# Patient Record
Sex: MALE | Race: WHITE | NOT HISPANIC OR LATINO | Employment: OTHER | URBAN - METROPOLITAN AREA
[De-identification: names, ages, dates, MRNs, and addresses within clinical notes are randomized per-mention and may not be internally consistent; named-entity substitution may affect disease eponyms.]

---

## 2023-01-22 PROBLEM — R35.1 NOCTURIA: Status: ACTIVE | Noted: 2023-01-22
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
## 2023-01-22 PROBLEM — Z98.890 H/O AORTIC ARCH REPAIR: Status: ACTIVE | Noted: 2023-01-22

## 2023-01-22 PROBLEM — G47.33 OBSTRUCTIVE SLEEP APNEA: Status: ACTIVE | Noted: 2023-01-22

## 2023-01-22 PROBLEM — C61 PROSTATE CA (MULTI): Status: ACTIVE | Noted: 2023-01-22

## 2023-01-22 PROBLEM — R60.9 EDEMA: Status: ACTIVE | Noted: 2023-01-22

## 2023-01-22 PROBLEM — R06.00 DYSPNEA: Status: ACTIVE | Noted: 2023-01-22

## 2023-01-22 PROBLEM — E78.5 HYPERLIPIDEMIA: Status: ACTIVE | Noted: 2023-01-22

## 2023-01-22 PROBLEM — E78.2 MIXED HYPERLIPIDEMIA: Status: ACTIVE | Noted: 2023-01-22

## 2023-01-22 PROBLEM — R07.81 ANTERIOR PLEURITIC PAIN: Status: ACTIVE | Noted: 2023-01-22

## 2023-01-22 PROBLEM — R94.31 ABNORMAL EKG: Status: ACTIVE | Noted: 2023-01-22

## 2023-01-22 PROBLEM — M65.332 TRIGGER MIDDLE FINGER OF LEFT HAND: Status: ACTIVE | Noted: 2023-01-22

## 2023-01-22 PROBLEM — I34.0 NON-RHEUMATIC MITRAL REGURGITATION: Status: ACTIVE | Noted: 2023-01-22

## 2023-01-22 PROBLEM — I10 HYPERTENSION: Status: ACTIVE | Noted: 2023-01-22

## 2023-01-22 PROBLEM — D17.1 LIPOMA OF BACK: Status: ACTIVE | Noted: 2023-01-22

## 2023-01-22 PROBLEM — I25.10 ATHEROSCLEROSIS OF CORONARY ARTERY: Status: ACTIVE | Noted: 2023-01-22

## 2023-01-22 PROBLEM — R97.20 ELEVATED PSA: Status: ACTIVE | Noted: 2023-01-22

## 2023-01-22 PROBLEM — I71.21 ASCENDING AORTIC ANEURYSM (CMS-HCC): Status: ACTIVE | Noted: 2023-01-22

## 2023-01-22 RX ORDER — LOSARTAN POTASSIUM 50 MG/1
1 TABLET ORAL ONCE
COMMUNITY
Start: 2021-08-03 | End: 2023-09-27 | Stop reason: SDUPTHER

## 2023-01-22 RX ORDER — ROSUVASTATIN CALCIUM 40 MG/1
1 TABLET, COATED ORAL ONCE
COMMUNITY
Start: 2019-02-19 | End: 2023-08-31 | Stop reason: SDUPTHER

## 2023-01-22 RX ORDER — MULTIVITAMIN WITH MINERALS
1 TABLET ORAL ONCE
COMMUNITY
Start: 2016-01-18 | End: 2023-09-27 | Stop reason: WASHOUT

## 2023-01-22 RX ORDER — EZETIMIBE 10 MG/1
1 TABLET ORAL ONCE
COMMUNITY
Start: 2022-03-17 | End: 2023-04-17

## 2023-01-22 RX ORDER — NAPROXEN SODIUM 220 MG/1
81 TABLET, FILM COATED ORAL
COMMUNITY
Start: 2016-01-18 | End: 2023-12-08 | Stop reason: SDUPTHER

## 2023-01-22 RX ORDER — TADALAFIL 20 MG/1
TABLET ORAL
COMMUNITY
Start: 2021-07-27 | End: 2023-09-27 | Stop reason: WASHOUT

## 2023-01-22 RX ORDER — LOSARTAN POTASSIUM 25 MG/1
1 TABLET ORAL ONCE
COMMUNITY
Start: 2022-02-04 | End: 2023-09-27 | Stop reason: WASHOUT

## 2023-01-22 RX ORDER — METOPROLOL TARTRATE 50 MG/1
TABLET ORAL
COMMUNITY
Start: 2021-01-10 | End: 2023-04-10 | Stop reason: SDUPTHER

## 2023-01-22 RX ORDER — AMLODIPINE BESYLATE 10 MG/1
1 TABLET ORAL ONCE
COMMUNITY
Start: 2021-07-27 | End: 2023-06-08

## 2023-03-08 ENCOUNTER — OFFICE VISIT (OUTPATIENT)
Dept: PRIMARY CARE | Facility: CLINIC | Age: 68
End: 2023-03-08
Payer: MEDICARE

## 2023-03-08 VITALS
TEMPERATURE: 98.7 F | BODY MASS INDEX: 32.54 KG/M2 | HEART RATE: 55 BPM | SYSTOLIC BLOOD PRESSURE: 141 MMHG | OXYGEN SATURATION: 94 % | DIASTOLIC BLOOD PRESSURE: 55 MMHG | WEIGHT: 214 LBS

## 2023-03-08 DIAGNOSIS — Z12.11 COLON CANCER SCREENING: ICD-10-CM

## 2023-03-08 DIAGNOSIS — E78.2 MIXED HYPERLIPIDEMIA: ICD-10-CM

## 2023-03-08 DIAGNOSIS — G47.33 OBSTRUCTIVE SLEEP APNEA: ICD-10-CM

## 2023-03-08 DIAGNOSIS — E66.1 CLASS 1 DRUG-INDUCED OBESITY WITH SERIOUS COMORBIDITY AND BODY MASS INDEX (BMI) OF 32.0 TO 32.9 IN ADULT: ICD-10-CM

## 2023-03-08 DIAGNOSIS — I10 HYPERTENSION, UNSPECIFIED TYPE: ICD-10-CM

## 2023-03-08 DIAGNOSIS — C61 PROSTATE CA (MULTI): Primary | ICD-10-CM

## 2023-03-08 DIAGNOSIS — Z98.890 H/O AORTIC ARCH REPAIR: ICD-10-CM

## 2023-03-08 DIAGNOSIS — M17.0 OSTEOARTHRITIS OF BOTH LOWER LEGS: ICD-10-CM

## 2023-03-08 PROBLEM — E66.811 CLASS 1 DRUG-INDUCED OBESITY WITH SERIOUS COMORBIDITY AND BODY MASS INDEX (BMI) OF 32.0 TO 32.9 IN ADULT: Status: ACTIVE | Noted: 2023-03-08

## 2023-03-08 PROBLEM — D17.1 LIPOMA OF BACK: Status: RESOLVED | Noted: 2023-01-22 | Resolved: 2023-03-08

## 2023-03-08 PROBLEM — M65.332 TRIGGER MIDDLE FINGER OF LEFT HAND: Status: RESOLVED | Noted: 2023-01-22 | Resolved: 2023-03-08

## 2023-03-08 PROBLEM — R60.9 EDEMA: Status: RESOLVED | Noted: 2023-01-22 | Resolved: 2023-03-08

## 2023-03-08 PROCEDURE — 3008F BODY MASS INDEX DOCD: CPT | Performed by: STUDENT IN AN ORGANIZED HEALTH CARE EDUCATION/TRAINING PROGRAM

## 2023-03-08 PROCEDURE — 1159F MED LIST DOCD IN RCRD: CPT | Performed by: STUDENT IN AN ORGANIZED HEALTH CARE EDUCATION/TRAINING PROGRAM

## 2023-03-08 PROCEDURE — 99214 OFFICE O/P EST MOD 30 MIN: CPT | Performed by: STUDENT IN AN ORGANIZED HEALTH CARE EDUCATION/TRAINING PROGRAM

## 2023-03-08 PROCEDURE — 3077F SYST BP >= 140 MM HG: CPT | Performed by: STUDENT IN AN ORGANIZED HEALTH CARE EDUCATION/TRAINING PROGRAM

## 2023-03-08 PROCEDURE — 3078F DIAST BP <80 MM HG: CPT | Performed by: STUDENT IN AN ORGANIZED HEALTH CARE EDUCATION/TRAINING PROGRAM

## 2023-03-08 PROCEDURE — 1160F RVW MEDS BY RX/DR IN RCRD: CPT | Performed by: STUDENT IN AN ORGANIZED HEALTH CARE EDUCATION/TRAINING PROGRAM

## 2023-03-08 RX ORDER — ACETAMINOPHEN 325 MG/1
TABLET ORAL
COMMUNITY
Start: 2022-10-28 | End: 2023-03-08 | Stop reason: WASHOUT

## 2023-03-08 RX ORDER — PREDNISONE 5 MG/1
1 TABLET ORAL DAILY
COMMUNITY
Start: 2022-06-13 | End: 2023-03-08 | Stop reason: WASHOUT

## 2023-03-08 RX ORDER — IBUPROFEN 600 MG/1
600 TABLET ORAL EVERY 6 HOURS
COMMUNITY
Start: 2022-10-28 | End: 2023-03-08 | Stop reason: WASHOUT

## 2023-03-08 RX ORDER — AZITHROMYCIN 250 MG/1
TABLET, FILM COATED ORAL
COMMUNITY
Start: 2022-09-23 | End: 2023-03-08 | Stop reason: WASHOUT

## 2023-03-08 SDOH — HEALTH STABILITY: PHYSICAL HEALTH: ON AVERAGE, HOW MANY MINUTES DO YOU ENGAGE IN EXERCISE AT THIS LEVEL?: 30 MIN

## 2023-03-08 SDOH — HEALTH STABILITY: PHYSICAL HEALTH: ON AVERAGE, HOW MANY DAYS PER WEEK DO YOU ENGAGE IN MODERATE TO STRENUOUS EXERCISE (LIKE A BRISK WALK)?: 1 DAY

## 2023-03-08 ASSESSMENT — PATIENT HEALTH QUESTIONNAIRE - PHQ9
2. FEELING DOWN, DEPRESSED OR HOPELESS: NOT AT ALL
1. LITTLE INTEREST OR PLEASURE IN DOING THINGS: NOT AT ALL
SUM OF ALL RESPONSES TO PHQ9 QUESTIONS 1 & 2: 0

## 2023-03-08 NOTE — ASSESSMENT & PLAN NOTE
# Obesity: BMI > 30  - counselled on wt loss via diet, exercise, and other lifestyle modifications

## 2023-03-08 NOTE — PATIENT INSTRUCTIONS
Please stop at the lab ([address]) to complete your blood and/or urine work that I've ordered for you.    I will contact you with the results at my soonest convenience. I strongly urge you to use [employer] as this is the quickest and easiest way to access your results and recieve my correspondences.
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
## 2023-04-10 DIAGNOSIS — Z00.00 HEALTHCARE MAINTENANCE: ICD-10-CM

## 2023-04-10 RX ORDER — METOPROLOL TARTRATE 50 MG/1
50 TABLET ORAL 2 TIMES DAILY
Qty: 180 TABLET | Refills: 1 | Status: SHIPPED | OUTPATIENT
Start: 2023-04-10 | End: 2023-04-13 | Stop reason: SDUPTHER

## 2023-04-13 DIAGNOSIS — Z00.00 HEALTHCARE MAINTENANCE: ICD-10-CM

## 2023-04-13 RX ORDER — METOPROLOL TARTRATE 50 MG/1
50 TABLET ORAL 2 TIMES DAILY
Qty: 180 TABLET | Refills: 3 | Status: SHIPPED | OUTPATIENT
Start: 2023-04-13 | End: 2023-08-23 | Stop reason: SDUPTHER

## 2023-04-14 DIAGNOSIS — Z00.00 HEALTHCARE MAINTENANCE: ICD-10-CM

## 2023-04-17 RX ORDER — EZETIMIBE 10 MG/1
10 TABLET ORAL DAILY
Qty: 90 TABLET | Refills: 0 | Status: SHIPPED | OUTPATIENT
Start: 2023-04-17 | End: 2023-07-24

## 2023-06-07 DIAGNOSIS — I10 HYPERTENSION, UNSPECIFIED TYPE: ICD-10-CM

## 2023-06-08 RX ORDER — AMLODIPINE BESYLATE 10 MG/1
10 TABLET ORAL DAILY
Qty: 90 TABLET | Refills: 3 | Status: SHIPPED | OUTPATIENT
Start: 2023-06-08 | End: 2023-09-27 | Stop reason: SDUPTHER

## 2023-07-22 DIAGNOSIS — Z00.00 HEALTHCARE MAINTENANCE: ICD-10-CM

## 2023-07-24 RX ORDER — EZETIMIBE 10 MG/1
10 TABLET ORAL DAILY
Qty: 90 TABLET | Refills: 0 | Status: SHIPPED | OUTPATIENT
Start: 2023-07-24 | End: 2023-09-27 | Stop reason: SDUPTHER

## 2023-08-23 DIAGNOSIS — Z00.00 HEALTHCARE MAINTENANCE: ICD-10-CM

## 2023-08-23 RX ORDER — METOPROLOL TARTRATE 50 MG/1
50 TABLET ORAL 2 TIMES DAILY
Qty: 180 TABLET | Refills: 3 | Status: SHIPPED | OUTPATIENT
Start: 2023-08-23 | End: 2023-09-27 | Stop reason: WASHOUT

## 2023-08-25 PROBLEM — R94.31 ABNORMAL ELECTROCARDIOGRAM: Status: ACTIVE | Noted: 2020-08-13

## 2023-08-25 PROBLEM — E83.52 HYPERCALCEMIA: Status: ACTIVE | Noted: 2023-08-25

## 2023-08-25 PROBLEM — I34.0 MITRAL REGURGITATION: Status: ACTIVE | Noted: 2023-08-25

## 2023-08-25 PROBLEM — R31.0 GROSS HEMATURIA: Status: ACTIVE | Noted: 2023-08-25

## 2023-08-25 PROBLEM — I48.91 ATRIAL FIBRILLATION (MULTI): Status: ACTIVE | Noted: 2020-08-13

## 2023-08-25 PROBLEM — R97.20 RAISED PROSTATE SPECIFIC ANTIGEN: Status: ACTIVE | Noted: 2023-08-25

## 2023-08-25 PROBLEM — R33.9 URINARY RETENTION: Status: ACTIVE | Noted: 2023-08-25

## 2023-08-25 PROBLEM — R14.0 ABDOMINAL BLOATING: Status: ACTIVE | Noted: 2023-08-25

## 2023-08-25 PROBLEM — R23.2 HOT FLASHES: Status: ACTIVE | Noted: 2023-08-25

## 2023-08-25 RX ORDER — TADALAFIL 10 MG/1
1 TABLET ORAL AS NEEDED
COMMUNITY
Start: 2023-06-07 | End: 2023-09-27 | Stop reason: WASHOUT

## 2023-08-25 RX ORDER — ACETAMINOPHEN 325 MG/1
2 TABLET ORAL EVERY 6 HOURS
COMMUNITY
End: 2023-09-27 | Stop reason: WASHOUT

## 2023-08-25 RX ORDER — BENZONATATE 100 MG/1
1 CAPSULE ORAL AS NEEDED
COMMUNITY
Start: 2023-06-29 | End: 2023-09-27 | Stop reason: WASHOUT

## 2023-08-31 DIAGNOSIS — E78.5 HYPERLIPIDEMIA, UNSPECIFIED HYPERLIPIDEMIA TYPE: ICD-10-CM

## 2023-08-31 RX ORDER — ROSUVASTATIN CALCIUM 40 MG/1
40 TABLET, COATED ORAL DAILY
Qty: 90 TABLET | Refills: 2 | Status: SHIPPED | OUTPATIENT
Start: 2023-08-31 | End: 2023-09-27 | Stop reason: SDUPTHER

## 2023-09-27 ENCOUNTER — OFFICE VISIT (OUTPATIENT)
Dept: PRIMARY CARE | Facility: CLINIC | Age: 68
End: 2023-09-27
Payer: MEDICARE

## 2023-09-27 VITALS
OXYGEN SATURATION: 97 % | BODY MASS INDEX: 32.1 KG/M2 | HEART RATE: 85 BPM | DIASTOLIC BLOOD PRESSURE: 87 MMHG | SYSTOLIC BLOOD PRESSURE: 151 MMHG | WEIGHT: 205 LBS | TEMPERATURE: 97.8 F

## 2023-09-27 DIAGNOSIS — Z00.00 MEDICARE ANNUAL WELLNESS VISIT, SUBSEQUENT: ICD-10-CM

## 2023-09-27 DIAGNOSIS — I10 HYPERTENSION, UNSPECIFIED TYPE: ICD-10-CM

## 2023-09-27 DIAGNOSIS — E66.1 CLASS 1 DRUG-INDUCED OBESITY WITH SERIOUS COMORBIDITY AND BODY MASS INDEX (BMI) OF 32.0 TO 32.9 IN ADULT: ICD-10-CM

## 2023-09-27 DIAGNOSIS — G47.33 OBSTRUCTIVE SLEEP APNEA: ICD-10-CM

## 2023-09-27 DIAGNOSIS — E78.5 HYPERLIPIDEMIA, UNSPECIFIED HYPERLIPIDEMIA TYPE: ICD-10-CM

## 2023-09-27 DIAGNOSIS — Z00.00 HEALTHCARE MAINTENANCE: Primary | ICD-10-CM

## 2023-09-27 DIAGNOSIS — Z98.890 H/O AORTIC ARCH REPAIR: ICD-10-CM

## 2023-09-27 DIAGNOSIS — I34.0 NONRHEUMATIC MITRAL VALVE REGURGITATION: ICD-10-CM

## 2023-09-27 DIAGNOSIS — Z12.11 ENCOUNTER FOR SCREENING FOR MALIGNANT NEOPLASM OF COLON: ICD-10-CM

## 2023-09-27 DIAGNOSIS — C61 PROSTATE CA (MULTI): ICD-10-CM

## 2023-09-27 PROBLEM — I71.21 ASCENDING AORTIC ANEURYSM (CMS-HCC): Status: RESOLVED | Noted: 2023-01-22 | Resolved: 2023-09-27

## 2023-09-27 PROBLEM — R35.1 NOCTURIA: Status: RESOLVED | Noted: 2023-01-22 | Resolved: 2023-09-27

## 2023-09-27 PROBLEM — R94.31 ABNORMAL ELECTROCARDIOGRAM: Status: RESOLVED | Noted: 2020-08-13 | Resolved: 2023-09-27

## 2023-09-27 PROBLEM — R97.20 RAISED PROSTATE SPECIFIC ANTIGEN: Status: RESOLVED | Noted: 2023-08-25 | Resolved: 2023-09-27

## 2023-09-27 PROBLEM — R94.31 ABNORMAL EKG: Status: RESOLVED | Noted: 2023-01-22 | Resolved: 2023-09-27

## 2023-09-27 PROBLEM — R06.00 DYSPNEA: Status: RESOLVED | Noted: 2023-01-22 | Resolved: 2023-09-27

## 2023-09-27 PROBLEM — R97.20 ELEVATED PSA: Status: RESOLVED | Noted: 2023-01-22 | Resolved: 2023-09-27

## 2023-09-27 PROBLEM — E78.2 MIXED HYPERLIPIDEMIA: Status: RESOLVED | Noted: 2023-01-22 | Resolved: 2023-09-27

## 2023-09-27 PROBLEM — R31.0 GROSS HEMATURIA: Status: RESOLVED | Noted: 2023-08-25 | Resolved: 2023-09-27

## 2023-09-27 PROBLEM — I48.91 ATRIAL FIBRILLATION (MULTI): Status: RESOLVED | Noted: 2020-08-13 | Resolved: 2023-09-27
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
## 2023-09-27 PROBLEM — R23.2 HOT FLASHES: Status: RESOLVED | Noted: 2023-08-25 | Resolved: 2023-09-27

## 2023-09-27 PROBLEM — E83.52 HYPERCALCEMIA: Status: RESOLVED | Noted: 2023-08-25 | Resolved: 2023-09-27

## 2023-09-27 PROBLEM — M17.0: Status: RESOLVED | Noted: 2023-03-08 | Resolved: 2023-09-27

## 2023-09-27 PROBLEM — R07.81 ANTERIOR PLEURITIC PAIN: Status: RESOLVED | Noted: 2023-01-22 | Resolved: 2023-09-27

## 2023-09-27 PROBLEM — I25.10 ATHEROSCLEROSIS OF CORONARY ARTERY: Status: RESOLVED | Noted: 2023-01-22 | Resolved: 2023-09-27

## 2023-09-27 PROBLEM — R14.0 ABDOMINAL BLOATING: Status: RESOLVED | Noted: 2023-08-25 | Resolved: 2023-09-27

## 2023-09-27 PROBLEM — R33.9 URINARY RETENTION: Status: RESOLVED | Noted: 2023-08-25 | Resolved: 2023-09-27

## 2023-09-27 PROCEDURE — 90662 IIV NO PRSV INCREASED AG IM: CPT | Performed by: STUDENT IN AN ORGANIZED HEALTH CARE EDUCATION/TRAINING PROGRAM

## 2023-09-27 PROCEDURE — 3077F SYST BP >= 140 MM HG: CPT | Performed by: STUDENT IN AN ORGANIZED HEALTH CARE EDUCATION/TRAINING PROGRAM

## 2023-09-27 PROCEDURE — 1160F RVW MEDS BY RX/DR IN RCRD: CPT | Performed by: STUDENT IN AN ORGANIZED HEALTH CARE EDUCATION/TRAINING PROGRAM

## 2023-09-27 PROCEDURE — 3079F DIAST BP 80-89 MM HG: CPT | Performed by: STUDENT IN AN ORGANIZED HEALTH CARE EDUCATION/TRAINING PROGRAM

## 2023-09-27 PROCEDURE — 1159F MED LIST DOCD IN RCRD: CPT | Performed by: STUDENT IN AN ORGANIZED HEALTH CARE EDUCATION/TRAINING PROGRAM

## 2023-09-27 PROCEDURE — G0439 PPPS, SUBSEQ VISIT: HCPCS | Performed by: STUDENT IN AN ORGANIZED HEALTH CARE EDUCATION/TRAINING PROGRAM

## 2023-09-27 PROCEDURE — 1126F AMNT PAIN NOTED NONE PRSNT: CPT | Performed by: STUDENT IN AN ORGANIZED HEALTH CARE EDUCATION/TRAINING PROGRAM

## 2023-09-27 PROCEDURE — G0008 ADMIN INFLUENZA VIRUS VAC: HCPCS | Performed by: STUDENT IN AN ORGANIZED HEALTH CARE EDUCATION/TRAINING PROGRAM

## 2023-09-27 PROCEDURE — 99397 PER PM REEVAL EST PAT 65+ YR: CPT | Performed by: STUDENT IN AN ORGANIZED HEALTH CARE EDUCATION/TRAINING PROGRAM

## 2023-09-27 PROCEDURE — 1170F FXNL STATUS ASSESSED: CPT | Performed by: STUDENT IN AN ORGANIZED HEALTH CARE EDUCATION/TRAINING PROGRAM

## 2023-09-27 PROCEDURE — 3008F BODY MASS INDEX DOCD: CPT | Performed by: STUDENT IN AN ORGANIZED HEALTH CARE EDUCATION/TRAINING PROGRAM

## 2023-09-27 RX ORDER — AMLODIPINE BESYLATE 10 MG/1
10 TABLET ORAL DAILY
Qty: 90 TABLET | Refills: 3 | Status: SHIPPED | OUTPATIENT
Start: 2023-09-27 | End: 2024-01-28 | Stop reason: HOSPADM

## 2023-09-27 RX ORDER — LOSARTAN POTASSIUM 50 MG/1
50 TABLET ORAL DAILY
Qty: 90 TABLET | Refills: 3 | Status: SHIPPED | OUTPATIENT
Start: 2023-09-27 | End: 2024-01-28 | Stop reason: HOSPADM

## 2023-09-27 RX ORDER — EZETIMIBE 10 MG/1
10 TABLET ORAL DAILY
Qty: 90 TABLET | Refills: 3 | Status: SHIPPED | OUTPATIENT
Start: 2023-09-27

## 2023-09-27 RX ORDER — ROSUVASTATIN CALCIUM 40 MG/1
40 TABLET, COATED ORAL DAILY
Qty: 90 TABLET | Refills: 2 | Status: SHIPPED | OUTPATIENT
Start: 2023-09-27

## 2023-09-27 RX ORDER — METOPROLOL SUCCINATE 50 MG/1
50 TABLET, EXTENDED RELEASE ORAL DAILY
Qty: 90 TABLET | Refills: 3 | Status: SHIPPED | OUTPATIENT
Start: 2023-09-27 | End: 2023-10-05 | Stop reason: SDUPTHER

## 2023-09-27 ASSESSMENT — PATIENT HEALTH QUESTIONNAIRE - PHQ9
2. FEELING DOWN, DEPRESSED OR HOPELESS: NOT AT ALL
1. LITTLE INTEREST OR PLEASURE IN DOING THINGS: NOT AT ALL
SUM OF ALL RESPONSES TO PHQ9 QUESTIONS 1 AND 2: 0

## 2023-09-27 ASSESSMENT — ACTIVITIES OF DAILY LIVING (ADL)
GROCERY_SHOPPING: INDEPENDENT
BATHING: INDEPENDENT
MANAGING_FINANCES: INDEPENDENT
DRESSING: INDEPENDENT
DOING_HOUSEWORK: INDEPENDENT
TAKING_MEDICATION: INDEPENDENT
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
## 2023-10-05 ENCOUNTER — HOSPITAL ENCOUNTER (OUTPATIENT)
Dept: CARDIOLOGY | Facility: HOSPITAL | Age: 68
Discharge: HOME | End: 2023-10-05
Payer: MEDICARE

## 2023-10-05 ENCOUNTER — OFFICE VISIT (OUTPATIENT)
Dept: CARDIOLOGY | Facility: HOSPITAL | Age: 68
End: 2023-10-05
Payer: MEDICARE

## 2023-10-05 ENCOUNTER — LAB (OUTPATIENT)
Dept: LAB | Facility: LAB | Age: 68
End: 2023-10-05
Payer: MEDICARE

## 2023-10-05 VITALS
WEIGHT: 206 LBS | BODY MASS INDEX: 31.22 KG/M2 | SYSTOLIC BLOOD PRESSURE: 120 MMHG | DIASTOLIC BLOOD PRESSURE: 60 MMHG | HEIGHT: 68 IN

## 2023-10-05 DIAGNOSIS — I25.10 CORONARY ARTERY DISEASE INVOLVING NATIVE CORONARY ARTERY OF NATIVE HEART WITHOUT ANGINA PECTORIS: ICD-10-CM

## 2023-10-05 DIAGNOSIS — I10 HYPERTENSION, UNSPECIFIED TYPE: Primary | ICD-10-CM

## 2023-10-05 DIAGNOSIS — I10 HYPERTENSION, UNSPECIFIED TYPE: ICD-10-CM

## 2023-10-05 DIAGNOSIS — Z98.890 H/O AORTIC ARCH REPAIR: ICD-10-CM

## 2023-10-05 DIAGNOSIS — E78.2 MIXED HYPERLIPIDEMIA: ICD-10-CM

## 2023-10-05 DIAGNOSIS — I34.0 NONRHEUMATIC MITRAL VALVE REGURGITATION: Primary | ICD-10-CM

## 2023-10-05 LAB
ANION GAP SERPL CALC-SCNC: 12 MMOL/L (ref 10–20)
BUN SERPL-MCNC: 17 MG/DL (ref 6–23)
CALCIUM SERPL-MCNC: 10.2 MG/DL (ref 8.6–10.3)
CHLORIDE SERPL-SCNC: 106 MMOL/L (ref 98–107)
CHOLEST SERPL-MCNC: 123 MG/DL (ref 0–199)
CHOLESTEROL/HDL RATIO: 2.8
CO2 SERPL-SCNC: 27 MMOL/L (ref 21–32)
CREAT SERPL-MCNC: 0.85 MG/DL (ref 0.5–1.3)
GFR SERPL CREATININE-BSD FRML MDRD: >90 ML/MIN/1.73M*2
GLUCOSE SERPL-MCNC: 100 MG/DL (ref 74–99)
HDLC SERPL-MCNC: 43.5 MG/DL
LDLC SERPL CALC-MCNC: 57 MG/DL (ref 140–190)
NON HDL CHOLESTEROL: 80 MG/DL (ref 0–149)
POTASSIUM SERPL-SCNC: 4.8 MMOL/L (ref 3.5–5.3)
SODIUM SERPL-SCNC: 140 MMOL/L (ref 136–145)
TRIGL SERPL-MCNC: 114 MG/DL (ref 0–149)
VLDL: 23 MG/DL (ref 0–40)

## 2023-10-05 PROCEDURE — 3008F BODY MASS INDEX DOCD: CPT | Performed by: INTERNAL MEDICINE

## 2023-10-05 PROCEDURE — 36415 COLL VENOUS BLD VENIPUNCTURE: CPT

## 2023-10-05 PROCEDURE — 3078F DIAST BP <80 MM HG: CPT | Performed by: INTERNAL MEDICINE

## 2023-10-05 PROCEDURE — 1036F TOBACCO NON-USER: CPT | Performed by: INTERNAL MEDICINE

## 2023-10-05 PROCEDURE — 1159F MED LIST DOCD IN RCRD: CPT | Performed by: INTERNAL MEDICINE

## 2023-10-05 PROCEDURE — 93005 ELECTROCARDIOGRAM TRACING: CPT

## 2023-10-05 PROCEDURE — 1160F RVW MEDS BY RX/DR IN RCRD: CPT | Performed by: INTERNAL MEDICINE

## 2023-10-05 PROCEDURE — 80048 BASIC METABOLIC PNL TOTAL CA: CPT

## 2023-10-05 PROCEDURE — 1126F AMNT PAIN NOTED NONE PRSNT: CPT | Performed by: INTERNAL MEDICINE

## 2023-10-05 PROCEDURE — 99214 OFFICE O/P EST MOD 30 MIN: CPT | Mod: 25 | Performed by: INTERNAL MEDICINE

## 2023-10-05 PROCEDURE — 80061 LIPID PANEL: CPT

## 2023-10-05 PROCEDURE — 3074F SYST BP LT 130 MM HG: CPT | Performed by: INTERNAL MEDICINE

## 2023-10-05 PROCEDURE — 93010 ELECTROCARDIOGRAM REPORT: CPT | Performed by: INTERNAL MEDICINE

## 2023-10-05 PROCEDURE — 99214 OFFICE O/P EST MOD 30 MIN: CPT | Performed by: INTERNAL MEDICINE

## 2023-10-05 RX ORDER — METOPROLOL TARTRATE 50 MG/1
75 TABLET ORAL 2 TIMES DAILY
Qty: 270 TABLET | Refills: 3 | Status: SHIPPED | OUTPATIENT
Start: 2023-10-05 | End: 2024-01-28 | Stop reason: HOSPADM

## 2023-10-05 RX ORDER — MULTIVITAMIN
1 TABLET ORAL DAILY
COMMUNITY

## 2023-10-05 RX ORDER — METOPROLOL TARTRATE 50 MG/1
1.5 TABLET ORAL 2 TIMES DAILY
COMMUNITY
End: 2023-10-05 | Stop reason: SDUPTHER

## 2023-10-05 ASSESSMENT — PAIN SCALES - GENERAL: PAINLEVEL: 0-NO PAIN

## 2023-10-05 ASSESSMENT — PATIENT HEALTH QUESTIONNAIRE - PHQ9
2. FEELING DOWN, DEPRESSED OR HOPELESS: NOT AT ALL
SUM OF ALL RESPONSES TO PHQ9 QUESTIONS 1 AND 2: 0
1. LITTLE INTEREST OR PLEASURE IN DOING THINGS: NOT AT ALL

## 2023-10-05 ASSESSMENT — ENCOUNTER SYMPTOMS
LOSS OF SENSATION IN FEET: 0
OCCASIONAL FEELINGS OF UNSTEADINESS: 0

## 2023-10-05 NOTE — PATIENT INSTRUCTIONS
Check BMP and fasting lipids.  Check CT angiogram of the chest.  CT surgery referral to Dr. Arboleda.  Follow up in 3 months with Deisi.

## 2023-10-05 NOTE — PROGRESS NOTES
"Chief Complaint:   Hypertension, Pre-op Clearance, Coronary Artery Disease, and Ascending aorta aneurysm     History Of Present Illness:    Jake Brunner is a 68 y.o. male presents for follow-up.  He walks his dog on a daily basis for up to 1 mile including up hills without chest discomfort.  He has longstanding mild dyspnea when walking briskly up hills that has not changed in the last 5 years.  The patient denies chest pain, palpitations, lightheadedness, syncope, orthopnea, paroxysmal nocturnal dyspnea, lower extremity edema, or bleeding problems.     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    Past Surgical History:  He has a past surgical history that includes Ankle surgery (01/27/2016); Femur fracture surgery (01/27/2016); Other surgical history (01/27/2016); and Other surgical history (01/27/2016).      Social History:  He reports that he has quit smoking. His smoking use included cigarettes. He has never used smokeless tobacco. He reports that he does not drink alcohol and does not use drugs.    Family History:  Family History   Problem Relation Name Age of Onset    Hyperlipidemia Mother      Coronary artery disease Father      Other (MYOCARDIAL INFARCTION) Father          Allergies:  Patient has no known allergies.    Outpatient Medications:  Current Outpatient Medications   Medication Instructions    amLODIPine (NORVASC) 10 mg, oral, Daily    aspirin 81 mg, oral, Daily RT, CHEW AND SWALLOW 1 TABLET DAILY.    ezetimibe (ZETIA) 10 mg, oral, Daily    losartan (COZAAR) 50 mg, oral, Daily, DAILY    metoprolol tartrate (Lopressor) 50 mg tablet " "1.5 tablets, oral, 2 times daily    multivitamin tablet 1 tablet, oral, Daily    rosuvastatin (CRESTOR) 40 mg, oral, Daily, DAILY       Physical Exam:  General: Well-developed well-nourished in no acute distress  HEENT: Normocephalic atraumatic  Neck: Supple, JVP is normal negative hepatojugular reflux 2+ carotid pulses without bruit  Pulmonary: Normal respiratory effort, clear to auscultation  Cardiovascular: No right ventricular heave, normal S1 and S2, 2 out of 6 early peaking systolic ejection murmur at the base, 2 out of 6 holosystolic murmur left lower sternal border and apex no rubs or gallops  Abdomen: Soft nontender nondistended  Extremities: Warm without edema 2+ radial pulses bilaterally   Neurologic: Alert and oriented x3  Psychiatric: Normal mood and affect     Last Labs:  CBC -  Lab Results   Component Value Date    WBC 6.3 07/11/2023    HGB 12.9 (L) 07/11/2023    HCT 38.6 (L) 07/11/2023    MCV 89 07/11/2023     07/11/2023       CMP -  Lab Results   Component Value Date    CALCIUM 11.0 (H) 07/11/2023    PROT 6.9 07/11/2023    ALBUMIN 4.5 07/11/2023    ALBUMIN 4.6 10/05/2021    AST 14 07/11/2023    ALT 12 07/11/2023    ALKPHOS 51 07/11/2023    BILITOT 0.6 07/11/2023       LIPID PANEL -   Lab Results   Component Value Date    CHOL 123 06/01/2022    TRIG 142 06/01/2022    HDL 43.9 06/01/2022    CHHDL 2.8 06/01/2022    LDLF 51 06/01/2022    VLDL 28 06/01/2022       RENAL FUNCTION PANEL -   Lab Results   Component Value Date    GLUCOSE 90 07/11/2023     07/11/2023    K 4.9 07/11/2023     07/11/2023    CO2 29 07/11/2023    ANIONGAP 10 07/11/2023    BUN 15 07/11/2023    CREATININE 0.82 07/11/2023    GFRMALE >90 07/11/2023    CALCIUM 11.0 (H) 07/11/2023    ALBUMIN 4.5 07/11/2023    ALBUMIN 4.6 10/05/2021        No results found for: \"BNP\", \"HGBA1C\"    Last Cardiology Tests:  ECG:  Electrocardiogram performed today that I reviewed shows sinus bradycardia and is otherwise normal.    Echo:  An " echocardiogram from February 21, 2023  CONCLUSIONS:  1. Left ventricular systolic function is normal with a 65-70% estimated ejection fraction.  2. Abnormal septal motion consistent with post-operative status.  3. Spectral Doppler shows an abnormal pattern of left ventricular diastolic filling.  4. The left atrium is moderately dilated.  5. Moderate mitral valve regurgitation.  6. Mild aortic valve regurgitation.      Cardiac Imaging:  MR cardiac morphology and function w and wo IV contrast 8/24/2023    CARDIAC MRI at 3T  Severe eccentrically directed anterior directed MR. RF 44% using the   indirect method.   Normal BIV function and size. Quantitative LVEF 81% and RVEF 49%   Concentric LVH with septal thickening (max 1.5 cm). No DAINA or LVOTO.   No evidence of myocardial inflammation/edema on T2-weighted imaging  Evidence of myocardial fibrosis with elevated ECV 31%   Basal septal, basal-mid anterolateral wall mid-myocardial LGE in a   non-ischemic pattern     LEFT VENTRICLE: 1. Normal LV size (EDVi 35 ml/m2) and hyperdynamic   systolic function (LVEF 81 %).   2. No regional wall motion abnormalities.   3. concentric LVH with septal thickening (max 1.5 cm) with normal   indexed LV mass.   4. Elevated ECV 31%.   5. Normal native T2 values (<50 ms) with normal myocardial signal on   STIR imaging.   6. Following administration of gadolinium, in the early phase, there   is no evidence of LV thrombus or MVO.   7. In the late phase, there is basal septal, basal-mid anterolateral   wall mid-myocardial enhancement in a  non-ischemic pattern    CT Coronary Angiogram 3/10/22  IMPRESSION:  1. There is 3 vessel coronary artery atherosclerotic disease  (LAD  70%, RCA <25%, Circ <25%).  2. Focal calcific plaque in the mid LAD renders segment of the  underlying lumen difficult. This study has been sent to [facility]  for further evaluation.  3. S/p ascending and transverse aorta replacement. The root is native  and the graft  starts approximate 2.7cm from the annulus. Unclear if  there is a pseudoleak within the graft, as no delayed imaging was  performed.  4. Left atrial enlargement.  5. Thoracic aortic atherosclerosis.        Assessment/Plan     In summary Mr. Brunner is a pleasant 68 year-old white male with a past medical history significant for type A aortic dissection status post replacement of his ascending aorta, hypertension, hyperlipidemia, postoperative atrial fibrillation, mitral regurgitation, coronary atherosclerosis on CT, hepatic steatosis, alcohol abuse, and remote traumatic subdural hematoma.  He is relatively asymptomatic from a cardiac perspective.  He has longstanding very mild chronic dyspnea with hills that has not changed in many years.  His MRI was consistent with severe eccentric mitral regurgitation.  I did refer him to CT surgery for consideration of repair.  I ordered a CT angiogram of his chest to evaluate his ascending aorta.  He will require preoperative right and left heart catheterization.  He should continue his current cardiovascular medications.  We will see him back in follow-up in 3 months.    Owen Choi MD

## 2023-10-18 ENCOUNTER — HOSPITAL ENCOUNTER (OUTPATIENT)
Dept: RADIOLOGY | Facility: HOSPITAL | Age: 68
Discharge: HOME | End: 2023-10-18
Payer: MEDICARE

## 2023-10-18 DIAGNOSIS — Z98.890 H/O AORTIC ARCH REPAIR: ICD-10-CM

## 2023-10-18 PROCEDURE — 2550000001 HC RX 255 CONTRASTS: Performed by: INTERNAL MEDICINE

## 2023-10-18 PROCEDURE — 71275 CT ANGIOGRAPHY CHEST: CPT | Performed by: INTERNAL MEDICINE

## 2023-10-18 PROCEDURE — 71275 CT ANGIOGRAPHY CHEST: CPT | Mod: MG

## 2023-10-18 RX ADMIN — IOHEXOL 75 ML: 350 INJECTION, SOLUTION INTRAVENOUS at 09:31

## 2023-10-26 ENCOUNTER — APPOINTMENT (OUTPATIENT)
Dept: CARDIAC SURGERY | Facility: HOSPITAL | Age: 68
End: 2023-10-26
Payer: MEDICARE

## 2023-10-30 NOTE — RESULT ENCOUNTER NOTE
Subsequent addendum of CT report by radiology notes stable chronic upper lobe changes in lungs on CT. Recommend referral to pulmonary (Jaja) for further evaluation.

## 2023-11-06 PROBLEM — S82.251A CLOSED DISPLACED COMMINUTED FRACTURE OF SHAFT OF RIGHT TIBIA: Status: RESOLVED | Noted: 2019-07-31 | Resolved: 2023-11-06

## 2023-11-06 PROBLEM — M79.604 RIGHT LEG PAIN: Status: RESOLVED | Noted: 2019-08-15 | Resolved: 2023-11-06

## 2023-11-06 PROBLEM — M25.511 RIGHT SHOULDER PAIN: Status: RESOLVED | Noted: 2019-08-15 | Resolved: 2023-11-06

## 2023-11-06 PROBLEM — S42.111A CLOSED DISPLACED FRACTURE OF BODY OF RIGHT SCAPULA: Status: RESOLVED | Noted: 2019-07-31 | Resolved: 2023-11-06

## 2023-11-06 PROBLEM — S22.49XA RIBS, MULTIPLE FRACTURES: Status: RESOLVED | Noted: 2019-06-26 | Resolved: 2023-11-06

## 2023-11-06 PROBLEM — S42.021A CLOSED DISPLACED FRACTURE OF SHAFT OF RIGHT CLAVICLE: Status: RESOLVED | Noted: 2019-07-31 | Resolved: 2023-11-06

## 2023-11-06 RX ORDER — ATORVASTATIN CALCIUM 40 MG/1
40 TABLET, FILM COATED ORAL NIGHTLY
Status: ON HOLD | COMMUNITY
Start: 2019-06-29 | End: 2023-12-11 | Stop reason: ALTCHOICE

## 2023-11-06 NOTE — PROGRESS NOTES
Referral from Dr. Choi. Jake comes to discuss treatment recommendations for mitral regurgitation. History of emergent ascending aorta replacement 12/25/2015. History of cad by coronary ct. Past etoh. Jen Bobo RN    Patient referred to clinic for evaluation of mild regurgitation.  Patient has a past medical history of a type I aortic dissection and emergent replacement of his ascending aorta with right axillary artery cannulation.  Recent CT scan demonstrates intact ascending aorta.    Patient has a known history of mitral regurgitation recent echocardiography demonstrates an enlarged left atrium with moderate mitral regurgitation.  There appears to be an eccentric, anteriorly directed jet suggesting posterior leaflet prolapse.  His left ventricle is normal in size and has normal function.  Recent MRI demonstrates moderately severe mitral regurgitation with preserved left ventricular function.    Recommend further evaluation with right and left heart cath and transesophageal echocardiography to better delineate mechanism of mitral regurgitation, feasibility of repair and degree of regurgitation.  Patient will return to clinic after obtaining the studies.

## 2023-11-09 ENCOUNTER — PREP FOR PROCEDURE (OUTPATIENT)
Dept: CARDIOLOGY | Facility: HOSPITAL | Age: 68
End: 2023-11-09

## 2023-11-09 ENCOUNTER — OFFICE VISIT (OUTPATIENT)
Dept: CARDIAC SURGERY | Facility: HOSPITAL | Age: 68
End: 2023-11-09
Payer: MEDICARE

## 2023-11-09 VITALS
WEIGHT: 209 LBS | DIASTOLIC BLOOD PRESSURE: 68 MMHG | BODY MASS INDEX: 31.78 KG/M2 | OXYGEN SATURATION: 95 % | HEART RATE: 55 BPM | SYSTOLIC BLOOD PRESSURE: 163 MMHG

## 2023-11-09 DIAGNOSIS — I34.0 NONRHEUMATIC MITRAL VALVE REGURGITATION: Primary | ICD-10-CM

## 2023-11-09 DIAGNOSIS — I34.0 MITRAL VALVE INSUFFICIENCY, UNSPECIFIED ETIOLOGY: ICD-10-CM

## 2023-11-09 PROCEDURE — 3078F DIAST BP <80 MM HG: CPT | Performed by: THORACIC SURGERY (CARDIOTHORACIC VASCULAR SURGERY)

## 2023-11-09 PROCEDURE — 1036F TOBACCO NON-USER: CPT | Performed by: THORACIC SURGERY (CARDIOTHORACIC VASCULAR SURGERY)

## 2023-11-09 PROCEDURE — 1160F RVW MEDS BY RX/DR IN RCRD: CPT | Performed by: THORACIC SURGERY (CARDIOTHORACIC VASCULAR SURGERY)

## 2023-11-09 PROCEDURE — 3077F SYST BP >= 140 MM HG: CPT | Performed by: THORACIC SURGERY (CARDIOTHORACIC VASCULAR SURGERY)

## 2023-11-09 PROCEDURE — 99214 OFFICE O/P EST MOD 30 MIN: CPT | Performed by: THORACIC SURGERY (CARDIOTHORACIC VASCULAR SURGERY)

## 2023-11-09 PROCEDURE — 1126F AMNT PAIN NOTED NONE PRSNT: CPT | Performed by: THORACIC SURGERY (CARDIOTHORACIC VASCULAR SURGERY)

## 2023-11-09 PROCEDURE — 99204 OFFICE O/P NEW MOD 45 MIN: CPT | Performed by: THORACIC SURGERY (CARDIOTHORACIC VASCULAR SURGERY)

## 2023-11-09 PROCEDURE — 3008F BODY MASS INDEX DOCD: CPT | Performed by: THORACIC SURGERY (CARDIOTHORACIC VASCULAR SURGERY)

## 2023-11-09 PROCEDURE — 1159F MED LIST DOCD IN RCRD: CPT | Performed by: THORACIC SURGERY (CARDIOTHORACIC VASCULAR SURGERY)

## 2023-11-09 RX ORDER — SODIUM CHLORIDE 9 MG/ML
100 INJECTION, SOLUTION INTRAVENOUS CONTINUOUS
Status: CANCELLED | OUTPATIENT
Start: 2023-11-09

## 2023-11-09 ASSESSMENT — PAIN SCALES - GENERAL: PAINLEVEL: 0-NO PAIN

## 2023-11-17 ENCOUNTER — TELEPHONE (OUTPATIENT)
Dept: CARDIOLOGY | Facility: HOSPITAL | Age: 68
End: 2023-11-17
Payer: MEDICARE

## 2023-11-17 DIAGNOSIS — R91.8 GROUND GLASS OPACITY PRESENT ON IMAGING OF LUNG: Primary | ICD-10-CM

## 2023-11-17 NOTE — TELEPHONE ENCOUNTER
----- Message from Owen Choi MD sent at 10/30/2023  4:46 PM EDT -----  Subsequent addendum of CT report by radiology notes stable chronic upper lobe changes in lungs on CT. Recommend referral to pulmonary (Jaja) for further evaluation.     Patient notified and agreeable to plan of care.

## 2023-11-20 LAB
ATRIAL RATE: 54 BPM
P AXIS: 43 DEGREES
P OFFSET: 205 MS
P ONSET: 142 MS
PR INTERVAL: 146 MS
Q ONSET: 215 MS
QRS COUNT: 9 BEATS
QRS DURATION: 88 MS
QT INTERVAL: 430 MS
QTC CALCULATION(BAZETT): 407 MS
QTC FREDERICIA: 414 MS
R AXIS: 10 DEGREES
T AXIS: 51 DEGREES
T OFFSET: 430 MS
VENTRICULAR RATE: 54 BPM

## 2023-12-08 ENCOUNTER — TELEPHONE (OUTPATIENT)
Dept: CARDIOLOGY | Facility: HOSPITAL | Age: 68
End: 2023-12-08
Payer: MEDICARE

## 2023-12-08 DIAGNOSIS — I25.10 CORONARY ARTERY DISEASE INVOLVING NATIVE CORONARY ARTERY OF NATIVE HEART WITHOUT ANGINA PECTORIS: Primary | ICD-10-CM

## 2023-12-08 NOTE — TELEPHONE ENCOUNTER
LM providing pre heart cath instructions.     Per Dr. Choi,   Check BMP/CBC Fri or Sat,   Meds ok.

## 2023-12-11 ENCOUNTER — ANESTHESIA (OUTPATIENT)
Dept: CARDIOLOGY | Facility: HOSPITAL | Age: 68
End: 2023-12-11
Payer: MEDICARE

## 2023-12-11 ENCOUNTER — ANESTHESIA EVENT (OUTPATIENT)
Dept: CARDIOLOGY | Facility: HOSPITAL | Age: 68
End: 2023-12-11
Payer: MEDICARE

## 2023-12-11 ENCOUNTER — HOSPITAL ENCOUNTER (OUTPATIENT)
Dept: CARDIOLOGY | Facility: HOSPITAL | Age: 68
Discharge: HOME | End: 2023-12-11
Payer: MEDICARE

## 2023-12-11 ENCOUNTER — HOSPITAL ENCOUNTER (OUTPATIENT)
Facility: HOSPITAL | Age: 68
Setting detail: OUTPATIENT SURGERY
Discharge: HOME | End: 2023-12-11
Attending: INTERNAL MEDICINE | Admitting: INTERNAL MEDICINE
Payer: MEDICARE

## 2023-12-11 VITALS
RESPIRATION RATE: 16 BRPM | HEART RATE: 49 BPM | DIASTOLIC BLOOD PRESSURE: 58 MMHG | SYSTOLIC BLOOD PRESSURE: 111 MMHG | OXYGEN SATURATION: 98 % | TEMPERATURE: 96.8 F

## 2023-12-11 VITALS
OXYGEN SATURATION: 96 % | RESPIRATION RATE: 16 BRPM | TEMPERATURE: 97.5 F | DIASTOLIC BLOOD PRESSURE: 57 MMHG | HEART RATE: 52 BPM | SYSTOLIC BLOOD PRESSURE: 119 MMHG

## 2023-12-11 DIAGNOSIS — I34.0 NONRHEUMATIC MITRAL VALVE REGURGITATION: ICD-10-CM

## 2023-12-11 DIAGNOSIS — I25.10 CORONARY ARTERY DISEASE INVOLVING NATIVE CORONARY ARTERY OF NATIVE HEART WITHOUT ANGINA PECTORIS: Primary | ICD-10-CM

## 2023-12-11 DIAGNOSIS — Z98.890 H/O AORTIC ARCH REPAIR: ICD-10-CM

## 2023-12-11 DIAGNOSIS — I34.0 MITRAL VALVE INSUFFICIENCY, UNSPECIFIED ETIOLOGY: ICD-10-CM

## 2023-12-11 LAB
ANION GAP SERPL CALC-SCNC: 9 MMOL/L (ref 10–20)
BUN SERPL-MCNC: 19 MG/DL (ref 6–23)
CALCIUM SERPL-MCNC: 9.8 MG/DL (ref 8.6–10.3)
CHLORIDE SERPL-SCNC: 108 MMOL/L (ref 98–107)
CO2 SERPL-SCNC: 25 MMOL/L (ref 21–32)
CREAT SERPL-MCNC: 0.82 MG/DL (ref 0.5–1.3)
ERYTHROCYTE [DISTWIDTH] IN BLOOD BY AUTOMATED COUNT: 14.1 % (ref 11.5–14.5)
GFR SERPL CREATININE-BSD FRML MDRD: >90 ML/MIN/1.73M*2
GLUCOSE SERPL-MCNC: 112 MG/DL (ref 74–99)
HCT VFR BLD AUTO: 39.1 % (ref 41–52)
HGB BLD-MCNC: 13.4 G/DL (ref 13.5–17.5)
HOLD SPECIMEN: NORMAL
MCH RBC QN AUTO: 30.6 PG (ref 26–34)
MCHC RBC AUTO-ENTMCNC: 34.3 G/DL (ref 32–36)
MCV RBC AUTO: 89 FL (ref 80–100)
NRBC BLD-RTO: 0 /100 WBCS (ref 0–0)
PLATELET # BLD AUTO: 204 X10*3/UL (ref 150–450)
POTASSIUM SERPL-SCNC: 4.2 MMOL/L (ref 3.5–5.3)
RBC # BLD AUTO: 4.38 X10*6/UL (ref 4.5–5.9)
RIGHT VENTRICLE PEAK SYSTOLIC PRESSURE: 42.9
SODIUM SERPL-SCNC: 138 MMOL/L (ref 136–145)
WBC # BLD AUTO: 6.6 X10*3/UL (ref 4.4–11.3)

## 2023-12-11 PROCEDURE — 93456 R HRT CORONARY ARTERY ANGIO: CPT | Performed by: INTERNAL MEDICINE

## 2023-12-11 PROCEDURE — 99153 MOD SED SAME PHYS/QHP EA: CPT | Performed by: INTERNAL MEDICINE

## 2023-12-11 PROCEDURE — C1894 INTRO/SHEATH, NON-LASER: HCPCS | Performed by: INTERNAL MEDICINE

## 2023-12-11 PROCEDURE — 36415 COLL VENOUS BLD VENIPUNCTURE: CPT | Performed by: INTERNAL MEDICINE

## 2023-12-11 PROCEDURE — 85027 COMPLETE CBC AUTOMATED: CPT | Performed by: INTERNAL MEDICINE

## 2023-12-11 PROCEDURE — 93325 DOPPLER ECHO COLOR FLOW MAPG: CPT | Performed by: INTERNAL MEDICINE

## 2023-12-11 PROCEDURE — 93312 ECHO TRANSESOPHAGEAL: CPT | Performed by: INTERNAL MEDICINE

## 2023-12-11 PROCEDURE — A93312 PR ECHO HEART,TRANSESOPHAGEAL,COMPLETE: Performed by: ANESTHESIOLOGIST ASSISTANT

## 2023-12-11 PROCEDURE — 93320 DOPPLER ECHO COMPLETE: CPT | Performed by: INTERNAL MEDICINE

## 2023-12-11 PROCEDURE — 2500000001 HC RX 250 WO HCPCS SELF ADMINISTERED DRUGS (ALT 637 FOR MEDICARE OP): Performed by: INTERNAL MEDICINE

## 2023-12-11 PROCEDURE — 99152 MOD SED SAME PHYS/QHP 5/>YRS: CPT | Performed by: INTERNAL MEDICINE

## 2023-12-11 PROCEDURE — 2500000004 HC RX 250 GENERAL PHARMACY W/ HCPCS (ALT 636 FOR OP/ED): Performed by: ANESTHESIOLOGIST ASSISTANT

## 2023-12-11 PROCEDURE — 99203 OFFICE O/P NEW LOW 30 MIN: CPT | Performed by: PHYSICIAN ASSISTANT

## 2023-12-11 PROCEDURE — 93312 ECHO TRANSESOPHAGEAL: CPT

## 2023-12-11 PROCEDURE — 2500000005 HC RX 250 GENERAL PHARMACY W/O HCPCS: Performed by: INTERNAL MEDICINE

## 2023-12-11 PROCEDURE — 2500000004 HC RX 250 GENERAL PHARMACY W/ HCPCS (ALT 636 FOR OP/ED): Performed by: INTERNAL MEDICINE

## 2023-12-11 PROCEDURE — A93312 PR ECHO HEART,TRANSESOPHAGEAL,COMPLETE: Performed by: STUDENT IN AN ORGANIZED HEALTH CARE EDUCATION/TRAINING PROGRAM

## 2023-12-11 PROCEDURE — 7100000010 HC PHASE TWO TIME - EACH INCREMENTAL 1 MINUTE: Performed by: INTERNAL MEDICINE

## 2023-12-11 PROCEDURE — 3700000001 HC GENERAL ANESTHESIA TIME - INITIAL BASE CHARGE

## 2023-12-11 PROCEDURE — 80048 BASIC METABOLIC PNL TOTAL CA: CPT | Performed by: INTERNAL MEDICINE

## 2023-12-11 PROCEDURE — 3700000002 HC GENERAL ANESTHESIA TIME - EACH INCREMENTAL 1 MINUTE

## 2023-12-11 PROCEDURE — 2550000001 HC RX 255 CONTRASTS: Performed by: INTERNAL MEDICINE

## 2023-12-11 PROCEDURE — C1769 GUIDE WIRE: HCPCS | Performed by: INTERNAL MEDICINE

## 2023-12-11 PROCEDURE — 99152 MOD SED SAME PHYS/QHP 5/>YRS: CPT

## 2023-12-11 PROCEDURE — 2720000007 HC OR 272 NO HCPCS: Performed by: INTERNAL MEDICINE

## 2023-12-11 PROCEDURE — 7100000009 HC PHASE TWO TIME - INITIAL BASE CHARGE: Performed by: INTERNAL MEDICINE

## 2023-12-11 PROCEDURE — 93461 R&L HRT ART/VENTRICLE ANGIO: CPT | Performed by: INTERNAL MEDICINE

## 2023-12-11 RX ORDER — SODIUM CHLORIDE 9 MG/ML
100 INJECTION, SOLUTION INTRAVENOUS CONTINUOUS
Status: DISCONTINUED | OUTPATIENT
Start: 2023-12-11 | End: 2023-12-12 | Stop reason: HOSPADM

## 2023-12-11 RX ORDER — MIDAZOLAM HYDROCHLORIDE 1 MG/ML
INJECTION, SOLUTION INTRAMUSCULAR; INTRAVENOUS AS NEEDED
Status: DISCONTINUED | OUTPATIENT
Start: 2023-12-11 | End: 2023-12-11 | Stop reason: HOSPADM

## 2023-12-11 RX ORDER — FENTANYL CITRATE 50 UG/ML
INJECTION, SOLUTION INTRAMUSCULAR; INTRAVENOUS AS NEEDED
Status: DISCONTINUED | OUTPATIENT
Start: 2023-12-11 | End: 2023-12-11 | Stop reason: HOSPADM

## 2023-12-11 RX ORDER — LIDOCAINE HYDROCHLORIDE 20 MG/ML
SOLUTION OROPHARYNGEAL AS NEEDED
Status: DISCONTINUED | OUTPATIENT
Start: 2023-12-11 | End: 2023-12-12 | Stop reason: HOSPADM

## 2023-12-11 RX ORDER — LIDOCAINE HYDROCHLORIDE 10 MG/ML
INJECTION, SOLUTION EPIDURAL; INFILTRATION; INTRACAUDAL; PERINEURAL AS NEEDED
Status: DISCONTINUED | OUTPATIENT
Start: 2023-12-11 | End: 2023-12-11 | Stop reason: HOSPADM

## 2023-12-11 RX ORDER — PROPOFOL 10 MG/ML
INJECTION, EMULSION INTRAVENOUS AS NEEDED
Status: DISCONTINUED | OUTPATIENT
Start: 2023-12-11 | End: 2023-12-11

## 2023-12-11 RX ORDER — HEPARIN SODIUM 1000 [USP'U]/ML
INJECTION, SOLUTION INTRAVENOUS; SUBCUTANEOUS AS NEEDED
Status: DISCONTINUED | OUTPATIENT
Start: 2023-12-11 | End: 2023-12-11 | Stop reason: HOSPADM

## 2023-12-11 RX ORDER — ASPIRIN 81 MG/1
1 TABLET ORAL DAILY
COMMUNITY

## 2023-12-11 RX ORDER — SODIUM CHLORIDE 9 MG/ML
1 INJECTION, SOLUTION INTRAVENOUS CONTINUOUS
Status: CANCELLED | OUTPATIENT
Start: 2023-12-11 | End: 2023-12-11

## 2023-12-11 RX ORDER — VERAPAMIL HYDROCHLORIDE 2.5 MG/ML
INJECTION, SOLUTION INTRAVENOUS AS NEEDED
Status: DISCONTINUED | OUTPATIENT
Start: 2023-12-11 | End: 2023-12-11 | Stop reason: HOSPADM

## 2023-12-11 RX ADMIN — BENZOCAINE, BUTAMBEN, AND TETRACAINE HYDROCHLORIDE 2 SPRAY: .028; .004; .004 AEROSOL, SPRAY TOPICAL at 07:46

## 2023-12-11 RX ADMIN — PROPOFOL 50 MG: 10 INJECTION, EMULSION INTRAVENOUS at 07:54

## 2023-12-11 RX ADMIN — SODIUM CHLORIDE, SODIUM LACTATE, POTASSIUM CHLORIDE, AND CALCIUM CHLORIDE: .6; .31; .03; .02 INJECTION, SOLUTION INTRAVENOUS at 07:43

## 2023-12-11 RX ADMIN — PROPOFOL 50 MG: 10 INJECTION, EMULSION INTRAVENOUS at 08:08

## 2023-12-11 RX ADMIN — PROPOFOL 50 MG: 10 INJECTION, EMULSION INTRAVENOUS at 07:47

## 2023-12-11 RX ADMIN — LIDOCAINE HYDROCHLORIDE 15 ML: 20 SOLUTION ORAL; TOPICAL at 07:45

## 2023-12-11 RX ADMIN — SODIUM CHLORIDE 100 ML/HR: 9 INJECTION, SOLUTION INTRAVENOUS at 07:34

## 2023-12-11 RX ADMIN — PROPOFOL 50 MG: 10 INJECTION, EMULSION INTRAVENOUS at 08:00

## 2023-12-11 SDOH — HEALTH STABILITY: MENTAL HEALTH: CURRENT SMOKER: 0

## 2023-12-11 ASSESSMENT — PAIN - FUNCTIONAL ASSESSMENT
PAIN_FUNCTIONAL_ASSESSMENT: 0-10

## 2023-12-11 ASSESSMENT — PAIN SCALES - GENERAL
PAINLEVEL_OUTOF10: 0 - NO PAIN

## 2023-12-11 ASSESSMENT — COLUMBIA-SUICIDE SEVERITY RATING SCALE - C-SSRS
2. HAVE YOU ACTUALLY HAD ANY THOUGHTS OF KILLING YOURSELF?: NO
6. HAVE YOU EVER DONE ANYTHING, STARTED TO DO ANYTHING, OR PREPARED TO DO ANYTHING TO END YOUR LIFE?: NO
1. IN THE PAST MONTH, HAVE YOU WISHED YOU WERE DEAD OR WISHED YOU COULD GO TO SLEEP AND NOT WAKE UP?: NO

## 2023-12-11 NOTE — POST-PROCEDURE NOTE
Physician Transition of Care Summary  Invasive Cardiovascular Lab    Procedure Date: 12/11/2023  Attending:    * Owen Choi - Primary  Resident/Fellow/Other Assistant: Surgeon(s) and Role:    Indications:   Pre-op Diagnosis     * Nonrheumatic mitral valve regurgitation [I34.0]    Post-procedure diagnosis:   Post-op Diagnosis     * Nonrheumatic mitral valve regurgitation [I34.0]    Procedure(s):   Left And Right Heart Cath, With LV  00497 - VT R & L HRT CATH WINJX HRT ART& L VENTR IMG        Procedure Findings:   Nonobstructive CAD    Description of the Procedure:   R antecubital vein  R radial artery    Complications:   none    Stents/Implants:   none    Anticoagulation/Antiplatelet Plan:   asa    Estimated Blood Loss:   5 mL    Anesthesia: Moderate Sedation Anesthesia Staff: No anesthesia staff entered.    Any Specimen(s) Removed:   No specimens collected during this procedure.    Disposition:   home      Electronically signed by: Owen Choi MD, 12/11/2023 11:46 AM

## 2023-12-11 NOTE — ANESTHESIA POSTPROCEDURE EVALUATION
Patient: Jake Brunner    Procedure Summary       Date: 12/11/23 Room / Location: [facility]; [facility]    Anesthesia Start: 0744 Anesthesia Stop: 0820    Procedure: TRANSESOPHAGEAL ECHO (EULA) Diagnosis:       Mitral valve insufficiency, unspecified etiology      (evaluate degree of mitral regurgitation)    Scheduled Providers: Owen Choi MD; Anastasiya Jaquez MD; ANTHONY Pastor Responsible Provider: Anastasiya Jaquez MD    Anesthesia Type: MAC ASA Status: 3            Anesthesia Type: No value filed.    Vitals Value Taken Time   /58 12/11/23 0859   Temp  12/11/23 1047   Pulse 49 12/11/23 0859   Resp 16 12/11/23 0859   SpO2 96 % 12/11/23 0859       Anesthesia Post Evaluation    Patient location during evaluation: PACU  Patient participation: complete - patient participated  Level of consciousness: awake and alert  Pain management: adequate  Airway patency: patent  Cardiovascular status: acceptable  Respiratory status: acceptable  Hydration status: acceptable  Postoperative Nausea and Vomiting: none  Comments: Denies nausea         No notable events documented.
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
## 2023-12-11 NOTE — ANESTHESIA PREPROCEDURE EVALUATION
Patient: Jake Brunner    Procedure Information       Date/Time: 12/11/23 0900    Procedure: Left And Right Heart Cath, With LV - Scheduled 12/11 @ 9:00am, EULA w/ anesthesia @ 7:30am    Location: [facility] LAB 2 / Virtual [facility] Cardiac Cath Lab    Providers: Owen Choi MD            Relevant Problems   Anesthesia (within normal limits)      Cardiovascular   (+) Coronary artery disease involving native coronary artery of native heart without angina pectoris   (+) Essential hypertension   (+) Hyperlipidemia   (+) Mitral regurgitation      Endocrine   (+) Class 1 drug-induced obesity with serious comorbidity and body mass index (BMI) of 32.0 to 32.9 in adult      /Renal (within normal limits)      Pulmonary   (+) Obstructive sleep apnea       Clinical information reviewed:                   NPO Detail:  NPO/Void Status  Date of Last Liquid: 12/11/23  Time of Last Liquid: 0000  Date of Last Solid: 12/10/23  Time of Last Solid: 2000       There were no vitals filed for this visit.    Past Surgical History:   Procedure Laterality Date    ANKLE SURGERY  01/27/2016    Ankle Surgery    FEMUR FRACTURE SURGERY  01/27/2016    Femur Repair    OTHER SURGICAL HISTORY  01/27/2016    Aortic Aneurysm Repair Ascending Aorta    OTHER SURGICAL HISTORY  01/27/2016    Wrist Surgery     Past Medical History:   Diagnosis Date    Alcohol abuse, in remission 01/27/2016    History of alcohol abuse    Closed displaced comminuted fracture of shaft of right tibia 07/31/2019    Closed displaced fracture of body of right scapula 07/31/2019    Closed displaced fracture of shaft of right clavicle 07/31/2019    Edema 01/22/2023    Essential (primary) hypertension 09/06/2022    Hypertension    Ribs, multiple fractures 06/26/2019    Right leg pain 08/15/2019    Right shoulder pain 08/15/2019    Trigger middle finger of left hand 01/22/2023     No current facility-administered medications for this encounter.    Current Outpatient Medications:      amLODIPine (Norvasc) 10 mg tablet, Take 1 tablet (10 mg) by mouth once daily. (Patient taking differently: Take 0.5 mg by mouth once daily.), Disp: 90 tablet, Rfl: 3    atorvastatin (Lipitor) 40 mg tablet, Take 1 tablet (40 mg) by mouth once daily at bedtime., Disp: , Rfl:     ezetimibe (Zetia) 10 mg tablet, Take 1 tablet (10 mg) by mouth once daily., Disp: 90 tablet, Rfl: 3    losartan (Cozaar) 50 mg tablet, Take 1 tablet (50 mg) by mouth once daily. DAILY, Disp: 90 tablet, Rfl: 3    metoprolol tartrate (Lopressor) 50 mg tablet, Take 1.5 tablets by mouth 2 times a day. Discontinue Metoprolol Succinate, Disp: 270 tablet, Rfl: 3    multivitamin tablet, Take 1 tablet by mouth once daily., Disp: , Rfl:     rosuvastatin (Crestor) 40 mg tablet, Take 1 tablet (40 mg) by mouth once daily. DAILY, Disp: 90 tablet, Rfl: 2  Prior to Admission medications    Medication Sig Start Date End Date Taking? Authorizing Provider   amLODIPine (Norvasc) 10 mg tablet Take 1 tablet (10 mg) by mouth once daily.  Patient taking differently: Take 0.5 mg by mouth once daily. 9/27/23   Neo Hanna MD   atorvastatin (Lipitor) 40 mg tablet Take 1 tablet (40 mg) by mouth once daily at bedtime. 6/29/19   Historical Provider, MD   ezetimibe (Zetia) 10 mg tablet Take 1 tablet (10 mg) by mouth once daily. 9/27/23   Neo Hanna MD   losartan (Cozaar) 50 mg tablet Take 1 tablet (50 mg) by mouth once daily. DAILY 9/27/23 9/26/24  Neo Hanna MD   metoprolol tartrate (Lopressor) 50 mg tablet Take 1.5 tablets by mouth 2 times a day. Discontinue Metoprolol Succinate 10/5/23   Owen Choi MD   multivitamin tablet Take 1 tablet by mouth once daily.    Historical Provider, MD   rosuvastatin (Crestor) 40 mg tablet Take 1 tablet (40 mg) by mouth once daily. DAILY 9/27/23   Neo Hanna MD   aspirin 81 mg chewable tablet Chew 1 tablet (81 mg) once daily. CHEW AND SWALLOW 1 TABLET DAILY. 1/18/16 12/8/23  Historical Provider, MD     No Known  Allergies  Social History     Tobacco Use    Smoking status: Former     Types: Cigarettes    Smokeless tobacco: Never   Substance Use Topics    Alcohol use: Never         Chemistry    Lab Results   Component Value Date/Time     10/05/2023 1107    K 4.8 10/05/2023 1107     10/05/2023 1107    CO2 27 10/05/2023 1107    BUN 17 10/05/2023 1107    CREATININE 0.85 10/05/2023 1107    Lab Results   Component Value Date/Time    CALCIUM 10.2 10/05/2023 1107    ALKPHOS 51 07/11/2023 1156    AST 14 07/11/2023 1156    ALT 12 07/11/2023 1156    BILITOT 0.6 07/11/2023 1156          Lab Results   Component Value Date/Time    WBC 6.3 07/11/2023 1156    HGB 12.9 (L) 07/11/2023 1156    HCT 38.6 (L) 07/11/2023 1156     07/11/2023 1156     Lab Results   Component Value Date/Time    PROTIME 10.0 10/05/2021 1329    INR 1.0 10/05/2021 1329     Encounter Date: 10/05/23   ECG 12 lead (Clinic Performed)   Result Value    Ventricular Rate 54    Atrial Rate 54    VA Interval 146    QRS Duration 88    QT Interval 430    QTC Calculation(Bazett) 407    P Axis 43    R Axis 10    T Axis 51    QRS Count 9    Q Onset 215    P Onset 142    P Offset 205    T Offset 430    QTC Fredericia 414    Narrative    Sinus bradycardia  Otherwise normal ECG  When compared with ECG of 23-AUG-2022 10:40,  No significant change was found  Confirmed by Owen Choi ([id number]) on 11/20/2023 4:04:05 PM     No results found for this or any previous visit from the past 1095 days.    Physical Exam    Airway  Mallampati: II  TM distance: >3 FB  Neck ROM: full     Cardiovascular   Rhythm: regular  Rate: normal     Dental - normal exam     Pulmonary - normal exam     Abdominal - normal exam             Anesthesia Plan    ASA 3     MAC     The patient is not a current smoker.    intravenous induction   Anesthetic plan and risks discussed with patient.  Use of blood products discussed with patient who.    Plan discussed with CAA and attending.

## 2023-12-11 NOTE — ANESTHESIA PREPROCEDURE EVALUATION
Patient: Jake Brunner    Procedure Information       Anesthesia Start Date/Time: 12/11/23 0744    Scheduled providers: Owen Choi MD; Anastasiya Jaquez MD; ANTHONY Pastor    Procedure: TRANSESOPHAGEAL ECHO (EULA)    Location: [facility]; [facility]            Relevant Problems   Cardiovascular   (+) Coronary artery disease involving native coronary artery of native heart without angina pectoris   (+) Essential hypertension   (+) Hyperlipidemia   (+) Mitral regurgitation      Endocrine   (+) Class 1 drug-induced obesity with serious comorbidity and body mass index (BMI) of 32.0 to 32.9 in adult      Pulmonary   (+) Obstructive sleep apnea       Clinical information reviewed:   Tobacco  Allergies  Meds   Med Hx  Surg Hx   Fam Hx  Soc Hx        NPO Detail:  NPO/Void Status  Date of Last Liquid: 12/10/23  Time of Last Liquid: 0000  Date of Last Solid: 12/10/23  Time of Last Solid: 2000         Vitals:    12/11/23 0859   BP: 111/58   Pulse: (!) 49   Resp: 16   Temp:    SpO2: 96%       Past Surgical History:   Procedure Laterality Date    ANKLE SURGERY  01/27/2016    Ankle Surgery    FEMUR FRACTURE SURGERY  01/27/2016    Femur Repair    OTHER SURGICAL HISTORY  01/27/2016    Aortic Aneurysm Repair Ascending Aorta    OTHER SURGICAL HISTORY  01/27/2016    Wrist Surgery     Past Medical History:   Diagnosis Date    Alcohol abuse, in remission 01/27/2016    History of alcohol abuse    Closed displaced comminuted fracture of shaft of right tibia 07/31/2019    Closed displaced fracture of body of right scapula 07/31/2019    Closed displaced fracture of shaft of right clavicle 07/31/2019    Edema 01/22/2023    Essential (primary) hypertension 09/06/2022    Hypertension    Ribs, multiple fractures 06/26/2019    Right leg pain 08/15/2019    Right shoulder pain 08/15/2019    Trigger middle finger of left hand 01/22/2023       Current Facility-Administered Medications:      butamben-tetracaine-benzocaine (Cetacaine) spray, , , PRN, Owen Choi MD, 2 spray at 12/11/23 0746    lidocaine (Xylocaine) 2 % mouth solution, , , PRN, Owen Choi MD, 15 mL at 12/11/23 0745    sodium chloride 0.9% infusion, 100 mL/hr, intravenous, Continuous, Natalya Ayala PA-C    sodium chloride 0.9% infusion, 100 mL/hr, intravenous, Continuous, Owen Choi MD, Last Rate: 100 mL/hr at 12/11/23 0734, 100 mL/hr at 12/11/23 0734  No current outpatient medications on file.  Prior to Admission medications    Medication Sig Start Date End Date Taking? Authorizing Provider   amLODIPine (Norvasc) 10 mg tablet Take 1 tablet (10 mg) by mouth once daily.  Patient taking differently: Take 0.5 mg by mouth once daily. 9/27/23  Yes Neo Hanna MD   aspirin 81 mg EC tablet Take 1 tablet (81 mg) by mouth once daily.   Yes Historical Provider, MD   ezetimibe (Zetia) 10 mg tablet Take 1 tablet (10 mg) by mouth once daily. 9/27/23  Yes Neo Hanna MD   losartan (Cozaar) 50 mg tablet Take 1 tablet (50 mg) by mouth once daily. DAILY 9/27/23 9/26/24 Yes Neo Hanna MD   metoprolol tartrate (Lopressor) 50 mg tablet Take 1.5 tablets by mouth 2 times a day. Discontinue Metoprolol Succinate 10/5/23  Yes Owen Choi MD   multivitamin tablet Take 1 tablet by mouth once daily.   Yes Historical Provider, MD   rosuvastatin (Crestor) 40 mg tablet Take 1 tablet (40 mg) by mouth once daily. DAILY 9/27/23  Yes Neo Hanna MD   aspirin 81 mg chewable tablet Chew 1 tablet (81 mg) once daily. CHEW AND SWALLOW 1 TABLET DAILY. 1/18/16 12/8/23  Historical Provider, MD   atorvastatin (Lipitor) 40 mg tablet Take 1 tablet (40 mg) by mouth once daily at bedtime. 6/29/19 12/11/23  Historical Provider, MD     No Known Allergies  Social History     Tobacco Use    Smoking status: Former     Types: Cigarettes    Smokeless tobacco: Never   Substance Use Topics    Alcohol use: Never         Chemistry    Lab Results    Component Value Date/Time     12/11/2023 0744    K 4.2 12/11/2023 0744     (H) 12/11/2023 0744    CO2 25 12/11/2023 0744    BUN 19 12/11/2023 0744    CREATININE 0.82 12/11/2023 0744    Lab Results   Component Value Date/Time    CALCIUM 9.8 12/11/2023 0744    ALKPHOS 51 07/11/2023 1156    AST 14 07/11/2023 1156    ALT 12 07/11/2023 1156    BILITOT 0.6 07/11/2023 1156          Lab Results   Component Value Date/Time    WBC 6.6 12/11/2023 0744    HGB 13.4 (L) 12/11/2023 0744    HCT 39.1 (L) 12/11/2023 0744     12/11/2023 0744     Lab Results   Component Value Date/Time    PROTIME 10.0 10/05/2021 1329    INR 1.0 10/05/2021 1329     Encounter Date: 10/05/23   ECG 12 lead (Clinic Performed)   Result Value    Ventricular Rate 54    Atrial Rate 54    MO Interval 146    QRS Duration 88    QT Interval 430    QTC Calculation(Bazett) 407    P Axis 43    R Axis 10    T Axis 51    QRS Count 9    Q Onset 215    P Onset 142    P Offset 205    T Offset 430    QTC Fredericia 414    Narrative    Sinus bradycardia  Otherwise normal ECG  When compared with ECG of 23-AUG-2022 10:40,  No significant change was found  Confirmed by Owen Choi ([id number]) on 11/20/2023 4:04:05 PM     No results found for this or any previous visit from the past 1095 days.    Physical Exam    Airway  Mallampati: II  TM distance: >3 FB  Neck ROM: full     Cardiovascular   Rhythm: regular  Rate: normal     Dental - normal exam     Pulmonary - normal exam     Abdominal - normal exam             Anesthesia Plan    ASA 3     MAC     intravenous induction   Anesthetic plan and risks discussed with patient.  Use of blood products discussed with patient who.    Plan discussed with CAA and attending.

## 2023-12-11 NOTE — Clinical Note
Patient Clipped and Prepped: right groin, right radial and right brachial. Prepped with ChloraPrep, a minimum of 3 minute dry time, longer if needed, no pooling noted, patient draped in sterile fashion and Betadine and draped in sterile fashion.

## 2023-12-15 NOTE — PROGRESS NOTES
Jake returns after recent EULA, left and right heart catheterization to evaluation mitral regurgitation. Jen Bobo RN

## 2023-12-21 ENCOUNTER — PREP FOR PROCEDURE (OUTPATIENT)
Dept: CARDIOTHORACIC SURGERY | Facility: HOSPITAL | Age: 68
End: 2023-12-21
Payer: MEDICARE

## 2023-12-21 ENCOUNTER — OFFICE VISIT (OUTPATIENT)
Dept: CARDIAC SURGERY | Facility: HOSPITAL | Age: 68
End: 2023-12-21
Payer: MEDICARE

## 2023-12-21 VITALS
BODY MASS INDEX: 31.93 KG/M2 | DIASTOLIC BLOOD PRESSURE: 76 MMHG | HEART RATE: 60 BPM | WEIGHT: 210 LBS | OXYGEN SATURATION: 95 % | SYSTOLIC BLOOD PRESSURE: 135 MMHG

## 2023-12-21 DIAGNOSIS — I34.0 MITRAL VALVE INSUFFICIENCY, UNSPECIFIED ETIOLOGY: ICD-10-CM

## 2023-12-21 DIAGNOSIS — I34.0 NONRHEUMATIC MITRAL VALVE REGURGITATION: ICD-10-CM

## 2023-12-21 DIAGNOSIS — I34.0 MITRAL REGURGITATION, MYXOMATOUS: Primary | ICD-10-CM

## 2023-12-21 PROCEDURE — 3008F BODY MASS INDEX DOCD: CPT | Performed by: THORACIC SURGERY (CARDIOTHORACIC VASCULAR SURGERY)

## 2023-12-21 PROCEDURE — 3075F SYST BP GE 130 - 139MM HG: CPT | Performed by: THORACIC SURGERY (CARDIOTHORACIC VASCULAR SURGERY)

## 2023-12-21 PROCEDURE — 99214 OFFICE O/P EST MOD 30 MIN: CPT | Performed by: THORACIC SURGERY (CARDIOTHORACIC VASCULAR SURGERY)

## 2023-12-21 PROCEDURE — 1159F MED LIST DOCD IN RCRD: CPT | Performed by: THORACIC SURGERY (CARDIOTHORACIC VASCULAR SURGERY)

## 2023-12-21 PROCEDURE — 1160F RVW MEDS BY RX/DR IN RCRD: CPT | Performed by: THORACIC SURGERY (CARDIOTHORACIC VASCULAR SURGERY)

## 2023-12-21 PROCEDURE — 1036F TOBACCO NON-USER: CPT | Performed by: THORACIC SURGERY (CARDIOTHORACIC VASCULAR SURGERY)

## 2023-12-21 PROCEDURE — 3078F DIAST BP <80 MM HG: CPT | Performed by: THORACIC SURGERY (CARDIOTHORACIC VASCULAR SURGERY)

## 2023-12-21 PROCEDURE — 1126F AMNT PAIN NOTED NONE PRSNT: CPT | Performed by: THORACIC SURGERY (CARDIOTHORACIC VASCULAR SURGERY)

## 2023-12-21 RX ORDER — CHLORHEXIDINE GLUCONATE ORAL RINSE 1.2 MG/ML
15 SOLUTION DENTAL 2 TIMES DAILY
Status: CANCELLED | OUTPATIENT
Start: 2023-12-21 | End: 2023-12-22

## 2023-12-21 RX ORDER — MUPIROCIN 20 MG/G
0.5 OINTMENT TOPICAL 2 TIMES DAILY
Status: CANCELLED | OUTPATIENT
Start: 2023-12-21 | End: 2023-12-26

## 2023-12-21 ASSESSMENT — PAIN SCALES - GENERAL: PAINLEVEL: 0-NO PAIN

## 2023-12-21 NOTE — H&P
History Of Present Illness  Jake Brunner is a 68 y.o. male presenting with mitral regurgitation.  8 years ago patient underwent emergent median sternotomy and replacement of his ascending aorta for an aortic dissection.  Patient now presents with severe mitral regurgitation secondary to a flail segment of his posterior mitral valve leaflet.  Echocardiography also demonstrates ventricular function to be preserved and coronary angiography just demonstrates moderate coronary artery disease without any significant stenoses.  During his previous operation it was performed via a median sternotomy with right axillary artery cannulation.    Plan redo median sternotomy, ascending and bicaval cannulation, with repair of mitral valve.  If unable to repair mitral valve we will plan on using a tissue valve.  Risks, benefits, and alternatives discussed with patient, who wishes to proceed..       Amarjit Arboleda MD

## 2024-01-05 ENCOUNTER — TELEPHONE (OUTPATIENT)
Dept: RADIATION ONCOLOGY | Facility: HOSPITAL | Age: 69
End: 2024-01-05
Payer: MEDICARE

## 2024-01-05 NOTE — TELEPHONE ENCOUNTER
Called pt. to remind of appointment on 1/9/2024 at 11:00 am with GAGANDEEP Buckner.  Pt answered and confirmed appointment.

## 2024-01-09 ENCOUNTER — HOSPITAL ENCOUNTER (OUTPATIENT)
Dept: RADIATION ONCOLOGY | Facility: HOSPITAL | Age: 69
Setting detail: RADIATION/ONCOLOGY SERIES
Discharge: HOME | End: 2024-01-09
Payer: MEDICARE

## 2024-01-09 ENCOUNTER — DOCUMENTATION (OUTPATIENT)
Dept: RADIATION ONCOLOGY | Facility: HOSPITAL | Age: 69
End: 2024-01-09

## 2024-01-09 ENCOUNTER — LAB (OUTPATIENT)
Dept: LAB | Facility: HOSPITAL | Age: 69
End: 2024-01-09
Payer: MEDICARE

## 2024-01-09 VITALS
BODY MASS INDEX: 32.78 KG/M2 | RESPIRATION RATE: 18 BRPM | HEIGHT: 68 IN | OXYGEN SATURATION: 96 % | TEMPERATURE: 96.8 F | WEIGHT: 216.27 LBS | SYSTOLIC BLOOD PRESSURE: 145 MMHG | HEART RATE: 58 BPM | DIASTOLIC BLOOD PRESSURE: 70 MMHG

## 2024-01-09 DIAGNOSIS — Z98.890 H/O AORTIC ARCH REPAIR: ICD-10-CM

## 2024-01-09 DIAGNOSIS — Z01.818 PRE-OP EVALUATION: ICD-10-CM

## 2024-01-09 DIAGNOSIS — C61 PROSTATE CA (MULTI): Primary | ICD-10-CM

## 2024-01-09 DIAGNOSIS — E78.2 MIXED HYPERLIPIDEMIA: ICD-10-CM

## 2024-01-09 DIAGNOSIS — I10 HYPERTENSION, UNSPECIFIED TYPE: ICD-10-CM

## 2024-01-09 DIAGNOSIS — C61 PROSTATE CA (MULTI): ICD-10-CM

## 2024-01-09 LAB
ALBUMIN SERPL BCP-MCNC: 4.7 G/DL (ref 3.4–5)
ALP SERPL-CCNC: 53 U/L (ref 33–136)
ALT SERPL W P-5'-P-CCNC: 18 U/L (ref 10–52)
ANION GAP SERPL CALC-SCNC: 9 MMOL/L (ref 10–20)
AST SERPL W P-5'-P-CCNC: 18 U/L (ref 9–39)
BASOPHILS # BLD AUTO: 0.06 X10*3/UL (ref 0–0.1)
BASOPHILS NFR BLD AUTO: 0.9 %
BILIRUB SERPL-MCNC: 0.5 MG/DL (ref 0–1.2)
BUN SERPL-MCNC: 16 MG/DL (ref 6–23)
CALCIUM SERPL-MCNC: 10.9 MG/DL (ref 8.6–10.3)
CHLORIDE SERPL-SCNC: 100 MMOL/L (ref 98–107)
CO2 SERPL-SCNC: 30 MMOL/L (ref 21–32)
CREAT SERPL-MCNC: 0.93 MG/DL (ref 0.5–1.3)
EGFRCR SERPLBLD CKD-EPI 2021: 89 ML/MIN/1.73M*2
EOSINOPHIL # BLD AUTO: 0.16 X10*3/UL (ref 0–0.7)
EOSINOPHIL NFR BLD AUTO: 2.5 %
ERYTHROCYTE [DISTWIDTH] IN BLOOD BY AUTOMATED COUNT: 14.2 % (ref 11.5–14.5)
GLUCOSE SERPL-MCNC: 106 MG/DL (ref 74–99)
HCT VFR BLD AUTO: 42 % (ref 41–52)
HGB BLD-MCNC: 14.3 G/DL (ref 13.5–17.5)
IMM GRANULOCYTES # BLD AUTO: 0.01 X10*3/UL (ref 0–0.7)
IMM GRANULOCYTES NFR BLD AUTO: 0.2 % (ref 0–0.9)
LYMPHOCYTES # BLD AUTO: 1.74 X10*3/UL (ref 1.2–4.8)
LYMPHOCYTES NFR BLD AUTO: 27.4 %
MCH RBC QN AUTO: 30.2 PG (ref 26–34)
MCHC RBC AUTO-ENTMCNC: 34 G/DL (ref 32–36)
MCV RBC AUTO: 89 FL (ref 80–100)
MONOCYTES # BLD AUTO: 0.58 X10*3/UL (ref 0.1–1)
MONOCYTES NFR BLD AUTO: 9.1 %
NEUTROPHILS # BLD AUTO: 3.81 X10*3/UL (ref 1.2–7.7)
NEUTROPHILS NFR BLD AUTO: 59.9 %
NRBC BLD-RTO: 0 /100 WBCS (ref 0–0)
PLATELET # BLD AUTO: 209 X10*3/UL (ref 150–450)
POTASSIUM SERPL-SCNC: 4.4 MMOL/L (ref 3.5–5.3)
PROT SERPL-MCNC: 7.5 G/DL (ref 6.4–8.2)
PSA SERPL-MCNC: 0.11 NG/ML
RBC # BLD AUTO: 4.74 X10*6/UL (ref 4.5–5.9)
SODIUM SERPL-SCNC: 135 MMOL/L (ref 136–145)
WBC # BLD AUTO: 6.4 X10*3/UL (ref 4.4–11.3)

## 2024-01-09 PROCEDURE — 84153 ASSAY OF PSA TOTAL: CPT

## 2024-01-09 PROCEDURE — 99214 OFFICE O/P EST MOD 30 MIN: CPT

## 2024-01-09 PROCEDURE — 85025 COMPLETE CBC W/AUTO DIFF WBC: CPT

## 2024-01-09 PROCEDURE — 36415 COLL VENOUS BLD VENIPUNCTURE: CPT

## 2024-01-09 PROCEDURE — 80053 COMPREHEN METABOLIC PANEL: CPT

## 2024-01-09 ASSESSMENT — ENCOUNTER SYMPTOMS
COUGH: 0
FATIGUE: 0
WEAKNESS: 0
PALPITATIONS: 0
DEPRESSION: 0
ABDOMINAL PAIN: 0
ARTHRALGIAS: 0
BLOOD IN STOOL: 0
SHORTNESS OF BREATH: 0
FREQUENCY: 0
PSYCHIATRIC NEGATIVE: 1
HEMATURIA: 0
DYSURIA: 0
UNEXPECTED WEIGHT CHANGE: 0
LOSS OF SENSATION IN FEET: 0
FEVER: 0
DIZZINESS: 0
ANAL BLEEDING: 0
BACK PAIN: 0
RECTAL PAIN: 0
ALLERGIC/IMMUNOLOGIC NEGATIVE: 1
CHEST TIGHTNESS: 0
DIARRHEA: 0
CONSTIPATION: 0
OCCASIONAL FEELINGS OF UNSTEADINESS: 0
DIFFICULTY URINATING: 0
JOINT SWELLING: 0

## 2024-01-09 ASSESSMENT — COLUMBIA-SUICIDE SEVERITY RATING SCALE - C-SSRS
1. IN THE PAST MONTH, HAVE YOU WISHED YOU WERE DEAD OR WISHED YOU COULD GO TO SLEEP AND NOT WAKE UP?: NO
6. HAVE YOU EVER DONE ANYTHING, STARTED TO DO ANYTHING, OR PREPARED TO DO ANYTHING TO END YOUR LIFE?: NO
2. HAVE YOU ACTUALLY HAD ANY THOUGHTS OF KILLING YOURSELF?: NO

## 2024-01-09 ASSESSMENT — PAIN SCALES - GENERAL: PAINLEVEL: 0-NO PAIN

## 2024-01-09 NOTE — RESEARCH NOTES
STUDY: [id number]  VISIT NAME: [id number]    Clinical Research Specialist saw patient in clinic today. Adverse Events and Con Meds were assessed. Next appointment given to patient. Business card/contact information provided. All questions were answered to patient satisfaction.    Karoline Gipson

## 2024-01-09 NOTE — PROGRESS NOTES
Cancer synopsis:  Rad/onc: Dr. Tavares/ S[facility] DONIS    68 year old male with node-positive prostate cancer, [id number] (PSMA PET+ left int iliac node), [place] 4+4=8, iPSA 63.47. He was treated with  definitive treatment on the ASCLEPIUS trial: SBRT with 6 months ADT/abiraterone/niraparib.   He completed treatment to a total dose of 4000 cGy in 5 fractions to his gross disease in his prostate and lymph nodes, 3750 cGy in 5 fractions to his prostate, and 2500 cGy to his pelvic lymph nodes     Treatment Dates: -2022  Elapsed Days: 9  Region(s) Treated: prostate, nodes, and pelvis  Radiation Dose Prescribed: 4000 cGy in five fractions to his gross disease (800 cGy per fraction), 3750 cGy in five fractions to his prostate (750 cGy per fraction), and 2500 cGy to his pelvic lymph nodes (500 cGy per fraction)  Radiation Technique: SBRT  Energy: 10 MV photons    History of presenting illness:    Patient ID: [id number]     Jake Brunner is a 68 y.o. male who presents for Locally advance very high risk prostate cancer GG4, IPSA 63.47, dP2dG6L0 now s/p RT st-term systemic therapy.    RT Site: prostate, pevlis and local LN  RT Date: 2022  Hormone therapy: Yes, st-term ADT, aa/p and niraparib  Hot Flushes: Denies  Fatigue: Denies  Bone pain: Denies  ED: Reports sexual drive has vastly improved. Does have Cialis but has not had used. Reports some mild discomfort initially with erectile.  ED medications: Yes, cialis 10-20mg  Urinary symptoms: Denies dysuria, hematuria, frequency, urgency, urine leakage.   Urinary Medications: No  Rectal bleeding: Denies  Other systems: Denies SOB, CP or fever. Is to have upcoming mitral valve open hear repair d/t mitral valve leakage.    PERFORMANCE STATUS:  KPS/ECO, Fully active, able to carry on all pre-disease performed without restriction (ECOG equivalent 0)    Review of systems:  Review of Systems   Constitutional:  Negative for fatigue, fever and unexpected weight  change.   Respiratory:  Negative for cough, chest tightness and shortness of breath.    Cardiovascular:  Negative for chest pain, palpitations and leg swelling.   Gastrointestinal:  Negative for abdominal pain, anal bleeding, blood in stool, constipation, diarrhea and rectal pain.   Endocrine: Negative for cold intolerance, heat intolerance and polyuria.   Genitourinary:  Positive for penile pain. Negative for decreased urine volume, difficulty urinating, dysuria, frequency, hematuria and urgency.        Refer to HPI   Musculoskeletal:  Negative for arthralgias, back pain, gait problem and joint swelling.   Skin: Negative.    Allergic/Immunologic: Negative.    Neurological:  Negative for dizziness, syncope and weakness.   Psychiatric/Behavioral: Negative.         Past Medical history  Past Medical History:   Diagnosis Date    Alcohol abuse, in remission 01/27/2016    History of alcohol abuse    Closed displaced comminuted fracture of shaft of right tibia 07/31/2019    Closed displaced fracture of body of right scapula 07/31/2019    Closed displaced fracture of shaft of right clavicle 07/31/2019    Edema 01/22/2023    Essential (primary) hypertension 09/06/2022    Hypertension    Ribs, multiple fractures 06/26/2019    Right leg pain 08/15/2019    Right shoulder pain 08/15/2019    Trigger middle finger of left hand 01/22/2023        Surgical/family history  Family History   Problem Relation Name Age of Onset    Hyperlipidemia Mother      Coronary artery disease Father      Other (MYOCARDIAL INFARCTION) Father        Past Surgical History:   Procedure Laterality Date    ANKLE SURGERY  01/27/2016    Ankle Surgery    CARDIAC CATHETERIZATION N/A 12/11/2023    Procedure: Left And Right Heart Cath, With LV;  Surgeon: Owen Choi MD;  Location: [facility] Cardiac Cath Lab;  Service: Cardiovascular;  Laterality: N/A;  Scheduled 12/11 @ 9:00am, EULA w/ anesthesia @ 7:30am    FEMUR FRACTURE SURGERY  01/27/2016    Femur Repair     "OTHER SURGICAL HISTORY  01/27/2016    Aortic Aneurysm Repair Ascending Aorta    OTHER SURGICAL HISTORY  01/27/2016    Wrist Surgery        Social History  Tobacco Use: Medium Risk (12/21/2023)    Patient History     Smoking Tobacco Use: Former     Smokeless Tobacco Use: Never     Passive Exposure: Not on file         Current med list:  Current Outpatient Medications   Medication Instructions    amLODIPine (NORVASC) 10 mg, oral, Daily    aspirin 81 mg, oral, Daily    ezetimibe (ZETIA) 10 mg, oral, Daily    losartan (COZAAR) 50 mg, oral, Daily, DAILY    metoprolol tartrate (LOPRESSOR) 75 mg, oral, 2 times daily, Discontinue Metoprolol Succinate    multivitamin tablet 1 tablet, oral, Daily    rosuvastatin (CRESTOR) 40 mg, oral, Daily, DAILY        Last recorded vital:  /70   Pulse 58   Temp 36 °C (96.8 °F) (Temporal)   Resp 18   Ht 1.727 m (5' 8\")   Wt 98.1 kg (216 lb 4.3 oz)   SpO2 96%   BMI 32.88 kg/m²     Physical exam  Physical Exam  Constitutional:       Appearance: Normal appearance.   Cardiovascular:      Rate and Rhythm: Normal rate.   Pulmonary:      Effort: Pulmonary effort is normal.      Breath sounds: Normal breath sounds.   Musculoskeletal:         General: Normal range of motion.      Cervical back: Normal range of motion.   Neurological:      Mental Status: He is alert and oriented to person, place, and time.   Psychiatric:         Mood and Affect: Mood normal.         Behavior: Behavior normal.         Thought Content: Thought content normal.         Judgment: Judgment normal.         Pertinent labs:  PSA   Date/Time Value Ref Range Status   06/29/2023 10:50 AM <0.10 0.00 - 4.00 ng/mL Final     Comment:     The FDA requires that the method used for PSA assay be   reported to the physician. Values obtained with different   assay methods must not be used interchangeably. This test   was performed at [facility] using the Siemens  You.i PSA method, which is a sandwich " immunoassay using   chemiluminescence for quantitation. The assay is approved  for measurement of prostate-specific antigen (PSA) in   serum and may be used in conjunction with a digital rectal  examination in men 50 years and older as an aid in   detection of prostate cancer.   5-Alpha-reductase inhibitors (e.g. Proscar, Finasteride,   Avodart, Dutasteride and Radha) for the treatment of BPH   have been shown to lower PSA levels by an average of 50%   after 6 months of treatment.           Dx:  Problem List Items Addressed This Visit       Prostate CA (CMS/HCC) - Primary   Due for labs today, will call with results. Review of latent SE including rectal bleeding, hematuria, urinary strictures, ED where reviewed as well as how to contact office if s/s present. Denies latent SE. NCCN guidelines where reviewed and routine FUV of every 3m for first year and every 6m for four years for a total of five years was discussed. Patient verbalized understanding.     Discussed re-bx at two year fuentes after RT per trial. Patient is hesitant about having new bx and asked to forgo this given urinary related issues from inital bx. Karoline evans clinical research coordinator to evaluate if this will effect trial enrollment or not.         PLAN:  FUV 6m  Labs Psa today and in 6m  Imaging none  Cialis 10-20mg PRN  FUV other providers: PCP for routine evals, cards for mitral valve regurg        Please contact office with any concerns:  Pacheco Gomes CNP  [phone]

## 2024-01-10 ENCOUNTER — APPOINTMENT (OUTPATIENT)
Dept: CARDIOLOGY | Facility: HOSPITAL | Age: 69
End: 2024-01-10
Payer: MEDICARE

## 2024-01-11 ENCOUNTER — APPOINTMENT (OUTPATIENT)
Dept: PREADMISSION TESTING | Facility: HOSPITAL | Age: 69
End: 2024-01-11
Payer: MEDICARE

## 2024-01-12 ENCOUNTER — HOSPITAL ENCOUNTER (OUTPATIENT)
Dept: RADIOLOGY | Facility: HOSPITAL | Age: 69
Discharge: HOME | End: 2024-01-12
Payer: MEDICARE

## 2024-01-12 ENCOUNTER — PRE-ADMISSION TESTING (OUTPATIENT)
Dept: PREADMISSION TESTING | Facility: HOSPITAL | Age: 69
End: 2024-01-12
Payer: MEDICARE

## 2024-01-12 VITALS
TEMPERATURE: 97.7 F | OXYGEN SATURATION: 97 % | HEART RATE: 64 BPM | WEIGHT: 214.73 LBS | DIASTOLIC BLOOD PRESSURE: 83 MMHG | SYSTOLIC BLOOD PRESSURE: 148 MMHG | HEIGHT: 68 IN | BODY MASS INDEX: 32.54 KG/M2

## 2024-01-12 DIAGNOSIS — Z01.818 PRE-OP EVALUATION: ICD-10-CM

## 2024-01-12 DIAGNOSIS — R79.1 ABNORMAL COAGULATION PROFILE: ICD-10-CM

## 2024-01-12 DIAGNOSIS — R94.5 ABNORMAL RESULTS OF LIVER FUNCTION STUDIES: ICD-10-CM

## 2024-01-12 LAB
ABO GROUP (TYPE) IN BLOOD: NORMAL
ANTIBODY SCREEN: NORMAL
APPEARANCE UR: CLEAR
APTT PPP: 32 SECONDS (ref 27–38)
BILIRUB UR STRIP.AUTO-MCNC: NEGATIVE MG/DL
COLOR UR: YELLOW
GLUCOSE UR STRIP.AUTO-MCNC: NEGATIVE MG/DL
INR PPP: 1 (ref 0.9–1.1)
KETONES UR STRIP.AUTO-MCNC: ABNORMAL MG/DL
LEUKOCYTE ESTERASE UR QL STRIP.AUTO: NEGATIVE
NITRITE UR QL STRIP.AUTO: NEGATIVE
PH UR STRIP.AUTO: 5 [PH]
PROT UR STRIP.AUTO-MCNC: NEGATIVE MG/DL
PROTHROMBIN TIME: 11.5 SECONDS (ref 9.8–12.8)
RBC # UR STRIP.AUTO: NEGATIVE /UL
RH FACTOR (ANTIGEN D): NORMAL
SP GR UR STRIP.AUTO: 1.02
UROBILINOGEN UR STRIP.AUTO-MCNC: <2 MG/DL

## 2024-01-12 PROCEDURE — 81003 URINALYSIS AUTO W/O SCOPE: CPT

## 2024-01-12 PROCEDURE — 99205 OFFICE O/P NEW HI 60 MIN: CPT | Performed by: NURSE PRACTITIONER

## 2024-01-12 PROCEDURE — 71046 X-RAY EXAM CHEST 2 VIEWS: CPT

## 2024-01-12 PROCEDURE — 85610 PROTHROMBIN TIME: CPT

## 2024-01-12 PROCEDURE — 86920 COMPATIBILITY TEST SPIN: CPT | Mod: 91

## 2024-01-12 PROCEDURE — 36415 COLL VENOUS BLD VENIPUNCTURE: CPT

## 2024-01-12 PROCEDURE — 86901 BLOOD TYPING SEROLOGIC RH(D): CPT

## 2024-01-12 PROCEDURE — 87081 CULTURE SCREEN ONLY: CPT

## 2024-01-12 RX ORDER — CHLORHEXIDINE GLUCONATE 40 MG/ML
SOLUTION TOPICAL
Qty: 473 ML | Refills: 0 | Status: SHIPPED | OUTPATIENT
Start: 2024-01-12 | End: 2024-01-28 | Stop reason: HOSPADM

## 2024-01-12 RX ORDER — CHLORHEXIDINE GLUCONATE ORAL RINSE 1.2 MG/ML
SOLUTION DENTAL
Qty: 15 ML | Refills: 0 | Status: SHIPPED | OUTPATIENT
Start: 2024-01-12 | End: 2024-01-28 | Stop reason: HOSPADM

## 2024-01-12 ASSESSMENT — ENCOUNTER SYMPTOMS
RESPIRATORY NEGATIVE: 1
CONSTITUTIONAL NEGATIVE: 1
ENDOCRINE NEGATIVE: 1
DYSPNEA WITH EXERTION: 1
GASTROINTESTINAL NEGATIVE: 1
MUSCULOSKELETAL NEGATIVE: 1
CARDIOVASCULAR NEGATIVE: 1
NEUROLOGICAL NEGATIVE: 1
NECK NEGATIVE: 1
EYES NEGATIVE: 1

## 2024-01-12 ASSESSMENT — CHADS2 SCORE
PRIOR STROKE OR TIA OR THROMBOEMBOLISM: NO
CHF: NO
CHADS2 SCORE: 1
DIABETES: NO
AGE GREATER THAN OR EQUAL TO 75: NO
HYPERTENSION: YES

## 2024-01-12 ASSESSMENT — DUKE ACTIVITY SCORE INDEX (DASI)
CAN YOU PARTICIPATE IN MODERATE RECREATIONAL ACTIVITIES LIKE GOLF, BOWLING, DANCING, DOUBLES TENNIS OR THROWING A BASEBALL OR FOOTBALL: YES
CAN YOU HAVE SEXUAL RELATIONS: YES
CAN YOU WALK A BLOCK OR TWO ON LEVEL GROUND: YES
CAN YOU DO LIGHT WORK AROUND THE HOUSE LIKE DUSTING OR WASHING DISHES: YES
CAN YOU RUN A SHORT DISTANCE: YES
CAN YOU PARTICIPATE IN STRENOUS SPORTS LIKE SWIMMING, SINGLES TENNIS, FOOTBALL, BASKETBALL, OR SKIING: YES
CAN YOU DO YARD WORK LIKE RAKING LEAVES, WEEDING OR PUSHING A MOWER: YES
TOTAL_SCORE: 58.2
CAN YOU DO HEAVY WORK AROUND THE HOUSE LIKE SCRUBBING FLOORS OR LIFTING AND MOVING HEAVY FURNITURE: YES
CAN YOU WALK INDOORS, SUCH AS AROUND YOUR HOUSE: YES
CAN YOU TAKE CARE OF YOURSELF (EAT, DRESS, BATHE, OR USE TOILET): YES
DASI METS SCORE: 9.9
CAN YOU CLIMB A FLIGHT OF STAIRS OR WALK UP A HILL: YES
CAN YOU DO MODERATE WORK AROUND THE HOUSE LIKE VACUUMING, SWEEPING FLOORS OR CARRYING GROCERIES: YES

## 2024-01-12 ASSESSMENT — LIFESTYLE VARIABLES: SMOKING_STATUS: NONSMOKER

## 2024-01-12 NOTE — PREPROCEDURE INSTRUCTIONS
NPO Instructions:    Do not eat any food after midnight the night before your surgery/procedure.  You may have up to TEN ounces of clear liquids until TWO hours before your instructed arrival time to the hospital. This includes water, black tea/coffee, (no milk or cream), apple juice, and/or electrolyte drinks (Gatorade).  You may chew gum up to TWO hours before your surgery/procedure.    Additional Instructions:     We have sent a prescription for Hibiclens soap and Peridex mouth wash to your preferred pharmacy.  If you have not already, Please  your prescription and start using as directed before surgery.  Follow the instruction sheet provided to you at your CPM/PAT appointment.    Avoid herbal supplements, multivitamins and NSAIDS (non-steroidal anti-inflammatory drugs) such as Advil, Aleve, Ibuprofen, Naproxen, Excedrin, Meloxicam or Celebrex for at least 7 days prior to surgery. May take Tylenol as needed.    Avoid tobacco and alcohol products for 24 hours prior to surgery.    Please bring CPAP/BIPAP with you the day of surgery.     Seven/Six Days before Surgery:  Review your medication instructions, stop indicated medications    Day of Surgery:  Review your medication instructions, take indicated medications  Wear comfortable loose fitting clothing  Do not use moisturizers, creams, lotions or perfume  All jewelry and valuables should be left at home    Ruben Eastman [facility]  [facility]  Hhxts-[phone]  [phone]  [email or web address]

## 2024-01-12 NOTE — H&P (VIEW-ONLY)
CPM/PAT Evaluation       Name: Jake Brunner (Jake Brunner)  /Age: 1955/68 y.o.     Visit Type:   In-Person       Chief Complaint: MVR    HPI  The patient is a 68 year old male with severe mitral valve regurgitation and history of type A aortic dissection status post replacement of his ascending aorta. He presents today for perioperative evaluation in anticipation of Redo Median Sternotomy, Repair Mitral Valve on 24 with Dr. Arboleda.      Past Medical History:   Diagnosis Date    Alcohol abuse, in remission     History of alcohol abuse    Cataract     Closed displaced comminuted fracture of shaft of right tibia 2019    Closed displaced fracture of body of right scapula 2019    Closed displaced fracture of shaft of right clavicle 2019    Coronary artery disease     Essential (primary) hypertension     Hypertension    Heart valve disease     Severe mitral valve regurgitation.    History of transesophageal echocardiography (EULA) 2023    Echo on 23    Hyperlipidemia     Prostate cancer (CMS/HCC) 2024    Onc: Pacheco Gomes CNP    Shortness of breath     Sleep apnea     Uses CPAP    Vision loss     Wears contacts       Past Surgical History:   Procedure Laterality Date    ANKLE SURGERY Left     Ankle Surgery    ARTERIAL ANEURYSM REPAIR  2015    Aortic Aneurysm Repair Ascending Aorta    CARDIAC CATHETERIZATION N/A 2023    Procedure: Left And Right Heart Cath, With LV;  Surgeon: Owen Choi MD;  Location: [facility];  Service: Cardiovascular;  Laterality: N/A;  Scheduled  @ 9:00am, EULA w/ anesthesia @ 7:30am    COLONOSCOPY      CT CHEST W AND WO IV CONTRAST  10/18/2023    Thoracic aortic atherosclerosis.    FEMUR FRACTURE SURGERY  2016    Femur Repair    LIPOMA RESECTION      back    OTHER SURGICAL HISTORY  2016    Wrist Surgery       Patient  reports that he is not currently sexually active.    Family History   Problem  Relation Name Age of Onset    Hyperlipidemia Mother      Coronary artery disease Father      Other (MYOCARDIAL INFARCTION) Father      Heart attack Father         No Known Allergies    Prior to Admission medications    Medication Sig Start Date End Date Taking? Authorizing Provider   amLODIPine (Norvasc) 10 mg tablet Take 1 tablet (10 mg) by mouth once daily.  Patient taking differently: Take 0.5 tablets (5 mg) by mouth once daily. 9/27/23   Neo Hanna MD   aspirin 81 mg EC tablet Take 1 tablet (81 mg) by mouth once daily.    Historical Provider, MD   ezetimibe (Zetia) 10 mg tablet Take 1 tablet (10 mg) by mouth once daily. 9/27/23   Neo Hanna MD   losartan (Cozaar) 50 mg tablet Take 1 tablet (50 mg) by mouth once daily. DAILY 9/27/23 9/26/24  Neo Hanna MD   metoprolol tartrate (Lopressor) 50 mg tablet Take 1.5 tablets by mouth 2 times a day. Discontinue Metoprolol Succinate 10/5/23   Owen Choi MD   multivitamin tablet Take 1 tablet by mouth once daily.    Historical Provider, MD   rosuvastatin (Crestor) 40 mg tablet Take 1 tablet (40 mg) by mouth once daily. DAILY 9/27/23   Neo Hanna MD        PAT ROS:   Constitutional:   neg    Neuro/Psych:   neg    Eyes:    contacts  neg     use of corrective lenses  Ears:   neg    Nose:   neg    Mouth:   neg    Throat:   neg    Neck:   neg    Cardio:   neg     ROD  Respiratory:   neg    Endocrine:   neg    GI:   neg    :   neg    Musculoskeletal:   neg    Hematologic:   neg    Skin:  neg        Physical Exam  Vitals reviewed.   Constitutional:       Appearance: Normal appearance.   HENT:      Head: Normocephalic and atraumatic.      Nose: Nose normal.      Mouth/Throat:      Mouth: Mucous membranes are moist.      Pharynx: Oropharynx is clear.   Eyes:      Extraocular Movements: Extraocular movements intact.      Pupils: Pupils are equal, round, and reactive to light.   Cardiovascular:      Rate and Rhythm: Normal rate and regular rhythm.       Pulses: Normal pulses.      Heart sounds: Murmur heard.      Comments: RSB, LSB III/VI systolic non-radiating  Pulmonary:      Effort: Pulmonary effort is normal.      Breath sounds: Normal breath sounds.   Musculoskeletal:         General: Normal range of motion.      Cervical back: Normal range of motion.   Skin:     General: Skin is warm and dry.   Neurological:      General: No focal deficit present.      Mental Status: He is alert and oriented to person, place, and time.   Psychiatric:         Mood and Affect: Mood normal.         Behavior: Behavior normal.          PAT AIRWAY:   Airway:     Mallampati::  IV    TM distance::  >3 FB    Neck ROM::  Full  normal        Visit Vitals  /83   Pulse 64   Temp 36.5 °C (97.7 °F) (Oral)       DASI Risk Score      Flowsheet Row Most Recent Value   DASI SCORE 58.2   METS Score (Will be calculated only when all the questions are answered) 9.9          Caprini DVT Assessment      Flowsheet Row Most Recent Value   DVT Score 13   Current Status Major surgery planned, lasting over 3 hours   History Prior major surgery, Previous malignancy   Age 60-75 years   BMI 31-40 (Obesity)          Modified Frailty Index    No data to display       CHADS2 Stroke Risk  Current as of 6 hours ago        N/A 3 - 100%: High Risk   2 - 3%: Medium Risk   0 - 2%: Low Risk     Last Change: N/A          This score determines the patient's risk of having a stroke if the patient has atrial fibrillation.        This score is not applicable to this patient. Components are not calculated.          Revised Cardiac Risk Index      Flowsheet Row Most Recent Value   Revised Cardiac Risk Calculator 2          Apfel Simplified Score      Flowsheet Row Most Recent Value   Apfel Simplified Score Calculator 2          Risk Analysis Index Results This Encounter    No data found in the last 1 encounters.       Stop Bang Score      Flowsheet Row Most Recent Value   Do you snore loudly? 1   Do you often feel  tired or fatigued after your sleep? 0   Has anyone ever observed you stop breathing in your sleep? 0   Do you have or are you being treated for high blood pressure? 1   Recent BMI (Calculated) 32.9   Is BMI greater than 35 kg/m2? 0=No   Age older than 50 years old? 1=Yes   Is your neck circumference greater than 17 inches (Male) or 16 inches (Female)? 0   Gender - Male 1=Yes   STOP-BANG Total Score 4          Recent Results (from the past 168 hour(s))   Coagulation Screen    Collection Time: 01/12/24  3:34 PM   Result Value Ref Range    Protime 11.5 9.8 - 12.8 seconds    INR 1.0 0.9 - 1.1    aPTT 32 27 - 38 seconds   Type And Screen    Collection Time: 01/12/24  3:34 PM   Result Value Ref Range    ABO TYPE A     Rh TYPE POS     ANTIBODY SCREEN NEG    Staphylococcus aureus/MRSA colonization, Culture    Collection Time: 01/12/24  3:34 PM    Specimen: Anterior Nares; Swab   Result Value Ref Range    Staph/MRSA Screen Culture No Staphylococcus aureus isolated    Urinalysis with Reflex Culture and Microscopic    Collection Time: 01/12/24  3:34 PM   Result Value Ref Range    Color, Urine Yellow Straw, Yellow    Appearance, Urine Clear Clear    Specific Gravity, Urine 1.017 1.005 - 1.035    pH, Urine 5.0 5.0, 5.5, 6.0, 6.5, 7.0, 7.5, 8.0    Protein, Urine NEGATIVE NEGATIVE mg/dL    Glucose, Urine NEGATIVE NEGATIVE mg/dL    Blood, Urine NEGATIVE NEGATIVE    Ketones, Urine 5 (TRACE) (A) NEGATIVE mg/dL    Bilirubin, Urine NEGATIVE NEGATIVE    Urobilinogen, Urine <2.0 <2.0 mg/dL    Nitrite, Urine NEGATIVE NEGATIVE    Leukocyte Esterase, Urine NEGATIVE NEGATIVE   Extra Urine Gray Tube    Collection Time: 01/12/24  3:34 PM   Result Value Ref Range    Extra Tube Hold for add-ons.      Diagnostic Results      Cardiovascular Catheterization Report   Study Date:       12/11/2023         CONCLUSIONS:   1. Moderate 2 vessel CAD in a right dominant system.   2. Elevated left and right heart filling pressures.   3. Mild pulmonary  hypertension with a PVR of 1.2 Wood units.   4. No evidence of intracardiac shunt.   5. Preserved cardiac index with a normal .    TRANSESOPHAGEAL ECHOCARDIOGRAM REPORT   Study Date:        12/11/2023     CONCLUSIONS:   1. Left ventricular systolic function is normal with a 65% estimated ejection fraction.   2. Abnormal septal motion consistent with post-operative status.   3. The left atrium is moderately dilated.   4. The right atrium is moderately dilated.   5. Flail segment of the posterior mitral valve leaflet.   6. Severe mitral valve regurgitation.   7. Mildly elevated RVSP.   8. Mild to moderate aortic valve regurgitation.    EKG 10/5/23  Sinus bradycardia  Otherwise normal ECG  When compared with ECG of 23-AUG-2022 10:40,  No significant change was found  Show images for ECG 12 lead (Clinic Performed)     Nuclear Treadmill Stress Test   Study Date:       5/12/2016       Summary:   1. No clinical or electrocardiographic evidence for ischemia at a maximal workload.   2. Nuclear image results are reported separately.   3. The adequate level of stress was achieved.    Assessment and Plan:     Anesthesia:  The patient denies problems with anesthesia in the past such as PONV, prolonged sedation, awareness, dental damage, aspiration, cardiac arrest, difficult intubation, or unexpected hospital admissions.     Neuro:   The patient has history of ETOH, remote traumatic subdural hematoma. He is at increased risk for postoperative delirium secondary to age 65 or older. The patient is at increased risk for perioperative stroke secondary to hypertension , hyperlipidemia, diabetes mellitus, general anesthesia, operative time >2.5 hours, cardiac or emergency surgery. Handouts for preoperative brain exercises given to patient.    HEENT/Airway  The patient has diagnoses, significant findings on chart review, clinical presentation or evaluation of short thick neck, No documented or reported history of airway  difficulty.     Cardiovascular  The patient is scheduled for cardiac surgery.  Preoperative evaluation is complete pending results of labs, urine, nares, cxr.  RCRI  The patient meets 2 RCRI criteria and therefore has a 6.6% risk (elevated) of major adverse cardiac complications.  METS  The patient's functional capacity capacity is greater than 4 METS.  EKG  The patient has no EKG or echocardiographic changes concerning for myocardial ischemia.  No further cardiac evaluation is indicated  Heart Failure  The patient has no known history of heart failure.  Additionally, the patient reports no symptoms of heart failure and demonstrates no signs of heart failure.  Hypertension Evaluation  The patient has a known history of hypertension that is controlled.  Patient's hypertension is most consistent with stage 2.  Heart Rhythm Evaluation  The patient has no history of arrhythmias.  Heart Valve Evaluation  The patient has a known history of valvular heart disease.  Per patient's prior studies, the patient has severe MR.  CARDS EVAL  The patient follows with cardiology, Diana Alcantar CNP last seen 2/22/23. Per note, moderate MVR, eccentric jet referred to get cardiac MRI. Patient now scheduled for surgery with Dr. Arboleda on 1/18/24. History of emergent ascending aorta replacement 12/25/2015.  Recent CT scan demonstrates intact ascending aorta. CAD by coronary CT, moderate 2 vessel CAD per recent cath.    The patient has a 30-day risk for MACE of 2 predictors, 10.1% risk for cardiac death, nonfatal myocardial infarction, and nonfatal cardiac arrest.  COOPER score which indicates a 0.8% risk of intraoperative or 30-day postoperative.    Pulmonary   The patient has findings on chart review, clinical presentation and evaluation significant for HILTON. The patient is at increased risk of perioperative pulmonary complications secondary to thoracic surgery, HILTON, advanced age greater than 60. Patient educated to bring CPAP/BIPAP day of  surgery.    ARISCAT 50, High, 42.1% risk of in-hospital postoperative pulmonary complications  PRODIGY 24, high risk of respiratory depression episode.    Hematology  No diagnoses or significant findings on chart review or clinical presentation and evaluation.  Antiplatelet management   The patient is currently receiving antiplatelet therapy for CAD., The patient was instructed to continue in there perioperative period.  Anticoagulation management  The patient is not currently receiving anticoagulation therapy. Patient provided with DVT educational handout.    Caprini score 13, high risk of perioperative VTE  Transfusion Evaluation  A type and screen was obtained given the likelihood for perioperative transfusion of blood or blood products.    Gastrointestinal  No diagnoses or significant findings on chart review or clinical presentation and evaluation.  Eat 10- 0,  self-perceived oropharyngeal dysphagia scale (0-40)     Genitourinary  The patient has diagnoses or significant findings on chart review or clinical presentation and evaluation significant for history of prostate cancer.  He was treated with  definitive treatment on the ASCLEPIUS trial: SBRT with 6 months ADT/abiraterone/niraparib 4/11-4/20/2022. Follows with radiation oncology Pacheco Buckner CNP, last seen 1/9/24.    Renal  The patient has no known history of chronic kidney disease. No renal diagnoses or significant findings on chart review or clinical presentation and evaluation. The patient is scheduled for coronary revascularization surgery which places patient at higher risk of perioperative renal complications. The patient has specific risk factors associated with increased risk of perioperative renal complications due to age greater than 55, male gender, hypertension. DPS >3 high risk for perioperative acute kidney injury Preventative measures include preoperative hydration.    Musculoskeletal  No diagnoses or significant findings on chart review  or clinical presentation and evaluation.    Endocrine  Diabetes Evaluation  The patient has no history of diabetes mellitus  Thyroid Disease Evaluation  The patient has no history of thyroid disease.    ID  No diagnoses or significant findings on chart review or clinical presentation and evaluation., MRSA screening obtained. Prescriptions given for Hibiclens and Peridex.    -Preoperative medication instructions were provided and reviewed with the patient.  Any additional testing or evaluation was explained to the patient.  NPO Instructions were discussed, and the patient's questions were answered prior to conclusion of this encounter

## 2024-01-12 NOTE — CPM/PAT H&P
CPM/PAT Evaluation       Name: Jake Brunner (Jake Brunner)  /Age: 1955/68 y.o.     Visit Type:   In-Person       Chief Complaint: MVR    HPI  The patient is a 68 year old male with severe mitral valve regurgitation and history of type A aortic dissection status post replacement of his ascending aorta. He presents today for perioperative evaluation in anticipation of Redo Median Sternotomy, Repair Mitral Valve on 24 with Dr. Arboleda.      Past Medical History:   Diagnosis Date    Alcohol abuse, in remission     History of alcohol abuse    Cataract     Closed displaced comminuted fracture of shaft of right tibia 2019    Closed displaced fracture of body of right scapula 2019    Closed displaced fracture of shaft of right clavicle 2019    Coronary artery disease     Essential (primary) hypertension     Hypertension    Heart valve disease     Severe mitral valve regurgitation.    History of transesophageal echocardiography (EULA) 2023    Echo on 23    Hyperlipidemia     Prostate cancer (CMS/HCC) 2024    Onc: Pacheco Gomes CNP    Shortness of breath     Sleep apnea     Uses CPAP    Vision loss     Wears contacts       Past Surgical History:   Procedure Laterality Date    ANKLE SURGERY Left     Ankle Surgery    ARTERIAL ANEURYSM REPAIR  2015    Aortic Aneurysm Repair Ascending Aorta    CARDIAC CATHETERIZATION N/A 2023    Procedure: Left And Right Heart Cath, With LV;  Surgeon: Owen Choi MD;  Location: [facility];  Service: Cardiovascular;  Laterality: N/A;  Scheduled  @ 9:00am, EULA w/ anesthesia @ 7:30am    COLONOSCOPY      CT CHEST W AND WO IV CONTRAST  10/18/2023    Thoracic aortic atherosclerosis.    FEMUR FRACTURE SURGERY  2016    Femur Repair    LIPOMA RESECTION      back    OTHER SURGICAL HISTORY  2016    Wrist Surgery       Patient  reports that he is not currently sexually active.    Family History   Problem  Relation Name Age of Onset    Hyperlipidemia Mother      Coronary artery disease Father      Other (MYOCARDIAL INFARCTION) Father      Heart attack Father         No Known Allergies    Prior to Admission medications    Medication Sig Start Date End Date Taking? Authorizing Provider   amLODIPine (Norvasc) 10 mg tablet Take 1 tablet (10 mg) by mouth once daily.  Patient taking differently: Take 0.5 tablets (5 mg) by mouth once daily. 9/27/23   Neo Hanna MD   aspirin 81 mg EC tablet Take 1 tablet (81 mg) by mouth once daily.    Historical Provider, MD   ezetimibe (Zetia) 10 mg tablet Take 1 tablet (10 mg) by mouth once daily. 9/27/23   Neo Hanna MD   losartan (Cozaar) 50 mg tablet Take 1 tablet (50 mg) by mouth once daily. DAILY 9/27/23 9/26/24  Neo Hanna MD   metoprolol tartrate (Lopressor) 50 mg tablet Take 1.5 tablets by mouth 2 times a day. Discontinue Metoprolol Succinate 10/5/23   Owen Choi MD   multivitamin tablet Take 1 tablet by mouth once daily.    Historical Provider, MD   rosuvastatin (Crestor) 40 mg tablet Take 1 tablet (40 mg) by mouth once daily. DAILY 9/27/23   Neo Hanna MD        PAT ROS:   Constitutional:   neg    Neuro/Psych:   neg    Eyes:    contacts  neg     use of corrective lenses  Ears:   neg    Nose:   neg    Mouth:   neg    Throat:   neg    Neck:   neg    Cardio:   neg     ROD  Respiratory:   neg    Endocrine:   neg    GI:   neg    :   neg    Musculoskeletal:   neg    Hematologic:   neg    Skin:  neg        Physical Exam  Vitals reviewed.   Constitutional:       Appearance: Normal appearance.   HENT:      Head: Normocephalic and atraumatic.      Nose: Nose normal.      Mouth/Throat:      Mouth: Mucous membranes are moist.      Pharynx: Oropharynx is clear.   Eyes:      Extraocular Movements: Extraocular movements intact.      Pupils: Pupils are equal, round, and reactive to light.   Cardiovascular:      Rate and Rhythm: Normal rate and regular rhythm.       Pulses: Normal pulses.      Heart sounds: Murmur heard.      Comments: RSB, LSB III/VI systolic non-radiating  Pulmonary:      Effort: Pulmonary effort is normal.      Breath sounds: Normal breath sounds.   Musculoskeletal:         General: Normal range of motion.      Cervical back: Normal range of motion.   Skin:     General: Skin is warm and dry.   Neurological:      General: No focal deficit present.      Mental Status: He is alert and oriented to person, place, and time.   Psychiatric:         Mood and Affect: Mood normal.         Behavior: Behavior normal.          PAT AIRWAY:   Airway:     Mallampati::  IV    TM distance::  >3 FB    Neck ROM::  Full  normal        Visit Vitals  /83   Pulse 64   Temp 36.5 °C (97.7 °F) (Oral)       DASI Risk Score      Flowsheet Row Most Recent Value   DASI SCORE 58.2   METS Score (Will be calculated only when all the questions are answered) 9.9          Caprini DVT Assessment      Flowsheet Row Most Recent Value   DVT Score 13   Current Status Major surgery planned, lasting over 3 hours   History Prior major surgery, Previous malignancy   Age 60-75 years   BMI 31-40 (Obesity)          Modified Frailty Index    No data to display       CHADS2 Stroke Risk  Current as of 6 hours ago        N/A 3 - 100%: High Risk   2 - 3%: Medium Risk   0 - 2%: Low Risk     Last Change: N/A          This score determines the patient's risk of having a stroke if the patient has atrial fibrillation.        This score is not applicable to this patient. Components are not calculated.          Revised Cardiac Risk Index      Flowsheet Row Most Recent Value   Revised Cardiac Risk Calculator 2          Apfel Simplified Score      Flowsheet Row Most Recent Value   Apfel Simplified Score Calculator 2          Risk Analysis Index Results This Encounter    No data found in the last 1 encounters.       Stop Bang Score      Flowsheet Row Most Recent Value   Do you snore loudly? 1   Do you often feel  tired or fatigued after your sleep? 0   Has anyone ever observed you stop breathing in your sleep? 0   Do you have or are you being treated for high blood pressure? 1   Recent BMI (Calculated) 32.9   Is BMI greater than 35 kg/m2? 0=No   Age older than 50 years old? 1=Yes   Is your neck circumference greater than 17 inches (Male) or 16 inches (Female)? 0   Gender - Male 1=Yes   STOP-BANG Total Score 4          Recent Results (from the past 168 hour(s))   Coagulation Screen    Collection Time: 01/12/24  3:34 PM   Result Value Ref Range    Protime 11.5 9.8 - 12.8 seconds    INR 1.0 0.9 - 1.1    aPTT 32 27 - 38 seconds   Type And Screen    Collection Time: 01/12/24  3:34 PM   Result Value Ref Range    ABO TYPE A     Rh TYPE POS     ANTIBODY SCREEN NEG    Staphylococcus aureus/MRSA colonization, Culture    Collection Time: 01/12/24  3:34 PM    Specimen: Anterior Nares; Swab   Result Value Ref Range    Staph/MRSA Screen Culture No Staphylococcus aureus isolated    Urinalysis with Reflex Culture and Microscopic    Collection Time: 01/12/24  3:34 PM   Result Value Ref Range    Color, Urine Yellow Straw, Yellow    Appearance, Urine Clear Clear    Specific Gravity, Urine 1.017 1.005 - 1.035    pH, Urine 5.0 5.0, 5.5, 6.0, 6.5, 7.0, 7.5, 8.0    Protein, Urine NEGATIVE NEGATIVE mg/dL    Glucose, Urine NEGATIVE NEGATIVE mg/dL    Blood, Urine NEGATIVE NEGATIVE    Ketones, Urine 5 (TRACE) (A) NEGATIVE mg/dL    Bilirubin, Urine NEGATIVE NEGATIVE    Urobilinogen, Urine <2.0 <2.0 mg/dL    Nitrite, Urine NEGATIVE NEGATIVE    Leukocyte Esterase, Urine NEGATIVE NEGATIVE   Extra Urine Gray Tube    Collection Time: 01/12/24  3:34 PM   Result Value Ref Range    Extra Tube Hold for add-ons.      Diagnostic Results      Cardiovascular Catheterization Report   Study Date:       12/11/2023         CONCLUSIONS:   1. Moderate 2 vessel CAD in a right dominant system.   2. Elevated left and right heart filling pressures.   3. Mild pulmonary  hypertension with a PVR of 1.2 Wood units.   4. No evidence of intracardiac shunt.   5. Preserved cardiac index with a normal .    TRANSESOPHAGEAL ECHOCARDIOGRAM REPORT   Study Date:        12/11/2023     CONCLUSIONS:   1. Left ventricular systolic function is normal with a 65% estimated ejection fraction.   2. Abnormal septal motion consistent with post-operative status.   3. The left atrium is moderately dilated.   4. The right atrium is moderately dilated.   5. Flail segment of the posterior mitral valve leaflet.   6. Severe mitral valve regurgitation.   7. Mildly elevated RVSP.   8. Mild to moderate aortic valve regurgitation.    EKG 10/5/23  Sinus bradycardia  Otherwise normal ECG  When compared with ECG of 23-AUG-2022 10:40,  No significant change was found  Show images for ECG 12 lead (Clinic Performed)     Nuclear Treadmill Stress Test   Study Date:       5/12/2016       Summary:   1. No clinical or electrocardiographic evidence for ischemia at a maximal workload.   2. Nuclear image results are reported separately.   3. The adequate level of stress was achieved.    Assessment and Plan:     Anesthesia:  The patient denies problems with anesthesia in the past such as PONV, prolonged sedation, awareness, dental damage, aspiration, cardiac arrest, difficult intubation, or unexpected hospital admissions.     Neuro:   The patient has history of ETOH, remote traumatic subdural hematoma. He is at increased risk for postoperative delirium secondary to age 65 or older. The patient is at increased risk for perioperative stroke secondary to hypertension , hyperlipidemia, diabetes mellitus, general anesthesia, operative time >2.5 hours, cardiac or emergency surgery. Handouts for preoperative brain exercises given to patient.    HEENT/Airway  The patient has diagnoses, significant findings on chart review, clinical presentation or evaluation of short thick neck, No documented or reported history of airway  difficulty.     Cardiovascular  The patient is scheduled for cardiac surgery.  Preoperative evaluation is complete pending results of labs, urine, nares, cxr.  RCRI  The patient meets 2 RCRI criteria and therefore has a 6.6% risk (elevated) of major adverse cardiac complications.  METS  The patient's functional capacity capacity is greater than 4 METS.  EKG  The patient has no EKG or echocardiographic changes concerning for myocardial ischemia.  No further cardiac evaluation is indicated  Heart Failure  The patient has no known history of heart failure.  Additionally, the patient reports no symptoms of heart failure and demonstrates no signs of heart failure.  Hypertension Evaluation  The patient has a known history of hypertension that is controlled.  Patient's hypertension is most consistent with stage 2.  Heart Rhythm Evaluation  The patient has no history of arrhythmias.  Heart Valve Evaluation  The patient has a known history of valvular heart disease.  Per patient's prior studies, the patient has severe MR.  CARDS EVAL  The patient follows with cardiology, Diana Alcantar CNP last seen 2/22/23. Per note, moderate MVR, eccentric jet referred to get cardiac MRI. Patient now scheduled for surgery with Dr. Arboleda on 1/18/24. History of emergent ascending aorta replacement 12/25/2015.  Recent CT scan demonstrates intact ascending aorta. CAD by coronary CT, moderate 2 vessel CAD per recent cath.    The patient has a 30-day risk for MACE of 2 predictors, 10.1% risk for cardiac death, nonfatal myocardial infarction, and nonfatal cardiac arrest.  COOPER score which indicates a 0.8% risk of intraoperative or 30-day postoperative.    Pulmonary   The patient has findings on chart review, clinical presentation and evaluation significant for HILTON. The patient is at increased risk of perioperative pulmonary complications secondary to thoracic surgery, HILTON, advanced age greater than 60. Patient educated to bring CPAP/BIPAP day of  surgery.    ARISCAT 50, High, 42.1% risk of in-hospital postoperative pulmonary complications  PRODIGY 24, high risk of respiratory depression episode.    Hematology  No diagnoses or significant findings on chart review or clinical presentation and evaluation.  Antiplatelet management   The patient is currently receiving antiplatelet therapy for CAD., The patient was instructed to continue in there perioperative period.  Anticoagulation management  The patient is not currently receiving anticoagulation therapy. Patient provided with DVT educational handout.    Caprini score 13, high risk of perioperative VTE  Transfusion Evaluation  A type and screen was obtained given the likelihood for perioperative transfusion of blood or blood products.    Gastrointestinal  No diagnoses or significant findings on chart review or clinical presentation and evaluation.  Eat 10- 0,  self-perceived oropharyngeal dysphagia scale (0-40)     Genitourinary  The patient has diagnoses or significant findings on chart review or clinical presentation and evaluation significant for history of prostate cancer.  He was treated with  definitive treatment on the ASCLEPIUS trial: SBRT with 6 months ADT/abiraterone/niraparib 4/11-4/20/2022. Follows with radiation oncology Pacheco Buckner CNP, last seen 1/9/24.    Renal  The patient has no known history of chronic kidney disease. No renal diagnoses or significant findings on chart review or clinical presentation and evaluation. The patient is scheduled for coronary revascularization surgery which places patient at higher risk of perioperative renal complications. The patient has specific risk factors associated with increased risk of perioperative renal complications due to age greater than 55, male gender, hypertension. DPS >3 high risk for perioperative acute kidney injury Preventative measures include preoperative hydration.    Musculoskeletal  No diagnoses or significant findings on chart review  or clinical presentation and evaluation.    Endocrine  Diabetes Evaluation  The patient has no history of diabetes mellitus  Thyroid Disease Evaluation  The patient has no history of thyroid disease.    ID  No diagnoses or significant findings on chart review or clinical presentation and evaluation., MRSA screening obtained. Prescriptions given for Hibiclens and Peridex.    -Preoperative medication instructions were provided and reviewed with the patient.  Any additional testing or evaluation was explained to the patient.  NPO Instructions were discussed, and the patient's questions were answered prior to conclusion of this encounter

## 2024-01-13 LAB — HOLD SPECIMEN: NORMAL

## 2024-01-14 LAB — STAPHYLOCOCCUS SPEC CULT: NORMAL

## 2024-01-22 ENCOUNTER — ANESTHESIA EVENT (OUTPATIENT)
Dept: OPERATING ROOM | Facility: HOSPITAL | Age: 69
DRG: 220 | End: 2024-01-22
Payer: MEDICARE

## 2024-01-23 ENCOUNTER — ANESTHESIA (OUTPATIENT)
Dept: OPERATING ROOM | Facility: HOSPITAL | Age: 69
DRG: 220 | End: 2024-01-23
Payer: MEDICARE

## 2024-01-23 ENCOUNTER — HOSPITAL ENCOUNTER (INPATIENT)
Facility: HOSPITAL | Age: 69
LOS: 5 days | Discharge: HOME | DRG: 220 | End: 2024-01-28
Attending: THORACIC SURGERY (CARDIOTHORACIC VASCULAR SURGERY) | Admitting: NURSE PRACTITIONER
Payer: MEDICARE

## 2024-01-23 ENCOUNTER — HOSPITAL ENCOUNTER (OUTPATIENT)
Dept: OPERATING ROOM | Facility: HOSPITAL | Age: 69
Discharge: HOME | End: 2024-01-23
Payer: MEDICARE

## 2024-01-23 ENCOUNTER — APPOINTMENT (OUTPATIENT)
Dept: RADIOLOGY | Facility: HOSPITAL | Age: 69
DRG: 220 | End: 2024-01-23
Payer: MEDICARE

## 2024-01-23 DIAGNOSIS — I05.9 MITRAL VALVE DISORDER: Primary | ICD-10-CM

## 2024-01-23 DIAGNOSIS — Z98.890 S/P MVR (MITRAL VALVE REPAIR): ICD-10-CM

## 2024-01-23 DIAGNOSIS — I34.0 NONRHEUMATIC MITRAL VALVE REGURGITATION: ICD-10-CM

## 2024-01-23 LAB
ABO GROUP (TYPE) IN BLOOD: NORMAL
ALBUMIN SERPL BCP-MCNC: 3.5 G/DL (ref 3.4–5)
ANION GAP BLDA CALCULATED.4IONS-SCNC: 10 MMO/L (ref 10–25)
ANION GAP BLDA CALCULATED.4IONS-SCNC: 10 MMO/L (ref 10–25)
ANION GAP BLDA CALCULATED.4IONS-SCNC: 11 MMO/L (ref 10–25)
ANION GAP BLDA CALCULATED.4IONS-SCNC: 13 MMO/L (ref 10–25)
ANION GAP BLDA CALCULATED.4IONS-SCNC: 13 MMO/L (ref 10–25)
ANION GAP BLDA CALCULATED.4IONS-SCNC: 14 MMO/L (ref 10–25)
ANION GAP BLDA CALCULATED.4IONS-SCNC: 16 MMO/L (ref 10–25)
ANION GAP BLDA CALCULATED.4IONS-SCNC: ABNORMAL MMOL/L
ANION GAP BLDA CALCULATED.4IONS-SCNC: ABNORMAL MMOL/L
ANION GAP BLDV CALCULATED.4IONS-SCNC: 9 MMOL/L (ref 10–25)
ANION GAP SERPL CALC-SCNC: 15 MMOL/L (ref 10–20)
ANTIBODY SCREEN: NORMAL
APTT PPP: 27 SECONDS (ref 27–38)
BASE EXCESS BLDA CALC-SCNC: -0.2 MMOL/L (ref -2–3)
BASE EXCESS BLDA CALC-SCNC: -0.4 MMOL/L (ref -2–3)
BASE EXCESS BLDA CALC-SCNC: -0.7 MMOL/L (ref -2–3)
BASE EXCESS BLDA CALC-SCNC: -1.5 MMOL/L (ref -2–3)
BASE EXCESS BLDA CALC-SCNC: -1.9 MMOL/L (ref -2–3)
BASE EXCESS BLDA CALC-SCNC: -2.2 MMOL/L (ref -2–3)
BASE EXCESS BLDA CALC-SCNC: -2.7 MMOL/L (ref -2–3)
BASE EXCESS BLDA CALC-SCNC: -3.1 MMOL/L (ref -2–3)
BASE EXCESS BLDA CALC-SCNC: -3.8 MMOL/L (ref -2–3)
BASE EXCESS BLDA CALC-SCNC: -3.9 MMOL/L (ref -2–3)
BASE EXCESS BLDA CALC-SCNC: -5.1 MMOL/L (ref -2–3)
BASE EXCESS BLDA CALC-SCNC: -5.9 MMOL/L (ref -2–3)
BASE EXCESS BLDA CALC-SCNC: 0.5 MMOL/L (ref -2–3)
BASE EXCESS BLDV CALC-SCNC: 0 MMOL/L (ref -2–3)
BODY TEMPERATURE: 37 DEGREES CELSIUS
BUN SERPL-MCNC: 15 MG/DL (ref 6–23)
CA-I BLD-SCNC: 1.15 MMOL/L (ref 1.1–1.33)
CA-I BLDA-SCNC: 1.15 MMOL/L (ref 1.1–1.33)
CA-I BLDA-SCNC: 1.17 MMOL/L (ref 1.1–1.33)
CA-I BLDA-SCNC: 1.17 MMOL/L (ref 1.1–1.33)
CA-I BLDA-SCNC: 1.18 MMOL/L (ref 1.1–1.33)
CA-I BLDA-SCNC: 1.19 MMOL/L (ref 1.1–1.33)
CA-I BLDA-SCNC: 1.19 MMOL/L (ref 1.1–1.33)
CA-I BLDA-SCNC: 1.22 MMOL/L (ref 1.1–1.33)
CA-I BLDA-SCNC: 1.29 MMOL/L (ref 1.1–1.33)
CA-I BLDA-SCNC: 1.33 MMOL/L (ref 1.1–1.33)
CA-I BLDA-SCNC: 1.33 MMOL/L (ref 1.1–1.33)
CA-I BLDA-SCNC: 1.35 MMOL/L (ref 1.1–1.33)
CA-I BLDV-SCNC: 1.18 MMOL/L (ref 1.1–1.33)
CALCIUM SERPL-MCNC: 8.7 MG/DL (ref 8.6–10.6)
CFT BLD TEG: 1.2 MIN (ref 0.8–2.1)
CFT BLD TEG: 1.4 MIN (ref 0.8–2.1)
CHLORIDE BLDA-SCNC: 107 MMOL/L (ref 98–107)
CHLORIDE BLDA-SCNC: 107 MMOL/L (ref 98–107)
CHLORIDE BLDA-SCNC: 108 MMOL/L (ref 98–107)
CHLORIDE BLDA-SCNC: 110 MMOL/L (ref 98–107)
CHLORIDE BLDA-SCNC: 111 MMOL/L (ref 98–107)
CHLORIDE BLDA-SCNC: 112 MMOL/L (ref 98–107)
CHLORIDE BLDA-SCNC: 114 MMOL/L (ref 98–107)
CHLORIDE BLDA-SCNC: 115 MMOL/L (ref 98–107)
CHLORIDE BLDA-SCNC: ABNORMAL MMOL/L
CHLORIDE BLDV-SCNC: 107 MMOL/L (ref 98–107)
CHLORIDE SERPL-SCNC: 112 MMOL/L (ref 98–107)
CLOT ANGLE BLD TEG: 71 DEG (ref 63–78)
CLOT ANGLE BLD TEG: 73 DEG (ref 63–78)
CLOT INIT BLD TEG: 6.6 MIN (ref 4.6–9.1)
CLOT INIT BLD TEG: 7.2 MIN (ref 4.6–9.1)
CLOT INIT P HPASE BLD TEG: 6.2 MIN (ref 4.3–8.3)
CLOT INIT P HPASE BLD TEG: 6.9 MIN (ref 4.3–8.3)
CO2 SERPL-SCNC: 21 MMOL/L (ref 21–32)
COHGB MFR BLDA: 0.2 %
COHGB MFR BLDA: 0.3 %
COHGB MFR BLDA: 0.4 %
COHGB MFR BLDA: 0.5 %
COHGB MFR BLDA: 0.6 %
COHGB MFR BLDA: 0.7 %
COHGB MFR BLDA: 0.7 %
COHGB MFR BLDA: 1 %
COHGB MFR BLDV: 1 %
CREAT SERPL-MCNC: 0.91 MG/DL (ref 0.5–1.3)
DO-HGB MFR BLDA: 0.1 % (ref 0–5)
DO-HGB MFR BLDA: 0.2 % (ref 0–5)
DO-HGB MFR BLDA: 0.3 % (ref 0–5)
DO-HGB MFR BLDA: 0.3 % (ref 0–5)
DO-HGB MFR BLDA: 0.4 % (ref 0–5)
DO-HGB MFR BLDA: 0.7 % (ref 0–5)
DO-HGB MFR BLDA: 1.7 % (ref 0–5)
EGFRCR SERPLBLD CKD-EPI 2021: >90 ML/MIN/1.73M*2
ERYTHROCYTE [DISTWIDTH] IN BLOOD BY AUTOMATED COUNT: 14.3 % (ref 11.5–14.5)
FIBRINOGEN BLD CALC-MCNC: 345 MG/DL (ref 278–581)
FIBRINOGEN BLD CALC-MCNC: 412 MG/DL (ref 278–581)
FIBRINOGEN PPP-MCNC: 157 MG/DL (ref 200–400)
GLUCOSE BLD MANUAL STRIP-MCNC: 171 MG/DL (ref 74–99)
GLUCOSE BLDA-MCNC: 112 MG/DL (ref 74–99)
GLUCOSE BLDA-MCNC: 119 MG/DL (ref 74–99)
GLUCOSE BLDA-MCNC: 120 MG/DL (ref 74–99)
GLUCOSE BLDA-MCNC: 122 MG/DL (ref 74–99)
GLUCOSE BLDA-MCNC: 123 MG/DL (ref 74–99)
GLUCOSE BLDA-MCNC: 124 MG/DL (ref 74–99)
GLUCOSE BLDA-MCNC: 125 MG/DL (ref 74–99)
GLUCOSE BLDA-MCNC: 128 MG/DL (ref 74–99)
GLUCOSE BLDA-MCNC: 137 MG/DL (ref 74–99)
GLUCOSE BLDA-MCNC: 151 MG/DL (ref 74–99)
GLUCOSE BLDA-MCNC: 155 MG/DL (ref 74–99)
GLUCOSE BLDA-MCNC: 160 MG/DL (ref 74–99)
GLUCOSE BLDA-MCNC: 171 MG/DL (ref 74–99)
GLUCOSE BLDV-MCNC: 126 MG/DL (ref 74–99)
GLUCOSE SERPL-MCNC: 146 MG/DL (ref 74–99)
HCO3 BLDA-SCNC: 20.2 MMOL/L (ref 22–26)
HCO3 BLDA-SCNC: 20.6 MMOL/L (ref 22–26)
HCO3 BLDA-SCNC: 21.5 MMOL/L (ref 22–26)
HCO3 BLDA-SCNC: 21.8 MMOL/L (ref 22–26)
HCO3 BLDA-SCNC: 22.2 MMOL/L (ref 22–26)
HCO3 BLDA-SCNC: 22.7 MMOL/L (ref 22–26)
HCO3 BLDA-SCNC: 23.2 MMOL/L (ref 22–26)
HCO3 BLDA-SCNC: 23.7 MMOL/L (ref 22–26)
HCO3 BLDA-SCNC: 24.6 MMOL/L (ref 22–26)
HCO3 BLDA-SCNC: 24.8 MMOL/L (ref 22–26)
HCO3 BLDA-SCNC: 24.8 MMOL/L (ref 22–26)
HCO3 BLDV-SCNC: 25.9 MMOL/L (ref 22–26)
HCT VFR BLD AUTO: 32.6 % (ref 41–52)
HCT VFR BLD EST: 28 % (ref 41–52)
HCT VFR BLD EST: 28 % (ref 41–52)
HCT VFR BLD EST: 29 % (ref 41–52)
HCT VFR BLD EST: 30 % (ref 41–52)
HCT VFR BLD EST: 31 % (ref 41–52)
HCT VFR BLD EST: 31 % (ref 41–52)
HCT VFR BLD EST: 32 % (ref 41–52)
HCT VFR BLD EST: 32 % (ref 41–52)
HCT VFR BLD EST: 35 % (ref 41–52)
HCT VFR BLD EST: 38 % (ref 41–52)
HGB BLD-MCNC: 10.9 G/DL (ref 13.5–17.5)
HGB BLDA-MCNC: 10 G/DL (ref 13.5–17.5)
HGB BLDA-MCNC: 10 G/DL (ref 13.5–17.5)
HGB BLDA-MCNC: 10.1 G/DL (ref 13.5–17.5)
HGB BLDA-MCNC: 10.1 G/DL (ref 13.5–17.5)
HGB BLDA-MCNC: 10.2 G/DL (ref 13.5–17.5)
HGB BLDA-MCNC: 10.3 G/DL (ref 13.5–17.5)
HGB BLDA-MCNC: 10.3 G/DL (ref 13.5–17.5)
HGB BLDA-MCNC: 10.8 G/DL (ref 13.5–17.5)
HGB BLDA-MCNC: 11.5 G/DL (ref 13.5–17.5)
HGB BLDA-MCNC: 12.8 G/DL (ref 13.5–17.5)
HGB BLDA-MCNC: 12.8 G/DL (ref 13.5–17.5)
HGB BLDA-MCNC: 9.3 G/DL (ref 13.5–17.5)
HGB BLDA-MCNC: 9.6 G/DL (ref 13.5–17.5)
HGB BLDA-MCNC: 9.8 G/DL (ref 13.5–17.5)
HGB BLDA-MCNC: 9.8 G/DL (ref 13.5–17.5)
HGB BLDV-MCNC: 10 G/DL (ref 13.5–17.5)
INHALED O2 CONCENTRATION: 100 %
INHALED O2 CONCENTRATION: 100 %
INHALED O2 CONCENTRATION: 21 %
INHALED O2 CONCENTRATION: 36 %
INHALED O2 CONCENTRATION: 50 %
INHALED O2 CONCENTRATION: 60 %
INHALED O2 CONCENTRATION: 70 %
INHALED O2 CONCENTRATION: 70 %
INHALED O2 CONCENTRATION: 77 %
INHALED O2 CONCENTRATION: 80 %
INR PPP: 1.2 (ref 0.9–1.1)
LACTATE BLDA-SCNC: 0.8 MMOL/L (ref 0.4–2)
LACTATE BLDA-SCNC: 0.9 MMOL/L (ref 0.4–2)
LACTATE BLDA-SCNC: 1.1 MMOL/L (ref 0.4–2)
LACTATE BLDA-SCNC: 1.1 MMOL/L (ref 0.4–2)
LACTATE BLDA-SCNC: 1.2 MMOL/L (ref 0.4–2)
LACTATE BLDA-SCNC: 1.7 MMOL/L (ref 0.4–2)
LACTATE BLDA-SCNC: 1.8 MMOL/L (ref 0.4–2)
LACTATE BLDA-SCNC: 1.9 MMOL/L (ref 0.4–2)
LACTATE BLDA-SCNC: 2 MMOL/L (ref 0.4–2)
LACTATE BLDA-SCNC: 2.1 MMOL/L (ref 0.4–2)
LACTATE BLDA-SCNC: 2.6 MMOL/L (ref 0.4–2)
LACTATE BLDA-SCNC: 3 MMOL/L (ref 0.4–2)
LACTATE BLDA-SCNC: 3.2 MMOL/L (ref 0.4–2)
LACTATE BLDV-SCNC: 1 MMOL/L (ref 0.4–2)
MAGNESIUM SERPL-MCNC: 3.46 MG/DL (ref 1.6–2.4)
MCF BLD TEG: 19 MM (ref 15–32)
MCF BLD TEG: 23 MM (ref 15–32)
MCF BLD TEG: 55 MM (ref 52–70)
MCF BLD TEG: 58 MM (ref 52–69)
MCF BLD TEG: 61 MM (ref 52–69)
MCF BLD TEG: 63 MM (ref 52–70)
MCH RBC QN AUTO: 29.9 PG (ref 26–34)
MCHC RBC AUTO-ENTMCNC: 33.4 G/DL (ref 32–36)
MCV RBC AUTO: 90 FL (ref 80–100)
METHGB MFR BLDA: 0.3 % (ref 0–1.5)
METHGB MFR BLDA: 0.5 % (ref 0–1.5)
METHGB MFR BLDA: 0.6 % (ref 0–1.5)
METHGB MFR BLDA: 0.7 % (ref 0–1.5)
METHGB MFR BLDA: 0.7 % (ref 0–1.5)
METHGB MFR BLDA: 0.8 % (ref 0–1.5)
METHGB MFR BLDA: 1.2 % (ref 0–1.5)
METHGB MFR BLDV: 0.4 % (ref 0–1.5)
NRBC BLD-RTO: 0 /100 WBCS (ref 0–0)
OXYHGB MFR BLDA: 96.7 % (ref 94–98)
OXYHGB MFR BLDA: 96.7 % (ref 94–98)
OXYHGB MFR BLDA: 97 % (ref 94–98)
OXYHGB MFR BLDA: 97.3 % (ref 94–98)
OXYHGB MFR BLDA: 97.4 % (ref 94–98)
OXYHGB MFR BLDA: 97.9 % (ref 94–98)
OXYHGB MFR BLDA: 97.9 % (ref 94–98)
OXYHGB MFR BLDA: 98.3 % (ref 94–98)
OXYHGB MFR BLDA: 98.3 % (ref 94–98)
OXYHGB MFR BLDA: 98.4 % (ref 94–98)
OXYHGB MFR BLDA: 98.4 % (ref 94–98)
OXYHGB MFR BLDA: 98.5 % (ref 94–98)
OXYHGB MFR BLDA: 98.6 % (ref 94–98)
OXYHGB MFR BLDA: 98.6 % (ref 94–98)
OXYHGB MFR BLDA: 98.8 % (ref 94–98)
OXYHGB MFR BLDA: 98.8 % (ref 94–98)
OXYHGB MFR BLDV: 82.6 % (ref 45–75)
PCO2 BLDA: 35 MM HG (ref 38–42)
PCO2 BLDA: 36 MM HG (ref 38–42)
PCO2 BLDA: 37 MM HG (ref 38–42)
PCO2 BLDA: 39 MM HG (ref 38–42)
PCO2 BLDA: 40 MM HG (ref 38–42)
PCO2 BLDA: 41 MM HG (ref 38–42)
PCO2 BLDA: 42 MM HG (ref 38–42)
PCO2 BLDA: 43 MM HG (ref 38–42)
PCO2 BLDA: 43 MM HG (ref 38–42)
PCO2 BLDA: 44 MM HG (ref 38–42)
PCO2 BLDA: 45 MM HG (ref 38–42)
PCO2 BLDV: 47 MM HG (ref 41–51)
PH BLDA: 7.3 PH (ref 7.38–7.42)
PH BLDA: 7.31 PH (ref 7.38–7.42)
PH BLDA: 7.32 PH (ref 7.38–7.42)
PH BLDA: 7.33 PH (ref 7.38–7.42)
PH BLDA: 7.33 PH (ref 7.38–7.42)
PH BLDA: 7.35 PH (ref 7.38–7.42)
PH BLDA: 7.35 PH (ref 7.38–7.42)
PH BLDA: 7.37 PH (ref 7.38–7.42)
PH BLDA: 7.38 PH (ref 7.38–7.42)
PH BLDA: 7.39 PH (ref 7.38–7.42)
PH BLDA: 7.39 PH (ref 7.38–7.42)
PH BLDA: 7.43 PH (ref 7.38–7.42)
PH BLDA: 7.43 PH (ref 7.38–7.42)
PH BLDV: 7.35 PH (ref 7.33–7.43)
PHOSPHATE SERPL-MCNC: 1.9 MG/DL (ref 2.5–4.9)
PLATELET # BLD AUTO: 106 X10*3/UL (ref 150–450)
PO2 BLDA: 104 MM HG (ref 85–95)
PO2 BLDA: 117 MM HG (ref 85–95)
PO2 BLDA: 125 MM HG (ref 85–95)
PO2 BLDA: 159 MM HG (ref 85–95)
PO2 BLDA: 165 MM HG (ref 85–95)
PO2 BLDA: 177 MM HG (ref 85–95)
PO2 BLDA: 220 MM HG (ref 85–95)
PO2 BLDA: 223 MM HG (ref 85–95)
PO2 BLDA: 238 MM HG (ref 85–95)
PO2 BLDA: 291 MM HG (ref 85–95)
PO2 BLDA: 292 MM HG (ref 85–95)
PO2 BLDA: 403 MM HG (ref 85–95)
PO2 BLDA: 438 MM HG (ref 85–95)
PO2 BLDV: 49 MM HG (ref 35–45)
POTASSIUM BLDA-SCNC: 3.8 MMOL/L (ref 3.5–5.3)
POTASSIUM BLDA-SCNC: 3.9 MMOL/L (ref 3.5–5.3)
POTASSIUM BLDA-SCNC: 4.2 MMOL/L (ref 3.5–5.3)
POTASSIUM BLDA-SCNC: 4.3 MMOL/L (ref 3.5–5.3)
POTASSIUM BLDA-SCNC: 4.3 MMOL/L (ref 3.5–5.3)
POTASSIUM BLDA-SCNC: 4.5 MMOL/L (ref 3.5–5.3)
POTASSIUM BLDA-SCNC: 4.6 MMOL/L (ref 3.5–5.3)
POTASSIUM BLDA-SCNC: 5 MMOL/L (ref 3.5–5.3)
POTASSIUM BLDA-SCNC: 5 MMOL/L (ref 3.5–5.3)
POTASSIUM BLDA-SCNC: 5.3 MMOL/L (ref 3.5–5.3)
POTASSIUM BLDA-SCNC: 5.5 MMOL/L (ref 3.5–5.3)
POTASSIUM BLDV-SCNC: 4 MMOL/L (ref 3.5–5.3)
POTASSIUM SERPL-SCNC: 5 MMOL/L (ref 3.5–5.3)
PROTHROMBIN TIME: 13.9 SECONDS (ref 9.8–12.8)
RBC # BLD AUTO: 3.64 X10*6/UL (ref 4.5–5.9)
RH FACTOR (ANTIGEN D): NORMAL
SAO2 % BLDA: 100 % (ref 94–100)
SAO2 % BLDA: 98 % (ref 94–100)
SAO2 % BLDA: 99 % (ref 94–100)
SAO2 % BLDA: 99 % (ref 94–100)
SAO2 % BLDV: 84 % (ref 45–75)
SODIUM BLDA-SCNC: 138 MMOL/L (ref 136–145)
SODIUM BLDA-SCNC: 138 MMOL/L (ref 136–145)
SODIUM BLDA-SCNC: 139 MMOL/L (ref 136–145)
SODIUM BLDA-SCNC: 139 MMOL/L (ref 136–145)
SODIUM BLDA-SCNC: 140 MMOL/L (ref 136–145)
SODIUM BLDA-SCNC: 140 MMOL/L (ref 136–145)
SODIUM BLDA-SCNC: 141 MMOL/L (ref 136–145)
SODIUM BLDA-SCNC: 142 MMOL/L (ref 136–145)
SODIUM BLDA-SCNC: 143 MMOL/L (ref 136–145)
SODIUM BLDA-SCNC: ABNORMAL MMOL/L
SODIUM BLDV-SCNC: 138 MMOL/L (ref 136–145)
SODIUM SERPL-SCNC: 143 MMOL/L (ref 136–145)
TEST COMMENT: NORMAL
TEST COMMENT: NORMAL
WBC # BLD AUTO: 23.2 X10*3/UL (ref 4.4–11.3)

## 2024-01-23 PROCEDURE — 2500000001 HC RX 250 WO HCPCS SELF ADMINISTERED DRUGS (ALT 637 FOR MEDICARE OP): Performed by: NURSE PRACTITIONER

## 2024-01-23 PROCEDURE — 71045 X-RAY EXAM CHEST 1 VIEW: CPT | Performed by: RADIOLOGY

## 2024-01-23 PROCEDURE — 2500000004 HC RX 250 GENERAL PHARMACY W/ HCPCS (ALT 636 FOR OP/ED): Performed by: NURSE PRACTITIONER

## 2024-01-23 PROCEDURE — 2500000005 HC RX 250 GENERAL PHARMACY W/O HCPCS: Performed by: THORACIC SURGERY (CARDIOTHORACIC VASCULAR SURGERY)

## 2024-01-23 PROCEDURE — 3600000012 HC PERFUSION TIME - EACH INCREMENTAL 1 MINUTE: Performed by: THORACIC SURGERY (CARDIOTHORACIC VASCULAR SURGERY)

## 2024-01-23 PROCEDURE — 0753T DGTZ GLS MCRSCP SLD LEVEL IV: CPT | Mod: TC,SUR | Performed by: THORACIC SURGERY (CARDIOTHORACIC VASCULAR SURGERY)

## 2024-01-23 PROCEDURE — 85384 FIBRINOGEN ACTIVITY: CPT | Performed by: NURSE PRACTITIONER

## 2024-01-23 PROCEDURE — 94002 VENT MGMT INPAT INIT DAY: CPT

## 2024-01-23 PROCEDURE — 36556 INSERT NON-TUNNEL CV CATH: CPT | Performed by: ANESTHESIOLOGY

## 2024-01-23 PROCEDURE — 2500000001 HC RX 250 WO HCPCS SELF ADMINISTERED DRUGS (ALT 637 FOR MEDICARE OP)

## 2024-01-23 PROCEDURE — 99291 CRITICAL CARE FIRST HOUR: CPT | Performed by: ANESTHESIOLOGY

## 2024-01-23 PROCEDURE — P9045 ALBUMIN (HUMAN), 5%, 250 ML: HCPCS | Mod: JZ | Performed by: NURSE PRACTITIONER

## 2024-01-23 PROCEDURE — 2500000005 HC RX 250 GENERAL PHARMACY W/O HCPCS: Performed by: NURSE PRACTITIONER

## 2024-01-23 PROCEDURE — 5A1221Z PERFORMANCE OF CARDIAC OUTPUT, CONTINUOUS: ICD-10-PCS | Performed by: THORACIC SURGERY (CARDIOTHORACIC VASCULAR SURGERY)

## 2024-01-23 PROCEDURE — 2500000004 HC RX 250 GENERAL PHARMACY W/ HCPCS (ALT 636 FOR OP/ED): Performed by: THORACIC SURGERY (CARDIOTHORACIC VASCULAR SURGERY)

## 2024-01-23 PROCEDURE — 82375 ASSAY CARBOXYHB QUANT: CPT

## 2024-01-23 PROCEDURE — 3700000001 HC GENERAL ANESTHESIA TIME - INITIAL BASE CHARGE: Performed by: THORACIC SURGERY (CARDIOTHORACIC VASCULAR SURGERY)

## 2024-01-23 PROCEDURE — 02QG0ZZ REPAIR MITRAL VALVE, OPEN APPROACH: ICD-10-PCS | Performed by: THORACIC SURGERY (CARDIOTHORACIC VASCULAR SURGERY)

## 2024-01-23 PROCEDURE — 85384 FIBRINOGEN ACTIVITY: CPT

## 2024-01-23 PROCEDURE — 2500000004 HC RX 250 GENERAL PHARMACY W/ HCPCS (ALT 636 FOR OP/ED)

## 2024-01-23 PROCEDURE — 33425 REPAIR OF MITRAL VALVE: CPT | Performed by: THORACIC SURGERY (CARDIOTHORACIC VASCULAR SURGERY)

## 2024-01-23 PROCEDURE — A4217 STERILE WATER/SALINE, 500 ML: HCPCS | Performed by: THORACIC SURGERY (CARDIOTHORACIC VASCULAR SURGERY)

## 2024-01-23 PROCEDURE — 84132 ASSAY OF SERUM POTASSIUM: CPT | Performed by: NURSE PRACTITIONER

## 2024-01-23 PROCEDURE — 82330 ASSAY OF CALCIUM: CPT | Performed by: NURSE PRACTITIONER

## 2024-01-23 PROCEDURE — 85610 PROTHROMBIN TIME: CPT | Performed by: NURSE PRACTITIONER

## 2024-01-23 PROCEDURE — 3600000011 HC PERFUSION TIME - INITIAL BASE CHARGE: Performed by: THORACIC SURGERY (CARDIOTHORACIC VASCULAR SURGERY)

## 2024-01-23 PROCEDURE — 82947 ASSAY GLUCOSE BLOOD QUANT: CPT

## 2024-01-23 PROCEDURE — 84132 ASSAY OF SERUM POTASSIUM: CPT

## 2024-01-23 PROCEDURE — 99223 1ST HOSP IP/OBS HIGH 75: CPT | Performed by: STUDENT IN AN ORGANIZED HEALTH CARE EDUCATION/TRAINING PROGRAM

## 2024-01-23 PROCEDURE — 2500000004 HC RX 250 GENERAL PHARMACY W/ HCPCS (ALT 636 FOR OP/ED): Performed by: ANESTHESIOLOGY

## 2024-01-23 PROCEDURE — 3700000002 HC GENERAL ANESTHESIA TIME - EACH INCREMENTAL 1 MINUTE: Performed by: THORACIC SURGERY (CARDIOTHORACIC VASCULAR SURGERY)

## 2024-01-23 PROCEDURE — 2780000003 HC OR 278 NO HCPCS: Performed by: THORACIC SURGERY (CARDIOTHORACIC VASCULAR SURGERY)

## 2024-01-23 PROCEDURE — 37799 UNLISTED PX VASCULAR SURGERY: CPT | Performed by: NURSE PRACTITIONER

## 2024-01-23 PROCEDURE — 2020000001 HC ICU ROOM DAILY

## 2024-01-23 PROCEDURE — 85347 COAGULATION TIME ACTIVATED: CPT

## 2024-01-23 PROCEDURE — P9016 RBC LEUKOCYTES REDUCED: HCPCS

## 2024-01-23 PROCEDURE — 3600000018 HC OR TIME - INITIAL BASE CHARGE - PROCEDURE LEVEL SIX: Performed by: THORACIC SURGERY (CARDIOTHORACIC VASCULAR SURGERY)

## 2024-01-23 PROCEDURE — 99291 CRITICAL CARE FIRST HOUR: CPT | Performed by: NURSE PRACTITIONER

## 2024-01-23 PROCEDURE — C9113 INJ PANTOPRAZOLE SODIUM, VIA: HCPCS | Performed by: NURSE PRACTITIONER

## 2024-01-23 PROCEDURE — 33530 CORONARY ARTERY BYPASS/REOP: CPT | Performed by: THORACIC SURGERY (CARDIOTHORACIC VASCULAR SURGERY)

## 2024-01-23 PROCEDURE — 88305 TISSUE EXAM BY PATHOLOGIST: CPT | Performed by: PATHOLOGY

## 2024-01-23 PROCEDURE — P9045 ALBUMIN (HUMAN), 5%, 250 ML: HCPCS | Mod: JZ

## 2024-01-23 PROCEDURE — 3600000017 HC OR TIME - EACH INCREMENTAL 1 MINUTE - PROCEDURE LEVEL SIX: Performed by: THORACIC SURGERY (CARDIOTHORACIC VASCULAR SURGERY)

## 2024-01-23 PROCEDURE — 85027 COMPLETE CBC AUTOMATED: CPT | Performed by: NURSE PRACTITIONER

## 2024-01-23 PROCEDURE — A33425 PR MITRALPLASTY W CP BYPASS: Performed by: ANESTHESIOLOGY

## 2024-01-23 PROCEDURE — 2500000005 HC RX 250 GENERAL PHARMACY W/O HCPCS

## 2024-01-23 PROCEDURE — 2500000002 HC RX 250 W HCPCS SELF ADMINISTERED DRUGS (ALT 637 FOR MEDICARE OP, ALT 636 FOR OP/ED)

## 2024-01-23 PROCEDURE — 36620 INSERTION CATHETER ARTERY: CPT | Performed by: ANESTHESIOLOGY

## 2024-01-23 PROCEDURE — 83735 ASSAY OF MAGNESIUM: CPT | Performed by: NURSE PRACTITIONER

## 2024-01-23 PROCEDURE — 71045 X-RAY EXAM CHEST 1 VIEW: CPT

## 2024-01-23 PROCEDURE — A33425 PR MITRALPLASTY W CP BYPASS

## 2024-01-23 PROCEDURE — 86901 BLOOD TYPING SEROLOGIC RH(D): CPT | Performed by: NURSE PRACTITIONER

## 2024-01-23 PROCEDURE — 2500000002 HC RX 250 W HCPCS SELF ADMINISTERED DRUGS (ALT 637 FOR MEDICARE OP, ALT 636 FOR OP/ED): Performed by: NURSE PRACTITIONER

## 2024-01-23 PROCEDURE — 2720000007 HC OR 272 NO HCPCS: Performed by: THORACIC SURGERY (CARDIOTHORACIC VASCULAR SURGERY)

## 2024-01-23 PROCEDURE — 76937 US GUIDE VASCULAR ACCESS: CPT | Performed by: ANESTHESIOLOGY

## 2024-01-23 PROCEDURE — A4649 SURGICAL SUPPLIES: HCPCS | Performed by: THORACIC SURGERY (CARDIOTHORACIC VASCULAR SURGERY)

## 2024-01-23 DEVICE — PATCH, TACHOSIL, 4.8CM X 4.8CM, ABSORABLE FIBRIN SEALANT: Type: IMPLANTABLE DEVICE | Site: HEART | Status: NON-FUNCTIONAL

## 2024-01-23 RX ORDER — POTASSIUM CHLORIDE 14.9 MG/ML
20 INJECTION INTRAVENOUS EVERY 6 HOURS PRN
Status: DISCONTINUED | OUTPATIENT
Start: 2024-01-23 | End: 2024-01-24

## 2024-01-23 RX ORDER — HYDROMORPHONE HYDROCHLORIDE 1 MG/ML
0.2 INJECTION, SOLUTION INTRAMUSCULAR; INTRAVENOUS; SUBCUTANEOUS
Status: DISCONTINUED | OUTPATIENT
Start: 2024-01-23 | End: 2024-01-24

## 2024-01-23 RX ORDER — GLYCOPYRROLATE 0.2 MG/ML
0.4 INJECTION INTRAMUSCULAR; INTRAVENOUS ONCE
Status: COMPLETED | OUTPATIENT
Start: 2024-01-23 | End: 2024-01-23

## 2024-01-23 RX ORDER — ACETAMINOPHEN 325 MG/1
TABLET ORAL AS NEEDED
Status: DISCONTINUED | OUTPATIENT
Start: 2024-01-23 | End: 2024-01-23

## 2024-01-23 RX ORDER — ASPIRIN 300 MG/1
150 SUPPOSITORY RECTAL DAILY
Status: DISCONTINUED | OUTPATIENT
Start: 2024-01-24 | End: 2024-01-24

## 2024-01-23 RX ORDER — CALCIUM CHLORIDE INJECTION 100 MG/ML
INJECTION, SOLUTION INTRAVENOUS AS NEEDED
Status: DISCONTINUED | OUTPATIENT
Start: 2024-01-23 | End: 2024-01-23

## 2024-01-23 RX ORDER — ROPIVACAINE HYDROCHLORIDE 5 MG/ML
INJECTION, SOLUTION EPIDURAL; INFILTRATION; PERINEURAL AS NEEDED
Status: DISCONTINUED | OUTPATIENT
Start: 2024-01-23 | End: 2024-01-23

## 2024-01-23 RX ORDER — NITROGLYCERIN 40 MG/100ML
INJECTION INTRAVENOUS AS NEEDED
Status: DISCONTINUED | OUTPATIENT
Start: 2024-01-23 | End: 2024-01-23

## 2024-01-23 RX ORDER — ACETAMINOPHEN 325 MG/1
650 TABLET ORAL EVERY 4 HOURS
Status: DISCONTINUED | OUTPATIENT
Start: 2024-01-23 | End: 2024-01-27

## 2024-01-23 RX ORDER — ESMOLOL HYDROCHLORIDE 10 MG/ML
INJECTION INTRAVENOUS AS NEEDED
Status: DISCONTINUED | OUTPATIENT
Start: 2024-01-23 | End: 2024-01-23

## 2024-01-23 RX ORDER — OXYCODONE HYDROCHLORIDE 5 MG/1
5 TABLET ORAL EVERY 4 HOURS PRN
Status: DISCONTINUED | OUTPATIENT
Start: 2024-01-23 | End: 2024-01-28 | Stop reason: HOSPADM

## 2024-01-23 RX ORDER — SODIUM CHLORIDE, SODIUM LACTATE, POTASSIUM CHLORIDE, CALCIUM CHLORIDE 600; 310; 30; 20 MG/100ML; MG/100ML; MG/100ML; MG/100ML
30 INJECTION, SOLUTION INTRAVENOUS CONTINUOUS
Status: DISCONTINUED | OUTPATIENT
Start: 2024-01-23 | End: 2024-01-24

## 2024-01-23 RX ORDER — CEFAZOLIN 1 G/1
INJECTION, POWDER, FOR SOLUTION INTRAVENOUS AS NEEDED
Status: DISCONTINUED | OUTPATIENT
Start: 2024-01-23 | End: 2024-01-23

## 2024-01-23 RX ORDER — SODIUM CHLORIDE 0.9 G/100ML
IRRIGANT IRRIGATION AS NEEDED
Status: DISCONTINUED | OUTPATIENT
Start: 2024-01-23 | End: 2024-01-23 | Stop reason: HOSPADM

## 2024-01-23 RX ORDER — EPINEPHRINE HCL IN 0.9 % NACL 4MG/250ML
PLASTIC BAG, INJECTION (ML) INTRAVENOUS CONTINUOUS PRN
Status: DISCONTINUED | OUTPATIENT
Start: 2024-01-23 | End: 2024-01-23

## 2024-01-23 RX ORDER — PROPOFOL 10 MG/ML
INJECTION, EMULSION INTRAVENOUS AS NEEDED
Status: DISCONTINUED | OUTPATIENT
Start: 2024-01-23 | End: 2024-01-23

## 2024-01-23 RX ORDER — ONDANSETRON 4 MG/1
4 TABLET, FILM COATED ORAL EVERY 8 HOURS PRN
Status: DISCONTINUED | OUTPATIENT
Start: 2024-01-23 | End: 2024-01-28 | Stop reason: HOSPADM

## 2024-01-23 RX ORDER — SODIUM CHLORIDE 9 MG/ML
INJECTION, SOLUTION INTRAVENOUS CONTINUOUS PRN
Status: DISCONTINUED | OUTPATIENT
Start: 2024-01-23 | End: 2024-01-23

## 2024-01-23 RX ORDER — CEFAZOLIN SODIUM 2 G/100ML
2 INJECTION, SOLUTION INTRAVENOUS EVERY 8 HOURS
Status: DISCONTINUED | OUTPATIENT
Start: 2024-01-23 | End: 2024-01-23

## 2024-01-23 RX ORDER — ALBUMIN HUMAN 50 G/1000ML
SOLUTION INTRAVENOUS AS NEEDED
Status: DISCONTINUED | OUTPATIENT
Start: 2024-01-23 | End: 2024-01-23

## 2024-01-23 RX ORDER — NAPROXEN SODIUM 220 MG/1
81 TABLET, FILM COATED ORAL DAILY
Status: DISCONTINUED | OUTPATIENT
Start: 2024-01-24 | End: 2024-01-24

## 2024-01-23 RX ORDER — SODIUM CHLORIDE, SODIUM LACTATE, POTASSIUM CHLORIDE, CALCIUM CHLORIDE 600; 310; 30; 20 MG/100ML; MG/100ML; MG/100ML; MG/100ML
5 INJECTION, SOLUTION INTRAVENOUS CONTINUOUS
Status: DISCONTINUED | OUTPATIENT
Start: 2024-01-23 | End: 2024-01-24

## 2024-01-23 RX ORDER — MAGNESIUM SULFATE HEPTAHYDRATE 40 MG/ML
2 INJECTION, SOLUTION INTRAVENOUS EVERY 6 HOURS PRN
Status: DISCONTINUED | OUTPATIENT
Start: 2024-01-23 | End: 2024-01-24

## 2024-01-23 RX ORDER — NAPROXEN SODIUM 220 MG/1
81 TABLET, FILM COATED ORAL DAILY
Status: DISCONTINUED | OUTPATIENT
Start: 2024-01-24 | End: 2024-01-28 | Stop reason: HOSPADM

## 2024-01-23 RX ORDER — PANTOPRAZOLE SODIUM 40 MG/10ML
40 INJECTION, POWDER, LYOPHILIZED, FOR SOLUTION INTRAVENOUS ONCE
Status: COMPLETED | OUTPATIENT
Start: 2024-01-23 | End: 2024-01-23

## 2024-01-23 RX ORDER — VANCOMYCIN HYDROCHLORIDE 1 G/20ML
INJECTION, POWDER, LYOPHILIZED, FOR SOLUTION INTRAVENOUS AS NEEDED
Status: DISCONTINUED | OUTPATIENT
Start: 2024-01-23 | End: 2024-01-23 | Stop reason: HOSPADM

## 2024-01-23 RX ORDER — CALCIUM GLUCONATE 20 MG/ML
1 INJECTION, SOLUTION INTRAVENOUS EVERY 6 HOURS PRN
Status: DISCONTINUED | OUTPATIENT
Start: 2024-01-23 | End: 2024-01-24

## 2024-01-23 RX ORDER — ONDANSETRON HYDROCHLORIDE 2 MG/ML
4 INJECTION, SOLUTION INTRAVENOUS EVERY 8 HOURS PRN
Status: DISCONTINUED | OUTPATIENT
Start: 2024-01-23 | End: 2024-01-28 | Stop reason: HOSPADM

## 2024-01-23 RX ORDER — POTASSIUM CHLORIDE 29.8 MG/ML
40 INJECTION INTRAVENOUS EVERY 6 HOURS PRN
Status: DISCONTINUED | OUTPATIENT
Start: 2024-01-23 | End: 2024-01-24

## 2024-01-23 RX ORDER — NEOSTIGMINE METHYLSULFATE 1 MG/ML
2 INJECTION, SOLUTION INTRAVENOUS ONCE
Status: COMPLETED | OUTPATIENT
Start: 2024-01-23 | End: 2024-01-23

## 2024-01-23 RX ORDER — FENTANYL CITRATE 50 UG/ML
INJECTION, SOLUTION INTRAMUSCULAR; INTRAVENOUS AS NEEDED
Status: DISCONTINUED | OUTPATIENT
Start: 2024-01-23 | End: 2024-01-23

## 2024-01-23 RX ORDER — SODIUM CHLORIDE, SODIUM GLUCONATE, SODIUM ACETATE, POTASSIUM CHLORIDE AND MAGNESIUM CHLORIDE 30; 37; 368; 526; 502 MG/100ML; MG/100ML; MG/100ML; MG/100ML; MG/100ML
INJECTION, SOLUTION INTRAVENOUS CONTINUOUS PRN
Status: DISCONTINUED | OUTPATIENT
Start: 2024-01-23 | End: 2024-01-23

## 2024-01-23 RX ORDER — PROPOFOL 10 MG/ML
INJECTION, EMULSION INTRAVENOUS CONTINUOUS PRN
Status: DISCONTINUED | OUTPATIENT
Start: 2024-01-23 | End: 2024-01-23

## 2024-01-23 RX ORDER — ALBUMIN HUMAN 50 G/1000ML
25 SOLUTION INTRAVENOUS ONCE
Status: COMPLETED | OUTPATIENT
Start: 2024-01-23 | End: 2024-01-23

## 2024-01-23 RX ORDER — LIDOCAINE HCL/PF 100 MG/5ML
SYRINGE (ML) INTRAVENOUS AS NEEDED
Status: DISCONTINUED | OUTPATIENT
Start: 2024-01-23 | End: 2024-01-23

## 2024-01-23 RX ORDER — NOREPINEPHRINE BITARTRATE 0.03 MG/ML
INJECTION, SOLUTION INTRAVENOUS CONTINUOUS PRN
Status: DISCONTINUED | OUTPATIENT
Start: 2024-01-23 | End: 2024-01-23

## 2024-01-23 RX ORDER — INSULIN LISPRO 100 [IU]/ML
0-15 INJECTION, SOLUTION INTRAVENOUS; SUBCUTANEOUS EVERY 4 HOURS
Status: DISCONTINUED | OUTPATIENT
Start: 2024-01-23 | End: 2024-01-24

## 2024-01-23 RX ORDER — NALOXONE HYDROCHLORIDE 0.4 MG/ML
0.2 INJECTION, SOLUTION INTRAMUSCULAR; INTRAVENOUS; SUBCUTANEOUS EVERY 5 MIN PRN
Status: DISCONTINUED | OUTPATIENT
Start: 2024-01-23 | End: 2024-01-28 | Stop reason: HOSPADM

## 2024-01-23 RX ORDER — NOREPINEPHRINE BITARTRATE/D5W 8 MG/250ML
0-1 PLASTIC BAG, INJECTION (ML) INTRAVENOUS CONTINUOUS
Status: DISCONTINUED | OUTPATIENT
Start: 2024-01-23 | End: 2024-01-24

## 2024-01-23 RX ORDER — AMOXICILLIN 250 MG
2 CAPSULE ORAL 2 TIMES DAILY
Status: DISCONTINUED | OUTPATIENT
Start: 2024-01-23 | End: 2024-01-28 | Stop reason: HOSPADM

## 2024-01-23 RX ORDER — PROPOFOL 10 MG/ML
0-50 INJECTION, EMULSION INTRAVENOUS CONTINUOUS
Status: DISCONTINUED | OUTPATIENT
Start: 2024-01-23 | End: 2024-01-24

## 2024-01-23 RX ORDER — PHENYLEPHRINE HCL IN 0.9% NACL 1 MG/10 ML
SYRINGE (ML) INTRAVENOUS AS NEEDED
Status: DISCONTINUED | OUTPATIENT
Start: 2024-01-23 | End: 2024-01-23

## 2024-01-23 RX ORDER — POLYETHYLENE GLYCOL 3350 17 G/17G
17 POWDER, FOR SOLUTION ORAL 2 TIMES DAILY
Status: DISCONTINUED | OUTPATIENT
Start: 2024-01-23 | End: 2024-01-28 | Stop reason: HOSPADM

## 2024-01-23 RX ORDER — GABAPENTIN 300 MG/1
CAPSULE ORAL AS NEEDED
Status: DISCONTINUED | OUTPATIENT
Start: 2024-01-23 | End: 2024-01-23

## 2024-01-23 RX ORDER — CALCIUM GLUCONATE 20 MG/ML
2 INJECTION, SOLUTION INTRAVENOUS EVERY 6 HOURS PRN
Status: DISCONTINUED | OUTPATIENT
Start: 2024-01-23 | End: 2024-01-24

## 2024-01-23 RX ORDER — ACETAMINOPHEN 650 MG/1
650 SUPPOSITORY RECTAL EVERY 4 HOURS
Status: DISCONTINUED | OUTPATIENT
Start: 2024-01-23 | End: 2024-01-24

## 2024-01-23 RX ORDER — ROCURONIUM BROMIDE 10 MG/ML
INJECTION, SOLUTION INTRAVENOUS AS NEEDED
Status: DISCONTINUED | OUTPATIENT
Start: 2024-01-23 | End: 2024-01-23

## 2024-01-23 RX ORDER — MIDAZOLAM HYDROCHLORIDE 1 MG/ML
INJECTION INTRAMUSCULAR; INTRAVENOUS AS NEEDED
Status: DISCONTINUED | OUTPATIENT
Start: 2024-01-23 | End: 2024-01-23

## 2024-01-23 RX ORDER — GENTAMICIN 40 MG/ML
INJECTION, SOLUTION INTRAMUSCULAR; INTRAVENOUS AS NEEDED
Status: DISCONTINUED | OUTPATIENT
Start: 2024-01-23 | End: 2024-01-23 | Stop reason: HOSPADM

## 2024-01-23 RX ORDER — CEFAZOLIN SODIUM 2 G/100ML
2 INJECTION, SOLUTION INTRAVENOUS EVERY 8 HOURS
Status: COMPLETED | OUTPATIENT
Start: 2024-01-23 | End: 2024-01-25

## 2024-01-23 RX ORDER — PANTOPRAZOLE SODIUM 40 MG/10ML
40 INJECTION, POWDER, LYOPHILIZED, FOR SOLUTION INTRAVENOUS
Status: DISCONTINUED | OUTPATIENT
Start: 2024-01-24 | End: 2024-01-24

## 2024-01-23 RX ORDER — NEOSTIGMINE METHYLSULFATE 1 MG/ML
INJECTION, SOLUTION INTRAVENOUS
Status: COMPLETED
Start: 2024-01-23 | End: 2024-01-23

## 2024-01-23 RX ORDER — LIDOCAINE HYDROCHLORIDE 10 MG/ML
INJECTION INFILTRATION; PERINEURAL AS NEEDED
Status: DISCONTINUED | OUTPATIENT
Start: 2024-01-23 | End: 2024-01-23

## 2024-01-23 RX ORDER — PROTAMINE SULFATE 10 MG/ML
INJECTION, SOLUTION INTRAVENOUS AS NEEDED
Status: DISCONTINUED | OUTPATIENT
Start: 2024-01-23 | End: 2024-01-23

## 2024-01-23 RX ORDER — DEXTROSE 50 % IN WATER (D50W) INTRAVENOUS SYRINGE
25
Status: DISCONTINUED | OUTPATIENT
Start: 2024-01-23 | End: 2024-01-28 | Stop reason: HOSPADM

## 2024-01-23 RX ORDER — ROSUVASTATIN CALCIUM 40 MG/1
40 TABLET, COATED ORAL NIGHTLY
Status: DISCONTINUED | OUTPATIENT
Start: 2024-01-24 | End: 2024-01-28 | Stop reason: HOSPADM

## 2024-01-23 RX ORDER — HEPARIN SODIUM 1000 [USP'U]/ML
INJECTION, SOLUTION INTRAVENOUS; SUBCUTANEOUS AS NEEDED
Status: DISCONTINUED | OUTPATIENT
Start: 2024-01-23 | End: 2024-01-23

## 2024-01-23 RX ORDER — PANTOPRAZOLE SODIUM 40 MG/1
40 TABLET, DELAYED RELEASE ORAL
Status: DISCONTINUED | OUTPATIENT
Start: 2024-01-24 | End: 2024-01-24

## 2024-01-23 RX ORDER — MAGNESIUM SULFATE HEPTAHYDRATE 40 MG/ML
4 INJECTION, SOLUTION INTRAVENOUS EVERY 6 HOURS PRN
Status: DISCONTINUED | OUTPATIENT
Start: 2024-01-23 | End: 2024-01-24

## 2024-01-23 RX ORDER — GLYCOPYRROLATE 0.2 MG/ML
INJECTION INTRAMUSCULAR; INTRAVENOUS
Status: COMPLETED
Start: 2024-01-23 | End: 2024-01-23

## 2024-01-23 RX ORDER — MAGNESIUM SULFATE HEPTAHYDRATE 500 MG/ML
INJECTION, SOLUTION INTRAMUSCULAR; INTRAVENOUS AS NEEDED
Status: DISCONTINUED | OUTPATIENT
Start: 2024-01-23 | End: 2024-01-23

## 2024-01-23 RX ORDER — DEXTROSE 50 % IN WATER (D50W) INTRAVENOUS SYRINGE
AS NEEDED
Status: DISCONTINUED | OUTPATIENT
Start: 2024-01-23 | End: 2024-01-23

## 2024-01-23 RX ORDER — INDOMETHACIN 25 MG/1
CAPSULE ORAL AS NEEDED
Status: DISCONTINUED | OUTPATIENT
Start: 2024-01-23 | End: 2024-01-23

## 2024-01-23 RX ADMIN — FENTANYL CITRATE 100 MCG: 50 INJECTION, SOLUTION INTRAMUSCULAR; INTRAVENOUS at 08:15

## 2024-01-23 RX ADMIN — Medication 8 MCG: at 08:24

## 2024-01-23 RX ADMIN — PROTAMINE SULFATE 350 MG: 10 INJECTION, SOLUTION INTRAVENOUS at 12:08

## 2024-01-23 RX ADMIN — VASOPRESSIN 0.03 UNITS/MIN: 20 INJECTION INTRAVENOUS at 13:01

## 2024-01-23 RX ADMIN — NITROGLYCERIN 200 MCG: 10 INJECTION INTRAVENOUS at 09:03

## 2024-01-23 RX ADMIN — Medication 4 L/MIN: at 22:53

## 2024-01-23 RX ADMIN — NITROGLYCERIN 100 MCG: 10 INJECTION INTRAVENOUS at 09:10

## 2024-01-23 RX ADMIN — Medication 40 MCG: at 09:27

## 2024-01-23 RX ADMIN — ESMOLOL HYDROCHLORIDE 20 MG: 10 INJECTION, SOLUTION INTRAVENOUS at 08:16

## 2024-01-23 RX ADMIN — HYDROMORPHONE HYDROCHLORIDE 0.2 MG: 1 INJECTION, SOLUTION INTRAMUSCULAR; INTRAVENOUS; SUBCUTANEOUS at 17:31

## 2024-01-23 RX ADMIN — ALBUMIN (HUMAN) 250 ML: 12.5 SOLUTION INTRAVENOUS at 12:34

## 2024-01-23 RX ADMIN — SODIUM BICARBONATE 50 MEQ: 84 INJECTION, SOLUTION INTRAVENOUS at 11:05

## 2024-01-23 RX ADMIN — LIDOCAINE HYDROCHLORIDE 100 MG: 10 INJECTION, SOLUTION INFILTRATION; PERINEURAL at 11:07

## 2024-01-23 RX ADMIN — INSULIN LISPRO 5 UNITS: 100 INJECTION, SOLUTION INTRAVENOUS; SUBCUTANEOUS at 15:12

## 2024-01-23 RX ADMIN — Medication 10 MG: at 11:18

## 2024-01-23 RX ADMIN — MAGNESIUM SULFATE HEPTAHYDRATE 2 G: 500 INJECTION, SOLUTION INTRAMUSCULAR; INTRAVENOUS at 10:51

## 2024-01-23 RX ADMIN — PROPOFOL 30 MCG/KG/MIN: 10 INJECTION, EMULSION INTRAVENOUS at 14:38

## 2024-01-23 RX ADMIN — Medication 50 MG: at 07:40

## 2024-01-23 RX ADMIN — SENNOSIDES AND DOCUSATE SODIUM 2 TABLET: 8.6; 5 TABLET ORAL at 21:15

## 2024-01-23 RX ADMIN — Medication 40 MCG: at 09:48

## 2024-01-23 RX ADMIN — PROPOFOL 120 MG: 10 INJECTION, EMULSION INTRAVENOUS at 07:40

## 2024-01-23 RX ADMIN — HYDROMORPHONE HYDROCHLORIDE 0.2 MG: 1 INJECTION, SOLUTION INTRAMUSCULAR; INTRAVENOUS; SUBCUTANEOUS at 18:55

## 2024-01-23 RX ADMIN — NEOSTIGMINE METHYLSULFATE 2 MG: 1 INJECTION INTRAVENOUS at 15:16

## 2024-01-23 RX ADMIN — ACETAMINOPHEN 975 MG: 325 TABLET ORAL at 06:43

## 2024-01-23 RX ADMIN — NITROGLYCERIN 150 MCG: 10 INJECTION INTRAVENOUS at 12:09

## 2024-01-23 RX ADMIN — OXYCODONE HYDROCHLORIDE 5 MG: 5 TABLET ORAL at 23:03

## 2024-01-23 RX ADMIN — Medication 4 MCG: at 12:04

## 2024-01-23 RX ADMIN — ALBUMIN (HUMAN) 250 ML: 12.5 SOLUTION INTRAVENOUS at 12:51

## 2024-01-23 RX ADMIN — MAGNESIUM SULFATE HEPTAHYDRATE 2 G: 500 INJECTION, SOLUTION INTRAMUSCULAR; INTRAVENOUS at 11:07

## 2024-01-23 RX ADMIN — Medication 8 MCG: at 13:09

## 2024-01-23 RX ADMIN — NITROGLYCERIN 200 MCG: 10 INJECTION INTRAVENOUS at 07:48

## 2024-01-23 RX ADMIN — CEFAZOLIN 2 G: 330 INJECTION, POWDER, FOR SOLUTION INTRAMUSCULAR; INTRAVENOUS at 07:31

## 2024-01-23 RX ADMIN — ALBUMIN HUMAN 25 G: 0.05 INJECTION, SOLUTION INTRAVENOUS at 21:14

## 2024-01-23 RX ADMIN — FENTANYL CITRATE 250 MCG: 50 INJECTION, SOLUTION INTRAMUSCULAR; INTRAVENOUS at 07:40

## 2024-01-23 RX ADMIN — MIDAZOLAM HYDROCHLORIDE 1 MG: 1 INJECTION, SOLUTION INTRAMUSCULAR; INTRAVENOUS at 07:26

## 2024-01-23 RX ADMIN — ROCURONIUM BROMIDE 20 MG: 10 INJECTION INTRAVENOUS at 08:34

## 2024-01-23 RX ADMIN — LIDOCAINE HYDROCHLORIDE 100 MG: 10 INJECTION, SOLUTION INFILTRATION; PERINEURAL at 11:51

## 2024-01-23 RX ADMIN — Medication 0.04 MCG/KG/MIN: at 10:24

## 2024-01-23 RX ADMIN — Medication 10 MG: at 09:17

## 2024-01-23 RX ADMIN — HYDROMORPHONE HYDROCHLORIDE 0.2 MG: 1 INJECTION, SOLUTION INTRAMUSCULAR; INTRAVENOUS; SUBCUTANEOUS at 16:56

## 2024-01-23 RX ADMIN — MIDAZOLAM HYDROCHLORIDE 1 MG: 1 INJECTION, SOLUTION INTRAMUSCULAR; INTRAVENOUS at 07:16

## 2024-01-23 RX ADMIN — GLYCOPYRROLATE 0.4 MG: 0.2 INJECTION INTRAMUSCULAR; INTRAVENOUS at 15:15

## 2024-01-23 RX ADMIN — Medication 40 MCG: at 09:25

## 2024-01-23 RX ADMIN — FENTANYL CITRATE 100 MCG: 50 INJECTION, SOLUTION INTRAMUSCULAR; INTRAVENOUS at 08:43

## 2024-01-23 RX ADMIN — CALCIUM CHLORIDE 1 G: 100 INJECTION, SOLUTION INTRAVENOUS at 11:12

## 2024-01-23 RX ADMIN — Medication 4 MCG: at 09:50

## 2024-01-23 RX ADMIN — SODIUM CHLORIDE, POTASSIUM CHLORIDE, SODIUM LACTATE AND CALCIUM CHLORIDE 500 ML: 600; 310; 30; 20 INJECTION, SOLUTION INTRAVENOUS at 14:32

## 2024-01-23 RX ADMIN — POLYETHYLENE GLYCOL 3350 17 G: 17 POWDER, FOR SOLUTION ORAL at 21:15

## 2024-01-23 RX ADMIN — HYDROMORPHONE HYDROCHLORIDE 0.2 MG: 1 INJECTION, SOLUTION INTRAMUSCULAR; INTRAVENOUS; SUBCUTANEOUS at 20:43

## 2024-01-23 RX ADMIN — ESMOLOL HYDROCHLORIDE 10 MG: 10 INJECTION, SOLUTION INTRAVENOUS at 09:10

## 2024-01-23 RX ADMIN — ROCURONIUM BROMIDE 50 MG: 10 INJECTION INTRAVENOUS at 11:30

## 2024-01-23 RX ADMIN — HEPARIN SODIUM 39000 UNITS: 1000 INJECTION INTRAVENOUS; SUBCUTANEOUS at 09:43

## 2024-01-23 RX ADMIN — FENTANYL CITRATE 200 MCG: 50 INJECTION, SOLUTION INTRAMUSCULAR; INTRAVENOUS at 08:40

## 2024-01-23 RX ADMIN — INSULIN HUMAN 10 UNITS: 100 INJECTION, SOLUTION PARENTERAL at 12:00

## 2024-01-23 RX ADMIN — Medication 8 MCG: at 10:08

## 2024-01-23 RX ADMIN — GABAPENTIN 300 MG: 300 CAPSULE ORAL at 06:43

## 2024-01-23 RX ADMIN — SODIUM CHLORIDE, SODIUM GLUCONATE, SODIUM ACETATE, POTASSIUM CHLORIDE AND MAGNESIUM CHLORIDE: 526; 502; 368; 37; 30 INJECTION, SOLUTION INTRAVENOUS at 07:16

## 2024-01-23 RX ADMIN — Medication 8 MCG: at 08:19

## 2024-01-23 RX ADMIN — CEFAZOLIN SODIUM 2 G: 2 INJECTION, SOLUTION INTRAVENOUS at 18:51

## 2024-01-23 RX ADMIN — PROPOFOL 20 MCG/KG/MIN: 10 INJECTION, EMULSION INTRAVENOUS at 12:43

## 2024-01-23 RX ADMIN — LIDOCAINE HYDROCHLORIDE 100 MG: 10 INJECTION, SOLUTION INFILTRATION; PERINEURAL at 11:03

## 2024-01-23 RX ADMIN — Medication 4 MCG: at 09:34

## 2024-01-23 RX ADMIN — INSULIN LISPRO 5 UNITS: 100 INJECTION, SOLUTION INTRAVENOUS; SUBCUTANEOUS at 21:20

## 2024-01-23 RX ADMIN — HYDROMORPHONE HYDROCHLORIDE 0.2 MG: 1 INJECTION, SOLUTION INTRAMUSCULAR; INTRAVENOUS; SUBCUTANEOUS at 15:29

## 2024-01-23 RX ADMIN — Medication 4 MCG: at 12:37

## 2024-01-23 RX ADMIN — SODIUM PHOSPHATE, MONOBASIC, MONOHYDRATE AND SODIUM PHOSPHATE, DIBASIC, ANHYDROUS 30 MMOL: 142; 276 INJECTION, SOLUTION INTRAVENOUS at 17:30

## 2024-01-23 RX ADMIN — FENTANYL CITRATE 350 MCG: 50 INJECTION, SOLUTION INTRAMUSCULAR; INTRAVENOUS at 08:47

## 2024-01-23 RX ADMIN — HEPARIN SODIUM 5000 UNITS: 1000 INJECTION INTRAVENOUS; SUBCUTANEOUS at 11:37

## 2024-01-23 RX ADMIN — ALBUMIN HUMAN 25 G: 0.05 INJECTION, SOLUTION INTRAVENOUS at 15:23

## 2024-01-23 RX ADMIN — Medication 40 MCG: at 11:24

## 2024-01-23 RX ADMIN — ROCURONIUM BROMIDE 50 MG: 10 INJECTION INTRAVENOUS at 09:55

## 2024-01-23 RX ADMIN — Medication 160 MCG: at 12:44

## 2024-01-23 RX ADMIN — Medication 0.02 MCG/KG/MIN: at 11:03

## 2024-01-23 RX ADMIN — CEFAZOLIN 2 G: 330 INJECTION, POWDER, FOR SOLUTION INTRAMUSCULAR; INTRAVENOUS at 11:22

## 2024-01-23 RX ADMIN — SODIUM CHLORIDE: 9 INJECTION, SOLUTION INTRAVENOUS at 08:14

## 2024-01-23 RX ADMIN — ROPIVACAINE HYDROCHLORIDE 60 ML: 5 INJECTION, SOLUTION EPIDURAL; INFILTRATION; PERINEURAL at 16:07

## 2024-01-23 RX ADMIN — LIDOCAINE HYDROCHLORIDE 100 MG: 20 INJECTION INTRAVENOUS at 07:40

## 2024-01-23 RX ADMIN — Medication 40 MCG: at 09:30

## 2024-01-23 RX ADMIN — Medication 40 MCG: at 09:38

## 2024-01-23 RX ADMIN — ROCURONIUM BROMIDE 100 MG: 10 INJECTION INTRAVENOUS at 07:41

## 2024-01-23 RX ADMIN — VASOPRESSIN 0.03 UNITS/MIN: 0.2 INJECTION INTRAVENOUS at 21:50

## 2024-01-23 RX ADMIN — ALBUMIN (HUMAN) 250 ML: 12.5 SOLUTION INTRAVENOUS at 13:09

## 2024-01-23 RX ADMIN — SODIUM CHLORIDE 1455 MG: 9 INJECTION, SOLUTION INTRAVENOUS at 08:24

## 2024-01-23 RX ADMIN — ACETAMINOPHEN 650 MG: 325 TABLET ORAL at 21:15

## 2024-01-23 RX ADMIN — Medication 8 MCG: at 07:40

## 2024-01-23 RX ADMIN — GLYCOPYRROLATE 0.4 MG: 0.2 INJECTION, SOLUTION INTRAMUSCULAR; INTRAVENOUS at 15:15

## 2024-01-23 RX ADMIN — PANTOPRAZOLE SODIUM 40 MG: 40 INJECTION, POWDER, FOR SOLUTION INTRAVENOUS at 21:52

## 2024-01-23 RX ADMIN — NEOSTIGMINE METHYLSULFATE 2 MG: 1 INJECTION, SOLUTION INTRAVENOUS at 15:16

## 2024-01-23 RX ADMIN — ROCURONIUM BROMIDE 30 MG: 10 INJECTION INTRAVENOUS at 09:02

## 2024-01-23 RX ADMIN — Medication 8 MCG: at 13:12

## 2024-01-23 RX ADMIN — DEXTROSE MONOHYDRATE 6.25 G: 25 INJECTION, SOLUTION INTRAVENOUS at 12:00

## 2024-01-23 RX ADMIN — Medication 8 MCG: at 10:07

## 2024-01-23 RX ADMIN — NITROGLYCERIN 100 MCG: 10 INJECTION INTRAVENOUS at 08:45

## 2024-01-23 SDOH — HEALTH STABILITY: MENTAL HEALTH
STRESS IS WHEN SOMEONE FEELS TENSE, NERVOUS, ANXIOUS, OR CAN'T SLEEP AT NIGHT BECAUSE THEIR MIND IS TROUBLED. HOW STRESSED ARE YOU?: PATIENT DECLINED

## 2024-01-23 SDOH — HEALTH STABILITY: PHYSICAL HEALTH
ON AVERAGE, HOW MANY DAYS PER WEEK DO YOU ENGAGE IN MODERATE TO STRENUOUS EXERCISE (LIKE A BRISK WALK)?: PATIENT DECLINED

## 2024-01-23 SDOH — SOCIAL STABILITY: SOCIAL NETWORK: IN A TYPICAL WEEK, HOW MANY TIMES DO YOU TALK ON THE PHONE WITH FAMILY, FRIENDS, OR NEIGHBORS?: PATIENT DECLINED

## 2024-01-23 SDOH — SOCIAL STABILITY: SOCIAL NETWORK: HOW OFTEN DO YOU ATTEND CHURCH OR RELIGIOUS SERVICES?: PATIENT DECLINED

## 2024-01-23 SDOH — SOCIAL STABILITY: SOCIAL INSECURITY: WITHIN THE LAST YEAR, HAVE YOU BEEN AFRAID OF YOUR PARTNER OR EX-PARTNER?: NO

## 2024-01-23 SDOH — SOCIAL STABILITY: SOCIAL INSECURITY
WITHIN THE LAST YEAR, HAVE TO BEEN RAPED OR FORCED TO HAVE ANY KIND OF SEXUAL ACTIVITY BY YOUR PARTNER OR EX-PARTNER?: NO

## 2024-01-23 SDOH — SOCIAL STABILITY: SOCIAL INSECURITY: HAS ANYONE EVER THREATENED TO HURT YOUR FAMILY OR YOUR PETS?: NO

## 2024-01-23 SDOH — SOCIAL STABILITY: SOCIAL INSECURITY: DO YOU FEEL UNSAFE GOING BACK TO THE PLACE WHERE YOU ARE LIVING?: NO

## 2024-01-23 SDOH — SOCIAL STABILITY: SOCIAL INSECURITY: DO YOU FEEL ANYONE HAS EXPLOITED OR TAKEN ADVANTAGE OF YOU FINANCIALLY OR OF YOUR PERSONAL PROPERTY?: NO

## 2024-01-23 SDOH — SOCIAL STABILITY: SOCIAL INSECURITY: DOES ANYONE TRY TO KEEP YOU FROM HAVING/CONTACTING OTHER FRIENDS OR DOING THINGS OUTSIDE YOUR HOME?: NO

## 2024-01-23 SDOH — ECONOMIC STABILITY: FOOD INSECURITY: WITHIN THE PAST 12 MONTHS, THE FOOD YOU BOUGHT JUST DIDN'T LAST AND YOU DIDN'T HAVE MONEY TO GET MORE.: PATIENT DECLINED

## 2024-01-23 SDOH — SOCIAL STABILITY: SOCIAL NETWORK: HOW OFTEN DO YOU ATTENT MEETINGS OF THE CLUB OR ORGANIZATION YOU BELONG TO?: PATIENT DECLINED

## 2024-01-23 SDOH — SOCIAL STABILITY: SOCIAL INSECURITY: ARE YOU OR HAVE YOU BEEN THREATENED OR ABUSED PHYSICALLY, EMOTIONALLY, OR SEXUALLY BY ANYONE?: NO

## 2024-01-23 SDOH — ECONOMIC STABILITY: TRANSPORTATION INSECURITY
IN THE PAST 12 MONTHS, HAS LACK OF TRANSPORTATION KEPT YOU FROM MEETINGS, WORK, OR FROM GETTING THINGS NEEDED FOR DAILY LIVING?: PATIENT DECLINED

## 2024-01-23 SDOH — SOCIAL STABILITY: SOCIAL INSECURITY: HAVE YOU HAD THOUGHTS OF HARMING ANYONE ELSE?: NO

## 2024-01-23 SDOH — SOCIAL STABILITY: SOCIAL INSECURITY: ABUSE: ADULT

## 2024-01-23 SDOH — SOCIAL STABILITY: SOCIAL INSECURITY: ARE THERE ANY APPARENT SIGNS OF INJURIES/BEHAVIORS THAT COULD BE RELATED TO ABUSE/NEGLECT?: NO

## 2024-01-23 SDOH — SOCIAL STABILITY: SOCIAL INSECURITY: WITHIN THE LAST YEAR, HAVE YOU BEEN HUMILIATED OR EMOTIONALLY ABUSED IN OTHER WAYS BY YOUR PARTNER OR EX-PARTNER?: NO

## 2024-01-23 SDOH — SOCIAL STABILITY: SOCIAL INSECURITY
WITHIN THE LAST YEAR, HAVE YOU BEEN KICKED, HIT, SLAPPED, OR OTHERWISE PHYSICALLY HURT BY YOUR PARTNER OR EX-PARTNER?: NO

## 2024-01-23 SDOH — HEALTH STABILITY: MENTAL HEALTH: CURRENT SMOKER: 0

## 2024-01-23 SDOH — ECONOMIC STABILITY: INCOME INSECURITY: HOW HARD IS IT FOR YOU TO PAY FOR THE VERY BASICS LIKE FOOD, HOUSING, MEDICAL CARE, AND HEATING?: PATIENT DECLINED

## 2024-01-23 SDOH — SOCIAL STABILITY: SOCIAL NETWORK: ARE YOU MARRIED, WIDOWED, DIVORCED, SEPARATED, NEVER MARRIED, OR LIVING WITH A PARTNER?: PATIENT DECLINED

## 2024-01-23 SDOH — SOCIAL STABILITY: SOCIAL NETWORK: HOW OFTEN DO YOU GET TOGETHER WITH FRIENDS OR RELATIVES?: PATIENT DECLINED

## 2024-01-23 SDOH — SOCIAL STABILITY: SOCIAL NETWORK
DO YOU BELONG TO ANY CLUBS OR ORGANIZATIONS SUCH AS CHURCH GROUPS UNIONS, FRATERNAL OR ATHLETIC GROUPS, OR SCHOOL GROUPS?: PATIENT DECLINED

## 2024-01-23 SDOH — ECONOMIC STABILITY: INCOME INSECURITY: IN THE LAST 12 MONTHS, WAS THERE A TIME WHEN YOU WERE NOT ABLE TO PAY THE MORTGAGE OR RENT ON TIME?: PATIENT DECLINED

## 2024-01-23 SDOH — ECONOMIC STABILITY: FOOD INSECURITY: WITHIN THE PAST 12 MONTHS, YOU WORRIED THAT YOUR FOOD WOULD RUN OUT BEFORE YOU GOT MONEY TO BUY MORE.: PATIENT DECLINED

## 2024-01-23 SDOH — ECONOMIC STABILITY: TRANSPORTATION INSECURITY
IN THE PAST 12 MONTHS, HAS THE LACK OF TRANSPORTATION KEPT YOU FROM MEDICAL APPOINTMENTS OR FROM GETTING MEDICATIONS?: PATIENT DECLINED

## 2024-01-23 SDOH — ECONOMIC STABILITY: HOUSING INSECURITY
IN THE LAST 12 MONTHS, WAS THERE A TIME WHEN YOU DID NOT HAVE A STEADY PLACE TO SLEEP OR SLEPT IN A SHELTER (INCLUDING NOW)?: PATIENT DECLINED

## 2024-01-23 SDOH — SOCIAL STABILITY: SOCIAL INSECURITY: WERE YOU ABLE TO COMPLETE ALL THE BEHAVIORAL HEALTH SCREENINGS?: YES

## 2024-01-23 ASSESSMENT — PAIN SCALES - GENERAL
PAINLEVEL_OUTOF10: 8
PAIN_LEVEL: 0
PAINLEVEL_OUTOF10: 0 - NO PAIN
PAINLEVEL_OUTOF10: 9
PAINLEVEL_OUTOF10: 5 - MODERATE PAIN
PAINLEVEL_OUTOF10: 9
PAINLEVEL_OUTOF10: 6
PAINLEVEL_OUTOF10: 7
PAINLEVEL_OUTOF10: 0 - NO PAIN
PAINLEVEL_OUTOF10: 0 - NO PAIN

## 2024-01-23 ASSESSMENT — ACTIVITIES OF DAILY LIVING (ADL)
HEARING - RIGHT EAR: FUNCTIONAL
JUDGMENT_ADEQUATE_SAFELY_COMPLETE_DAILY_ACTIVITIES: YES
ADEQUATE_TO_COMPLETE_ADL: YES
DRESSING YOURSELF: INDEPENDENT
FEEDING YOURSELF: INDEPENDENT
HEARING - LEFT EAR: FUNCTIONAL
LACK_OF_TRANSPORTATION: NO
GROOMING: INDEPENDENT
BATHING: INDEPENDENT
WALKS IN HOME: INDEPENDENT
PATIENT'S MEMORY ADEQUATE TO SAFELY COMPLETE DAILY ACTIVITIES?: YES
TOILETING: INDEPENDENT
LACK_OF_TRANSPORTATION: NO

## 2024-01-23 ASSESSMENT — PAIN - FUNCTIONAL ASSESSMENT
PAIN_FUNCTIONAL_ASSESSMENT: 0-10

## 2024-01-23 ASSESSMENT — LIFESTYLE VARIABLES
AUDIT-C TOTAL SCORE: 0
SUBSTANCE_ABUSE_PAST_12_MONTHS: NO
HOW MANY STANDARD DRINKS CONTAINING ALCOHOL DO YOU HAVE ON A TYPICAL DAY: PATIENT DOES NOT DRINK
SKIP TO QUESTIONS 9-10: 1
HOW OFTEN DO YOU HAVE 6 OR MORE DRINKS ON ONE OCCASION: NEVER
AUDIT-C TOTAL SCORE: 0
PRESCIPTION_ABUSE_PAST_12_MONTHS: NO
HOW OFTEN DO YOU HAVE A DRINK CONTAINING ALCOHOL: NEVER

## 2024-01-23 ASSESSMENT — COGNITIVE AND FUNCTIONAL STATUS - GENERAL
DAILY ACTIVITIY SCORE: 24
PATIENT BASELINE BEDBOUND: NO
MOBILITY SCORE: 24

## 2024-01-23 ASSESSMENT — PATIENT HEALTH QUESTIONNAIRE - PHQ9
1. LITTLE INTEREST OR PLEASURE IN DOING THINGS: NOT AT ALL
1. LITTLE INTEREST OR PLEASURE IN DOING THINGS: NOT AT ALL
SUM OF ALL RESPONSES TO PHQ9 QUESTIONS 1 & 2: 0
SUM OF ALL RESPONSES TO PHQ9 QUESTIONS 1 & 2: 0
2. FEELING DOWN, DEPRESSED OR HOPELESS: NOT AT ALL
2. FEELING DOWN, DEPRESSED OR HOPELESS: NOT AT ALL

## 2024-01-23 ASSESSMENT — PAIN DESCRIPTION - LOCATION
LOCATION: CHEST

## 2024-01-23 ASSESSMENT — PAIN DESCRIPTION - ORIENTATION
ORIENTATION: MID
ORIENTATION: MID

## 2024-01-23 NOTE — BRIEF OP NOTE
Date: 2024  OR Location: [facility] OR    Name: Jake Brunner, : 1955, Age: 68 y.o., MRN: [id number], Sex: male    Diagnosis  Pre-op Diagnosis     * Nonrheumatic mitral valve regurgitation [I34.0] Post-op Diagnosis     * Nonrheumatic mitral valve regurgitation [I34.0]     Procedures  Redo Median Sternotomy, Repair Mitral Valve (tissue valve if unable to repair)  33737 - AL VLVP MITRAL VALVE W/CARD BYP W/PROSTC RING  Re-do Sternotomy  Mitral Valve repair with Ethibond stitch     Chest Tubes/Drains: Bilateral Pleural chest tubes, one mediastinal chest tube       Temporary Pacing Wires: Ventricular temporary pacing wires    -Settings: Not pacing   -Underlying Rhythm:     Permanent pacer/ICD: No   -Preoperative settings:    -Intra-op/ Postoperative settings:    Sternotomy performed by: Robles    Conduit Harvested by: n/a    Sternal Wires placed by: Jony    Arm/Leg/Groin Closure/Cutdown performed by: n/a    Cardio Pulmonary Bypass Time: 125 minutes  Cross-clamp Time: 72 minutes  Circulatory Arrest: No Time:     Is patient candidate for Emergency Re-sternotomy? Yes   -If yes, POD #10 is - 24      Surgeons      * Amarjit Arboleda - Primary    Resident/Fellow/Other Assistant:  Surgeon(s) and Role:  Duncan Borges Larkin    Procedure Summary  Anesthesia: General  ASA: ASA status not filed in the log.  Anesthesia Staff: Anesthesiologist: Williams Schwartz MD  C-AA: ANTHONY Mcclelland  Perfusionist: Miguel Sharpe  Estimated Blood Loss: 500mL  Intra-op Medications:   Medication Name Total Dose   gelatin absorbable (Gelfoam) 100 sponge 1 each   sodium chloride 0.9 % irrigation solution 3,000 mL   gentamicin (Garamycin) injection 482.5 mg              Anesthesia Record               Intraprocedure I/O Totals          Intake    Norepinephrine Drip 0.00 mL    The total shown is the total volume documented since Anesthesia Start was filed.    Epinephrine Drip 0.00 mL    The total shown is the total  volume documented since Anesthesia Start was filed.    Propofol Drip 0.00 mL    The total shown is the total volume documented since Anesthesia Start was filed.    Cell Saver 900 mL    Total Intake 900 mL       Output    Urine 630 mL    Est. Blood Loss 500 mL    Total Output 1130 mL       Net    Net Volume -230 mL          Specimen: No specimens collected     Staff:   Circulator: Goldie Hardwick RN  Scrub Person: Sue Heredia RN; Ewa Ignacio RN          Findings: MR    Complications:  None; patient tolerated the procedure well.     Disposition: ICU - intubated and hemodynamically stable.  Condition: stable  Specimens Collected: No specimens collected  Attending Attestation: I was present and scrubbed for the key portions of the procedure.    Amarjit Arboleda  Phone Number: [phone]

## 2024-01-23 NOTE — ANESTHESIA PROCEDURE NOTES
Peripheral IV  Date/Time: 1/23/2024 8:00 AM      Placement  Needle size: 14 G  Laterality: right  Location: hand  Site prep: alcohol  Technique: anatomical landmarks  Attempts: 1

## 2024-01-23 NOTE — ANESTHESIA PREPROCEDURE EVALUATION
Patient: Jake Brunner    Procedure Information       Anesthesia Start Date/Time: 01/23/24 0716    Procedure: Redo Median Sternotomy, Repair Mitral Valve (tissue valve if unable to repair)    Location: [facility] OR 21 / [facility] OR    Surgeons: Amarjit Arboleda MD            Relevant Problems   Cardiovascular   (+) Coronary artery disease involving native coronary artery of native heart without angina pectoris   (+) Essential hypertension   (+) Hyperlipidemia   (+) Mitral regurgitation   (+) Mitral valve disorder      Endocrine   (+) Class 1 drug-induced obesity with serious comorbidity and body mass index (BMI) of 32.0 to 32.9 in adult      Pulmonary   (+) Obstructive sleep apnea       Clinical information reviewed:    Allergies  Meds               NPO Detail:  NPO/Void Status  Date of Last Liquid: 01/22/24  Date of Last Solid: 01/22/24         Physical Exam    Airway  Mallampati: II  TM distance: >3 FB  Neck ROM: full     Cardiovascular   Rhythm: regular  Rate: normal     Dental - normal exam     Pulmonary   Breath sounds clear to auscultation     Abdominal   Abdomen: soft             Anesthesia Plan    History of general anesthesia?: yes  History of complications of general anesthesia?: no    ASA 4     general     The patient is not a current smoker.    intravenous induction   Postoperative administration of opioids is intended.  Anesthetic plan and risks discussed with patient.  Use of blood products discussed with patient who.    Plan discussed with CRNA.

## 2024-01-23 NOTE — ANESTHESIA PROCEDURE NOTES
Airway  Date/Time: 1/23/2024 7:43 AM  Urgency: elective    Airway not difficult    Staffing  Performed: ANTHONY   Authorized by: Williams Schwartz MD    Performed by: ANTHONY Mcclelland  Patient location during procedure: OR    Indications and Patient Condition  Indications for airway management: anesthesia  Spontaneous Ventilation: absent  Sedation level: deep  Preoxygenated: yes  Patient position: sniffing  Mask difficulty assessment: 1 - vent by mask    Final Airway Details  Final airway type: endotracheal airway      Successful airway: ETT  Cuffed: yes   Successful intubation technique: direct laryngoscopy  Facilitating devices/methods: intubating stylet  Endotracheal tube insertion site: oral  Blade: Magen  Blade size: #4  ETT size (mm): 7.5  Cormack-Lehane Classification: grade I - full view of glottis  Placement verified by: chest auscultation and capnometry   Measured from: lips  ETT to lips (cm): 24  Number of attempts at approach: 1

## 2024-01-23 NOTE — ANESTHESIA PROCEDURE NOTES
Peripheral Block    Patient location during procedure: ICU  Start time: 1/23/2024 3:00 PM  Reason for block: post-op pain management  Staffing  Performed: attending   Authorized by: Williams Schwartz MD    Performed by: Williams Schwartz MD  Preanesthetic Checklist  Completed: patient identified, IV checked, site marked, risks and benefits discussed, surgical consent, monitors and equipment checked, pre-op evaluation and timeout performed   Timeout performed at: 1/23/2024 3:00 PM  Peripheral Block  Prep: ChloraPrep  Patient monitoring: cardiac monitor  Block type: serratus anterior and PIF  Injection technique: single-shot  Local infiltration: ropivacaine  Infiltration strength: 0.3 %  Dose: 80 mL  Needle  Needle gauge: 21 G  Needle localization: ultrasound guidance  Assessment  Injection assessment: negative aspiration for heme, incremental injection and local visualized surrounding nerve on ultrasound  Heart rate change: no  Slow fractionated injection: yes

## 2024-01-23 NOTE — PROGRESS NOTES
Pharmacy Medication History Review    Jake Brunner is a 68 y.o. male admitted for Mitral regurgitation. Pharmacy reviewed the patient's [id number] medications and allergies for accuracy.    The list below reflects the updated PTA list.   Comments regarding how patient may be taking medications differently can be found in the Admit Orders Activity  Prior to Admission Medications   Prescriptions Last Dose Informant Patient Reported?   amLODIPine (Norvasc) 10 mg tablet  Self No   Sig: Take 1 tablet (10 mg) by mouth once daily.   Patient taking differently: Take 0.5 tablets (5 mg) by mouth once daily.   aspirin 81 mg EC tablet  Self Yes   Sig: Take 1 tablet (81 mg) by mouth once daily.   chlorhexidine (Hibiclens) 4 % external liquid  Self No   Sig: Use as directed daily preoperatively   chlorhexidine (Peridex) 0.12 % solution  Self No   Sig: Swish and spit. Do not swallow. Use the night before and morning of surgery as directed   ezetimibe (Zetia) 10 mg tablet  Self No   Sig: Take 1 tablet (10 mg) by mouth once daily.   losartan (Cozaar) 50 mg tablet  Self No   Sig: Take 1 tablet (50 mg) by mouth once daily. DAILY   metoprolol tartrate (Lopressor) 50 mg tablet  Self No   Sig: Take 1.5 tablets by mouth 2 times a day. Discontinue Metoprolol Succinate   multivitamin tablet  Self Yes   Sig: Take 1 tablet by mouth once daily.   rosuvastatin (Crestor) 40 mg tablet  Self No   Sig: Take 1 tablet (40 mg) by mouth once daily. DAILY      Facility-Administered Medications: None        The list below reflects the updated allergy list. Please review each documented allergy for additional clarification and justification.  Allergies  Reviewed by Asher Forrester RPh on 1/23/2024   No Known Allergies         M2B service not offered prior to surgery, please reassess prior to patient discharge if Meds to Beds is desired.     Sources:   Pt interview  Dispense hx  OARRS - no hx          Asher Forrester PharmD  Transitions of Care  "Pharmacist    Secure Chat preferred   If no response call [phone] or Vocera \"Med Rec\"   "

## 2024-01-23 NOTE — CARE PLAN
The clinical goals for the shift include  to remain hemodynamically stable and get extubated during the shift.    Over the shift, the patient made progress toward the following goals. The patient was able to get extubated to nasal cannula (sat 100%). Patient remains on vaso and levo to maintain a map >70, fluid boluses given as needed.

## 2024-01-23 NOTE — PROGRESS NOTES
"Jake Brunner is a 68 y.o. male on day 0 of admission presenting with Mitral regurgitation now s/p redo sternotomy and Mvr on 1/23/24.      Objective     Physical Exam    Last Recorded Vitals  Blood pressure (!) 186/79, pulse 65, temperature 36.4 °C (97.5 °F), temperature source Temporal, resp. rate 16, height 1.727 m (5' 8\"), weight 96.5 kg (212 lb 11.9 oz), SpO2 100 %.  Intake/Output last 3 Shifts:  No intake/output data recorded.    Relevant Results  Results for orders placed or performed during the hospital encounter of 01/23/24 (from the past 24 hour(s))   Prepare RBC: 6 Units   Result Value Ref Range    PRODUCT CODE P4000O68     Unit Number [id number]     Unit ABO A     Unit RH POS     XM INTEP COMP     Dispense Status XM     Blood Expiration Date January 29, 2024 23:59 EST     PRODUCT BLOOD TYPE 6200     UNIT VOLUME 350     PRODUCT CODE T8334O74     Unit Number [id number]     Unit ABO A     Unit RH POS     XM INTEP COMP     Dispense Status XM     Blood Expiration Date January 29, 2024 23:59 EST     PRODUCT BLOOD TYPE 6200     UNIT VOLUME 350     PRODUCT CODE C1395N71     Unit Number [id number]     Unit ABO A     Unit RH POS     XM INTEP COMP     Dispense Status XM     Blood Expiration Date January 29, 2024 23:59 EST     PRODUCT BLOOD TYPE 6200     UNIT VOLUME 350     PRODUCT CODE [id number]     Unit Number [id number]     Unit ABO A     Unit RH POS     XM INTEP COMP     Dispense Status XM     Blood Expiration Date January 29, 2024 23:59 EST     PRODUCT BLOOD TYPE 6200     UNIT VOLUME 350     PRODUCT CODE P5457O40     Unit Number [id number]     Unit ABO A     Unit RH POS     XM INTEP COMP     Dispense Status XM     Blood Expiration Date January 29, 2024 23:59 EST     PRODUCT BLOOD TYPE 6200     UNIT VOLUME 350     PRODUCT CODE D0263P66     Unit Number [id number]     Unit ABO A     Unit RH POS     XM INTEP COMP     Dispense Status XM     Blood Expiration Date January 29, 2024 23:59 " EST     PRODUCT BLOOD TYPE 6200     UNIT VOLUME 350    Blood Gas Arterial Full Panel Unsolicited   Result Value Ref Range    POCT pH, Arterial 7.43 (H) 7.38 - 7.42 pH    POCT pCO2, Arterial 37 (L) 38 - 42 mm Hg    POCT pO2, Arterial 104 (H) 85 - 95 mm Hg    POCT SO2, Arterial 98 94 - 100 %    POCT Oxy Hemoglobin, Arterial 96.7 94.0 - 98.0 %    POCT Hematocrit Calculated, Arterial 38.0 (L) 41.0 - 52.0 %    POCT Sodium, Arterial 141 136 - 145 mmol/L    POCT Potassium, Arterial 3.8 3.5 - 5.3 mmol/L    POCT Chloride, Arterial 107 98 - 107 mmol/L    POCT Ionized Calcium, Arterial 1.35 (H) 1.10 - 1.33 mmol/L    POCT Glucose, Arterial 112 (H) 74 - 99 mg/dL    POCT Lactate, Arterial 0.8 0.4 - 2.0 mmol/L    POCT Base Excess, Arterial 0.5 -2.0 - 3.0 mmol/L    POCT HCO3 Calculated, Arterial 24.6 22.0 - 26.0 mmol/L    POCT Hemoglobin, Arterial 12.8 (L) 13.5 - 17.5 g/dL    POCT Anion Gap, Arterial 13 10 - 25 mmo/L    Patient Temperature 37.0 degrees Celsius    FiO2 21 %   Coox Panel, Arterial Unsolicited   Result Value Ref Range    POCT Hemoglobin, Arterial 12.8 (L) 13.5 - 17.5 g/dL    POCT Oxy Hemoglobin, Arterial 96.7 94.0 - 98.0 %    POCT Carboxyhemoglobin, Arterial 0.4 %    POCT Methemoglobin, Arterial 1.2 0.0 - 1.5 %    POCT Deoxy Hemoglobin, Arterial 1.7 0.0 - 5.0 %   Blood Gas Arterial Full Panel Unsolicited   Result Value Ref Range    POCT pH, Arterial 7.37 (L) 7.38 - 7.42 pH    POCT pCO2, Arterial 41 38 - 42 mm Hg    POCT pO2, Arterial 159 (H) 85 - 95 mm Hg    POCT SO2, Arterial 99 94 - 100 %    POCT Oxy Hemoglobin, Arterial 98.3 (H) 94.0 - 98.0 %    POCT Hematocrit Calculated, Arterial 32.0 (L) 41.0 - 52.0 %    POCT Sodium, Arterial 141 136 - 145 mmol/L    POCT Potassium, Arterial 4.3 3.5 - 5.3 mmol/L    POCT Chloride, Arterial 108 (H) 98 - 107 mmol/L    POCT Ionized Calcium, Arterial 1.22 1.10 - 1.33 mmol/L    POCT Glucose, Arterial 119 (H) 74 - 99 mg/dL    POCT Lactate, Arterial 0.9 0.4 - 2.0 mmol/L    POCT Base  Excess, Arterial -1.5 -2.0 - 3.0 mmol/L    POCT HCO3 Calculated, Arterial 23.7 22.0 - 26.0 mmol/L    POCT Hemoglobin, Arterial 10.8 (L) 13.5 - 17.5 g/dL    POCT Anion Gap, Arterial 14 10 - 25 mmo/L    Patient Temperature 37.0 degrees Celsius    FiO2 60 %   Coox Panel, Arterial Unsolicited   Result Value Ref Range    POCT Hemoglobin, Arterial 10.8 (L) 13.5 - 17.5 g/dL    POCT Oxy Hemoglobin, Arterial 98.3 (H) 94.0 - 98.0 %    POCT Carboxyhemoglobin, Arterial 0.4 %    POCT Methemoglobin, Arterial 0.6 0.0 - 1.5 %    POCT Deoxy Hemoglobin, Arterial 0.7 0.0 - 5.0 %   Blood Gas Arterial Full Panel Unsolicited   Result Value Ref Range    POCT pH, Arterial 7.39 7.38 - 7.42 pH    POCT pCO2, Arterial 41 38 - 42 mm Hg    POCT pO2, Arterial 403 (H) 85 - 95 mm Hg    POCT SO2, Arterial 100 94 - 100 %    POCT Oxy Hemoglobin, Arterial 98.5 (H) 94.0 - 98.0 %    POCT Hematocrit Calculated, Arterial 30.0 (L) 41.0 - 52.0 %    POCT Sodium, Arterial 138 136 - 145 mmol/L    POCT Potassium, Arterial 3.9 3.5 - 5.3 mmol/L    POCT Chloride, Arterial 107 98 - 107 mmol/L    POCT Ionized Calcium, Arterial 1.19 1.10 - 1.33 mmol/L    POCT Glucose, Arterial 123 (H) 74 - 99 mg/dL    POCT Lactate, Arterial 1.1 0.4 - 2.0 mmol/L    POCT Base Excess, Arterial -0.2 -2.0 - 3.0 mmol/L    POCT HCO3 Calculated, Arterial 24.8 22.0 - 26.0 mmol/L    POCT Hemoglobin, Arterial 10.0 (L) 13.5 - 17.5 g/dL    POCT Anion Gap, Arterial 10 10 - 25 mmo/L    Patient Temperature 37.0 degrees Celsius    FiO2 80 %   Coox Panel, Arterial Unsolicited   Result Value Ref Range    POCT Hemoglobin, Arterial 10.0 (L) 13.5 - 17.5 g/dL    POCT Oxy Hemoglobin, Arterial 98.5 (H) 94.0 - 98.0 %    POCT Carboxyhemoglobin, Arterial 0.3 %    POCT Methemoglobin, Arterial 0.8 0.0 - 1.5 %    POCT Deoxy Hemoglobin, Arterial 0.4 0.0 - 5.0 %   Blood Gas Venous Full Panel Unsolicited   Result Value Ref Range    POCT pH, Venous 7.35 7.33 - 7.43 pH    POCT pCO2, Venous 47 41 - 51 mm Hg    POCT pO2,  Venous 49 (H) 35 - 45 mm Hg    POCT SO2, Venous 84 (H) 45 - 75 %    POCT Oxy Hemoglobin, Venous 82.6 (H) 45.0 - 75.0 %    POCT Hematocrit Calculated, Venous 30.0 (L) 41.0 - 52.0 %    POCT Sodium, Venous 138 136 - 145 mmol/L    POCT Potassium, Venous 4.0 3.5 - 5.3 mmol/L    POCT Chloride, Venous 107 98 - 107 mmol/L    POCT Ionized Calicum, Venous 1.18 1.10 - 1.33 mmol/L    POCT Glucose, Venous 126 (H) 74 - 99 mg/dL    POCT Lactate, Venous 1.0 0.4 - 2.0 mmol/L    POCT Base Excess, Venous 0.0 -2.0 - 3.0 mmol/L    POCT HCO3 Calculated, Venous 25.9 22.0 - 26.0 mmol/L    POCT Hemoglobin, Venous 10.0 (L) 13.5 - 17.5 g/dL    POCT Anion Gap, Venous 9.0 (L) 10.0 - 25.0 mmol/L    Patient Temperature 37.0 degrees Celsius   Coox Panel, Venous Unsolicited   Result Value Ref Range    POCT Carboxyhemoglobin, Venous 1.0 %    POCT Methemoglobin, Venous 0.4 0.0 - 1.5 %   Blood Gas Arterial Full Panel Unsolicited   Result Value Ref Range    POCT pH, Arterial 7.38 7.38 - 7.42 pH    POCT pCO2, Arterial 42 38 - 42 mm Hg    POCT pO2, Arterial 291 (H) 85 - 95 mm Hg    POCT SO2, Arterial 100 94 - 100 %    POCT Oxy Hemoglobin, Arterial 98.5 (H) 94.0 - 98.0 %    POCT Hematocrit Calculated, Arterial 29.0 (L) 41.0 - 52.0 %    POCT Sodium, Arterial 139 136 - 145 mmol/L    POCT Potassium, Arterial 5.0 3.5 - 5.3 mmol/L    POCT Chloride, Arterial 108 (H) 98 - 107 mmol/L    POCT Ionized Calcium, Arterial 1.19 1.10 - 1.33 mmol/L    POCT Glucose, Arterial 128 (H) 74 - 99 mg/dL    POCT Lactate, Arterial 1.1 0.4 - 2.0 mmol/L    POCT Base Excess, Arterial -0.4 -2.0 - 3.0 mmol/L    POCT HCO3 Calculated, Arterial 24.8 22.0 - 26.0 mmol/L    POCT Hemoglobin, Arterial 9.8 (L) 13.5 - 17.5 g/dL    POCT Anion Gap, Arterial 11 10 - 25 mmo/L    Patient Temperature 37.0 degrees Celsius    FiO2 70 %   Coox Panel, Arterial Unsolicited   Result Value Ref Range    POCT Hemoglobin, Arterial 9.8 (L) 13.5 - 17.5 g/dL    POCT Oxy Hemoglobin, Arterial 98.5 (H) 94.0 - 98.0 %     POCT Carboxyhemoglobin, Arterial 0.4 %    POCT Methemoglobin, Arterial 0.7 0.0 - 1.5 %    POCT Deoxy Hemoglobin, Arterial 0.4 0.0 - 5.0 %   Blood Gas Arterial Full Panel Unsolicited   Result Value Ref Range    POCT pH, Arterial 7.35 (L) 7.38 - 7.42 pH    POCT pCO2, Arterial 43 (H) 38 - 42 mm Hg    POCT pO2, Arterial 223 (H) 85 - 95 mm Hg    POCT SO2, Arterial 100 94 - 100 %    POCT Oxy Hemoglobin, Arterial 98.8 (H) 94.0 - 98.0 %    POCT Hematocrit Calculated, Arterial 30.0 (L) 41.0 - 52.0 %    POCT Sodium, Arterial 138 136 - 145 mmol/L    POCT Potassium, Arterial 5.0 3.5 - 5.3 mmol/L    POCT Chloride, Arterial 108 (H) 98 - 107 mmol/L    POCT Ionized Calcium, Arterial 1.17 1.10 - 1.33 mmol/L    POCT Glucose, Arterial 122 (H) 74 - 99 mg/dL    POCT Lactate, Arterial 1.2 0.4 - 2.0 mmol/L    POCT Base Excess, Arterial -1.9 -2.0 - 3.0 mmol/L    POCT HCO3 Calculated, Arterial 23.7 22.0 - 26.0 mmol/L    POCT Hemoglobin, Arterial 10.1 (L) 13.5 - 17.5 g/dL    POCT Anion Gap, Arterial 11 10 - 25 mmo/L    Patient Temperature 37.0 degrees Celsius    FiO2 70 %   Coox Panel, Arterial Unsolicited   Result Value Ref Range    POCT Hemoglobin, Arterial 10.1 (L) 13.5 - 17.5 g/dL    POCT Oxy Hemoglobin, Arterial 98.8 (H) 94.0 - 98.0 %    POCT Carboxyhemoglobin, Arterial 0.7 %    POCT Methemoglobin, Arterial 0.3 0.0 - 1.5 %    POCT Deoxy Hemoglobin, Arterial 0.2 0.0 - 5.0 %   Blood Gas Arterial Full Panel Unsolicited   Result Value Ref Range    POCT pH, Arterial 7.33 (L) 7.38 - 7.42 pH    POCT pCO2, Arterial 45 (H) 38 - 42 mm Hg    POCT pO2, Arterial 292 (H) 85 - 95 mm Hg    POCT SO2, Arterial 100 94 - 100 %    POCT Oxy Hemoglobin, Arterial 98.5 (H) 94.0 - 98.0 %    POCT Hematocrit Calculated, Arterial 28.0 (L) 41.0 - 52.0 %    POCT Sodium, Arterial      POCT Potassium, Arterial 4.2 3.5 - 5.3 mmol/L    POCT Chloride, Arterial 111 (H) 98 - 107 mmol/L    POCT Ionized Calcium, Arterial 1.33 1.10 - 1.33 mmol/L    POCT Glucose, Arterial  120 (H) 74 - 99 mg/dL    POCT Lactate, Arterial 2.1 (H) 0.4 - 2.0 mmol/L    POCT Base Excess, Arterial -2.2 (L) -2.0 - 3.0 mmol/L    POCT HCO3 Calculated, Arterial 23.7 22.0 - 26.0 mmol/L    POCT Hemoglobin, Arterial 9.3 (L) 13.5 - 17.5 g/dL    POCT Anion Gap, Arterial      Patient Temperature 37.0 degrees Celsius    FiO2 77 %   Coox Panel, Arterial Unsolicited   Result Value Ref Range    POCT Hemoglobin, Arterial 9.3 (L) 13.5 - 17.5 g/dL    POCT Oxy Hemoglobin, Arterial 98.5 (H) 94.0 - 98.0 %    POCT Carboxyhemoglobin, Arterial 0.5 %    POCT Methemoglobin, Arterial 0.7 0.0 - 1.5 %    POCT Deoxy Hemoglobin, Arterial 0.3 0.0 - 5.0 %   Blood Gas Arterial Full Panel Unsolicited   Result Value Ref Range    POCT pH, Arterial 7.33 (L) 7.38 - 7.42 pH    POCT pCO2, Arterial 43 (H) 38 - 42 mm Hg    POCT pO2, Arterial 438 (H) 85 - 95 mm Hg    POCT SO2, Arterial 100 94 - 100 %    POCT Oxy Hemoglobin, Arterial 98.5 (H) 94.0 - 98.0 %    POCT Hematocrit Calculated, Arterial 29.0 (L) 41.0 - 52.0 %    POCT Sodium, Arterial 139 136 - 145 mmol/L    POCT Potassium, Arterial 5.5 (H) 3.5 - 5.3 mmol/L    POCT Chloride, Arterial 111 (H) 98 - 107 mmol/L    POCT Ionized Calcium, Arterial 1.33 1.10 - 1.33 mmol/L    POCT Glucose, Arterial 137 (H) 74 - 99 mg/dL    POCT Lactate, Arterial 2.6 (H) 0.4 - 2.0 mmol/L    POCT Base Excess, Arterial -3.1 (L) -2.0 - 3.0 mmol/L    POCT HCO3 Calculated, Arterial 22.7 22.0 - 26.0 mmol/L    POCT Hemoglobin, Arterial 9.6 (L) 13.5 - 17.5 g/dL    POCT Anion Gap, Arterial 11 10 - 25 mmo/L    Patient Temperature 37.0 degrees Celsius    FiO2 100 %   Coox Panel, Arterial Unsolicited   Result Value Ref Range    POCT Hemoglobin, Arterial 9.6 (L) 13.5 - 17.5 g/dL    POCT Oxy Hemoglobin, Arterial 98.5 (H) 94.0 - 98.0 %    POCT Carboxyhemoglobin, Arterial 0.6 %    POCT Methemoglobin, Arterial 0.8 0.0 - 1.5 %    POCT Deoxy Hemoglobin, Arterial 0.1 0.0 - 5.0 %   Blood Gas Arterial Full Panel Unsolicited   Result Value  Ref Range    POCT pH, Arterial 7.31 (L) 7.38 - 7.42 pH    POCT pCO2, Arterial 44 (H) 38 - 42 mm Hg    POCT pO2, Arterial 238 (H) 85 - 95 mm Hg    POCT SO2, Arterial 100 94 - 100 %    POCT Oxy Hemoglobin, Arterial 98.6 (H) 94.0 - 98.0 %    POCT Hematocrit Calculated, Arterial 29.0 (L) 41.0 - 52.0 %    POCT Sodium, Arterial 140 136 - 145 mmol/L    POCT Potassium, Arterial 4.2 3.5 - 5.3 mmol/L    POCT Chloride, Arterial      POCT Ionized Calcium, Arterial 1.29 1.10 - 1.33 mmol/L    POCT Glucose, Arterial 151 (H) 74 - 99 mg/dL    POCT Lactate, Arterial 3.0 (H) 0.4 - 2.0 mmol/L    POCT Base Excess, Arterial -3.9 (L) -2.0 - 3.0 mmol/L    POCT HCO3 Calculated, Arterial 22.2 22.0 - 26.0 mmol/L    POCT Hemoglobin, Arterial 9.6 (L) 13.5 - 17.5 g/dL    POCT Anion Gap, Arterial      Patient Temperature 37.0 degrees Celsius    FiO2 100 %   Coox Panel, Arterial Unsolicited   Result Value Ref Range    POCT Hemoglobin, Arterial 9.6 (L) 13.5 - 17.5 g/dL    POCT Oxy Hemoglobin, Arterial 98.6 (H) 94.0 - 98.0 %    POCT Carboxyhemoglobin, Arterial 0.2 %    POCT Methemoglobin, Arterial 0.8 0.0 - 1.5 %    POCT Deoxy Hemoglobin, Arterial 0.4 0.0 - 5.0 %   Blood Gas Arterial Full Panel Unsolicited   Result Value Ref Range    POCT pH, Arterial 7.30 (L) 7.38 - 7.42 pH    POCT pCO2, Arterial 41 38 - 42 mm Hg    POCT pO2, Arterial 220 (H) 85 - 95 mm Hg    POCT SO2, Arterial 100 94 - 100 %    POCT Oxy Hemoglobin, Arterial 98.4 (H) 94.0 - 98.0 %    POCT Hematocrit Calculated, Arterial 31.0 (L) 41.0 - 52.0 %    POCT Sodium, Arterial 142 136 - 145 mmol/L    POCT Potassium, Arterial 4.3 3.5 - 5.3 mmol/L    POCT Chloride, Arterial 110 (H) 98 - 107 mmol/L    POCT Ionized Calcium, Arterial 1.22 1.10 - 1.33 mmol/L    POCT Glucose, Arterial 124 (H) 74 - 99 mg/dL    POCT Lactate, Arterial 3.2 (H) 0.4 - 2.0 mmol/L    POCT Base Excess, Arterial -5.9 (L) -2.0 - 3.0 mmol/L    POCT HCO3 Calculated, Arterial 20.2 (L) 22.0 - 26.0 mmol/L    POCT Hemoglobin,  Arterial 10.3 (L) 13.5 - 17.5 g/dL    POCT Anion Gap, Arterial 16 10 - 25 mmo/L    Patient Temperature 37.0 degrees Celsius    FiO2 60 %   Coox Panel, Arterial Unsolicited   Result Value Ref Range    POCT Hemoglobin, Arterial 10.3 (L) 13.5 - 17.5 g/dL    POCT Oxy Hemoglobin, Arterial 98.4 (H) 94.0 - 98.0 %    POCT Carboxyhemoglobin, Arterial 0.7 %    POCT Methemoglobin, Arterial 0.5 0.0 - 1.5 %    POCT Deoxy Hemoglobin, Arterial 0.4 0.0 - 5.0 %   Blood Gas Arterial Full Panel Unsolicited   Result Value Ref Range    POCT pH, Arterial 7.39 7.38 - 7.42 pH    POCT pCO2, Arterial 36 (L) 38 - 42 mm Hg    POCT pO2, Arterial 177 (H) 85 - 95 mm Hg    POCT SO2, Arterial 100 94 - 100 %    POCT Oxy Hemoglobin, Arterial 97.9 94.0 - 98.0 %    POCT Hematocrit Calculated, Arterial 32.0 (L) 41.0 - 52.0 %    POCT Sodium, Arterial 141 136 - 145 mmol/L    POCT Potassium, Arterial 4.6 3.5 - 5.3 mmol/L    POCT Chloride, Arterial 111 (H) 98 - 107 mmol/L    POCT Ionized Calcium, Arterial 1.17 1.10 - 1.33 mmol/L    POCT Glucose, Arterial 125 (H) 74 - 99 mg/dL    POCT Lactate, Arterial 1.9 0.4 - 2.0 mmol/L    POCT Base Excess, Arterial -2.7 (L) -2.0 - 3.0 mmol/L    POCT HCO3 Calculated, Arterial 21.8 (L) 22.0 - 26.0 mmol/L    POCT Hemoglobin, Arterial 10.8 (L) 13.5 - 17.5 g/dL    POCT Anion Gap, Arterial 13 10 - 25 mmo/L    Patient Temperature 37.0 degrees Celsius    FiO2 60 %   Coox Panel, Arterial Unsolicited   Result Value Ref Range    POCT Hemoglobin, Arterial 10.8 (L) 13.5 - 17.5 g/dL    POCT Oxy Hemoglobin, Arterial 97.9 94.0 - 98.0 %    POCT Carboxyhemoglobin, Arterial 1.0 %    POCT Methemoglobin, Arterial 0.8 0.0 - 1.5 %    POCT Deoxy Hemoglobin, Arterial 0.3 0.0 - 5.0 %      Assessment/Plan   Principal Problem:    Mitral regurgitation  Active Problems:    Mitral valve disorder    1) s/p Mvr, prior ascending aortic aneurysm repair   EF 65%   Volume resuscitate/GDT   Following continued stability -> SAT/SBT->WTE   Most recent ABG  shows excellent oxygenation/ventilation and normal lactate.  2) Accelerated junctional rhythm   Monitor  3) Hx of HTN      Due to the high probability of life threatening clinical decompensation, the patient required critical care time evaluating and managing this patient.  Critical care time included obtaining a history, examining the patient, ordering and reviewing studies, discussing, developing, and implementing a management plan, evaluating the patient's response to treatment, and discussion with other care team providers. I saw and evaluated the patient myself. I reviewed the [facility]'s documentation and discussed the patient with the [facility]. Critical care time was performed exclusive of billable procedures.    Critical Care Time: 38 minutes   Samuel Negro MD

## 2024-01-23 NOTE — ADDENDUM NOTE
Addendum  created 01/23/24 1603 by Wliliams Schwartz MD    Child order released for a procedure order, Clinical Note Signed, Intraprocedure Blocks edited (Anesthesia), Intraprocedure Meds edited (Anesthesia), SmartForm saved

## 2024-01-23 NOTE — ANESTHESIA POSTPROCEDURE EVALUATION
Patient: Jake Brunner    Procedure Summary       Date: 01/23/24 Room / Location: [facility] OR 21 / [facility] OR    Anesthesia Start: 0716 Anesthesia Stop: 1406    Procedure: Redo Median Sternotomy, Repair Mitral Valve (tissue valve if unable to repair) Diagnosis:       Nonrheumatic mitral valve regurgitation      (Nonrheumatic mitral valve regurgitation [I34.0])    Surgeons: Amarjit Arboleda MD Responsible Provider: Williams Schwartz MD    Anesthesia Type: general ASA Status: 4            Anesthesia Type: general    Vitals Value Taken Time   /86 36.9   Temp 36.9 01/23/24 1549   Pulse 55 01/23/24 1548   Resp 7 01/23/24 1548   SpO2 100 % 01/23/24 1548   Vitals shown include unvalidated device data.    Anesthesia Post Evaluation    Patient location during evaluation: ICU  Patient participation: complete - patient cannot participate  Level of consciousness: sedated  Pain score: 0  Pain management: adequate  Airway patency: patent  Cardiovascular status: acceptable  Respiratory status: acceptable, ETT, intubated and ventilator  Hydration status: acceptable  Postoperative Nausea and Vomiting: none        There were no known notable events for this encounter.

## 2024-01-23 NOTE — H&P
CTICU History & Physical    Subjective   HPI:  This is a 68 year old male with a PMH significant for HTN, HPL, prostate cancer, HILTON uses CPAP, CAD, cataracts and ascending aortic aneurysm repair in 2015.  1/23 Today he presents s/p redo sternotomy MVr with Dr. Arboleda.    Cardiac Testing:     TTE: 11/09/2024  CONCLUSIONS:   1. Left ventricular systolic function is normal with a 65% estimated ejection fraction.   2. Abnormal septal motion consistent with post-operative status.   3. The left atrium is moderately dilated.   4. The right atrium is moderately dilated.   5. Flail segment of the posterior mitral valve leaflet.   6. Severe mitral valve regurgitation.   7. Mildly elevated RVSP.   8. Mild to moderate aortic valve regurgitation.    C: 11/10/2024  CONCLUSIONS:   1. Moderate 2 vessel CAD in a right dominant system.   2. Elevated left and right heart filling pressures.   3. Mild pulmonary hypertension with a PVR of 1.2 Wood units.   4. No evidence of intracardiac shunt.   5. Preserved cardiac index with a normal .     Procedure/Surgeon: MVr w/ Dr. Arboleda  Frontliner/Anesthesia: Dr. Schwartz  Out of OR Time (document on ventilator card): 1408     OR Course/Issues: LVOT obstruction in OR causing DAINA     CPB time: 125mins  Cross clamp time: 72mins  Circ arrest time: n/a  Echo Pre/Post: Normal BIV function  Chest Tubes/Drains: 1MS, 1LPL, 1Rpl   Temporary wires location/setting: V wires, backed up VVI @ 50      Fluids  Crystalloid: 1500cc  Colloid: 750cc  Cellsaver: 720cc  Products: n/a  EBL: 500cc  UOP: 855cc     Anesthesia  Intubation: Grade I MAC 4  Intravenous Access: RIJ Mac   Regional anesthesia: n/a  NMB dose/last administration: 250mg rocuronium total @ 1130  TOF/ reversal given: no  Antibiotic time: cefazolin @ 1130  Temperature on admission to ICU: 36.3C    Past Medical History:   Diagnosis Date    Alcohol abuse, in remission     History of alcohol abuse    Cataract     Closed displaced comminuted  fracture of shaft of right tibia 07/31/2019    Closed displaced fracture of body of right scapula 07/31/2019    Closed displaced fracture of shaft of right clavicle 07/31/2019    Coronary artery disease     Essential (primary) hypertension     Hypertension    Heart valve disease     Severe mitral valve regurgitation.    History of transesophageal echocardiography (EULA) 12/11/2023    Echo on 12/11/23    Hyperlipidemia     Prostate cancer (CMS/HCC) 01/09/2024    Onc: Pacheco Gomes CNP    Shortness of breath     Sleep apnea     Uses CPAP    Vision loss     Wears contacts     Past Surgical History:   Procedure Laterality Date    ANKLE SURGERY Left     Ankle Surgery    ARTERIAL ANEURYSM REPAIR  12/2015    Aortic Aneurysm Repair Ascending Aorta    CARDIAC CATHETERIZATION N/A 12/11/2023    Procedure: Left And Right Heart Cath, With LV;  Surgeon: Owen Choi MD;  Location: [facility];  Service: Cardiovascular;  Laterality: N/A;  Scheduled 12/11 @ 9:00am, EULA w/ anesthesia @ 7:30am    COLONOSCOPY      CT CHEST W AND WO IV CONTRAST  10/18/2023    Thoracic aortic atherosclerosis.    FEMUR FRACTURE SURGERY  01/27/2016    Femur Repair    LIPOMA RESECTION  2022    back    OTHER SURGICAL HISTORY  01/27/2016    Wrist Surgery     Medications Prior to Admission   Medication Sig Dispense Refill Last Dose    amLODIPine (Norvasc) 10 mg tablet Take 1 tablet (10 mg) by mouth once daily. (Patient taking differently: Take 0.5 tablets (5 mg) by mouth once daily.) 90 tablet 3     aspirin 81 mg EC tablet Take 1 tablet (81 mg) by mouth once daily.       chlorhexidine (Hibiclens) 4 % external liquid Use as directed daily preoperatively 473 mL 0     chlorhexidine (Peridex) 0.12 % solution Swish and spit. Do not swallow. Use the night before and morning of surgery as directed 15 mL 0     ezetimibe (Zetia) 10 mg tablet Take 1 tablet (10 mg) by mouth once daily. 90 tablet 3     losartan (Cozaar) 50 mg tablet Take 1 tablet (50 mg) by  mouth once daily. DAILY 90 tablet 3     metoprolol tartrate (Lopressor) 50 mg tablet Take 1.5 tablets by mouth 2 times a day. Discontinue Metoprolol Succinate 270 tablet 3     multivitamin tablet Take 1 tablet by mouth once daily.       rosuvastatin (Crestor) 40 mg tablet Take 1 tablet (40 mg) by mouth once daily. DAILY 90 tablet 2      Patient has no known allergies.  Social History     Tobacco Use    Smoking status: Former     Types: Cigarettes    Smokeless tobacco: Never   Vaping Use    Vaping Use: Never used   Substance Use Topics    Alcohol use: Never    Drug use: Never     Family History   Problem Relation Name Age of Onset    Hyperlipidemia Mother      Coronary artery disease Father      Other (MYOCARDIAL INFARCTION) Father      Heart attack Father         Review of Systems:  BERNIE sedated and intubated    Objective   Vitals:  Most Recent:  Vitals:    01/23/24 0649   BP: (!) 186/79   Pulse: 66   Resp: 16   Temp: 36.4 °C (97.5 °F)   SpO2: 98%       24hr Min/Max:  Temp  Min: 36.4 °C (97.5 °F)  Max: 36.4 °C (97.5 °F)  Pulse  Min: 66  Max: 66  BP  Min: 186/79  Max: 186/79  Resp  Min: 16  Max: 16  SpO2  Min: 98 %  Max: 98 %    I/O:  No intake/output data recorded.    LDA:  CVC 01/23/24 Double lumen Right Internal jugular (Active)   Placement Date/Time: 01/23/24 (c) 0800   Hand Hygiene Performed Prior to CVC Insertion: Yes  Site Prep: Chlorhexidine   Site Prep Agent has Completely Dried Before Insertion: Yes  All 5 Sterile Barriers Used (Gloves, Gown, Cap, Mask, Large Sterile Leatha...   Number of days: 0       Arterial Line 01/23/24 Left Brachial (Active)   Placement Date/Time: 01/23/24 (c) 0732   Size: 20 G  Orientation: Left  Location: Brachial  Securement Method: Transparent dressing  Patient Tolerance: Tolerated well   Number of days: 0       ETT  7.5 mm (Active)   Placement Date/Time: 01/23/24 (c) 0743   Mask Ventilation: Vent by mask  Technique: Direct laryngoscopy  ETT Type: ETT - single  Single Lumen Tube Size:  7.5 mm  Cuffed: Yes  Laryngoscope: Magen  Blade Size: 4  Location: Oral  Grade View: Full view...   Number of days: 0       Urethral Catheter Non-latex 14 Fr. (Active)   Placement Date/Time: 01/23/24 0755   Placed by External Staff?: Other (Comment)  Placed by: JUSTIN Song  Hand Hygiene Completed: Yes  Catheter Type: Non-latex  Tube Size (Fr.): 14 Fr.  Catheter Balloon Size: 10 mL  Urine Returned: Yes   Number of days: 0       Physical Exam:   - CONSTITUTION: Adult male, emerging from anesthesia on vent, no acute distress  - NEUROLOGIC: Pupils equal and reactive  - CARDIOVASCULAR: Accelerated junctional rhythm. V wires backed up  - RESPIRATORY: Diminished lung sounds bilateral  - GI: Soft nondistended, hypoactive  - : Clear yellow urine in finch  - EXTREMITIES: generalized edema, warm  - SKIN: Pink  - PSYCHIATRIC: BERNIE    Lab Review: Reviewed            Results from last 7 days   Lab Units 01/23/24  1155   POCT PH, ARTERIAL pH 7.33*   POCT PCO2, ARTERIAL mm Hg 43*   POCT PO2, ARTERIAL mm Hg 438*   POCT HCO3 CALCULATED, ARTERIAL mmol/L 22.7   POCT BASE EXCESS, ARTERIAL mmol/L -3.1*       Most recent labs and imaging reviewed.    Daily Risk Screen           Assessment/Plan     Assessment: This is a 68 year old male with a PMH significant for HTN, HPL, prostate cancer, HILTON uses CPAP, CAD, cataracts and ascending aortic aneurysm repair in 2015.  1/23 Today he presents s/p redo sternotomy MVr with Dr. Arboleda. EZEKIEL DAINA in OR.     Plan:  NEURO:  PMH of ETOH abuse in remission for years. Patient is intubated and sedated on propofol infusion. Acute post operative pain. No blocks but acute pain to place blocks today before extubation  -->  - Serial neuro and pain assessments   - Continue propofol until NMB reversal, then daily sedation vacation at minimum   - NMB reversal when normothermic and hemodynamically stable.    - Scheduled Tylenol   - PRN oxycodone  - PRN dilaudid for pain   - PT Consult, OOB to chair as tolerated,  chair position if not tolerated   - CAM ICU score qshift  - Sleep/wake cycle hygiene     CV:  Patient has a history of HTN, CAD, ascending aortic aneurysm repair in 2015. Is now status post MVR w/ Dr. Arboleda Pre/Post EF: 65% normal RV Arrived to CTICU on Levo and vaso. V epicardial wires set VVI @ 50.Currently accelerated junctional rhythm-->  - Maintain goal MAP >65  - Mixed venous and CI Q4H  - Volume resuscitate as clinically indicated  - Maintain epicardial wires set VVI @ 50  - Start Crestor 40mg tomorrow   - Hold home ezetimibe, losartan, metop, amlodipine and crestor     PULM: HX of HILTON uses CPAP.  Currently intubated on ventilator. Chest tubes MS, RPL, LPL.-->  - F/u post op CXR  - Once reversed, wean ventilator settings towards CPAP & extubation   - Wean FiO2 maintaining SpO2 >92%.   - IS q1h and OOB to chair when extubated  - Chest tubes to wall suction.    GI:  No PMH.  OG in place.-->  - Continue PPI until extubated  - NPO, will perform bedside swallow eval post extubation   - Colace/senna BID and miralax BID     :  CSA-LAWANDA Risk Score small.  No history of renal disease, baseline creatinine 0.99. Creatinine stable post-op. Finch in place and making adequate UOP. -->  - Continue finch catheter for strict I/Os.  - Goal UOP 0.5ml/kg/hr  - RFP as clinically indicated  - Replete electrolytes per CTICU protocol     ENDO: No PMH->  - Maintain BG <180, insulin per CTICU protocol     HEME:  Acute blood loss anemia and thrombocytopenia.-->    - Monitor drain output volume and characteristics  - CBC, coags, and fibrinogen post op and as clinically indicated  - Start ASA 6hrs post-op for CABG  - SQH tomorrow   - SCDs for DVT prophylaxis.  - Last type and screen: 1/12-send today     ID:  Afebrile, no current indications of infection. MRSA negative 1/12.-->  - Trend temp q4h  - Periop cefazolin x 48hrs     Skin:  No active skin issues.  - preventative Mepilex dressings in place on sacrum and heels  - change  preventative Mepilex weekly or more frequently as indicated (when moist/soiled)   - every shift skin assessment per nursing and weekly ICU skin rounds  - moisture barrier to be applied with fernando care  - active skin problems addressed with nursing on daily rounds     Proph:  SCDs  PPI     G:  Line  Right IJ MAC w Minimac placed 1/23  Left brachial a-line placed 1/23  Vega 1/23    F: Family: will update at bedside postoperatively.     A,B,C,D,E,F,G: reviewed    The indications and risks/benefits of non-violent/non self-destructive restraints were discussed.     I personally spent 60 minutes of critical care time directly and personally managing the patient exclusive of separately billable procedures.     Dispo: CTICU care for now.    CTICU TEAM PHONE [phone]

## 2024-01-23 NOTE — ANESTHESIA PROCEDURE NOTES
Arterial Line:    Date/Time: 1/23/2024 7:32 AM    Staffing  Performed: CAA   Authorized by: Williams Schwartz MD    Performed by: ANTHONY Mcclelland    An arterial line was placed. Procedure performed using ultrasound guidance.in the OR for the following indication(s): continuous blood pressure monitoring and blood sampling needed.    A 20 gauge (size), 10 cm (length), Angiocath (type) catheter was placed into the Left brachial artery, secured by Tegaderm,   Seldinger technique used.  Events:  patient tolerated procedure well with no complications.

## 2024-01-23 NOTE — ANESTHESIA PROCEDURE NOTES
Central Venous Line:    Date/Time: 1/23/2024 8:00 AM    A central venous line was placed in the OR for the following indication(s): central venous access and CVP monitoring.  Staffing  Performed: ANTHONY   Authorized by: Williams Schwartz MD    Performed by: ANTHONY Mcclelland    Sterility preparation included the following: provider hand hygiene performed prior to central venous catheter insertion, all 5 sterile barriers used (gloves, gown, cap, mask, large sterile drape) during central venous catheter insertion, antiseptic used during central venous catheter insertion and skin prep agent completely dried prior to procedure.  The patient was placed in Trendelenburg position.    Right internal jugular vein was prepped.    The site was prepped with Chlorhexidine.  Size: 9 Fr (8 Fr)   Length: 11.5  Number of Lumens: double lumen      During the procedure, the following specific steps were taken: target vein identified, needle advanced into vein and blood aspirated and guidewire advanced into vein.  Seldinger technique used.  Procedure performed using ultrasound guidance.  Sterile gel and probe cover used in ultrasound-guided central venous catheter insertion.    Intravenous verification was obtained by ultrasound, venous blood return and manometry.      Post insertion care included: all ports aspirated, all ports flushed easily, guidewire removed intact, Biopatch applied, line sutured in place and dressing applied.    During the procedure the patient experienced: patient tolerated procedure well with no complications.           images stored in chart

## 2024-01-24 ENCOUNTER — APPOINTMENT (OUTPATIENT)
Dept: RADIOLOGY | Facility: HOSPITAL | Age: 69
DRG: 220 | End: 2024-01-24
Payer: MEDICARE

## 2024-01-24 PROBLEM — G89.18 ACUTE POSTOPERATIVE PAIN: Status: ACTIVE | Noted: 2024-01-24

## 2024-01-24 LAB
ALBUMIN SERPL BCP-MCNC: 3.7 G/DL (ref 3.4–5)
ALBUMIN SERPL BCP-MCNC: 4.2 G/DL (ref 3.4–5)
ANION GAP BLDA CALCULATED.4IONS-SCNC: 12 MMO/L (ref 10–25)
ANION GAP BLDA CALCULATED.4IONS-SCNC: 7 MMO/L (ref 10–25)
ANION GAP BLDA CALCULATED.4IONS-SCNC: 8 MMO/L (ref 10–25)
ANION GAP SERPL CALC-SCNC: 12 MMOL/L (ref 10–20)
ANION GAP SERPL CALC-SCNC: 16 MMOL/L (ref 10–20)
BASE EXCESS BLDA CALC-SCNC: -0.4 MMOL/L (ref -2–3)
BASE EXCESS BLDA CALC-SCNC: 0.9 MMOL/L (ref -2–3)
BASE EXCESS BLDA CALC-SCNC: 2.8 MMOL/L (ref -2–3)
BODY TEMPERATURE: 37 DEGREES CELSIUS
BODY TEMPERATURE: 37 DEGREES CELSIUS
BODY TEMPERATURE: ABNORMAL
BUN SERPL-MCNC: 15 MG/DL (ref 6–23)
BUN SERPL-MCNC: 16 MG/DL (ref 6–23)
CA-I BLD-SCNC: 1.18 MMOL/L (ref 1.1–1.33)
CA-I BLD-SCNC: 1.22 MMOL/L (ref 1.1–1.33)
CA-I BLDA-SCNC: 1.23 MMOL/L (ref 1.1–1.33)
CA-I BLDA-SCNC: 1.26 MMOL/L (ref 1.1–1.33)
CA-I BLDA-SCNC: 1.27 MMOL/L (ref 1.1–1.33)
CALCIUM SERPL-MCNC: 8.9 MG/DL (ref 8.6–10.6)
CALCIUM SERPL-MCNC: 8.9 MG/DL (ref 8.6–10.6)
CHLORIDE BLDA-SCNC: 110 MMOL/L (ref 98–107)
CHLORIDE BLDA-SCNC: 110 MMOL/L (ref 98–107)
CHLORIDE BLDA-SCNC: 111 MMOL/L (ref 98–107)
CHLORIDE SERPL-SCNC: 105 MMOL/L (ref 98–107)
CHLORIDE SERPL-SCNC: 110 MMOL/L (ref 98–107)
CO2 SERPL-SCNC: 24 MMOL/L (ref 21–32)
CO2 SERPL-SCNC: 25 MMOL/L (ref 21–32)
CREAT SERPL-MCNC: 0.85 MG/DL (ref 0.5–1.3)
CREAT SERPL-MCNC: 0.87 MG/DL (ref 0.5–1.3)
EGFRCR SERPLBLD CKD-EPI 2021: >90 ML/MIN/1.73M*2
EGFRCR SERPLBLD CKD-EPI 2021: >90 ML/MIN/1.73M*2
ERYTHROCYTE [DISTWIDTH] IN BLOOD BY AUTOMATED COUNT: 14.5 % (ref 11.5–14.5)
GLUCOSE BLD MANUAL STRIP-MCNC: 124 MG/DL (ref 74–99)
GLUCOSE BLD MANUAL STRIP-MCNC: 126 MG/DL (ref 74–99)
GLUCOSE BLD MANUAL STRIP-MCNC: 162 MG/DL (ref 74–99)
GLUCOSE BLD MANUAL STRIP-MCNC: 171 MG/DL (ref 74–99)
GLUCOSE BLD MANUAL STRIP-MCNC: 182 MG/DL (ref 74–99)
GLUCOSE BLDA-MCNC: 161 MG/DL (ref 74–99)
GLUCOSE BLDA-MCNC: 200 MG/DL (ref 74–99)
GLUCOSE BLDA-MCNC: 211 MG/DL (ref 74–99)
GLUCOSE SERPL-MCNC: 156 MG/DL (ref 74–99)
GLUCOSE SERPL-MCNC: 205 MG/DL (ref 74–99)
HCO3 BLDA-SCNC: 24.1 MMOL/L (ref 22–26)
HCO3 BLDA-SCNC: 25.2 MMOL/L (ref 22–26)
HCO3 BLDA-SCNC: 27.2 MMOL/L (ref 22–26)
HCT VFR BLD AUTO: 25.1 % (ref 41–52)
HCT VFR BLD EST: 26 % (ref 41–52)
HCT VFR BLD EST: 26 % (ref 41–52)
HCT VFR BLD EST: 28 % (ref 41–52)
HGB BLD-MCNC: 9 G/DL (ref 13.5–17.5)
HGB BLDA-MCNC: 8.8 G/DL (ref 13.5–17.5)
HGB BLDA-MCNC: 8.8 G/DL (ref 13.5–17.5)
HGB BLDA-MCNC: 9.3 G/DL (ref 13.5–17.5)
INHALED O2 CONCENTRATION: 21 %
LACTATE BLDA-SCNC: 1.9 MMOL/L (ref 0.4–2)
LACTATE BLDA-SCNC: 3 MMOL/L (ref 0.4–2)
LACTATE BLDA-SCNC: 3.7 MMOL/L (ref 0.4–2)
MAGNESIUM SERPL-MCNC: 2.33 MG/DL (ref 1.6–2.4)
MAGNESIUM SERPL-MCNC: 2.43 MG/DL (ref 1.6–2.4)
MCH RBC QN AUTO: 31.1 PG (ref 26–34)
MCHC RBC AUTO-ENTMCNC: 35.9 G/DL (ref 32–36)
MCV RBC AUTO: 87 FL (ref 80–100)
NRBC BLD-RTO: 0 /100 WBCS (ref 0–0)
OXYHGB MFR BLDA: 92.9 % (ref 94–98)
OXYHGB MFR BLDA: 93.9 % (ref 94–98)
OXYHGB MFR BLDA: 96.5 % (ref 94–98)
PCO2 BLDA: 38 MM HG (ref 38–42)
PCO2 BLDA: 38 MM HG (ref 38–42)
PCO2 BLDA: 40 MM HG (ref 38–42)
PH BLDA: 7.41 PH (ref 7.38–7.42)
PH BLDA: 7.43 PH (ref 7.38–7.42)
PH BLDA: 7.44 PH (ref 7.38–7.42)
PHOSPHATE SERPL-MCNC: 2.6 MG/DL (ref 2.5–4.9)
PHOSPHATE SERPL-MCNC: 4.2 MG/DL (ref 2.5–4.9)
PLATELET # BLD AUTO: 87 X10*3/UL (ref 150–450)
PO2 BLDA: 71 MM HG (ref 85–95)
PO2 BLDA: 73 MM HG (ref 85–95)
PO2 BLDA: 93 MM HG (ref 85–95)
POTASSIUM BLDA-SCNC: 4.2 MMOL/L (ref 3.5–5.3)
POTASSIUM BLDA-SCNC: 4.4 MMOL/L (ref 3.5–5.3)
POTASSIUM BLDA-SCNC: 4.8 MMOL/L (ref 3.5–5.3)
POTASSIUM SERPL-SCNC: 4 MMOL/L (ref 3.5–5.3)
POTASSIUM SERPL-SCNC: 4.6 MMOL/L (ref 3.5–5.3)
RBC # BLD AUTO: 2.89 X10*6/UL (ref 4.5–5.9)
SAO2 % BLDA: 95 % (ref 94–100)
SAO2 % BLDA: 96 % (ref 94–100)
SAO2 % BLDA: 97 % (ref 94–100)
SODIUM BLDA-SCNC: 140 MMOL/L (ref 136–145)
SODIUM BLDA-SCNC: 140 MMOL/L (ref 136–145)
SODIUM BLDA-SCNC: 141 MMOL/L (ref 136–145)
SODIUM SERPL-SCNC: 138 MMOL/L (ref 136–145)
SODIUM SERPL-SCNC: 145 MMOL/L (ref 136–145)
WBC # BLD AUTO: 14 X10*3/UL (ref 4.4–11.3)

## 2024-01-24 PROCEDURE — 99231 SBSQ HOSP IP/OBS SF/LOW 25: CPT | Performed by: STUDENT IN AN ORGANIZED HEALTH CARE EDUCATION/TRAINING PROGRAM

## 2024-01-24 PROCEDURE — 97530 THERAPEUTIC ACTIVITIES: CPT | Mod: GP

## 2024-01-24 PROCEDURE — 82330 ASSAY OF CALCIUM: CPT | Performed by: NURSE PRACTITIONER

## 2024-01-24 PROCEDURE — 82947 ASSAY GLUCOSE BLOOD QUANT: CPT

## 2024-01-24 PROCEDURE — 97116 GAIT TRAINING THERAPY: CPT | Mod: GP

## 2024-01-24 PROCEDURE — 2500000004 HC RX 250 GENERAL PHARMACY W/ HCPCS (ALT 636 FOR OP/ED)

## 2024-01-24 PROCEDURE — 71045 X-RAY EXAM CHEST 1 VIEW: CPT

## 2024-01-24 PROCEDURE — 82435 ASSAY OF BLOOD CHLORIDE: CPT | Performed by: NURSE PRACTITIONER

## 2024-01-24 PROCEDURE — 97161 PT EVAL LOW COMPLEX 20 MIN: CPT | Mod: GP

## 2024-01-24 PROCEDURE — 84132 ASSAY OF SERUM POTASSIUM: CPT | Performed by: NURSE PRACTITIONER

## 2024-01-24 PROCEDURE — 99291 CRITICAL CARE FIRST HOUR: CPT

## 2024-01-24 PROCEDURE — 2500000004 HC RX 250 GENERAL PHARMACY W/ HCPCS (ALT 636 FOR OP/ED): Performed by: NURSE PRACTITIONER

## 2024-01-24 PROCEDURE — 1200000002 HC GENERAL ROOM WITH TELEMETRY DAILY

## 2024-01-24 PROCEDURE — 2500000004 HC RX 250 GENERAL PHARMACY W/ HCPCS (ALT 636 FOR OP/ED): Performed by: CLINICAL NURSE SPECIALIST

## 2024-01-24 PROCEDURE — 85027 COMPLETE CBC AUTOMATED: CPT | Performed by: NURSE PRACTITIONER

## 2024-01-24 PROCEDURE — 2500000004 HC RX 250 GENERAL PHARMACY W/ HCPCS (ALT 636 FOR OP/ED): Performed by: STUDENT IN AN ORGANIZED HEALTH CARE EDUCATION/TRAINING PROGRAM

## 2024-01-24 PROCEDURE — 2500000002 HC RX 250 W HCPCS SELF ADMINISTERED DRUGS (ALT 637 FOR MEDICARE OP, ALT 636 FOR OP/ED)

## 2024-01-24 PROCEDURE — 2500000001 HC RX 250 WO HCPCS SELF ADMINISTERED DRUGS (ALT 637 FOR MEDICARE OP): Performed by: CLINICAL NURSE SPECIALIST

## 2024-01-24 PROCEDURE — 83735 ASSAY OF MAGNESIUM: CPT | Performed by: NURSE PRACTITIONER

## 2024-01-24 PROCEDURE — C9113 INJ PANTOPRAZOLE SODIUM, VIA: HCPCS | Performed by: NURSE PRACTITIONER

## 2024-01-24 PROCEDURE — 2500000001 HC RX 250 WO HCPCS SELF ADMINISTERED DRUGS (ALT 637 FOR MEDICARE OP)

## 2024-01-24 PROCEDURE — 37799 UNLISTED PX VASCULAR SURGERY: CPT | Performed by: NURSE PRACTITIONER

## 2024-01-24 PROCEDURE — 99291 CRITICAL CARE FIRST HOUR: CPT | Performed by: ANESTHESIOLOGY

## 2024-01-24 PROCEDURE — 71045 X-RAY EXAM CHEST 1 VIEW: CPT | Performed by: RADIOLOGY

## 2024-01-24 PROCEDURE — 2500000001 HC RX 250 WO HCPCS SELF ADMINISTERED DRUGS (ALT 637 FOR MEDICARE OP): Performed by: NURSE PRACTITIONER

## 2024-01-24 RX ORDER — METHOCARBAMOL 500 MG/1
500 TABLET, FILM COATED ORAL EVERY 8 HOURS SCHEDULED
Status: DISPENSED | OUTPATIENT
Start: 2024-01-24 | End: 2024-01-27

## 2024-01-24 RX ORDER — METOPROLOL TARTRATE 25 MG/1
12.5 TABLET, FILM COATED ORAL 2 TIMES DAILY
Status: DISCONTINUED | OUTPATIENT
Start: 2024-01-24 | End: 2024-01-28 | Stop reason: HOSPADM

## 2024-01-24 RX ORDER — IRON POLYSACCHARIDE COMPLEX 150 MG
150 CAPSULE ORAL DAILY
Status: DISCONTINUED | OUTPATIENT
Start: 2024-01-25 | End: 2024-01-28 | Stop reason: HOSPADM

## 2024-01-24 RX ORDER — TALC
6 POWDER (GRAM) TOPICAL NIGHTLY PRN
Status: DISCONTINUED | OUTPATIENT
Start: 2024-01-24 | End: 2024-01-28 | Stop reason: HOSPADM

## 2024-01-24 RX ORDER — OXYCODONE HYDROCHLORIDE 5 MG/1
10 TABLET ORAL EVERY 6 HOURS PRN
Status: DISCONTINUED | OUTPATIENT
Start: 2024-01-24 | End: 2024-01-28 | Stop reason: HOSPADM

## 2024-01-24 RX ORDER — HEPARIN SODIUM 5000 [USP'U]/ML
5000 INJECTION, SOLUTION INTRAVENOUS; SUBCUTANEOUS EVERY 8 HOURS
Status: DISCONTINUED | OUTPATIENT
Start: 2024-01-24 | End: 2024-01-28 | Stop reason: HOSPADM

## 2024-01-24 RX ORDER — HYDROMORPHONE HYDROCHLORIDE 1 MG/ML
0.2 INJECTION, SOLUTION INTRAMUSCULAR; INTRAVENOUS; SUBCUTANEOUS EVERY 4 HOURS PRN
Status: DISCONTINUED | OUTPATIENT
Start: 2024-01-24 | End: 2024-01-24

## 2024-01-24 RX ORDER — INSULIN LISPRO 100 [IU]/ML
0-5 INJECTION, SOLUTION INTRAVENOUS; SUBCUTANEOUS
Status: DISCONTINUED | OUTPATIENT
Start: 2024-01-24 | End: 2024-01-27

## 2024-01-24 RX ORDER — MULTIVIT-MIN/IRON FUM/FOLIC AC 7.5 MG-4
1 TABLET ORAL DAILY
Status: DISCONTINUED | OUTPATIENT
Start: 2024-01-25 | End: 2024-01-28 | Stop reason: HOSPADM

## 2024-01-24 RX ADMIN — HYDROMORPHONE HYDROCHLORIDE 0.2 MG: 1 INJECTION, SOLUTION INTRAMUSCULAR; INTRAVENOUS; SUBCUTANEOUS at 09:47

## 2024-01-24 RX ADMIN — ACETAMINOPHEN 650 MG: 325 TABLET ORAL at 21:01

## 2024-01-24 RX ADMIN — SENNOSIDES AND DOCUSATE SODIUM 2 TABLET: 8.6; 5 TABLET ORAL at 10:38

## 2024-01-24 RX ADMIN — METHOCARBAMOL 500 MG: 500 TABLET ORAL at 08:32

## 2024-01-24 RX ADMIN — SODIUM CHLORIDE, POTASSIUM CHLORIDE, SODIUM LACTATE AND CALCIUM CHLORIDE 500 ML: 600; 310; 30; 20 INJECTION, SOLUTION INTRAVENOUS at 00:47

## 2024-01-24 RX ADMIN — ASPIRIN 81 MG 81 MG: 81 TABLET ORAL at 08:28

## 2024-01-24 RX ADMIN — INSULIN LISPRO 10 UNITS: 100 INJECTION, SOLUTION INTRAVENOUS; SUBCUTANEOUS at 01:46

## 2024-01-24 RX ADMIN — HYDROMORPHONE HYDROCHLORIDE 0.2 MG: 1 INJECTION, SOLUTION INTRAMUSCULAR; INTRAVENOUS; SUBCUTANEOUS at 00:41

## 2024-01-24 RX ADMIN — INSULIN LISPRO 5 UNITS: 100 INJECTION, SOLUTION INTRAVENOUS; SUBCUTANEOUS at 06:43

## 2024-01-24 RX ADMIN — ROSUVASTATIN 40 MG: 40 TABLET, FILM COATED ORAL at 20:50

## 2024-01-24 RX ADMIN — CEFAZOLIN SODIUM 2 G: 2 INJECTION, SOLUTION INTRAVENOUS at 20:50

## 2024-01-24 RX ADMIN — POLYETHYLENE GLYCOL 3350 17 G: 17 POWDER, FOR SOLUTION ORAL at 20:50

## 2024-01-24 RX ADMIN — INSULIN LISPRO 1 UNITS: 100 INJECTION, SOLUTION INTRAVENOUS; SUBCUTANEOUS at 12:20

## 2024-01-24 RX ADMIN — HEPARIN SODIUM 5000 UNITS: 5000 INJECTION INTRAVENOUS; SUBCUTANEOUS at 15:03

## 2024-01-24 RX ADMIN — CEFAZOLIN SODIUM 2 G: 2 INJECTION, SOLUTION INTRAVENOUS at 03:22

## 2024-01-24 RX ADMIN — OXYCODONE HYDROCHLORIDE 10 MG: 5 TABLET ORAL at 17:01

## 2024-01-24 RX ADMIN — ACETAMINOPHEN 650 MG: 325 TABLET ORAL at 17:01

## 2024-01-24 RX ADMIN — SENNOSIDES AND DOCUSATE SODIUM 2 TABLET: 8.6; 5 TABLET ORAL at 20:50

## 2024-01-24 RX ADMIN — PANTOPRAZOLE SODIUM 40 MG: 40 INJECTION, POWDER, FOR SOLUTION INTRAVENOUS at 06:48

## 2024-01-24 RX ADMIN — HEPARIN SODIUM 5000 UNITS: 5000 INJECTION INTRAVENOUS; SUBCUTANEOUS at 08:29

## 2024-01-24 RX ADMIN — OXYCODONE HYDROCHLORIDE 5 MG: 5 TABLET ORAL at 08:29

## 2024-01-24 RX ADMIN — ONDANSETRON 4 MG: 2 INJECTION INTRAMUSCULAR; INTRAVENOUS at 03:44

## 2024-01-24 RX ADMIN — HYDROMORPHONE HYDROCHLORIDE 0.2 MG: 1 INJECTION, SOLUTION INTRAMUSCULAR; INTRAVENOUS; SUBCUTANEOUS at 05:18

## 2024-01-24 RX ADMIN — POLYETHYLENE GLYCOL 3350 17 G: 17 POWDER, FOR SOLUTION ORAL at 08:30

## 2024-01-24 RX ADMIN — ACETAMINOPHEN 650 MG: 325 TABLET ORAL at 06:43

## 2024-01-24 RX ADMIN — METOPROLOL TARTRATE 12.5 MG: 25 TABLET, FILM COATED ORAL at 11:26

## 2024-01-24 RX ADMIN — METOPROLOL TARTRATE 12.5 MG: 25 TABLET, FILM COATED ORAL at 20:50

## 2024-01-24 RX ADMIN — CEFAZOLIN SODIUM 2 G: 2 INJECTION, SOLUTION INTRAVENOUS at 11:26

## 2024-01-24 RX ADMIN — METHOCARBAMOL 500 MG: 500 TABLET ORAL at 15:03

## 2024-01-24 RX ADMIN — ACETAMINOPHEN 650 MG: 325 TABLET ORAL at 11:26

## 2024-01-24 RX ADMIN — ACETAMINOPHEN 650 MG: 325 TABLET ORAL at 01:46

## 2024-01-24 RX ADMIN — OXYCODONE HYDROCHLORIDE 5 MG: 5 TABLET ORAL at 03:21

## 2024-01-24 ASSESSMENT — PAIN SCALES - GENERAL
PAINLEVEL_OUTOF10: 7
PAINLEVEL_OUTOF10: 5 - MODERATE PAIN
PAINLEVEL_OUTOF10: 0 - NO PAIN
PAINLEVEL_OUTOF10: 3
PAINLEVEL_OUTOF10: 7
PAINLEVEL_OUTOF10: 8
PAINLEVEL_OUTOF10: 0 - NO PAIN
PAINLEVEL_OUTOF10: 6
PAINLEVEL_OUTOF10: 5 - MODERATE PAIN
PAINLEVEL_OUTOF10: 0 - NO PAIN

## 2024-01-24 ASSESSMENT — COGNITIVE AND FUNCTIONAL STATUS - GENERAL
MOVING TO AND FROM BED TO CHAIR: A LITTLE
TURNING FROM BACK TO SIDE WHILE IN FLAT BAD: A LITTLE
PERSONAL GROOMING: A LITTLE
STANDING UP FROM CHAIR USING ARMS: A LITTLE
WALKING IN HOSPITAL ROOM: A LITTLE
PERSONAL GROOMING: A LOT
DAILY ACTIVITIY SCORE: 16
MOVING TO AND FROM BED TO CHAIR: A LITTLE
EATING MEALS: A LOT
CLIMB 3 TO 5 STEPS WITH RAILING: A LOT
MOBILITY SCORE: 17
STANDING UP FROM CHAIR USING ARMS: A LITTLE
WALKING IN HOSPITAL ROOM: A LITTLE
DRESSING REGULAR LOWER BODY CLOTHING: A LOT
MOVING FROM LYING ON BACK TO SITTING ON SIDE OF FLAT BED WITH BEDRAILS: A LITTLE
HELP NEEDED FOR BATHING: A LOT
TOILETING: A LOT
MOVING FROM LYING ON BACK TO SITTING ON SIDE OF FLAT BED WITH BEDRAILS: A LITTLE
DRESSING REGULAR UPPER BODY CLOTHING: A LOT
CLIMB 3 TO 5 STEPS WITH RAILING: A LOT
DAILY ACTIVITIY SCORE: 12
HELP NEEDED FOR BATHING: A LOT
DRESSING REGULAR LOWER BODY CLOTHING: A LOT
TURNING FROM BACK TO SIDE WHILE IN FLAT BAD: A LITTLE
DRESSING REGULAR UPPER BODY CLOTHING: A LITTLE
TOILETING: A LOT
MOBILITY SCORE: 17

## 2024-01-24 ASSESSMENT — PAIN DESCRIPTION - ORIENTATION
ORIENTATION: MID

## 2024-01-24 ASSESSMENT — PAIN DESCRIPTION - LOCATION
LOCATION: CHEST

## 2024-01-24 NOTE — CONSULTS
Consults  Acute Pain Service  Jake Brunner is a 68 y.o. year old male patient who presents for MVR  with Dr. Arboleda 1/23/24. Acute Pain consulted for block for postoperative pain control.     Anticipated Postop Pain Issues -   Palliative: typically relieved with IV analgesics and regional local anesthetics  Provocative: typically with movement  Quality: typically burning and aching  Radiation: typically none  Severity: typically severe 8-10/10  Timing: typically constant    Past Medical History:   Diagnosis Date    Alcohol abuse, in remission     History of alcohol abuse    Cataract     Closed displaced comminuted fracture of shaft of right tibia 07/31/2019    Closed displaced fracture of body of right scapula 07/31/2019    Closed displaced fracture of shaft of right clavicle 07/31/2019    Coronary artery disease     Essential (primary) hypertension     Hypertension    Heart valve disease     Severe mitral valve regurgitation.    History of transesophageal echocardiography (EULA) 12/11/2023    Echo on 12/11/23    Hyperlipidemia     Prostate cancer (CMS/HCC) 01/09/2024    Onc: Pacheco Gomes CNP    Shortness of breath     Sleep apnea     Uses CPAP    Vision loss     Wears contacts        Past Surgical History:   Procedure Laterality Date    ANKLE SURGERY Left     Ankle Surgery    ARTERIAL ANEURYSM REPAIR  12/2015    Aortic Aneurysm Repair Ascending Aorta    CARDIAC CATHETERIZATION N/A 12/11/2023    Procedure: Left And Right Heart Cath, With LV;  Surgeon: Owen Choi MD;  Location: [facility] Cardiac [facility];  Service: Cardiovascular;  Laterality: N/A;  Scheduled 12/11 @ 9:00am, EULA w/ anesthesia @ 7:30am    COLONOSCOPY      CT CHEST W AND WO IV CONTRAST  10/18/2023    Thoracic aortic atherosclerosis.    FEMUR FRACTURE SURGERY  01/27/2016    Femur Repair    LIPOMA RESECTION  2022    back    OTHER SURGICAL HISTORY  01/27/2016    Wrist Surgery        Family History   Problem Relation Name Age of Onset    Hyperlipidemia  The patient called and needs his/her form signed ASAP.  Mother      Coronary artery disease Father      Other (MYOCARDIAL INFARCTION) Father      Heart attack Father          Social History     Socioeconomic History    Marital status:      Spouse name: Not on file    Number of children: Not on file    Years of education: Not on file    Highest education level: Not on file   Occupational History    Not on file   Tobacco Use    Smoking status: Former     Types: Cigarettes    Smokeless tobacco: Never   Vaping Use    Vaping Use: Never used   Substance and Sexual Activity    Alcohol use: Never    Drug use: Never    Sexual activity: Not Currently   Other Topics Concern    Not on file   Social History Narrative    Not on file     Social Determinants of Health     Financial Resource Strain: Low Risk  (1/23/2024)    Overall Financial Resource Strain (CARDIA)     Difficulty of Paying Living Expenses: Not hard at all   Food Insecurity: Patient Declined (1/23/2024)    Hunger Vital Sign     Worried About Running Out of Food in the Last Year: Patient declined     Ran Out of Food in the Last Year: Patient declined   Transportation Needs: No Transportation Needs (1/23/2024)    PRAPARE - Transportation     Lack of Transportation (Medical): No     Lack of Transportation (Non-Medical): No   Physical Activity: Unknown (1/23/2024)    Exercise Vital Sign     Days of Exercise per Week: Patient declined     Minutes of Exercise per Session: 30 min   Stress: Patient Declined (1/23/2024)    [place] [employer] - Occupational Stress Questionnaire     Feeling of Stress : Patient declined   Social Connections: Patient Declined (1/23/2024)    Social Connection and Isolation Panel [NHANES]     Frequency of Communication with Friends and Family: Patient declined     Frequency of Social Gatherings with Friends and Family: Patient declined     Attends Church Services: Patient declined     Active Member of Clubs or Organizations: Patient declined     Attends Club or  Organization Meetings: Patient declined     Marital Status: Patient declined   Intimate Partner Violence: Not At Risk (1/23/2024)    Humiliation, Afraid, Rape, and Kick questionnaire     Fear of Current or Ex-Partner: No     Emotionally Abused: No     Physically Abused: No     Sexually Abused: No   Housing Stability: Low Risk  (1/23/2024)    Housing Stability Vital Sign     Unable to Pay for Housing in the Last Year: No     Number of Places Lived in the Last Year: 1     Unstable Housing in the Last Year: No        No Known Allergies      Review of Systems  Gen: No fatigue, anorexia, insomnia, fever.   Eyes: No vision loss, double vision, drainage, eye pain.   ENT: No pharyngitis, dry mouth, no hearing changes or ear discharge  Cardiac: No chest pain, palpitations, syncope, near syncope.   Pulmonary: No shortness of breath, cough, hemoptysis.   Heme/lymph: No swollen glands, fever, bleeding.   GI: No abdominal pain, change in bowel habits, melena, hematemesis, hematochezia, nausea, vomiting, diarrhea.   : No discharge, dysuria, frequency, urgency, hematuria.  Endo: No polyuria or weight loss.   Musculoskeletal: Negative for any pain or loss of ROM/weakness  Skin: No rashes or lesions  Neuro: Normal speech, no numbness or weakness. No gait difficulties  Review of systems is otherwise negative unless stated above or in history of present illness.    Physical Exam:  Constitutional:  no distress, alert and cooperative  Eyes: clear sclera  Head/Neck: No apparent injury, trachea midline  Respiratory/Thorax: Patent airways, thorax symmetric, breathing comfortably  Cardiovascular: no pitting edema  Gastrointestinal: Nondistended  Musculoskeletal: ROM intact  Extremities: no clubbing  Neurological: alert, mckeon x4  Psychological: Appropriate affect    Results for orders placed or performed during the hospital encounter of 01/23/24 (from the past 24 hour(s))   Blood Gas Arterial Full Panel Unsolicited   Result Value Ref Range     POCT pH, Arterial 7.43 (H) 7.38 - 7.42 pH    POCT pCO2, Arterial 37 (L) 38 - 42 mm Hg    POCT pO2, Arterial 104 (H) 85 - 95 mm Hg    POCT SO2, Arterial 98 94 - 100 %    POCT Oxy Hemoglobin, Arterial 96.7 94.0 - 98.0 %    POCT Hematocrit Calculated, Arterial 38.0 (L) 41.0 - 52.0 %    POCT Sodium, Arterial 141 136 - 145 mmol/L    POCT Potassium, Arterial 3.8 3.5 - 5.3 mmol/L    POCT Chloride, Arterial 107 98 - 107 mmol/L    POCT Ionized Calcium, Arterial 1.35 (H) 1.10 - 1.33 mmol/L    POCT Glucose, Arterial 112 (H) 74 - 99 mg/dL    POCT Lactate, Arterial 0.8 0.4 - 2.0 mmol/L    POCT Base Excess, Arterial 0.5 -2.0 - 3.0 mmol/L    POCT HCO3 Calculated, Arterial 24.6 22.0 - 26.0 mmol/L    POCT Hemoglobin, Arterial 12.8 (L) 13.5 - 17.5 g/dL    POCT Anion Gap, Arterial 13 10 - 25 mmo/L    Patient Temperature 37.0 degrees Celsius    FiO2 21 %   Coox Panel, Arterial Unsolicited   Result Value Ref Range    POCT Hemoglobin, Arterial 12.8 (L) 13.5 - 17.5 g/dL    POCT Oxy Hemoglobin, Arterial 96.7 94.0 - 98.0 %    POCT Carboxyhemoglobin, Arterial 0.4 %    POCT Methemoglobin, Arterial 1.2 0.0 - 1.5 %    POCT Deoxy Hemoglobin, Arterial 1.7 0.0 - 5.0 %   Blood Gas Arterial Full Panel Unsolicited   Result Value Ref Range    POCT pH, Arterial 7.37 (L) 7.38 - 7.42 pH    POCT pCO2, Arterial 41 38 - 42 mm Hg    POCT pO2, Arterial 159 (H) 85 - 95 mm Hg    POCT SO2, Arterial 99 94 - 100 %    POCT Oxy Hemoglobin, Arterial 98.3 (H) 94.0 - 98.0 %    POCT Hematocrit Calculated, Arterial 32.0 (L) 41.0 - 52.0 %    POCT Sodium, Arterial 141 136 - 145 mmol/L    POCT Potassium, Arterial 4.3 3.5 - 5.3 mmol/L    POCT Chloride, Arterial 108 (H) 98 - 107 mmol/L    POCT Ionized Calcium, Arterial 1.22 1.10 - 1.33 mmol/L    POCT Glucose, Arterial 119 (H) 74 - 99 mg/dL    POCT Lactate, Arterial 0.9 0.4 - 2.0 mmol/L    POCT Base Excess, Arterial -1.5 -2.0 - 3.0 mmol/L    POCT HCO3 Calculated, Arterial 23.7 22.0 - 26.0 mmol/L    POCT Hemoglobin, Arterial  10.8 (L) 13.5 - 17.5 g/dL    POCT Anion Gap, Arterial 14 10 - 25 mmo/L    Patient Temperature 37.0 degrees Celsius    FiO2 60 %   Coox Panel, Arterial Unsolicited   Result Value Ref Range    POCT Hemoglobin, Arterial 10.8 (L) 13.5 - 17.5 g/dL    POCT Oxy Hemoglobin, Arterial 98.3 (H) 94.0 - 98.0 %    POCT Carboxyhemoglobin, Arterial 0.4 %    POCT Methemoglobin, Arterial 0.6 0.0 - 1.5 %    POCT Deoxy Hemoglobin, Arterial 0.7 0.0 - 5.0 %   Blood Gas Arterial Full Panel Unsolicited   Result Value Ref Range    POCT pH, Arterial 7.39 7.38 - 7.42 pH    POCT pCO2, Arterial 41 38 - 42 mm Hg    POCT pO2, Arterial 403 (H) 85 - 95 mm Hg    POCT SO2, Arterial 100 94 - 100 %    POCT Oxy Hemoglobin, Arterial 98.5 (H) 94.0 - 98.0 %    POCT Hematocrit Calculated, Arterial 30.0 (L) 41.0 - 52.0 %    POCT Sodium, Arterial 138 136 - 145 mmol/L    POCT Potassium, Arterial 3.9 3.5 - 5.3 mmol/L    POCT Chloride, Arterial 107 98 - 107 mmol/L    POCT Ionized Calcium, Arterial 1.19 1.10 - 1.33 mmol/L    POCT Glucose, Arterial 123 (H) 74 - 99 mg/dL    POCT Lactate, Arterial 1.1 0.4 - 2.0 mmol/L    POCT Base Excess, Arterial -0.2 -2.0 - 3.0 mmol/L    POCT HCO3 Calculated, Arterial 24.8 22.0 - 26.0 mmol/L    POCT Hemoglobin, Arterial 10.0 (L) 13.5 - 17.5 g/dL    POCT Anion Gap, Arterial 10 10 - 25 mmo/L    Patient Temperature 37.0 degrees Celsius    FiO2 80 %   Coox Panel, Arterial Unsolicited   Result Value Ref Range    POCT Hemoglobin, Arterial 10.0 (L) 13.5 - 17.5 g/dL    POCT Oxy Hemoglobin, Arterial 98.5 (H) 94.0 - 98.0 %    POCT Carboxyhemoglobin, Arterial 0.3 %    POCT Methemoglobin, Arterial 0.8 0.0 - 1.5 %    POCT Deoxy Hemoglobin, Arterial 0.4 0.0 - 5.0 %   Blood Gas Venous Full Panel Unsolicited   Result Value Ref Range    POCT pH, Venous 7.35 7.33 - 7.43 pH    POCT pCO2, Venous 47 41 - 51 mm Hg    POCT pO2, Venous 49 (H) 35 - 45 mm Hg    POCT SO2, Venous 84 (H) 45 - 75 %    POCT Oxy Hemoglobin, Venous 82.6 (H) 45.0 - 75.0 %    POCT  Hematocrit Calculated, Venous 30.0 (L) 41.0 - 52.0 %    POCT Sodium, Venous 138 136 - 145 mmol/L    POCT Potassium, Venous 4.0 3.5 - 5.3 mmol/L    POCT Chloride, Venous 107 98 - 107 mmol/L    POCT Ionized [place], Venous 1.18 1.10 - 1.33 mmol/L    POCT Glucose, Venous 126 (H) 74 - 99 mg/dL    POCT Lactate, Venous 1.0 0.4 - 2.0 mmol/L    POCT Base Excess, Venous 0.0 -2.0 - 3.0 mmol/L    POCT HCO3 Calculated, Venous 25.9 22.0 - 26.0 mmol/L    POCT Hemoglobin, Venous 10.0 (L) 13.5 - 17.5 g/dL    POCT Anion Gap, Venous 9.0 (L) 10.0 - 25.0 mmol/L    Patient Temperature 37.0 degrees Celsius   Coox Panel, Venous Unsolicited   Result Value Ref Range    POCT Carboxyhemoglobin, Venous 1.0 %    POCT Methemoglobin, Venous 0.4 0.0 - 1.5 %   Blood Gas Arterial Full Panel Unsolicited   Result Value Ref Range    POCT pH, Arterial 7.38 7.38 - 7.42 pH    POCT pCO2, Arterial 42 38 - 42 mm Hg    POCT pO2, Arterial 291 (H) 85 - 95 mm Hg    POCT SO2, Arterial 100 94 - 100 %    POCT Oxy Hemoglobin, Arterial 98.5 (H) 94.0 - 98.0 %    POCT Hematocrit Calculated, Arterial 29.0 (L) 41.0 - 52.0 %    POCT Sodium, Arterial 139 136 - 145 mmol/L    POCT Potassium, Arterial 5.0 3.5 - 5.3 mmol/L    POCT Chloride, Arterial 108 (H) 98 - 107 mmol/L    POCT Ionized Calcium, Arterial 1.19 1.10 - 1.33 mmol/L    POCT Glucose, Arterial 128 (H) 74 - 99 mg/dL    POCT Lactate, Arterial 1.1 0.4 - 2.0 mmol/L    POCT Base Excess, Arterial -0.4 -2.0 - 3.0 mmol/L    POCT HCO3 Calculated, Arterial 24.8 22.0 - 26.0 mmol/L    POCT Hemoglobin, Arterial 9.8 (L) 13.5 - 17.5 g/dL    POCT Anion Gap, Arterial 11 10 - 25 mmo/L    Patient Temperature 37.0 degrees Celsius    FiO2 70 %   Coox Panel, Arterial Unsolicited   Result Value Ref Range    POCT Hemoglobin, Arterial 9.8 (L) 13.5 - 17.5 g/dL    POCT Oxy Hemoglobin, Arterial 98.5 (H) 94.0 - 98.0 %    POCT Carboxyhemoglobin, Arterial 0.4 %    POCT Methemoglobin, Arterial 0.7 0.0 - 1.5 %    POCT Deoxy Hemoglobin, Arterial  0.4 0.0 - 5.0 %   Blood Gas Arterial Full Panel Unsolicited   Result Value Ref Range    POCT pH, Arterial 7.35 (L) 7.38 - 7.42 pH    POCT pCO2, Arterial 43 (H) 38 - 42 mm Hg    POCT pO2, Arterial 223 (H) 85 - 95 mm Hg    POCT SO2, Arterial 100 94 - 100 %    POCT Oxy Hemoglobin, Arterial 98.8 (H) 94.0 - 98.0 %    POCT Hematocrit Calculated, Arterial 30.0 (L) 41.0 - 52.0 %    POCT Sodium, Arterial 138 136 - 145 mmol/L    POCT Potassium, Arterial 5.0 3.5 - 5.3 mmol/L    POCT Chloride, Arterial 108 (H) 98 - 107 mmol/L    POCT Ionized Calcium, Arterial 1.17 1.10 - 1.33 mmol/L    POCT Glucose, Arterial 122 (H) 74 - 99 mg/dL    POCT Lactate, Arterial 1.2 0.4 - 2.0 mmol/L    POCT Base Excess, Arterial -1.9 -2.0 - 3.0 mmol/L    POCT HCO3 Calculated, Arterial 23.7 22.0 - 26.0 mmol/L    POCT Hemoglobin, Arterial 10.1 (L) 13.5 - 17.5 g/dL    POCT Anion Gap, Arterial 11 10 - 25 mmo/L    Patient Temperature 37.0 degrees Celsius    FiO2 70 %   Coox Panel, Arterial Unsolicited   Result Value Ref Range    POCT Hemoglobin, Arterial 10.1 (L) 13.5 - 17.5 g/dL    POCT Oxy Hemoglobin, Arterial 98.8 (H) 94.0 - 98.0 %    POCT Carboxyhemoglobin, Arterial 0.7 %    POCT Methemoglobin, Arterial 0.3 0.0 - 1.5 %    POCT Deoxy Hemoglobin, Arterial 0.2 0.0 - 5.0 %   Blood Gas Arterial Full Panel Unsolicited   Result Value Ref Range    POCT pH, Arterial 7.33 (L) 7.38 - 7.42 pH    POCT pCO2, Arterial 45 (H) 38 - 42 mm Hg    POCT pO2, Arterial 292 (H) 85 - 95 mm Hg    POCT SO2, Arterial 100 94 - 100 %    POCT Oxy Hemoglobin, Arterial 98.5 (H) 94.0 - 98.0 %    POCT Hematocrit Calculated, Arterial 28.0 (L) 41.0 - 52.0 %    POCT Sodium, Arterial      POCT Potassium, Arterial 4.2 3.5 - 5.3 mmol/L    POCT Chloride, Arterial 111 (H) 98 - 107 mmol/L    POCT Ionized Calcium, Arterial 1.33 1.10 - 1.33 mmol/L    POCT Glucose, Arterial 120 (H) 74 - 99 mg/dL    POCT Lactate, Arterial 2.1 (H) 0.4 - 2.0 mmol/L    POCT Base Excess, Arterial -2.2 (L) -2.0 - 3.0  mmol/L    POCT HCO3 Calculated, Arterial 23.7 22.0 - 26.0 mmol/L    POCT Hemoglobin, Arterial 9.3 (L) 13.5 - 17.5 g/dL    POCT Anion Gap, Arterial      Patient Temperature 37.0 degrees Celsius    FiO2 77 %   Coox Panel, Arterial Unsolicited   Result Value Ref Range    POCT Hemoglobin, Arterial 9.3 (L) 13.5 - 17.5 g/dL    POCT Oxy Hemoglobin, Arterial 98.5 (H) 94.0 - 98.0 %    POCT Carboxyhemoglobin, Arterial 0.5 %    POCT Methemoglobin, Arterial 0.7 0.0 - 1.5 %    POCT Deoxy Hemoglobin, Arterial 0.3 0.0 - 5.0 %   Blood Gas Arterial Full Panel Unsolicited   Result Value Ref Range    POCT pH, Arterial 7.33 (L) 7.38 - 7.42 pH    POCT pCO2, Arterial 43 (H) 38 - 42 mm Hg    POCT pO2, Arterial 438 (H) 85 - 95 mm Hg    POCT SO2, Arterial 100 94 - 100 %    POCT Oxy Hemoglobin, Arterial 98.5 (H) 94.0 - 98.0 %    POCT Hematocrit Calculated, Arterial 29.0 (L) 41.0 - 52.0 %    POCT Sodium, Arterial 139 136 - 145 mmol/L    POCT Potassium, Arterial 5.5 (H) 3.5 - 5.3 mmol/L    POCT Chloride, Arterial 111 (H) 98 - 107 mmol/L    POCT Ionized Calcium, Arterial 1.33 1.10 - 1.33 mmol/L    POCT Glucose, Arterial 137 (H) 74 - 99 mg/dL    POCT Lactate, Arterial 2.6 (H) 0.4 - 2.0 mmol/L    POCT Base Excess, Arterial -3.1 (L) -2.0 - 3.0 mmol/L    POCT HCO3 Calculated, Arterial 22.7 22.0 - 26.0 mmol/L    POCT Hemoglobin, Arterial 9.6 (L) 13.5 - 17.5 g/dL    POCT Anion Gap, Arterial 11 10 - 25 mmo/L    Patient Temperature 37.0 degrees Celsius    FiO2 100 %   Coox Panel, Arterial Unsolicited   Result Value Ref Range    POCT Hemoglobin, Arterial 9.6 (L) 13.5 - 17.5 g/dL    POCT Oxy Hemoglobin, Arterial 98.5 (H) 94.0 - 98.0 %    POCT Carboxyhemoglobin, Arterial 0.6 %    POCT Methemoglobin, Arterial 0.8 0.0 - 1.5 %    POCT Deoxy Hemoglobin, Arterial 0.1 0.0 - 5.0 %   Blood Gas Arterial Full Panel Unsolicited   Result Value Ref Range    POCT pH, Arterial 7.31 (L) 7.38 - 7.42 pH    POCT pCO2, Arterial 44 (H) 38 - 42 mm Hg    POCT pO2, Arterial 238  (H) 85 - 95 mm Hg    POCT SO2, Arterial 100 94 - 100 %    POCT Oxy Hemoglobin, Arterial 98.6 (H) 94.0 - 98.0 %    POCT Hematocrit Calculated, Arterial 29.0 (L) 41.0 - 52.0 %    POCT Sodium, Arterial 140 136 - 145 mmol/L    POCT Potassium, Arterial 4.2 3.5 - 5.3 mmol/L    POCT Chloride, Arterial      POCT Ionized Calcium, Arterial 1.29 1.10 - 1.33 mmol/L    POCT Glucose, Arterial 151 (H) 74 - 99 mg/dL    POCT Lactate, Arterial 3.0 (H) 0.4 - 2.0 mmol/L    POCT Base Excess, Arterial -3.9 (L) -2.0 - 3.0 mmol/L    POCT HCO3 Calculated, Arterial 22.2 22.0 - 26.0 mmol/L    POCT Hemoglobin, Arterial 9.6 (L) 13.5 - 17.5 g/dL    POCT Anion Gap, Arterial      Patient Temperature 37.0 degrees Celsius    FiO2 100 %   Coox Panel, Arterial Unsolicited   Result Value Ref Range    POCT Hemoglobin, Arterial 9.6 (L) 13.5 - 17.5 g/dL    POCT Oxy Hemoglobin, Arterial 98.6 (H) 94.0 - 98.0 %    POCT Carboxyhemoglobin, Arterial 0.2 %    POCT Methemoglobin, Arterial 0.8 0.0 - 1.5 %    POCT Deoxy Hemoglobin, Arterial 0.4 0.0 - 5.0 %   Blood Gas Arterial Full Panel Unsolicited   Result Value Ref Range    POCT pH, Arterial 7.30 (L) 7.38 - 7.42 pH    POCT pCO2, Arterial 41 38 - 42 mm Hg    POCT pO2, Arterial 220 (H) 85 - 95 mm Hg    POCT SO2, Arterial 100 94 - 100 %    POCT Oxy Hemoglobin, Arterial 98.4 (H) 94.0 - 98.0 %    POCT Hematocrit Calculated, Arterial 31.0 (L) 41.0 - 52.0 %    POCT Sodium, Arterial 142 136 - 145 mmol/L    POCT Potassium, Arterial 4.3 3.5 - 5.3 mmol/L    POCT Chloride, Arterial 110 (H) 98 - 107 mmol/L    POCT Ionized Calcium, Arterial 1.22 1.10 - 1.33 mmol/L    POCT Glucose, Arterial 124 (H) 74 - 99 mg/dL    POCT Lactate, Arterial 3.2 (H) 0.4 - 2.0 mmol/L    POCT Base Excess, Arterial -5.9 (L) -2.0 - 3.0 mmol/L    POCT HCO3 Calculated, Arterial 20.2 (L) 22.0 - 26.0 mmol/L    POCT Hemoglobin, Arterial 10.3 (L) 13.5 - 17.5 g/dL    POCT Anion Gap, Arterial 16 10 - 25 mmo/L    Patient Temperature 37.0 degrees Celsius     FiO2 60 %   Coox Panel, Arterial Unsolicited   Result Value Ref Range    POCT Hemoglobin, Arterial 10.3 (L) 13.5 - 17.5 g/dL    POCT Oxy Hemoglobin, Arterial 98.4 (H) 94.0 - 98.0 %    POCT Carboxyhemoglobin, Arterial 0.7 %    POCT Methemoglobin, Arterial 0.5 0.0 - 1.5 %    POCT Deoxy Hemoglobin, Arterial 0.4 0.0 - 5.0 %   Blood Gas Arterial Full Panel Unsolicited   Result Value Ref Range    POCT pH, Arterial 7.39 7.38 - 7.42 pH    POCT pCO2, Arterial 36 (L) 38 - 42 mm Hg    POCT pO2, Arterial 177 (H) 85 - 95 mm Hg    POCT SO2, Arterial 100 94 - 100 %    POCT Oxy Hemoglobin, Arterial 97.9 94.0 - 98.0 %    POCT Hematocrit Calculated, Arterial 32.0 (L) 41.0 - 52.0 %    POCT Sodium, Arterial 141 136 - 145 mmol/L    POCT Potassium, Arterial 4.6 3.5 - 5.3 mmol/L    POCT Chloride, Arterial 111 (H) 98 - 107 mmol/L    POCT Ionized Calcium, Arterial 1.17 1.10 - 1.33 mmol/L    POCT Glucose, Arterial 125 (H) 74 - 99 mg/dL    POCT Lactate, Arterial 1.9 0.4 - 2.0 mmol/L    POCT Base Excess, Arterial -2.7 (L) -2.0 - 3.0 mmol/L    POCT HCO3 Calculated, Arterial 21.8 (L) 22.0 - 26.0 mmol/L    POCT Hemoglobin, Arterial 10.8 (L) 13.5 - 17.5 g/dL    POCT Anion Gap, Arterial 13 10 - 25 mmo/L    Patient Temperature 37.0 degrees Celsius    FiO2 60 %   Coox Panel, Arterial Unsolicited   Result Value Ref Range    POCT Hemoglobin, Arterial 10.8 (L) 13.5 - 17.5 g/dL    POCT Oxy Hemoglobin, Arterial 97.9 94.0 - 98.0 %    POCT Carboxyhemoglobin, Arterial 1.0 %    POCT Methemoglobin, Arterial 0.8 0.0 - 1.5 %    POCT Deoxy Hemoglobin, Arterial 0.3 0.0 - 5.0 %   Calcium, Ionized   Result Value Ref Range    POCT Calcium, Ionized 1.15 1.1 - 1.33 mmol/L   Magnesium   Result Value Ref Range    Magnesium 3.46 (H) 1.60 - 2.40 mg/dL   Coagulation Screen   Result Value Ref Range    Protime 13.9 (H) 9.8 - 12.8 seconds    INR 1.2 (H) 0.9 - 1.1    aPTT 27 27 - 38 seconds   Fibrinogen   Result Value Ref Range    Fibrinogen 157 (L) 200 - 400 mg/dL   CBC    Result Value Ref Range    WBC 23.2 (H) 4.4 - 11.3 x10*3/uL    nRBC 0.0 0.0 - 0.0 /100 WBCs    RBC 3.64 (L) 4.50 - 5.90 x10*6/uL    Hemoglobin 10.9 (L) 13.5 - 17.5 g/dL    Hematocrit 32.6 (L) 41.0 - 52.0 %    MCV 90 80 - 100 fL    MCH 29.9 26.0 - 34.0 pg    MCHC 33.4 32.0 - 36.0 g/dL    RDW 14.3 11.5 - 14.5 %    Platelets 106 (L) 150 - 450 x10*3/uL   Renal Function Panel   Result Value Ref Range    Glucose 146 (H) 74 - 99 mg/dL    Sodium 143 136 - 145 mmol/L    Potassium 5.0 3.5 - 5.3 mmol/L    Chloride 112 (H) 98 - 107 mmol/L    Bicarbonate 21 21 - 32 mmol/L    Anion Gap 15 10 - 20 mmol/L    Urea Nitrogen 15 6 - 23 mg/dL    Creatinine 0.91 0.50 - 1.30 mg/dL    eGFR >90 >60 mL/min/1.73m*2    Calcium 8.7 8.6 - 10.6 mg/dL    Phosphorus 1.9 (L) 2.5 - 4.9 mg/dL    Albumin 3.5 3.4 - 5.0 g/dL   Type and Screen   Result Value Ref Range    ABO TYPE A     Rh TYPE POS     ANTIBODY SCREEN NEG    BLOOD GAS ARTERIAL FULL PANEL   Result Value Ref Range    POCT pH, Arterial 7.43 (H) 7.38 - 7.42 pH    POCT pCO2, Arterial 35 (L) 38 - 42 mm Hg    POCT pO2, Arterial 165 (H) 85 - 95 mm Hg    POCT SO2, Arterial 100 94 - 100 %    POCT Oxy Hemoglobin, Arterial 97.3 94.0 - 98.0 %    POCT Hematocrit Calculated, Arterial 35.0 (L) 41.0 - 52.0 %    POCT Sodium, Arterial 140 136 - 145 mmol/L    POCT Potassium, Arterial 5.3 3.5 - 5.3 mmol/L    POCT Chloride, Arterial 112 (H) 98 - 107 mmol/L    POCT Ionized Calcium, Arterial 1.22 1.10 - 1.33 mmol/L    POCT Glucose, Arterial 155 (H) 74 - 99 mg/dL    POCT Lactate, Arterial 2.0 0.4 - 2.0 mmol/L    POCT Base Excess, Arterial -0.7 -2.0 - 3.0 mmol/L    POCT HCO3 Calculated, Arterial 23.2 22.0 - 26.0 mmol/L    POCT Hemoglobin, Arterial 11.5 (L) 13.5 - 17.5 g/dL    POCT Anion Gap, Arterial 10 10 - 25 mmo/L    Patient Temperature 37.0 degrees Celsius    FiO2 50 %   BLOOD GAS ARTERIAL FULL PANEL   Result Value Ref Range    POCT pH, Arterial 7.35 (L) 7.38 - 7.42 pH    POCT pCO2, Arterial 39 38 - 42 mm Hg     POCT pO2, Arterial 117 (H) 85 - 95 mm Hg    POCT SO2, Arterial 99 94 - 100 %    POCT Oxy Hemoglobin, Arterial 97.4 94.0 - 98.0 %    POCT Hematocrit Calculated, Arterial 28.0 (L) 41.0 - 52.0 %    POCT Sodium, Arterial 143 136 - 145 mmol/L    POCT Potassium, Arterial 4.2 3.5 - 5.3 mmol/L    POCT Chloride, Arterial 115 (H) 98 - 107 mmol/L    POCT Ionized Calcium, Arterial 1.15 1.10 - 1.33 mmol/L    POCT Glucose, Arterial 171 (H) 74 - 99 mg/dL    POCT Lactate, Arterial 1.7 0.4 - 2.0 mmol/L    POCT Base Excess, Arterial -3.8 (L) -2.0 - 3.0 mmol/L    POCT HCO3 Calculated, Arterial 21.5 (L) 22.0 - 26.0 mmol/L    POCT Hemoglobin, Arterial 9.3 (L) 13.5 - 17.5 g/dL    POCT Anion Gap, Arterial 11 10 - 25 mmo/L    Patient Temperature 37.0 degrees Celsius    FiO2 36 %   POCT GLUCOSE   Result Value Ref Range    POCT Glucose 171 (H) 74 - 99 mg/dL   Calcium, Ionized   Result Value Ref Range    POCT Calcium, Ionized 1.18 1.1 - 1.33 mmol/L   CBC   Result Value Ref Range    WBC 14.0 (H) 4.4 - 11.3 x10*3/uL    nRBC 0.0 0.0 - 0.0 /100 WBCs    RBC 2.89 (L) 4.50 - 5.90 x10*6/uL    Hemoglobin 9.0 (L) 13.5 - 17.5 g/dL    Hematocrit 25.1 (L) 41.0 - 52.0 %    MCV 87 80 - 100 fL    MCH 31.1 26.0 - 34.0 pg    MCHC 35.9 32.0 - 36.0 g/dL    RDW 14.5 11.5 - 14.5 %    Platelets 87 (L) 150 - 450 x10*3/uL   Magnesium   Result Value Ref Range    Magnesium 2.43 (H) 1.60 - 2.40 mg/dL   Renal Function Panel   Result Value Ref Range    Glucose 205 (H) 74 - 99 mg/dL    Sodium 145 136 - 145 mmol/L    Potassium 4.6 3.5 - 5.3 mmol/L    Chloride 110 (H) 98 - 107 mmol/L    Bicarbonate 24 21 - 32 mmol/L    Anion Gap 16 10 - 20 mmol/L    Urea Nitrogen 15 6 - 23 mg/dL    Creatinine 0.87 0.50 - 1.30 mg/dL    eGFR >90 >60 mL/min/1.73m*2    Calcium 8.9 8.6 - 10.6 mg/dL    Phosphorus 4.2 2.5 - 4.9 mg/dL    Albumin 4.2 3.4 - 5.0 g/dL   BLOOD GAS ARTERIAL FULL PANEL   Result Value Ref Range    POCT pH, Arterial 7.41 7.38 - 7.42 pH    POCT pCO2, Arterial 38 38 - 42 mm  Hg    POCT pO2, Arterial 71 (L) 85 - 95 mm Hg    POCT SO2, Arterial 95 94 - 100 %    POCT Oxy Hemoglobin, Arterial 92.9 (L) 94.0 - 98.0 %    POCT Hematocrit Calculated, Arterial 28.0 (L) 41.0 - 52.0 %    POCT Sodium, Arterial 141 136 - 145 mmol/L    POCT Potassium, Arterial 4.8 3.5 - 5.3 mmol/L    POCT Chloride, Arterial 110 (H) 98 - 107 mmol/L    POCT Ionized Calcium, Arterial 1.23 1.10 - 1.33 mmol/L    POCT Glucose, Arterial 211 (H) 74 - 99 mg/dL    POCT Lactate, Arterial 3.7 (H) 0.4 - 2.0 mmol/L    POCT Base Excess, Arterial -0.4 -2.0 - 3.0 mmol/L    POCT HCO3 Calculated, Arterial 24.1 22.0 - 26.0 mmol/L    POCT Hemoglobin, Arterial 9.3 (L) 13.5 - 17.5 g/dL    POCT Anion Gap, Arterial 12 10 - 25 mmo/L    Patient Temperature 37.0 degrees Celsius    FiO2 21 %   BLOOD GAS ARTERIAL FULL PANEL   Result Value Ref Range    POCT pH, Arterial 7.43 (H) 7.38 - 7.42 pH    POCT pCO2, Arterial 38 38 - 42 mm Hg    POCT pO2, Arterial 93 85 - 95 mm Hg    POCT SO2, Arterial 97 94 - 100 %    POCT Oxy Hemoglobin, Arterial 96.5 94.0 - 98.0 %    POCT Hematocrit Calculated, Arterial 26.0 (L) 41.0 - 52.0 %    POCT Sodium, Arterial 140 136 - 145 mmol/L    POCT Potassium, Arterial 4.4 3.5 - 5.3 mmol/L    POCT Chloride, Arterial 111 (H) 98 - 107 mmol/L    POCT Ionized Calcium, Arterial 1.26 1.10 - 1.33 mmol/L    POCT Glucose, Arterial 200 (H) 74 - 99 mg/dL    POCT Lactate, Arterial 3.0 (H) 0.4 - 2.0 mmol/L    POCT Base Excess, Arterial 0.9 -2.0 - 3.0 mmol/L    POCT HCO3 Calculated, Arterial 25.2 22.0 - 26.0 mmol/L    POCT Hemoglobin, Arterial 8.8 (L) 13.5 - 17.5 g/dL    POCT Anion Gap, Arterial 8 (L) 10 - 25 mmo/L    Patient Temperature      FiO2 21 %   POCT GLUCOSE   Result Value Ref Range    POCT Glucose 182 (H) 74 - 99 mg/dL   BLOOD GAS ARTERIAL FULL PANEL   Result Value Ref Range    POCT pH, Arterial 7.44 (H) 7.38 - 7.42 pH    POCT pCO2, Arterial 40 38 - 42 mm Hg    POCT pO2, Arterial 73 (L) 85 - 95 mm Hg    POCT SO2, Arterial 96 94 -  100 %    POCT Oxy Hemoglobin, Arterial 93.9 (L) 94.0 - 98.0 %    POCT Hematocrit Calculated, Arterial 26.0 (L) 41.0 - 52.0 %    POCT Sodium, Arterial 140 136 - 145 mmol/L    POCT Potassium, Arterial 4.2 3.5 - 5.3 mmol/L    POCT Chloride, Arterial 110 (H) 98 - 107 mmol/L    POCT Ionized Calcium, Arterial 1.27 1.10 - 1.33 mmol/L    POCT Glucose, Arterial 161 (H) 74 - 99 mg/dL    POCT Lactate, Arterial 1.9 0.4 - 2.0 mmol/L    POCT Base Excess, Arterial 2.8 -2.0 - 3.0 mmol/L    POCT HCO3 Calculated, Arterial 27.2 (H) 22.0 - 26.0 mmol/L    POCT Hemoglobin, Arterial 8.8 (L) 13.5 - 17.5 g/dL    POCT Anion Gap, Arterial 7 (L) 10 - 25 mmo/L    Patient Temperature 37.0 degrees Celsius    FiO2 21 %      Jake Brunner is a 68 y.o. year old male patient who presents for MVR  with Dr. Arboleda 1/23/24. Acute Pain consulted for block for postoperative pain control.     Plan:    - SAP and PIF blocks single shots performed preoperatively on 1/23/24  - Acute team will follow POD1  - Rest of pain management per primary team    Acute Pain Resident  pg [phone]  [phone]

## 2024-01-24 NOTE — PROGRESS NOTES
CTICU Progress Note      Subjective   Overnight events: Extubated to NC. Home CPAP overnight. Fluid resuscitated. Off all vasoactives.     Scheduled Medications:   acetaminophen, 650 mg, oral, q4h   Or  acetaminophen, 650 mg, rectal, q4h  aspirin, 81 mg, oral, Daily   Or  aspirin, 81 mg, oral, Daily   Or  aspirin, 150 mg, rectal, Daily  ceFAZolin, 2 g, intravenous, q8h  insulin lispro, 0-15 Units, subcutaneous, q4h  pantoprazole, 40 mg, oral, Daily before breakfast   Or  pantoprazole, 40 mg, intravenous, Daily before breakfast  polyethylene glycol, 17 g, oral, BID  rosuvastatin, 40 mg, oral, Nightly  sennosides-docusate sodium, 2 tablet, oral, BID         Continuous Medications:   lactated Ringer's, 30 mL/hr, Last Rate: 30 mL/hr (01/24/24 0600)  lactated Ringer's, 5 mL/hr  norepinephrine, 0-1 mcg/kg/min, Last Rate: Stopped (01/23/24 2328)  propofol, 0-50 mcg/kg/min, Last Rate: Stopped (01/23/24 1730)  vasopressin, 0.01-0.03 Units/min, Last Rate: Stopped (01/24/24 0130)         PRN Medications:   PRN medications: calcium gluconate, calcium gluconate, dextrose **OR** glucagon, HYDROmorphone, magnesium sulfate, magnesium sulfate, naloxone, ondansetron **OR** ondansetron, oxyCODONE, oxygen, potassium chloride, potassium chloride    Objective   Vitals:  Most Recent:  Vitals:    01/24/24 0700   BP:    Pulse: 79   Resp: 16   Temp:    SpO2: 92%       24hr Min/Max:  Temp  Min: 36.2 °C (97.2 °F)  Max: 36.5 °C (97.7 °F)  Pulse  Min: 58  Max: 80  Resp  Min: 10  Max: 19  SpO2  Min: 91 %  Max: 100 %    I/O:  I/O last 2 completed shifts:  In: 7351.7 (76.2 mL/kg) [P.O.:480; I.V.:714.2 (7.4 mL/kg); Blood:1895; IV Piggyback:2512.5]  Out: 3720 (38.5 mL/kg) [Urine:2515 (1.1 mL/kg/hr); Emesis/NG output:120; Blood:500; Chest Tube:585]  Weight: 96.5 kg     Hemodynamic parameters for last 24 hours:         Vent settings:  Vent Mode: Pressure support  FiO2 (%):  [40 %-50 %] 40 %  S RR:  [16] 16  S VT:  [550 mL] 550 mL  PEEP/CPAP (cm H2O):   [5 cm H20-8 cm H20] 5 cm H20  IN SUP:  [5 cm H20] 5 cm H20  MAP (cm H2O):  [12] 12    Physical Exam:   - CONSTITUTION: Adult male, sitting in chair  - NEUROLOGIC: Pupils equal and reactive, no focal deficits. Alert and oriented, CAM negative  - CARDIOVASCULAR: NSR, V wires backed up  - RESPIRATORY: Diminished lung sounds bilaterally  - GI: Soft nondistended, hypoactive  - : Clear yellow urine in finch  - EXTREMITIES: generalized edema, warm  - SKIN: Warm, dry  - PSYCHIATRIC: Calm, appropriate    Lab/Radiology/Diagnostic Review:  All AM labs reviewed     AM CXR: Reviewed      Assessment/Plan   Assessment: This is a 68 year old male with a PMH significant for HTN, HPL, prostate cancer, HILTON uses CPAP, CAD, cataracts and ascending aortic aneurysm repair in 2015.  Pesents s/p redo sternotomy MVr on 1/23 with Dr. Arboleda. LVOT DAINA in OR.     Plan:  NEURO:  PMH of ETOH abuse in remission for years. Alert and oriented. Acute post operative pain, adequately controlled. Received SAP and PIC single shot blocks 1/23. Ambulated with PT today. -->  - Serial neuro and pain assessments   - Scheduled Tylenol   - PRN oxycodone  - robaxin x3 days  - discontinue dilaudid  - PT Consult, OOB to chair as tolerated, chair position if not tolerated   - CAM ICU score qshift  - Sleep/wake cycle hygiene     CV:  Patient has a history of HTN, CAD, ascending aortic aneurysm repair in 2015. Is now status post MVr w/ Dr. Arboleda Pre/Post EF: 65% normal RV Arrived to CTICU on Levo and vaso, now off. V epicardial wires set VVI @ 50. Accelerated junctional rhythm post-op, now NSR. Normotensive. -->  - Maintain goal MAP >65  - Continue ASA  - start metop 12.5 mg BID  - Maintain epicardial wires set VVI @ 50  - Continue Crestor 40mg tomorrow   - Hold home ezetimibe, losartan, metop, amlodipine and crestor     PULM: HX of HILTON uses CPAP.  On RA. Chest tubes MS, RPL, LPL, no airleak. Appropriate serosang output. -->  - Daily CXR while in ICU  - Wean FiO2  maintaining SpO2 >92%.   - IS q1h and OOB to chair when extubated  - discontinue chest tubes     GI:  No PMH. Nausea, resolved. No post-op BM yet. -->  - Regular diet  - Colace/senna BID and miralax BID     :  CSA-LAWANDA Risk Score small.  No history of renal disease, baseline creatinine 0.99. Creatinine stable post-op. Finch in place and making adequate UOP. -->  - discontinue finch  - Goal UOP 0.5ml/kg/hr, consider diuresis later today  - RFP as clinically indicated  - Replete electrolytes per CTICU protocol     ENDO: No PMH. Stress hyperglycemia, improving. ->  - Maintain BG <180, mild SSI per CTICU protocol     HEME:  Acute blood loss anemia and thrombocytopenia, stable.-->    - Monitor drain output volume and characteristics  - CBC daily  - Continue ASA  - Start SQH  - SCDs for DVT prophylaxis  - Last type and screen: 1/23     ID:  Afebrile, no current indications of infection. MRSA negative 1/12. Reactive leukocytosis, improving.-->  - Trend temp q4h  - Periop cefazolin x 48hrs     Skin:  No active skin issues.  - preventative Mepilex dressings in place on sacrum and heels  - change preventative Mepilex weekly or more frequently as indicated (when moist/soiled)   - every shift skin assessment per nursing and weekly ICU skin rounds  - moisture barrier to be applied with fernando care  - active skin problems addressed with nursing on daily rounds     Proph:  SCDs  SQH     G:  Line  Right IJ MAC w Minimac placed 1/23 - discontinue  Left brachial a-line placed 1/23 - discontinue  Finch 1/23 - discontinue     F: Family: will update at bedside when available     A,B,C,D,E,F,G: reviewed     I personally spent 40 minutes of critical care time directly and personally managing the patient exclusive of separately billable procedures.     Dispo: T3 pending bed availability  CTICU TEAM PHONE [phone]

## 2024-01-24 NOTE — PROGRESS NOTES
Physical Therapy    Physical Therapy Evaluation & Treatment    Patient Name: Jake Brunner  MRN: [id number]  Today's Date: 1/24/2024    Time In (first session): 9:50  Time Out (first session): 10:33  Time Calculation (min):  43   Time In (second session): 12:17  Time Out (second session): 12:39  Time Calculation (min):  22  Total Time Calculation: 65 min    Assessment/Plan   PT Assessment  PT Assessment Results: Decreased strength, Decreased endurance, Impaired balance, Decreased mobility  Rehab Prognosis: Good  Evaluation/Treatment Tolerance: Other (Comment) (Pt limited by orthostatic hypotension in standing)  Medical Staff Made Aware: Yes  End of Session Communication: Bedside nurse  Assessment Comment: Pt performed bed mobility, functional transfers with Min A x 1, and ambulated ~5' with FWW with CGA. Pt limited by SOB, surgical pain, and orthostatic hypotension in standing/during ambulation. Pt will continue to benefit from skilled PT to improve functional mobility.  End of Session Patient Position: Up in chair, Alarm off, not on at start of session   IP OR SWING BED PT PLAN  Inpatient or Swing Bed: Inpatient  PT Plan  Treatment/Interventions: Bed mobility, Transfer training, Gait training, Stair training, Balance training, Strengthening, Endurance training, Therapeutic exercise, Therapeutic activity, Home exercise program  PT Plan: Skilled PT  PT Frequency: 4 times per week  PT Discharge Recommendations: Low intensity level of continued care  PT Recommended Transfer Status: Assist x1  PT - OK to Discharge: Yes      Subjective     General Visit Information:  General  Reason for Referral: s/p redo sternotomy MVr  Referred By: Dr. Arboleda  Past Medical History Relevant to Rehab: HTN, HPL, prostate CA, HILTON-uses CPAP, CAD, cadaracts, ascending aortic aneurysm repair, ETOH abuse  Family/Caregiver Present: No  Prior to Session Communication: Bedside nurse  Patient Position Received: Bed, 3 rail up, Alarm off, not on at  start of session  Preferred Learning Style: auditory, verbal, visual  General Comment: Pt awake, alert, oriented, and willing to participate in PT evaluation. Returned back to patient room later in day to perform ambulation/transfer back to bed from 12:17-12:39. (Lines: IV, A line, CVC double lumen, finch, chest tubes x3, tele)  Home Living:  Home Living  Type of Home: House  Lives With: Spouse  Home Layout: One level  Home Access: Stairs to enter with rails  Entrance Stairs-Number of Steps: 1  Bathroom Shower/Tub: Tub/shower unit, Walk-in shower  Bathroom Equipment: Tub transfer bench  Prior Level of Function:  Prior Function Per Pt/Caregiver Report  Level of Swisher: Independent with ADLs and functional transfers  Prior Function Comments: Drives, works full time as a , no falls  Precautions:  Precautions  Precautions Comment: MAP>65  Vital Signs:  Vital Signs  Heart Rate: 83 (90)  Heart Rate Source: Monitor  SpO2: (!) 89 % (90)  BP: 133/50 (123/50 (MAP before/after: 76/73). During second visit /55 (72) prior to ambulation, 127/59 (79) post ambulation, 137/54 (77) in supine)  BP Method: Arterial line  Patient Position: Lying    Objective   Pain:  Pain Assessment  Pain Assessment: 0-10  Pain Score: 5 - Moderate pain  Pain Type: Surgical pain  Pain Location: Chest  Pain Interventions: Ambulation/increased activity  Cognition:  Cognition  Overall Cognitive Status: Within Functional Limits  Orientation Level: Oriented X4    General Assessments:    Activity Tolerance  Endurance: Tolerates 10 - 20 min exercise with multiple rests  Early Mobility/Exercise Safety Screen: Proceed with mobilization - No exclusion criteria met  Activity Tolerance Comments: Pt with significant decrease in BP/MAP during standing/walking; MAP decreased to 65, patient symptomatic via increased dizziness. Pt reported SOB throughout evaluation prior to and after ambulation.    Sensation  Sensation Comment:  WFL    Strength  Strength Comments: Not formally assessed; grossly at least 3/5 based on functional mobility  Strength  Strength Comments: Not formally assessed; grossly at least 3/5 based on functional mobility    Postural Control  Postural Control: Within Functional Limits    Static Sitting Balance  Static Sitting-Balance Support: Bilateral upper extremity supported, Feet supported  Static Sitting-Level of Assistance: Close supervision    Static Standing Balance  Static Standing-Balance Support: Bilateral upper extremity supported  Static Standing-Level of Assistance: Contact guard  Functional Assessments:     Bed Mobility  Bed Mobility: Yes  Bed Mobility 1  Bed Mobility 1: Supine to sitting  Level of Assistance 1: Minimum assistance  Bed Mobility Comments 1: Min A x 1 for maneuvering LEs/hips and for balance; verbal cues for hand placement and use of bed rail. Pt will increased surgical pain during transfer    Transfers  Transfer: Yes  Transfer 1  Trials/Comments 1: Sit>stand from EOB with FWW; Min A x 1 for hip elevation and balance; verbal cues for upright posture  Transfers 2  Trials/Comments 2: Stand>sit to arm chair; CGA; verbal cues for hand placement on chair. Pt with appropriate controlled descent despite being symptomatic    Ambulation/Gait Training  Ambulation/Gait Training Performed: Yes  Ambulation/Gait Training 1  Surface 1: Level tile  Device 1: Rolling walker  Assistance 1: Contact guard, Moderate verbal cues  Quality of Gait 1: Decreased step length, Forward flexed posture  Comments/Distance (ft) 1: 5' (Pt tolerated upright standing initially however during ambulation became orthostatic (MAP~63-65, significantly decreased BP). Pt cued to sit in recliner chair; symptoms/vitals improved once seated.)    Stairs  Stairs: No    Extremity/Trunk Assessments:  Cervical Spine   Cervical Spine: Within Functional Limits  Lumbar Spine   Lumbar Spine : Within Functional Limits    RUE   RUE : Within Functional  Limits  LUE   LUE: Within Functional Limits  RLE   RLE : Within Functional Limits  LLE   LLE : Within Functional Limits  Treatments:  Therapeutic Activity  Therapeutic Activity Performed: Yes  Therapeutic Activity 1: Increased time for advanced ICU line management required for functional mobility.  Therapeutic Activity 5: Education provided for MITT precautions including no driving 6-8 weeks, no lifting/pushing/pulling >10lbs, no wide arm movements per MITT, appropriate RPE and SOB scale measures.  Therapeutic Activity 6: Later session: Pt sat EOB for ~10 minutes while PT managed ICU lines/tubes/FWW/IV pole. Pt demonstrated appropriate postural control and safety in seated position; no significant change in vitals in seated at EOB.  Therapeutic Activity 7: Later session: Sit<>stand from arm chair to thoracic walker with CGA for safety and line management  Therapeutic Activity 8: Later session: Pt sat EOB with close supervision after ambulation; pt without symptoms however with increased anxiety.  Therapeutic Activity 9: Later session: pt performed sit>supine transfer with CGA for safety; verbal cues for controlled descent to bed  Therapeutic Activity 10: Later session: pt ambulated 30' with thoracic walker; CGA for safety and line management. Pt asymptomatic throughout however anxious about becoming symptomatic, therefore limiting ambulation distance.  Outcome Measures:  [facility] Basic Mobility  Turning from your back to your side while in a flat bed without using bedrails: A little  Moving from lying on your back to sitting on the side of a flat bed without using bedrails: A little  Moving to and from bed to chair (including a wheelchair): A little  Standing up from a chair using your arms (e.g. wheelchair or bedside chair): A little  To walk in hospital room: A little  Climbing 3-5 steps with railing: A lot  Basic Mobility - Total Score: 17    FSS-ICU  Ambulation: Walks <50 feet with any assistance x1 or walks any  distance with assistance x2 people  Rolling: Minimal assistance (performs 75% or more of task)  Sitting: Supervision or set-up only  Transfer Sit-to-Stand: Minimal assistance (performs 75% or more of task)  Transfer Supine-to-Sit: Minimal assistance (performs 75% or more of task)  Total Score: 18    Encounter Problems       Encounter Problems (Active)       Balance       Pt will demonstrate improved sitting/standing static/dynamic balance activities without LOB via scoring a 24/28 on the Tinetti for increase in safety prior to DC.  (Progressing)       Start:  01/24/24    Expected End:  02/07/24               Mobility       Pt will ambulate 100ft with appropriate form, LRAD, and no LOB for safe DC.  (Progressing)       Start:  01/24/24    Expected End:  02/07/24            Pt will tolerate >15 minutes of upright standing activity without seated rest breaks with no changes in vital signs for improved functional mobility.  (Progressing)       Start:  01/24/24    Expected End:  02/07/24               Mobility       STG - Patient will ascend and descend one step without HR with CGA in order to safely access his home for DC.  (Progressing)       Start:  01/24/24    Expected End:  02/07/24               Transfers       Pt will perform bed mobility and sit<>stand transfers with CGA and use of LRAD to safely DC.  (Progressing)       Start:  01/24/24    Expected End:  02/07/24                   Education Documentation  Handouts, taught by KEYSHA Heath at 1/24/2024  2:29 PM.  Learner: Patient  Readiness: Acceptance  Method: Explanation, Demonstration  Response: Verbalizes Understanding    Precautions, taught by KEYSHA Heath at 1/24/2024  2:29 PM.  Learner: Patient  Readiness: Acceptance  Method: Explanation, Demonstration  Response: Verbalizes Understanding    Body Mechanics, taught by KEYSHA Heath at 1/24/2024  2:29 PM.  Learner: Patient  Readiness: Acceptance  Method: Explanation,  Demonstration  Response: Verbalizes Understanding    Home Exercise Program, taught by KEYSHA Heath at 1/24/2024  2:29 PM.  Learner: Patient  Readiness: Acceptance  Method: Explanation, Demonstration  Response: Verbalizes Understanding    Mobility Training, taught by KEYSHA Heath at 1/24/2024  2:29 PM.  Learner: Patient  Readiness: Acceptance  Method: Explanation, Demonstration  Response: Verbalizes Understanding    Education Comments  No comments found.      Faye Rincon, BRANDON  1/24/24

## 2024-01-24 NOTE — PROGRESS NOTES
Postop Pain HPI -   Palliative: relieved with IV analgesics and regional local anesthetics  Provocative: movement  Quality:  burning and aching  Radiation:  none  Severity:  8/10, no midline pain  Timing: constant    24-HOUR OPIOID CONSUMPTION:  Dilaudid 1.4mg  Oxycodone 10mg  Fentanyl 1mg    Scheduled medications  acetaminophen, 650 mg, oral, q4h   Or  acetaminophen, 650 mg, rectal, q4h  aspirin, 81 mg, oral, Daily   Or  aspirin, 81 mg, oral, Daily   Or  aspirin, 150 mg, rectal, Daily  ceFAZolin, 2 g, intravenous, q8h  heparin (porcine), 5,000 Units, subcutaneous, q8h  insulin lispro, 0-15 Units, subcutaneous, q4h  methocarbamol, 500 mg, oral, q8h EVELYN  metoprolol tartrate, 12.5 mg, oral, BID  polyethylene glycol, 17 g, oral, BID  rosuvastatin, 40 mg, oral, Nightly  sennosides-docusate sodium, 2 tablet, oral, BID      Continuous medications  lactated Ringer's, 5 mL/hr      PRN medications  PRN medications: calcium gluconate, calcium gluconate, dextrose **OR** glucagon, HYDROmorphone, magnesium sulfate, magnesium sulfate, naloxone, ondansetron **OR** ondansetron, oxyCODONE, oxyCODONE, oxygen, potassium chloride, potassium chloride     Physical Exam:  Constitutional:  no distress, alert and cooperative  Eyes: clear sclera  Head/Neck: No apparent injury, trachea midline  Respiratory/Thorax: Patent airways, thorax symmetric, breathing comfortably  Cardiovascular: no pitting edema  Gastrointestinal: Nondistended  Musculoskeletal: ROM intact  Extremities: no clubbing  Neurological: alert, mckeon x4  Psychological: Appropriate affect    Results for orders placed or performed during the hospital encounter of 01/23/24 (from the past 24 hour(s))   Blood Gas Arterial Full Panel Unsolicited   Result Value Ref Range    POCT pH, Arterial 7.33 (L) 7.38 - 7.42 pH    POCT pCO2, Arterial 45 (H) 38 - 42 mm Hg    POCT pO2, Arterial 292 (H) 85 - 95 mm Hg    POCT SO2, Arterial 100 94 - 100 %    POCT Oxy Hemoglobin, Arterial 98.5 (H) 94.0 -  98.0 %    POCT Hematocrit Calculated, Arterial 28.0 (L) 41.0 - 52.0 %    POCT Sodium, Arterial      POCT Potassium, Arterial 4.2 3.5 - 5.3 mmol/L    POCT Chloride, Arterial 111 (H) 98 - 107 mmol/L    POCT Ionized Calcium, Arterial 1.33 1.10 - 1.33 mmol/L    POCT Glucose, Arterial 120 (H) 74 - 99 mg/dL    POCT Lactate, Arterial 2.1 (H) 0.4 - 2.0 mmol/L    POCT Base Excess, Arterial -2.2 (L) -2.0 - 3.0 mmol/L    POCT HCO3 Calculated, Arterial 23.7 22.0 - 26.0 mmol/L    POCT Hemoglobin, Arterial 9.3 (L) 13.5 - 17.5 g/dL    POCT Anion Gap, Arterial      Patient Temperature 37.0 degrees Celsius    FiO2 77 %   Coox Panel, Arterial Unsolicited   Result Value Ref Range    POCT Hemoglobin, Arterial 9.3 (L) 13.5 - 17.5 g/dL    POCT Oxy Hemoglobin, Arterial 98.5 (H) 94.0 - 98.0 %    POCT Carboxyhemoglobin, Arterial 0.5 %    POCT Methemoglobin, Arterial 0.7 0.0 - 1.5 %    POCT Deoxy Hemoglobin, Arterial 0.3 0.0 - 5.0 %   Blood Gas Arterial Full Panel Unsolicited   Result Value Ref Range    POCT pH, Arterial 7.33 (L) 7.38 - 7.42 pH    POCT pCO2, Arterial 43 (H) 38 - 42 mm Hg    POCT pO2, Arterial 438 (H) 85 - 95 mm Hg    POCT SO2, Arterial 100 94 - 100 %    POCT Oxy Hemoglobin, Arterial 98.5 (H) 94.0 - 98.0 %    POCT Hematocrit Calculated, Arterial 29.0 (L) 41.0 - 52.0 %    POCT Sodium, Arterial 139 136 - 145 mmol/L    POCT Potassium, Arterial 5.5 (H) 3.5 - 5.3 mmol/L    POCT Chloride, Arterial 111 (H) 98 - 107 mmol/L    POCT Ionized Calcium, Arterial 1.33 1.10 - 1.33 mmol/L    POCT Glucose, Arterial 137 (H) 74 - 99 mg/dL    POCT Lactate, Arterial 2.6 (H) 0.4 - 2.0 mmol/L    POCT Base Excess, Arterial -3.1 (L) -2.0 - 3.0 mmol/L    POCT HCO3 Calculated, Arterial 22.7 22.0 - 26.0 mmol/L    POCT Hemoglobin, Arterial 9.6 (L) 13.5 - 17.5 g/dL    POCT Anion Gap, Arterial 11 10 - 25 mmo/L    Patient Temperature 37.0 degrees Celsius    FiO2 100 %   Coox Panel, Arterial Unsolicited   Result Value Ref Range    POCT Hemoglobin, Arterial  9.6 (L) 13.5 - 17.5 g/dL    POCT Oxy Hemoglobin, Arterial 98.5 (H) 94.0 - 98.0 %    POCT Carboxyhemoglobin, Arterial 0.6 %    POCT Methemoglobin, Arterial 0.8 0.0 - 1.5 %    POCT Deoxy Hemoglobin, Arterial 0.1 0.0 - 5.0 %   Blood Gas Arterial Full Panel Unsolicited   Result Value Ref Range    POCT pH, Arterial 7.31 (L) 7.38 - 7.42 pH    POCT pCO2, Arterial 44 (H) 38 - 42 mm Hg    POCT pO2, Arterial 238 (H) 85 - 95 mm Hg    POCT SO2, Arterial 100 94 - 100 %    POCT Oxy Hemoglobin, Arterial 98.6 (H) 94.0 - 98.0 %    POCT Hematocrit Calculated, Arterial 29.0 (L) 41.0 - 52.0 %    POCT Sodium, Arterial 140 136 - 145 mmol/L    POCT Potassium, Arterial 4.2 3.5 - 5.3 mmol/L    POCT Chloride, Arterial      POCT Ionized Calcium, Arterial 1.29 1.10 - 1.33 mmol/L    POCT Glucose, Arterial 151 (H) 74 - 99 mg/dL    POCT Lactate, Arterial 3.0 (H) 0.4 - 2.0 mmol/L    POCT Base Excess, Arterial -3.9 (L) -2.0 - 3.0 mmol/L    POCT HCO3 Calculated, Arterial 22.2 22.0 - 26.0 mmol/L    POCT Hemoglobin, Arterial 9.6 (L) 13.5 - 17.5 g/dL    POCT Anion Gap, Arterial      Patient Temperature 37.0 degrees Celsius    FiO2 100 %   Coox Panel, Arterial Unsolicited   Result Value Ref Range    POCT Hemoglobin, Arterial 9.6 (L) 13.5 - 17.5 g/dL    POCT Oxy Hemoglobin, Arterial 98.6 (H) 94.0 - 98.0 %    POCT Carboxyhemoglobin, Arterial 0.2 %    POCT Methemoglobin, Arterial 0.8 0.0 - 1.5 %    POCT Deoxy Hemoglobin, Arterial 0.4 0.0 - 5.0 %   Blood Gas Arterial Full Panel Unsolicited   Result Value Ref Range    POCT pH, Arterial 7.30 (L) 7.38 - 7.42 pH    POCT pCO2, Arterial 41 38 - 42 mm Hg    POCT pO2, Arterial 220 (H) 85 - 95 mm Hg    POCT SO2, Arterial 100 94 - 100 %    POCT Oxy Hemoglobin, Arterial 98.4 (H) 94.0 - 98.0 %    POCT Hematocrit Calculated, Arterial 31.0 (L) 41.0 - 52.0 %    POCT Sodium, Arterial 142 136 - 145 mmol/L    POCT Potassium, Arterial 4.3 3.5 - 5.3 mmol/L    POCT Chloride, Arterial 110 (H) 98 - 107 mmol/L    POCT Ionized  Calcium, Arterial 1.22 1.10 - 1.33 mmol/L    POCT Glucose, Arterial 124 (H) 74 - 99 mg/dL    POCT Lactate, Arterial 3.2 (H) 0.4 - 2.0 mmol/L    POCT Base Excess, Arterial -5.9 (L) -2.0 - 3.0 mmol/L    POCT HCO3 Calculated, Arterial 20.2 (L) 22.0 - 26.0 mmol/L    POCT Hemoglobin, Arterial 10.3 (L) 13.5 - 17.5 g/dL    POCT Anion Gap, Arterial 16 10 - 25 mmo/L    Patient Temperature 37.0 degrees Celsius    FiO2 60 %   Coox Panel, Arterial Unsolicited   Result Value Ref Range    POCT Hemoglobin, Arterial 10.3 (L) 13.5 - 17.5 g/dL    POCT Oxy Hemoglobin, Arterial 98.4 (H) 94.0 - 98.0 %    POCT Carboxyhemoglobin, Arterial 0.7 %    POCT Methemoglobin, Arterial 0.5 0.0 - 1.5 %    POCT Deoxy Hemoglobin, Arterial 0.4 0.0 - 5.0 %   Blood Gas Arterial Full Panel Unsolicited   Result Value Ref Range    POCT pH, Arterial 7.39 7.38 - 7.42 pH    POCT pCO2, Arterial 36 (L) 38 - 42 mm Hg    POCT pO2, Arterial 177 (H) 85 - 95 mm Hg    POCT SO2, Arterial 100 94 - 100 %    POCT Oxy Hemoglobin, Arterial 97.9 94.0 - 98.0 %    POCT Hematocrit Calculated, Arterial 32.0 (L) 41.0 - 52.0 %    POCT Sodium, Arterial 141 136 - 145 mmol/L    POCT Potassium, Arterial 4.6 3.5 - 5.3 mmol/L    POCT Chloride, Arterial 111 (H) 98 - 107 mmol/L    POCT Ionized Calcium, Arterial 1.17 1.10 - 1.33 mmol/L    POCT Glucose, Arterial 125 (H) 74 - 99 mg/dL    POCT Lactate, Arterial 1.9 0.4 - 2.0 mmol/L    POCT Base Excess, Arterial -2.7 (L) -2.0 - 3.0 mmol/L    POCT HCO3 Calculated, Arterial 21.8 (L) 22.0 - 26.0 mmol/L    POCT Hemoglobin, Arterial 10.8 (L) 13.5 - 17.5 g/dL    POCT Anion Gap, Arterial 13 10 - 25 mmo/L    Patient Temperature 37.0 degrees Celsius    FiO2 60 %   Coox Panel, Arterial Unsolicited   Result Value Ref Range    POCT Hemoglobin, Arterial 10.8 (L) 13.5 - 17.5 g/dL    POCT Oxy Hemoglobin, Arterial 97.9 94.0 - 98.0 %    POCT Carboxyhemoglobin, Arterial 1.0 %    POCT Methemoglobin, Arterial 0.8 0.0 - 1.5 %    POCT Deoxy Hemoglobin, Arterial 0.3  0.0 - 5.0 %   Calcium, Ionized   Result Value Ref Range    POCT Calcium, Ionized 1.15 1.1 - 1.33 mmol/L   Magnesium   Result Value Ref Range    Magnesium 3.46 (H) 1.60 - 2.40 mg/dL   Coagulation Screen   Result Value Ref Range    Protime 13.9 (H) 9.8 - 12.8 seconds    INR 1.2 (H) 0.9 - 1.1    aPTT 27 27 - 38 seconds   Fibrinogen   Result Value Ref Range    Fibrinogen 157 (L) 200 - 400 mg/dL   CBC   Result Value Ref Range    WBC 23.2 (H) 4.4 - 11.3 x10*3/uL    nRBC 0.0 0.0 - 0.0 /100 WBCs    RBC 3.64 (L) 4.50 - 5.90 x10*6/uL    Hemoglobin 10.9 (L) 13.5 - 17.5 g/dL    Hematocrit 32.6 (L) 41.0 - 52.0 %    MCV 90 80 - 100 fL    MCH 29.9 26.0 - 34.0 pg    MCHC 33.4 32.0 - 36.0 g/dL    RDW 14.3 11.5 - 14.5 %    Platelets 106 (L) 150 - 450 x10*3/uL   Renal Function Panel   Result Value Ref Range    Glucose 146 (H) 74 - 99 mg/dL    Sodium 143 136 - 145 mmol/L    Potassium 5.0 3.5 - 5.3 mmol/L    Chloride 112 (H) 98 - 107 mmol/L    Bicarbonate 21 21 - 32 mmol/L    Anion Gap 15 10 - 20 mmol/L    Urea Nitrogen 15 6 - 23 mg/dL    Creatinine 0.91 0.50 - 1.30 mg/dL    eGFR >90 >60 mL/min/1.73m*2    Calcium 8.7 8.6 - 10.6 mg/dL    Phosphorus 1.9 (L) 2.5 - 4.9 mg/dL    Albumin 3.5 3.4 - 5.0 g/dL   Type and Screen   Result Value Ref Range    ABO TYPE A     Rh TYPE POS     ANTIBODY SCREEN NEG    BLOOD GAS ARTERIAL FULL PANEL   Result Value Ref Range    POCT pH, Arterial 7.43 (H) 7.38 - 7.42 pH    POCT pCO2, Arterial 35 (L) 38 - 42 mm Hg    POCT pO2, Arterial 165 (H) 85 - 95 mm Hg    POCT SO2, Arterial 100 94 - 100 %    POCT Oxy Hemoglobin, Arterial 97.3 94.0 - 98.0 %    POCT Hematocrit Calculated, Arterial 35.0 (L) 41.0 - 52.0 %    POCT Sodium, Arterial 140 136 - 145 mmol/L    POCT Potassium, Arterial 5.3 3.5 - 5.3 mmol/L    POCT Chloride, Arterial 112 (H) 98 - 107 mmol/L    POCT Ionized Calcium, Arterial 1.22 1.10 - 1.33 mmol/L    POCT Glucose, Arterial 155 (H) 74 - 99 mg/dL    POCT Lactate, Arterial 2.0 0.4 - 2.0 mmol/L    POCT  Base Excess, Arterial -0.7 -2.0 - 3.0 mmol/L    POCT HCO3 Calculated, Arterial 23.2 22.0 - 26.0 mmol/L    POCT Hemoglobin, Arterial 11.5 (L) 13.5 - 17.5 g/dL    POCT Anion Gap, Arterial 10 10 - 25 mmo/L    Patient Temperature 37.0 degrees Celsius    FiO2 50 %   BLOOD GAS ARTERIAL FULL PANEL   Result Value Ref Range    POCT pH, Arterial 7.35 (L) 7.38 - 7.42 pH    POCT pCO2, Arterial 39 38 - 42 mm Hg    POCT pO2, Arterial 117 (H) 85 - 95 mm Hg    POCT SO2, Arterial 99 94 - 100 %    POCT Oxy Hemoglobin, Arterial 97.4 94.0 - 98.0 %    POCT Hematocrit Calculated, Arterial 28.0 (L) 41.0 - 52.0 %    POCT Sodium, Arterial 143 136 - 145 mmol/L    POCT Potassium, Arterial 4.2 3.5 - 5.3 mmol/L    POCT Chloride, Arterial 115 (H) 98 - 107 mmol/L    POCT Ionized Calcium, Arterial 1.15 1.10 - 1.33 mmol/L    POCT Glucose, Arterial 171 (H) 74 - 99 mg/dL    POCT Lactate, Arterial 1.7 0.4 - 2.0 mmol/L    POCT Base Excess, Arterial -3.8 (L) -2.0 - 3.0 mmol/L    POCT HCO3 Calculated, Arterial 21.5 (L) 22.0 - 26.0 mmol/L    POCT Hemoglobin, Arterial 9.3 (L) 13.5 - 17.5 g/dL    POCT Anion Gap, Arterial 11 10 - 25 mmo/L    Patient Temperature 37.0 degrees Celsius    FiO2 36 %   POCT GLUCOSE   Result Value Ref Range    POCT Glucose 171 (H) 74 - 99 mg/dL   Calcium, Ionized   Result Value Ref Range    POCT Calcium, Ionized 1.18 1.1 - 1.33 mmol/L   CBC   Result Value Ref Range    WBC 14.0 (H) 4.4 - 11.3 x10*3/uL    nRBC 0.0 0.0 - 0.0 /100 WBCs    RBC 2.89 (L) 4.50 - 5.90 x10*6/uL    Hemoglobin 9.0 (L) 13.5 - 17.5 g/dL    Hematocrit 25.1 (L) 41.0 - 52.0 %    MCV 87 80 - 100 fL    MCH 31.1 26.0 - 34.0 pg    MCHC 35.9 32.0 - 36.0 g/dL    RDW 14.5 11.5 - 14.5 %    Platelets 87 (L) 150 - 450 x10*3/uL   Magnesium   Result Value Ref Range    Magnesium 2.43 (H) 1.60 - 2.40 mg/dL   Renal Function Panel   Result Value Ref Range    Glucose 205 (H) 74 - 99 mg/dL    Sodium 145 136 - 145 mmol/L    Potassium 4.6 3.5 - 5.3 mmol/L    Chloride 110 (H) 98 - 107  mmol/L    Bicarbonate 24 21 - 32 mmol/L    Anion Gap 16 10 - 20 mmol/L    Urea Nitrogen 15 6 - 23 mg/dL    Creatinine 0.87 0.50 - 1.30 mg/dL    eGFR >90 >60 mL/min/1.73m*2    Calcium 8.9 8.6 - 10.6 mg/dL    Phosphorus 4.2 2.5 - 4.9 mg/dL    Albumin 4.2 3.4 - 5.0 g/dL   BLOOD GAS ARTERIAL FULL PANEL   Result Value Ref Range    POCT pH, Arterial 7.41 7.38 - 7.42 pH    POCT pCO2, Arterial 38 38 - 42 mm Hg    POCT pO2, Arterial 71 (L) 85 - 95 mm Hg    POCT SO2, Arterial 95 94 - 100 %    POCT Oxy Hemoglobin, Arterial 92.9 (L) 94.0 - 98.0 %    POCT Hematocrit Calculated, Arterial 28.0 (L) 41.0 - 52.0 %    POCT Sodium, Arterial 141 136 - 145 mmol/L    POCT Potassium, Arterial 4.8 3.5 - 5.3 mmol/L    POCT Chloride, Arterial 110 (H) 98 - 107 mmol/L    POCT Ionized Calcium, Arterial 1.23 1.10 - 1.33 mmol/L    POCT Glucose, Arterial 211 (H) 74 - 99 mg/dL    POCT Lactate, Arterial 3.7 (H) 0.4 - 2.0 mmol/L    POCT Base Excess, Arterial -0.4 -2.0 - 3.0 mmol/L    POCT HCO3 Calculated, Arterial 24.1 22.0 - 26.0 mmol/L    POCT Hemoglobin, Arterial 9.3 (L) 13.5 - 17.5 g/dL    POCT Anion Gap, Arterial 12 10 - 25 mmo/L    Patient Temperature 37.0 degrees Celsius    FiO2 21 %   BLOOD GAS ARTERIAL FULL PANEL   Result Value Ref Range    POCT pH, Arterial 7.43 (H) 7.38 - 7.42 pH    POCT pCO2, Arterial 38 38 - 42 mm Hg    POCT pO2, Arterial 93 85 - 95 mm Hg    POCT SO2, Arterial 97 94 - 100 %    POCT Oxy Hemoglobin, Arterial 96.5 94.0 - 98.0 %    POCT Hematocrit Calculated, Arterial 26.0 (L) 41.0 - 52.0 %    POCT Sodium, Arterial 140 136 - 145 mmol/L    POCT Potassium, Arterial 4.4 3.5 - 5.3 mmol/L    POCT Chloride, Arterial 111 (H) 98 - 107 mmol/L    POCT Ionized Calcium, Arterial 1.26 1.10 - 1.33 mmol/L    POCT Glucose, Arterial 200 (H) 74 - 99 mg/dL    POCT Lactate, Arterial 3.0 (H) 0.4 - 2.0 mmol/L    POCT Base Excess, Arterial 0.9 -2.0 - 3.0 mmol/L    POCT HCO3 Calculated, Arterial 25.2 22.0 - 26.0 mmol/L    POCT Hemoglobin, Arterial  8.8 (L) 13.5 - 17.5 g/dL    POCT Anion Gap, Arterial 8 (L) 10 - 25 mmo/L    Patient Temperature      FiO2 21 %   POCT GLUCOSE   Result Value Ref Range    POCT Glucose 182 (H) 74 - 99 mg/dL   BLOOD GAS ARTERIAL FULL PANEL   Result Value Ref Range    POCT pH, Arterial 7.44 (H) 7.38 - 7.42 pH    POCT pCO2, Arterial 40 38 - 42 mm Hg    POCT pO2, Arterial 73 (L) 85 - 95 mm Hg    POCT SO2, Arterial 96 94 - 100 %    POCT Oxy Hemoglobin, Arterial 93.9 (L) 94.0 - 98.0 %    POCT Hematocrit Calculated, Arterial 26.0 (L) 41.0 - 52.0 %    POCT Sodium, Arterial 140 136 - 145 mmol/L    POCT Potassium, Arterial 4.2 3.5 - 5.3 mmol/L    POCT Chloride, Arterial 110 (H) 98 - 107 mmol/L    POCT Ionized Calcium, Arterial 1.27 1.10 - 1.33 mmol/L    POCT Glucose, Arterial 161 (H) 74 - 99 mg/dL    POCT Lactate, Arterial 1.9 0.4 - 2.0 mmol/L    POCT Base Excess, Arterial 2.8 -2.0 - 3.0 mmol/L    POCT HCO3 Calculated, Arterial 27.2 (H) 22.0 - 26.0 mmol/L    POCT Hemoglobin, Arterial 8.8 (L) 13.5 - 17.5 g/dL    POCT Anion Gap, Arterial 7 (L) 10 - 25 mmo/L    Patient Temperature 37.0 degrees Celsius    FiO2 21 %   POCT GLUCOSE   Result Value Ref Range    POCT Glucose 126 (H) 74 - 99 mg/dL          Jake Brunner is a 68 y.o. year old male patient who presents for MVR  with Dr. Arboleda 1/23/24. Acute Pain consulted for block for postoperative pain control. The patient complains of on going thoracic pain, both in the anterior and posterior aspect, bilaterally, that is worst with deep breaths. 7/10. Denies any midline pain.     Plan:     - SAP and PIF blocks single shots performed preoperatively on 1/23/24  - Continue tylenol  - continue methocarbamol  - Can increase gabapentin to 600 mg TID scheduled  - can consider amitriptyline 10 mg 1-3 tables at night   - Pt denies any midline pain, discussion held on pain from chest tubes    Acute pain team  Pager [phone] Phone [phone]

## 2024-01-24 NOTE — OP NOTE
Redo Median Sternotomy, Repair Mitral Valve (tissue valve if unable to repair) Operative Note     Date: 2024  OR Location: [facility] OR    Name: Jake Brunner, : 1955, Age: 68 y.o., MRN: [id number], Sex: male    Diagnosis  Pre-op Diagnosis     * Nonrheumatic mitral valve regurgitation [I34.0] Post-op Diagnosis     * Nonrheumatic mitral valve regurgitation [I34.0]     Procedures  Redo median sternotomy, mitral valve repair    Surgeons      * Amarjit Arboleda - Primary    Resident/Fellow/Other Assistant:  Surgeon(s) and Role:    Procedure Summary  Anesthesia: General  ASA: IV  Anesthesia Staff: Anesthesiologist: Williams Schwartz MD  C-AA: ANTHONY Mcclelland  Perfusionist: Miguel Sharpe  Estimated Blood Loss: 250 mL  Intra-op Medications:   Medication Name Total Dose   gelatin absorbable (Gelfoam) 100 sponge 1 each   vancomycin (Vancocin) vial for injection 1 g   microfibril. collagen hemostat (Ultrafoam) sponge sponge 1 each   sodium chloride 0.9 % irrigation solution 3,000 mL   gentamicin (Garamycin) injection 482.5 mg              Anesthesia Record               Intraprocedure I/O Totals          Intake    electrolyte-A (Plasmalyte-A) 1500.00 mL    Norepinephrine Drip 0.00 mL    The total shown is the total volume documented since Anesthesia Start was filed.    Epinephrine Drip 0.00 mL    The total shown is the total volume documented since Anesthesia Start was filed.    Propofol Drip 0.00 mL    The total shown is the total volume documented since Anesthesia Start was filed.    Vasopressin Drip 0.00 mL    The total shown is the total volume documented since Anesthesia Start was filed.    Cell Saver 1395 mL    Total Intake 2895 mL       Output    Urine 855 mL    Est. Blood Loss 500 mL    Total Output 1355 mL       Net    Net Volume 1540 mL          Specimen:   ID Type Source Tests Collected by Time   1 : Posterior Mitral Valve Leaflet Tissue HEART VALVE - MITRAL SURGICAL PATHOLOGY EXAM Aamrjit MURRELL  MD Robles 1/23/2024 1321        Staff:   Circulator: Goldie Hardwick RN  Scrub Person: Sue Heredia RN; Ewa Ignacio RN         Drains and/or Catheters:   Chest Tube 1 Right Pleural (Active)   Function -20 cm H2O 01/23/24 1415   Chest Tube Air Leak No 01/23/24 1415   Drainage Description Sanguineous 01/23/24 1415   Dressing Status Clean;Dry 01/23/24 1955   Site Assessment Clean;Dry;Intact 01/23/24 1955   Surrounding Skin Unable to view 01/23/24 1415   Output (mL) 5 mL 01/23/24 1900       Chest Tube 2 Mediastinal (Active)   Function -20 cm H2O 01/23/24 1415   Chest Tube Air Leak No 01/23/24 1415   Drainage Description Sanguineous 01/23/24 1415   Dressing Status Clean;Dry 01/23/24 1955   Site Assessment Clean;Dry;Intact 01/23/24 1955   Surrounding Skin Unable to view 01/23/24 1415   Output (mL) 40 mL 01/23/24 1900       Chest Tube 3 Left Pleural (Active)   Function -20 cm H2O 01/23/24 1415   Chest Tube Air Leak No 01/23/24 1415   Drainage Description Sanguineous 01/23/24 1415   Dressing Status Clean;Dry 01/23/24 1955   Site Assessment Clean;Intact;Dry 01/23/24 1955   Surrounding Skin Unable to view 01/23/24 1415   Output (mL) 0 mL 01/23/24 1900       Urethral Catheter Non-latex 14 Fr. (Active)   Site Assessment Clean;Skin intact 01/23/24 1800   Output (mL) 75 mL 01/23/24 1900       Tourniquet Times:         Implants:  Implants       Type Name Action Serial No.      Other Cardiac Implant BAND, 30MM, SIMUPLUS FLEXIBLE - [id number] - [id number] Explanted [id number]     Other Cardiac Implant PATCH, TACHOSIL, 4.8CM X 4.8CM, ABSORABLE FIBRIN SEALANT - [id number] Used, Not Implanted               Findings: There were extremely dense pericardial adhesions.  We were able to clamp the aortic arch.  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  The mitral regurgitation due to a prolapsing P3 segment of the posterior mitral valve leaflet.    Indications: Jake Brunner is an 68 y.o. male who presented with  severe mitral regurgitation.  Echocardiography demonstrated severe prolapse or possibly flail of a segment of the posterior mitral valve leaflet.  Coronary catheterization demonstrated normal coronary arteries without any significant atherosclerosis.  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    Procedure Details: A redo median sternotomy was performed.  The adhesions along the right side of the heart and the aorta were dissected free.  The aortic arch was soft without evidence of atherosclerosis.  The patient was heparinized.  The proximal aortic arch and IVC and SVC were individually cannulated for cardiopulmonary bypass.  Antegrade and retrograde cardioplegia cannulas were placed.  The patient was placed on cardiopulmonary bypass, the ascending aortic graft was crossclamped, and the heart was arrested with cold blood cardioplegia.  During the remainder of the cross-clamp time cold blood cardioplegia was administered every 15 to 20 minutes.  A left atriotomy was performed it was quite difficult to see the mitral valve however we were able to visualize it adequately.  Retractors were used to expose the mitral valve.  The prolapsing segment of P3 was then excised in a triangular fashion.  The leaflet was then repaired with 2 layers of running 5-0 Ethibond suture.  A posterior flexible annuloplasty band was then sized to the anterior leaflet and sutured in place with interrupted 2-0 sutures.  The valve was tested and it appeared to be competent.  The left atriotomy was then closed with a running 3-0 Prolene stitch the heart and ascending aorta were de-aired and the aortic cross-clamp was removed.  When the patient's heart was adequately recovered and thoroughly de-aired he was weaned from cardiopulmonary bypass without difficulty.  Initially the mitral valve repair appeared excellent without any insufficiency.  However over time the patient developed  systolic anterior motion of the mitral valve and severe mitral regurgitation.  Despite medical maneuvers the systolic anterior motion continued and the severe MR.  We therefore went back on cardiopulmonary bypass, crossclamped the ascending aortic graft, and arrested and protected the heart with cold blood cardioplegia.  During the remainder of the cross-clamp time cold blood cardioplegia was given every 15 to 20 minutes.  The left atriotomy was reopened we carefully removed the annuloplasty band and then tested the valve.  Without the annuloplasty band the mitral valve appeared to be competent without any regurgitation.  We therefore elected not to place another annuloplasty band.  The left atriotomy was then closed with a running 3-0 Prolene stitch.  The heart and ascending aorta were de-aired and the aortic cross-clamp was removed.  When the patient's heart was completely recovered and thoroughly de-aired, he was weaned from cardiopulmonary bypass without difficulty.  Intraoperative transesophageal echocardiography demonstrated a well-functioning repaired mitral valve without any insufficiency.  There was no longer any systolic anterior motion of the mitral valve or mitral regurgitation.  All cannulas were removed and the heparin effect was reversed with protamine.  Hemostasis was obtained, mediastinal and bilateral pleural chest tubes were placed, right ventricular pacemaker wires were placed on the diaphragmatic surface of the RV, and the wounds were closed in the standard fashion.  The patient tolerated the procedure well and was brought to the intensive care unit in stable condition.  To sponge counts, instrument counts, and needle counts reported correct by the circulating nurse.  The specimen sent to pathology included the P3 segment of the posterior mitral valve.  The drains included mediastinal and bilateral pleural chest tubes.  The estimated blood loss was 500 cc.  I performed the procedure without a  resident.  Complications:  None; patient tolerated the procedure well.    Disposition: ICU - intubated and hemodynamically stable.  Condition: stable         Additional Details: none    Attending Attestation: I performed the procedure.    Amarjit Arboleda  Phone Number: [phone]

## 2024-01-24 NOTE — PROGRESS NOTES
"Jake Brunner is a 68 y.o. male on day 1 of admission presenting with Mitral regurgitation, s/p redo sternotomy and Mvr on 1/23/24.      Objective     Physical Exam    Last Recorded Vitals  Blood pressure (!) 186/79, pulse 85, temperature 36.5 °C (97.7 °F), temperature source Temporal, resp. rate 10, height 1.727 m (5' 8\"), weight 96.5 kg (212 lb 11.9 oz), SpO2 91 %.  Intake/Output last 3 Shifts:  I/O last 3 completed shifts:  In: 7351.7 (76.2 mL/kg) [P.O.:480; I.V.:714.2 (7.4 mL/kg); Blood:1895; IV Piggyback:2512.5]  Out: 3720 (38.5 mL/kg) [Urine:2515 (0.7 mL/kg/hr); Emesis/NG output:120; Blood:500; Chest Tube:585]  Weight: 96.5 kg     Relevant Results  Results for orders placed or performed during the hospital encounter of 01/23/24 (from the past 24 hour(s))   Calcium, Ionized   Result Value Ref Range    POCT Calcium, Ionized 1.15 1.1 - 1.33 mmol/L   Magnesium   Result Value Ref Range    Magnesium 3.46 (H) 1.60 - 2.40 mg/dL   Coagulation Screen   Result Value Ref Range    Protime 13.9 (H) 9.8 - 12.8 seconds    INR 1.2 (H) 0.9 - 1.1    aPTT 27 27 - 38 seconds   Fibrinogen   Result Value Ref Range    Fibrinogen 157 (L) 200 - 400 mg/dL   CBC   Result Value Ref Range    WBC 23.2 (H) 4.4 - 11.3 x10*3/uL    nRBC 0.0 0.0 - 0.0 /100 WBCs    RBC 3.64 (L) 4.50 - 5.90 x10*6/uL    Hemoglobin 10.9 (L) 13.5 - 17.5 g/dL    Hematocrit 32.6 (L) 41.0 - 52.0 %    MCV 90 80 - 100 fL    MCH 29.9 26.0 - 34.0 pg    MCHC 33.4 32.0 - 36.0 g/dL    RDW 14.3 11.5 - 14.5 %    Platelets 106 (L) 150 - 450 x10*3/uL   Renal Function Panel   Result Value Ref Range    Glucose 146 (H) 74 - 99 mg/dL    Sodium 143 136 - 145 mmol/L    Potassium 5.0 3.5 - 5.3 mmol/L    Chloride 112 (H) 98 - 107 mmol/L    Bicarbonate 21 21 - 32 mmol/L    Anion Gap 15 10 - 20 mmol/L    Urea Nitrogen 15 6 - 23 mg/dL    Creatinine 0.91 0.50 - 1.30 mg/dL    eGFR >90 >60 mL/min/1.73m*2    Calcium 8.7 8.6 - 10.6 mg/dL    Phosphorus 1.9 (L) 2.5 - 4.9 mg/dL    Albumin 3.5 3.4 " - 5.0 g/dL   Type and Screen   Result Value Ref Range    ABO TYPE A     Rh TYPE POS     ANTIBODY SCREEN NEG    BLOOD GAS ARTERIAL FULL PANEL   Result Value Ref Range    POCT pH, Arterial 7.43 (H) 7.38 - 7.42 pH    POCT pCO2, Arterial 35 (L) 38 - 42 mm Hg    POCT pO2, Arterial 165 (H) 85 - 95 mm Hg    POCT SO2, Arterial 100 94 - 100 %    POCT Oxy Hemoglobin, Arterial 97.3 94.0 - 98.0 %    POCT Hematocrit Calculated, Arterial 35.0 (L) 41.0 - 52.0 %    POCT Sodium, Arterial 140 136 - 145 mmol/L    POCT Potassium, Arterial 5.3 3.5 - 5.3 mmol/L    POCT Chloride, Arterial 112 (H) 98 - 107 mmol/L    POCT Ionized Calcium, Arterial 1.22 1.10 - 1.33 mmol/L    POCT Glucose, Arterial 155 (H) 74 - 99 mg/dL    POCT Lactate, Arterial 2.0 0.4 - 2.0 mmol/L    POCT Base Excess, Arterial -0.7 -2.0 - 3.0 mmol/L    POCT HCO3 Calculated, Arterial 23.2 22.0 - 26.0 mmol/L    POCT Hemoglobin, Arterial 11.5 (L) 13.5 - 17.5 g/dL    POCT Anion Gap, Arterial 10 10 - 25 mmo/L    Patient Temperature 37.0 degrees Celsius    FiO2 50 %   BLOOD GAS ARTERIAL FULL PANEL   Result Value Ref Range    POCT pH, Arterial 7.35 (L) 7.38 - 7.42 pH    POCT pCO2, Arterial 39 38 - 42 mm Hg    POCT pO2, Arterial 117 (H) 85 - 95 mm Hg    POCT SO2, Arterial 99 94 - 100 %    POCT Oxy Hemoglobin, Arterial 97.4 94.0 - 98.0 %    POCT Hematocrit Calculated, Arterial 28.0 (L) 41.0 - 52.0 %    POCT Sodium, Arterial 143 136 - 145 mmol/L    POCT Potassium, Arterial 4.2 3.5 - 5.3 mmol/L    POCT Chloride, Arterial 115 (H) 98 - 107 mmol/L    POCT Ionized Calcium, Arterial 1.15 1.10 - 1.33 mmol/L    POCT Glucose, Arterial 171 (H) 74 - 99 mg/dL    POCT Lactate, Arterial 1.7 0.4 - 2.0 mmol/L    POCT Base Excess, Arterial -3.8 (L) -2.0 - 3.0 mmol/L    POCT HCO3 Calculated, Arterial 21.5 (L) 22.0 - 26.0 mmol/L    POCT Hemoglobin, Arterial 9.3 (L) 13.5 - 17.5 g/dL    POCT Anion Gap, Arterial 11 10 - 25 mmo/L    Patient Temperature 37.0 degrees Celsius    FiO2 36 %   POCT GLUCOSE    Result Value Ref Range    POCT Glucose 171 (H) 74 - 99 mg/dL   Calcium, Ionized   Result Value Ref Range    POCT Calcium, Ionized 1.18 1.1 - 1.33 mmol/L   CBC   Result Value Ref Range    WBC 14.0 (H) 4.4 - 11.3 x10*3/uL    nRBC 0.0 0.0 - 0.0 /100 WBCs    RBC 2.89 (L) 4.50 - 5.90 x10*6/uL    Hemoglobin 9.0 (L) 13.5 - 17.5 g/dL    Hematocrit 25.1 (L) 41.0 - 52.0 %    MCV 87 80 - 100 fL    MCH 31.1 26.0 - 34.0 pg    MCHC 35.9 32.0 - 36.0 g/dL    RDW 14.5 11.5 - 14.5 %    Platelets 87 (L) 150 - 450 x10*3/uL   Magnesium   Result Value Ref Range    Magnesium 2.43 (H) 1.60 - 2.40 mg/dL   Renal Function Panel   Result Value Ref Range    Glucose 205 (H) 74 - 99 mg/dL    Sodium 145 136 - 145 mmol/L    Potassium 4.6 3.5 - 5.3 mmol/L    Chloride 110 (H) 98 - 107 mmol/L    Bicarbonate 24 21 - 32 mmol/L    Anion Gap 16 10 - 20 mmol/L    Urea Nitrogen 15 6 - 23 mg/dL    Creatinine 0.87 0.50 - 1.30 mg/dL    eGFR >90 >60 mL/min/1.73m*2    Calcium 8.9 8.6 - 10.6 mg/dL    Phosphorus 4.2 2.5 - 4.9 mg/dL    Albumin 4.2 3.4 - 5.0 g/dL   BLOOD GAS ARTERIAL FULL PANEL   Result Value Ref Range    POCT pH, Arterial 7.41 7.38 - 7.42 pH    POCT pCO2, Arterial 38 38 - 42 mm Hg    POCT pO2, Arterial 71 (L) 85 - 95 mm Hg    POCT SO2, Arterial 95 94 - 100 %    POCT Oxy Hemoglobin, Arterial 92.9 (L) 94.0 - 98.0 %    POCT Hematocrit Calculated, Arterial 28.0 (L) 41.0 - 52.0 %    POCT Sodium, Arterial 141 136 - 145 mmol/L    POCT Potassium, Arterial 4.8 3.5 - 5.3 mmol/L    POCT Chloride, Arterial 110 (H) 98 - 107 mmol/L    POCT Ionized Calcium, Arterial 1.23 1.10 - 1.33 mmol/L    POCT Glucose, Arterial 211 (H) 74 - 99 mg/dL    POCT Lactate, Arterial 3.7 (H) 0.4 - 2.0 mmol/L    POCT Base Excess, Arterial -0.4 -2.0 - 3.0 mmol/L    POCT HCO3 Calculated, Arterial 24.1 22.0 - 26.0 mmol/L    POCT Hemoglobin, Arterial 9.3 (L) 13.5 - 17.5 g/dL    POCT Anion Gap, Arterial 12 10 - 25 mmo/L    Patient Temperature 37.0 degrees Celsius    FiO2 21 %   BLOOD GAS  ARTERIAL FULL PANEL   Result Value Ref Range    POCT pH, Arterial 7.43 (H) 7.38 - 7.42 pH    POCT pCO2, Arterial 38 38 - 42 mm Hg    POCT pO2, Arterial 93 85 - 95 mm Hg    POCT SO2, Arterial 97 94 - 100 %    POCT Oxy Hemoglobin, Arterial 96.5 94.0 - 98.0 %    POCT Hematocrit Calculated, Arterial 26.0 (L) 41.0 - 52.0 %    POCT Sodium, Arterial 140 136 - 145 mmol/L    POCT Potassium, Arterial 4.4 3.5 - 5.3 mmol/L    POCT Chloride, Arterial 111 (H) 98 - 107 mmol/L    POCT Ionized Calcium, Arterial 1.26 1.10 - 1.33 mmol/L    POCT Glucose, Arterial 200 (H) 74 - 99 mg/dL    POCT Lactate, Arterial 3.0 (H) 0.4 - 2.0 mmol/L    POCT Base Excess, Arterial 0.9 -2.0 - 3.0 mmol/L    POCT HCO3 Calculated, Arterial 25.2 22.0 - 26.0 mmol/L    POCT Hemoglobin, Arterial 8.8 (L) 13.5 - 17.5 g/dL    POCT Anion Gap, Arterial 8 (L) 10 - 25 mmo/L    Patient Temperature      FiO2 21 %   POCT GLUCOSE   Result Value Ref Range    POCT Glucose 182 (H) 74 - 99 mg/dL   BLOOD GAS ARTERIAL FULL PANEL   Result Value Ref Range    POCT pH, Arterial 7.44 (H) 7.38 - 7.42 pH    POCT pCO2, Arterial 40 38 - 42 mm Hg    POCT pO2, Arterial 73 (L) 85 - 95 mm Hg    POCT SO2, Arterial 96 94 - 100 %    POCT Oxy Hemoglobin, Arterial 93.9 (L) 94.0 - 98.0 %    POCT Hematocrit Calculated, Arterial 26.0 (L) 41.0 - 52.0 %    POCT Sodium, Arterial 140 136 - 145 mmol/L    POCT Potassium, Arterial 4.2 3.5 - 5.3 mmol/L    POCT Chloride, Arterial 110 (H) 98 - 107 mmol/L    POCT Ionized Calcium, Arterial 1.27 1.10 - 1.33 mmol/L    POCT Glucose, Arterial 161 (H) 74 - 99 mg/dL    POCT Lactate, Arterial 1.9 0.4 - 2.0 mmol/L    POCT Base Excess, Arterial 2.8 -2.0 - 3.0 mmol/L    POCT HCO3 Calculated, Arterial 27.2 (H) 22.0 - 26.0 mmol/L    POCT Hemoglobin, Arterial 8.8 (L) 13.5 - 17.5 g/dL    POCT Anion Gap, Arterial 7 (L) 10 - 25 mmo/L    Patient Temperature 37.0 degrees Celsius    FiO2 21 %   POCT GLUCOSE   Result Value Ref Range    POCT Glucose 126 (H) 74 - 99 mg/dL    Calcium, Ionized   Result Value Ref Range    POCT Calcium, Ionized 1.22 1.1 - 1.33 mmol/L   Magnesium   Result Value Ref Range    Magnesium 2.33 1.60 - 2.40 mg/dL   Renal Function Panel   Result Value Ref Range    Glucose 156 (H) 74 - 99 mg/dL    Sodium 138 136 - 145 mmol/L    Potassium 4.0 3.5 - 5.3 mmol/L    Chloride 105 98 - 107 mmol/L    Bicarbonate 25 21 - 32 mmol/L    Anion Gap 12 10 - 20 mmol/L    Urea Nitrogen 16 6 - 23 mg/dL    Creatinine 0.85 0.50 - 1.30 mg/dL    eGFR >90 >60 mL/min/1.73m*2    Calcium 8.9 8.6 - 10.6 mg/dL    Phosphorus 2.6 2.5 - 4.9 mg/dL    Albumin 3.7 3.4 - 5.0 g/dL   POCT GLUCOSE   Result Value Ref Range    POCT Glucose 162 (H) 74 - 99 mg/dL      Assessment/Plan   Principal Problem:    Mitral regurgitation  Active Problems:    Mitral valve disorder    Acute postoperative pain    1) S/P Mvr, EF65%  2) Acute postoperative pain  3) History of hypertension  4) History of HILTON, uses CPAP    Reviewed course at bedside with patient and ZAINAB.    Hemodynamically stable with improved HR, breathing with nasal cannula oxygen. Mild splinting noted on exam.    Plan  1) discontinue chest tubes per protocol  2) asa and as indicated low dose BB  3) negative fluid balance, will provide intermittent diuretic as needed to achieve  4) home cpap for HILTON  5) mobilization/nutrition/bowel care  6) remove lines  7) candidate for floor discharge    care time included obtaining a history, examining the patient, ordering and reviewing studies, discussing, developing, and implementing a management plan, evaluating the patient's response to treatment, and discussion with other care team providers. I saw and evaluated the patient myself. I reviewed the provider's documentation and discussed the patient with the provider. care time was performed exclusive of billable procedures.  Care Time: 38 minutes            Samuel Negro MD

## 2024-01-25 ENCOUNTER — APPOINTMENT (OUTPATIENT)
Dept: CARDIOLOGY | Facility: HOSPITAL | Age: 69
DRG: 220 | End: 2024-01-25
Payer: MEDICARE

## 2024-01-25 ENCOUNTER — APPOINTMENT (OUTPATIENT)
Dept: CARDIAC SURGERY | Facility: HOSPITAL | Age: 69
End: 2024-01-25
Payer: MEDICARE

## 2024-01-25 LAB
ALBUMIN SERPL BCP-MCNC: 3.4 G/DL (ref 3.4–5)
ANION GAP SERPL CALC-SCNC: 7 MMOL/L (ref 10–20)
AORTIC VALVE MEAN GRADIENT: 12.2 MMHG
AORTIC VALVE PEAK VELOCITY: 2.17 M/S
AV PEAK GRADIENT: 18.8 MMHG
BUN SERPL-MCNC: 16 MG/DL (ref 6–23)
CALCIUM SERPL-MCNC: 9.4 MG/DL (ref 8.6–10.6)
CHLORIDE SERPL-SCNC: 103 MMOL/L (ref 98–107)
CO2 SERPL-SCNC: 31 MMOL/L (ref 21–32)
CREAT SERPL-MCNC: 0.95 MG/DL (ref 0.5–1.3)
EGFRCR SERPLBLD CKD-EPI 2021: 87 ML/MIN/1.73M*2
EJECTION FRACTION APICAL 4 CHAMBER: 77
EJECTION FRACTION: 76 %
ERYTHROCYTE [DISTWIDTH] IN BLOOD BY AUTOMATED COUNT: 15.3 % (ref 11.5–14.5)
GLUCOSE BLD MANUAL STRIP-MCNC: 125 MG/DL (ref 74–99)
GLUCOSE BLD MANUAL STRIP-MCNC: 133 MG/DL (ref 74–99)
GLUCOSE BLD MANUAL STRIP-MCNC: 145 MG/DL (ref 74–99)
GLUCOSE BLD MANUAL STRIP-MCNC: 150 MG/DL (ref 74–99)
GLUCOSE SERPL-MCNC: 134 MG/DL (ref 74–99)
HCT VFR BLD AUTO: 23.5 % (ref 41–52)
HGB BLD-MCNC: 7.8 G/DL (ref 13.5–17.5)
LEFT VENTRICLE INTERNAL DIMENSION DIASTOLE: 4.21 CM (ref 3.5–6)
LEFT VENTRICULAR OUTFLOW TRACT DIAMETER: 2.37 CM
MAGNESIUM SERPL-MCNC: 2.09 MG/DL (ref 1.6–2.4)
MCH RBC QN AUTO: 30.8 PG (ref 26–34)
MCHC RBC AUTO-ENTMCNC: 33.2 G/DL (ref 32–36)
MCV RBC AUTO: 93 FL (ref 80–100)
MITRAL VALVE E/A RATIO: 1.08
MITRAL VALVE E/E' RATIO: 23.82
NRBC BLD-RTO: 0 /100 WBCS (ref 0–0)
PHOSPHATE SERPL-MCNC: 1.8 MG/DL (ref 2.5–4.9)
PLATELET # BLD AUTO: 85 X10*3/UL (ref 150–450)
POTASSIUM SERPL-SCNC: 4.1 MMOL/L (ref 3.5–5.3)
RBC # BLD AUTO: 2.53 X10*6/UL (ref 4.5–5.9)
RIGHT VENTRICLE FREE WALL PEAK S': 9.01 CM/S
SODIUM SERPL-SCNC: 137 MMOL/L (ref 136–145)
TRICUSPID ANNULAR PLANE SYSTOLIC EXCURSION: 1.7 CM
WBC # BLD AUTO: 12.8 X10*3/UL (ref 4.4–11.3)

## 2024-01-25 PROCEDURE — 2500000004 HC RX 250 GENERAL PHARMACY W/ HCPCS (ALT 636 FOR OP/ED): Performed by: CLINICAL NURSE SPECIALIST

## 2024-01-25 PROCEDURE — 2500000001 HC RX 250 WO HCPCS SELF ADMINISTERED DRUGS (ALT 637 FOR MEDICARE OP): Performed by: CLINICAL NURSE SPECIALIST

## 2024-01-25 PROCEDURE — 85027 COMPLETE CBC AUTOMATED: CPT | Performed by: CLINICAL NURSE SPECIALIST

## 2024-01-25 PROCEDURE — 93306 TTE W/DOPPLER COMPLETE: CPT

## 2024-01-25 PROCEDURE — 97110 THERAPEUTIC EXERCISES: CPT | Mod: GP | Performed by: STUDENT IN AN ORGANIZED HEALTH CARE EDUCATION/TRAINING PROGRAM

## 2024-01-25 PROCEDURE — 1200000002 HC GENERAL ROOM WITH TELEMETRY DAILY

## 2024-01-25 PROCEDURE — 82947 ASSAY GLUCOSE BLOOD QUANT: CPT

## 2024-01-25 PROCEDURE — 83735 ASSAY OF MAGNESIUM: CPT | Performed by: CLINICAL NURSE SPECIALIST

## 2024-01-25 PROCEDURE — 97116 GAIT TRAINING THERAPY: CPT | Mod: GP | Performed by: STUDENT IN AN ORGANIZED HEALTH CARE EDUCATION/TRAINING PROGRAM

## 2024-01-25 PROCEDURE — 2500000004 HC RX 250 GENERAL PHARMACY W/ HCPCS (ALT 636 FOR OP/ED): Performed by: NURSE PRACTITIONER

## 2024-01-25 PROCEDURE — 97165 OT EVAL LOW COMPLEX 30 MIN: CPT | Mod: GO

## 2024-01-25 PROCEDURE — 99232 SBSQ HOSP IP/OBS MODERATE 35: CPT | Performed by: NURSE PRACTITIONER

## 2024-01-25 PROCEDURE — 84100 ASSAY OF PHOSPHORUS: CPT | Performed by: CLINICAL NURSE SPECIALIST

## 2024-01-25 PROCEDURE — 36415 COLL VENOUS BLD VENIPUNCTURE: CPT | Performed by: CLINICAL NURSE SPECIALIST

## 2024-01-25 PROCEDURE — 93306 TTE W/DOPPLER COMPLETE: CPT | Performed by: SPECIALIST

## 2024-01-25 RX ORDER — FUROSEMIDE 10 MG/ML
20 INJECTION INTRAMUSCULAR; INTRAVENOUS ONCE
Status: COMPLETED | OUTPATIENT
Start: 2024-01-25 | End: 2024-01-25

## 2024-01-25 RX ADMIN — HEPARIN SODIUM 5000 UNITS: 5000 INJECTION INTRAVENOUS; SUBCUTANEOUS at 20:14

## 2024-01-25 RX ADMIN — METOPROLOL TARTRATE 12.5 MG: 25 TABLET, FILM COATED ORAL at 20:14

## 2024-01-25 RX ADMIN — SENNOSIDES AND DOCUSATE SODIUM 2 TABLET: 8.6; 5 TABLET ORAL at 09:52

## 2024-01-25 RX ADMIN — ACETAMINOPHEN 650 MG: 325 TABLET ORAL at 09:54

## 2024-01-25 RX ADMIN — HEPARIN SODIUM 5000 UNITS: 5000 INJECTION INTRAVENOUS; SUBCUTANEOUS at 00:00

## 2024-01-25 RX ADMIN — METHOCARBAMOL 500 MG: 500 TABLET ORAL at 00:00

## 2024-01-25 RX ADMIN — OXYCODONE HYDROCHLORIDE 10 MG: 5 TABLET ORAL at 06:04

## 2024-01-25 RX ADMIN — ACETAMINOPHEN 650 MG: 325 TABLET ORAL at 04:40

## 2024-01-25 RX ADMIN — ASPIRIN 81 MG 81 MG: 81 TABLET ORAL at 09:52

## 2024-01-25 RX ADMIN — PERFLUTREN 10 ML OF DILUTION: 6.52 INJECTION, SUSPENSION INTRAVENOUS at 15:31

## 2024-01-25 RX ADMIN — CEFAZOLIN SODIUM 2 G: 2 INJECTION, SOLUTION INTRAVENOUS at 04:40

## 2024-01-25 RX ADMIN — Medication 1 TABLET: at 09:52

## 2024-01-25 RX ADMIN — ACETAMINOPHEN 650 MG: 325 TABLET ORAL at 16:12

## 2024-01-25 RX ADMIN — ACETAMINOPHEN 650 MG: 325 TABLET ORAL at 20:13

## 2024-01-25 RX ADMIN — OXYCODONE HYDROCHLORIDE 10 MG: 5 TABLET ORAL at 00:00

## 2024-01-25 RX ADMIN — ROSUVASTATIN 40 MG: 40 TABLET, FILM COATED ORAL at 20:14

## 2024-01-25 RX ADMIN — FUROSEMIDE 20 MG: 10 INJECTION, SOLUTION INTRAMUSCULAR; INTRAVENOUS at 14:05

## 2024-01-25 RX ADMIN — POLYSACCHARIDE-IRON COMPLEX 150 MG: 150 CAPSULE ORAL at 09:53

## 2024-01-25 RX ADMIN — METOPROLOL TARTRATE 12.5 MG: 25 TABLET, FILM COATED ORAL at 09:52

## 2024-01-25 RX ADMIN — POLYETHYLENE GLYCOL 3350 17 G: 17 POWDER, FOR SOLUTION ORAL at 09:52

## 2024-01-25 RX ADMIN — HEPARIN SODIUM 5000 UNITS: 5000 INJECTION INTRAVENOUS; SUBCUTANEOUS at 12:16

## 2024-01-25 ASSESSMENT — COGNITIVE AND FUNCTIONAL STATUS - GENERAL
TURNING FROM BACK TO SIDE WHILE IN FLAT BAD: A LITTLE
STANDING UP FROM CHAIR USING ARMS: A LITTLE
MOVING FROM LYING ON BACK TO SITTING ON SIDE OF FLAT BED WITH BEDRAILS: A LITTLE
HELP NEEDED FOR BATHING: A LITTLE
MOVING TO AND FROM BED TO CHAIR: A LITTLE
CLIMB 3 TO 5 STEPS WITH RAILING: A LOT
WALKING IN HOSPITAL ROOM: A LITTLE
TURNING FROM BACK TO SIDE WHILE IN FLAT BAD: A LITTLE
WALKING IN HOSPITAL ROOM: A LITTLE
DRESSING REGULAR LOWER BODY CLOTHING: A LITTLE
TOILETING: A LITTLE
DAILY ACTIVITIY SCORE: 21
CLIMB 3 TO 5 STEPS WITH RAILING: A LOT
MOBILITY SCORE: 17
MOBILITY SCORE: 17
HELP NEEDED FOR BATHING: A LITTLE
STANDING UP FROM CHAIR USING ARMS: A LITTLE
MOVING FROM LYING ON BACK TO SITTING ON SIDE OF FLAT BED WITH BEDRAILS: A LITTLE
DRESSING REGULAR LOWER BODY CLOTHING: A LITTLE
DAILY ACTIVITIY SCORE: 19
DRESSING REGULAR UPPER BODY CLOTHING: A LITTLE
TOILETING: A LITTLE
PERSONAL GROOMING: A LITTLE
MOVING TO AND FROM BED TO CHAIR: A LITTLE

## 2024-01-25 ASSESSMENT — PAIN SCALES - GENERAL
PAINLEVEL_OUTOF10: 1
PAINLEVEL_OUTOF10: 5 - MODERATE PAIN
PAINLEVEL_OUTOF10: 2
PAINLEVEL_OUTOF10: 0 - NO PAIN
PAINLEVEL_OUTOF10: 0 - NO PAIN
PAINLEVEL_OUTOF10: 2
PAINLEVEL_OUTOF10: 3
PAINLEVEL_OUTOF10: 3
PAINLEVEL_OUTOF10: 1
PAINLEVEL_OUTOF10: 3

## 2024-01-25 ASSESSMENT — PAIN - FUNCTIONAL ASSESSMENT
PAIN_FUNCTIONAL_ASSESSMENT: 0-10

## 2024-01-25 ASSESSMENT — PAIN DESCRIPTION - LOCATION
LOCATION: CHEST
LOCATION: INCISION

## 2024-01-25 ASSESSMENT — ACTIVITIES OF DAILY LIVING (ADL): ADL_ASSISTANCE: INDEPENDENT

## 2024-01-25 ASSESSMENT — PAIN DESCRIPTION - DESCRIPTORS
DESCRIPTORS: ACHING;SORE
DESCRIPTORS: ACHING;SORE

## 2024-01-25 NOTE — PROGRESS NOTES
Physical Therapy    Physical Therapy    Physical Therapy Treatment    Patient Name: Jake Brunner  MRN: [id number]  Today's Date: 1/25/2024        Assessment/Plan   PT Assessment  End of Session Communication: Bedside nurse  End of Session Patient Position: Bed, 3 rail up, Alarm off, not on at start of session (call light within reach)  PT Plan  Inpatient/Swing Bed or Outpatient: Inpatient  PT Plan  Treatment/Interventions: Bed mobility, Transfer training, Gait training, Stair training, Balance training, Strengthening, Endurance training, Therapeutic exercise, Therapeutic activity, Home exercise program  PT Plan: Skilled PT  PT Frequency: 4 times per week  PT Discharge Recommendations: Low intensity level of continued care  PT Recommended Transfer Status: Assist x1  PT - OK to Discharge: Yes      General Visit Information:   PT  Visit  PT Received On: 01/25/24  Response to Previous Treatment: Patient with no complaints from previous session.  Prior to Session Communication: Bedside nurse  Patient Position Received: Bed, 3 rail up, Alarm off, not on at start of session  General Comment: Pt pleasant, supine in bed and agreeable to therapy     Subjective   Subjective: Pt pleasant and agreeable to therapy  Precautions:  Precautions  Medical Precautions: Cardiac precautions, Fall precautions  Post-Surgical Precautions: Move in the Tube  Precautions Comment: Pt able to maintain precautions  Vital Signs:       Objective   Pain:  Pain Assessment  Pain Assessment: 0-10  Pain Score: 2  Pain Type: Surgical pain  Pain Interventions: Repositioned, Ambulation/increased activity  Response to Interventions: Pt reported no change in pain  Cognition:  Cognition  Overall Cognitive Status: Within Functional Limits  Orientation Level: Oriented X4  Lines/Tubes/Drains:       PT Treatments:  Therapeutic Exercise  Therapeutic Exercise Performed: Yes  Therapeutic Exercise Activity 1: active supine ankle pumps, SLR, hip ADD, heel slides x  15  Therapeutic Activity  Therapeutic Activity Performed: Yes  Therapeutic Activity 1: Sitting EOB x 4 min to don pants     Bed Mobility  Bed Mobility: Yes  Bed Mobility 1  Bed Mobility 1: Supine to sitting  Level of Assistance 1: Minimal verbal cues  Bed Mobility Comments 1: Pt needed min VC to maintain precautions and log roll  Bed Mobility 2  Bed Mobility  2: Sitting to supine  Level of Assistance 2: Minimal verbal cues  Bed Mobility Comments 2: min VC for MITT  Ambulation/Gait Training  Ambulation/Gait Training Performed: Yes  Ambulation/Gait Training 1  Surface 1: Level tile  Device 1: Rolling walker  Assistance 1: Close supervision  Quality of Gait 1: Decreased step length (decreased stephan)  Comments/Distance (ft) 1: 125'; Pt amb with decreased stephan but steady  Transfers  Transfer: Yes  Transfer 1  Transfer From 1: Sit to  Transfer to 1: Stand  Transfer Device 1: Walker  Transfer Level of Assistance 1: Close supervision  Trials/Comments 1: x1  Transfers 2  Transfer From 2: Stand to  Transfer to 2: Sit  Transfer Device 2: Walker  Transfer Level of Assistance 2: Close supervision  Trials/Comments 2: x1  Stairs  Stairs: No        Outcome Measures:  [facility] Basic Mobility  Turning from your back to your side while in a flat bed without using bedrails: A little  Moving from lying on your back to sitting on the side of a flat bed without using bedrails: A little  Moving to and from bed to chair (including a wheelchair): A little  Standing up from a chair using your arms (e.g. wheelchair or bedside chair): A little  To walk in hospital room: A little  Climbing 3-5 steps with railing: A lot  Basic Mobility - Total Score: 17          Education Documentation  Handouts, taught by Rosi Meehan PT at 1/25/2024  4:24 PM.  Learner: Patient  Readiness: Acceptance  Method: Explanation  Response: Needs Reinforcement    Precautions, taught by Rosi Meehan PT at 1/25/2024  4:24 PM.  Learner: Patient  Readiness:  Acceptance  Method: Explanation  Response: Needs Reinforcement    Body Mechanics, taught by Rosi Meehan PT at 1/25/2024  4:24 PM.  Learner: Patient  Readiness: Acceptance  Method: Explanation  Response: Needs Reinforcement    Home Exercise Program, taught by Rosi Meehan PT at 1/25/2024  4:24 PM.  Learner: Patient  Readiness: Acceptance  Method: Explanation  Response: Needs Reinforcement    Mobility Training, taught by Rosi Meehan PT at 1/25/2024  4:24 PM.  Learner: Patient  Readiness: Acceptance  Method: Explanation  Response: Needs Reinforcement    Education Comments  No comments found.        Encounter Problems       Encounter Problems (Active)       Balance       Pt will demonstrate improved sitting/standing static/dynamic balance activities without LOB via scoring a 24/28 on the Tinetti for increase in safety prior to DC.  (Progressing)       Start:  01/24/24    Expected End:  02/07/24               Mobility       Pt will ambulate 100ft with appropriate form, LRAD, and no LOB for safe DC.  (Progressing)       Start:  01/24/24    Expected End:  02/07/24            Pt will tolerate >15 minutes of upright standing activity without seated rest breaks with no changes in vital signs for improved functional mobility.  (Progressing)       Start:  01/24/24    Expected End:  02/07/24               Mobility       STG - Patient will ascend and descend one step without HR with CGA in order to safely access his home for DC.  (Progressing)       Start:  01/24/24    Expected End:  02/07/24               Transfers       Pt will perform bed mobility and sit<>stand transfers with CGA and use of LRAD to safely DC.  (Progressing)       Start:  01/24/24    Expected End:  02/07/24                   Assessment: Patient is progressing Well with therapy this date. Pt able to amb steady with close supervision but was limited by fatigue. Pt required min vc for precautions and safety throughout session. Patient continues to benefit  from skilled physical therapy to maximize functional mobility and safety. Pt remains appropriate for low intensity therapy when medically appropriate for DC.      01/25/24 at 4:31 PM   VENANCIO TORRES, PT   Rehab Office: [phone]

## 2024-01-25 NOTE — PROGRESS NOTES
Transitional Care Coordinator   Met with patient and introduced myself as Care Coordinator and member of the discharge planning team.  Patient is s/p MV repair. He plans to return home with his wife at time of discharge. He was independent in ADL's prior to admission. No DME at home. Patient uses [email or web address] on [address]. PCP is Dr. Hanna, last visit was 6 months ago. Discussed home care needs and options. Patient is interested in using  Home Care. Will continue to follow for home going needs.  Andreea Aaron RN

## 2024-01-25 NOTE — ADDENDUM NOTE
Addendum  created 01/25/24 0912 by Miguel Sharpe    Attestation recorded in Intraprocedure (Perfusion), Intraprocedure Attestations filed (Perfusion), Intraprocedure Event edited (Perfusion), Intraprocedure Staff edited (Perfusion)

## 2024-01-25 NOTE — HOSPITAL COURSE
This is a 68 year old male with a PMH significant for HTN, HPL, prostate cancer, HILTON uses CPAP, CAD, cataracts and ascending aortic aneurysm repair in 2015.  1/23 Today he presents s/p redo sternotomy MVr with Dr. Arboleda. EZEKIEL LAMA in OR.    1/23/2024 OPERATION: by Amarjit Arboleda  Redo median sternotomy, mitral valve repair    CTICU:   Transferred to the floor 1/24:         Floor Course:  - Patient was diuresed for fluid volume overload post cardiac surgery; Preop weight: 96.5kg, discharge wt: ***kg  - On ASA, statin, BB, *** by discharge  - Epicardial wires {cut / pull epicardial wires:88467} on ***  - Telemetry at discharge ***  - 2v CXR done 1/26  - Postop echo done completed on 1/25 and showed LVEF 75-80%  - Cardiac rehab referral was placed  - PT recs home  - Anticipate discharge to home with homecare    On day of discharge, vital signs were stable and no acute distress was noted. All questions were answered. After VS and labs were reviewed it was determined the patient was stable for discharge.     Discharged on ***  =========================    Hospital day of discharge management- spent >30 minutes coordinating the discharge and counseling/educating patient and family regarding discharge instructions.       Past Medical History:  Diagnosis Date  · Alcohol abuse, in remission    · Cataract    · Coronary artery disease       Hypertension     Severe mitral valve regurgitation.  · Hyperlipidemia    · Prostate cancer (CMS/HCC)   · Sleep apnea      Uses CPAP  · Vision loss      Wears contacts     Surgical History  Past Surgical History:  Procedure Laterality Date  · ANKLE SURGERY Left      Ankle Surgery  · ARTERIAL ANEURYSM REPAIR   12/2015    Aortic Aneurysm Repair Ascending Aorta  · CARDIAC CATHETERIZATION N/A 12/11/2023    Procedure: Left And Right Heart Cath, With LV;  Surgeon: Owen Choi MD;  Location[facility] Cardiac [facility] Lab;  Service: Cardiovascular;  Laterality: N/A;  Scheduled 12/11 @ 9:00am, EULA w/  anesthesia @ 7:30am  · COLONOSCOPY      · CT CHEST W AND WO IV CONTRAST   10/18/2023    Thoracic aortic atherosclerosis.  · FEMUR FRACTURE SURGERY   01/27/2016    Femur Repair  · LIPOMA RESECTION   2022    back  · OTHER SURGICAL HISTORY   01/27/2016    Wrist Surgery     Prescriptions Prior to Admission  Medications Prior to Admission  Medication Sig Dispense Refill Last Dose  · amLODIPine (Norvasc) 10 mg tablet Take 1 tablet (10 mg) by mouth once daily. (Patient taking differently: Take 0.5 tablets (5 mg) by mouth once daily.) 90 tablet 3    · aspirin 81 mg EC tablet Take 1 tablet (81 mg) by mouth once daily.        · chlorhexidine (Hibiclens) 4 % external liquid Use as directed daily preoperatively 473 mL 0    · chlorhexidine (Peridex) 0.12 % solution Swish and spit. Do not swallow. Use the night before and morning of surgery as directed 15 mL 0    · ezetimibe (Zetia) 10 mg tablet Take 1 tablet (10 mg) by mouth once daily. 90 tablet 3    · losartan (Cozaar) 50 mg tablet Take 1 tablet (50 mg) by mouth once daily. DAILY 90 tablet 3    · metoprolol tartrate (Lopressor) 50 mg tablet Take 1.5 tablets by mouth 2 times a day. Discontinue Metoprolol Succinate 270 tablet 3    · multivitamin tablet Take 1 tablet by mouth once daily.        · rosuvastatin (Crestor) 40 mg tablet Take 1 tablet (40 mg) by mouth once daily. DAILY 90 tablet 2       Patient has no known allergies.    Social History   Tobacco Use  · Smoking status: Former      Types: Cigarettes  · Smokeless tobacco: Never  Vaping Use  · Vaping Use: Never used  Substance Use Topics  · Alcohol use: Never  · Drug use: Never     Cardiac Testing:      TTE: 11/09/2024  CONCLUSIONS:   1. Left ventricular systolic function is normal with a 65% estimated ejection fraction.   2. Abnormal septal motion consistent with post-operative status.   3. The left atrium is moderately dilated.   4. The right atrium is moderately dilated.   5. Flail segment of the posterior mitral valve  leaflet.   6. Severe mitral valve regurgitation.   7. Mildly elevated RVSP.   8. Mild to moderate aortic valve regurgitation.     [facility]: 11/10/2024  CONCLUSIONS:   1. Moderate 2 vessel CAD in a right dominant system.   2. Elevated left and right heart filling pressures.   3. Mild pulmonary hypertension with a PVR of 1.2 Wood units.   4. No evidence of intracardiac shunt.   5. Preserved cardiac index with a normal .

## 2024-01-25 NOTE — PROGRESS NOTES
"CARDIAC SURGERY DAILY PROGRESS NOTE    Jake Brunner is a 68 year old male with a PMH significant for HTN, HPL, prostate cancer, HILTON uses CPAP, CAD, cataracts and ascending aortic aneurysm repair in .   Today he presents s/p redo sternotomy MVr with Dr. Arboleda. EZEKIEL LAMA in [place].    2024 OPERATION: by Amarjit Arboleda  Redo median sternotomy, mitral valve repair    CTICU:   Transferred to the floor :       Interval History:   Overnight uneventful    SUBJECTIVE:  Denies complaints      Objective   /57 (BP Location: Right arm, Patient Position: Lying)   Pulse 79   Temp 37 °C (98.6 °F) (Temporal)   Resp 17   Ht 1.727 m (5' 8\")   Wt 101 kg (221 lb 11.2 oz)   SpO2 93%   BMI 33.71 kg/m²   Pain Score: 0 - No pain   3 Day Weight Change: Unable to Calculate    Intake and Output    Intake/Output Summary (Last 24 hours) at 2024 1310  Last data filed at 2024 0924  Gross per 24 hour   Intake 380 ml   Output 750 ml   Net -370 ml       Physical Exam  Physical Exam  Vitals and nursing note reviewed.   Constitutional:       General: He is not in acute distress.     Appearance: Normal appearance.   HENT:      Head: Normocephalic.      Mouth/Throat:      Mouth: Mucous membranes are moist.   Eyes:      Conjunctiva/sclera: Conjunctivae normal.   Cardiovascular:      Rate and Rhythm: Normal rate and regular rhythm.      Pulses: Normal pulses.      Heart sounds: Normal heart sounds.      Comments: SR 60s-80s  Epicardial wires to temporary pacer backup  Pulmonary:      Effort: Pulmonary effort is normal.      Breath sounds: Normal breath sounds.   Abdominal:      General: Bowel sounds are normal.      Palpations: Abdomen is soft.      Tenderness: There is no abdominal tenderness.      Comments: Await postop BM   Genitourinary:     Comments: Voiding independently   Musculoskeletal:      Cervical back: Neck supple.      Right lower le+ Edema present.      Left lower le+ Edema present.   Skin:     " General: Skin is warm and dry.      Comments: midsternal chest incision C/D/I, well approximated, no s/s infection  Sternum stable    Neurological:      General: No focal deficit present.      Mental Status: He is alert and oriented to person, place, and time.   Psychiatric:         Mood and Affect: Mood normal.         Behavior: Behavior normal. Behavior is cooperative.           Medications  Scheduled medications  acetaminophen, 650 mg, oral, q4h  aspirin, 81 mg, oral, Daily  heparin (porcine), 5,000 Units, subcutaneous, q8h  insulin lispro, 0-5 Units, subcutaneous, TID with meals  iron polysaccharides, 150 mg, oral, Daily  methocarbamol, 500 mg, oral, q8h EVELYN  metoprolol tartrate, 12.5 mg, oral, BID  multivitamin with minerals, 1 tablet, oral, Daily  polyethylene glycol, 17 g, oral, BID  rosuvastatin, 40 mg, oral, Nightly  sennosides-docusate sodium, 2 tablet, oral, BID    Continuous medications   PRN medications  PRN medications: dextrose **OR** glucagon, melatonin, naloxone, ondansetron **OR** ondansetron, oxyCODONE, oxyCODONE, oxygen    Labs  Results for orders placed or performed during the hospital encounter of 01/23/24 (from the past 24 hour(s))   POCT GLUCOSE   Result Value Ref Range    POCT Glucose 124 (H) 74 - 99 mg/dL   POCT GLUCOSE   Result Value Ref Range    POCT Glucose 171 (H) 74 - 99 mg/dL   Magnesium   Result Value Ref Range    Magnesium 2.09 1.60 - 2.40 mg/dL   Renal Function Panel   Result Value Ref Range    Glucose 134 (H) 74 - 99 mg/dL    Sodium 137 136 - 145 mmol/L    Potassium 4.1 3.5 - 5.3 mmol/L    Chloride 103 98 - 107 mmol/L    Bicarbonate 31 21 - 32 mmol/L    Anion Gap 7 (L) 10 - 20 mmol/L    Urea Nitrogen 16 6 - 23 mg/dL    Creatinine 0.95 0.50 - 1.30 mg/dL    eGFR 87 >60 mL/min/1.73m*2    Calcium 9.4 8.6 - 10.6 mg/dL    Phosphorus 1.8 (L) 2.5 - 4.9 mg/dL    Albumin 3.4 3.4 - 5.0 g/dL   CBC   Result Value Ref Range    WBC 12.8 (H) 4.4 - 11.3 x10*3/uL    nRBC 0.0 0.0 - 0.0 /100 WBCs     RBC 2.53 (L) 4.50 - 5.90 x10*6/uL    Hemoglobin 7.8 (L) 13.5 - 17.5 g/dL    Hematocrit 23.5 (L) 41.0 - 52.0 %    MCV 93 80 - 100 fL    MCH 30.8 26.0 - 34.0 pg    MCHC 33.2 32.0 - 36.0 g/dL    RDW 15.3 (H) 11.5 - 14.5 %    Platelets 85 (L) 150 - 450 x10*3/uL   POCT GLUCOSE   Result Value Ref Range    POCT Glucose 133 (H) 74 - 99 mg/dL   POCT GLUCOSE   Result Value Ref Range    POCT Glucose 145 (H) 74 - 99 mg/dL                 IMPRESSION & PLAN:  POD # 2 s/p redo sternotomy, mitral valve repair  - Increase activity/ ambulation; PT/OT  - Encourage IS, C/DB; respiratory therapy; wean O2 as nicol   - Cardiac rehab referral   - Continue cardiac meds: ASA, BB, statin    - Pain and anticonstipation meds  - 2v CXR ordered for am   - Postop echo ordered and pending   - Remove epicardial wires prior to discharge   - Tele until discharge  - Optimize nutrition and electrolytes    Rhythm  - Tele: SR 60s-80s  - Continue BB  - Adjust medications as tolerated    Acute Blood Loss Anemia   Recent Labs     01/25/24  0811 01/24/24  0013 01/23/24  1423 01/09/24  1205 12/11/23  0744 07/11/23  1156 03/23/23  1036   HGB 7.8* 9.0* 10.9* 14.3 13.4* 12.9* 13.8   HCT 23.5* 25.1* 32.6* 42.0 39.1* 38.6* 40.7*   - MV, PO Iron x1mo  - Daily labs, transfuse as indicated    Thrombocytopenia  Recent Labs     01/25/24  0811 01/24/24  0013 01/23/24  1423 01/09/24  1205 12/11/23  0744 07/11/23  1156 03/23/23  1036   PLT 85* 87* 106* 209 204 170 211   - Etiology likely postop/CPB related  - Continue to trend with daily CBCs    Volume/Electrolyte Status: Preop wt Weight: 96.5 kg (212 lb 11.9 oz)   Vitals:    01/25/24 0448   Weight: 101 kg (221 lb 11.2 oz)   - Weight: 101 kg  - Adjust diuresis as needed for postop cardiac surgery hypervolemia  - Replete electrolytes for hypokalemia/hypomagnesemia/hypophosphatemia as needed   - Daily weights and strict I&Os  - Daily RFP while admitted    Hypertension: home meds: amlodipine 5mg daily, losartan 50mg daily,  metoprolol 75mg BID  Systolic (24hrs), Av , Min:107 , Max:156    - continue metoprolol  - resume losartan as tolerated  - additional antihypertensives as needed     Hyperlipidemia: home crestor 40mg daily, zetia 10mg daily  Lab Results   Component Value Date    CHOL 123 10/05/2023    HDL 43.5 10/05/2023    VLDL 23 10/05/2023    TRIG 114 10/05/2023    NHDL 80 10/05/2023   - continue rosuvastatin 40mg daily  - resume zetia at discharge   - follow up lipid panel with PCP/ cardiologist for ongoing lipid management    HILTON  -aggressive bronchial hygiene  -wean O2 as tolerated  -ABGs PRN  -continue home CPAP    VTE Prophylaxis: SCDs/TEDs, ambulation, SQ heparin  Code Status: Full Code    Dispo  - PT/OT recs home  - Would benefit from homecare for cardiac surgery carepath and RN visits  - Anticipate discharge 2-3 days  - Will continue to assess discharge needs        JACOBO Steven-CNP  Cardiac Surgery [facility]  [facility]  Team Phone [phone]    2024  1:10 PM

## 2024-01-25 NOTE — PROGRESS NOTES
Occupational Therapy    Evaluation    Patient Name: Jake Brunner  MRN: [id number]  Today's Date: 1/25/2024  Time Calculation  Start Time: 1056  Stop Time: 1110  Time Calculation (min): 14 min        Assessment:  OT Assessment: Pt demos 10 reps of MITT exercises, BUE shoulder flexion x10, cervical flexion and rotatoin. Pt demos decreased functional activity tolerance, ADLs and IADLs, and transfers Pt would benefit from skilled OT intervention to return to independence  Prognosis: Excellent  Barriers to Discharge: None  Evaluation/Treatment Tolerance: Patient tolerated treatment well  Medical Staff Made Aware: Yes  End of Session Communication: Bedside nurse  End of Session Patient Position: Bed, 3 rail up, Alarm off, not on at start of session  OT Assessment Results: Decreased ADL status, Decreased endurance, Decreased gross motor control, Decreased functional mobility, Decreased trunk control for functional activities, Decreased IADLs  Prognosis: Excellent  Barriers to Discharge: None  Evaluation/Treatment Tolerance: Patient tolerated treatment well  Medical Staff Made Aware: Yes  Strengths: Ability to acquire knowledge, Capable of completing ADLs semi/independent, Attitude of self, Insight into problems, Living arrangement secure, Physical health, Premorbid level of function, Support of Caregivers  Barriers to Participation: Housing layout  Plan:  Treatment Interventions: ADL retraining, Functional transfer training, UE strengthening/ROM, Endurance training, Compensatory technique education, Patient/family training  OT Frequency: 2 times per week  OT Discharge Recommendations: Low intensity level of continued care  OT Recommended Transfer Status: Stand by assist  OT - OK to Discharge: Yes  Treatment Interventions: ADL retraining, Functional transfer training, UE strengthening/ROM, Endurance training, Compensatory technique education, Patient/family training    Subjective   Current Problem:  1. Mitral valve  disorder  Anesthesia Intraoperative Transesophageal Echocardiogram    Anesthesia Intraoperative Transesophageal Echocardiogram    Transthoracic Echo (TTE) Complete    Transthoracic Echo (TTE) Complete      2. Nonrheumatic mitral valve regurgitation  Surgical Pathology Exam    Surgical Pathology Exam      3. S/P MVR (mitral valve repair)  Transthoracic Echo (TTE) Complete    Transthoracic Echo (TTE) Complete        General:  General  Reason for Referral: s/p redo sternotomy MVr  Past Medical History Relevant to Rehab: HTN, HPL, prostate CA, HILTON-uses CPAP, CAD, cadaracts, ascending aortic aneurysm repair, ETOH abuse  Prior to Session Communication: Bedside nurse  Patient Position Received: Bed, 3 rail up, Alarm off, not on at start of session  Preferred Learning Style: auditory, verbal, visual  General Comment: Pt supine in bed upon entry to room. pt pleasant and willing to work with OT. Pt recalls MITT  Precautions:  Post-Surgical Precautions: Move in the Tube  Vital Signs:     Pain:  Pain Assessment  Pain Assessment: 0-10  Pain Score: 0 - No pain    Objective   Cognition:  Overall Cognitive Status: Within Functional Limits  Orientation Level: Oriented X4           Home Living:  Type of Home: House  Lives With: Spouse  Home Adaptive Equipment: Cane, Walker rolling or standard  Home Layout: Two level, Stairs to alternate level with rails, Bed/bath upstairs  Alternate Level Stairs-Rails: Right  Alternate Level Stairs-Number of Steps: 1  Home Access: Stairs to enter with rails  Entrance Stairs-Number of Steps: 1  Bathroom Shower/Tub: Tub/shower unit, Walk-in shower  Bathroom Toilet: Standard  Bathroom Equipment: Tub transfer bench  Prior Function:  Level of [place]: Independent with ADLs and functional transfers, Independent with homemaking with ambulation  Receives Help From: Family  ADL Assistance: Independent  Homemaking Assistance: Independent  Ambulatory Assistance: Independent  Vocational: Full time  employment, Works at home ()  Leisure: motorcycle  Prior Function Comments: drives+ -falls, has beagle  IADL History:     ADL:  LE Dressing Assistance: Minimal  LE Dressing Deficit: Don/doff R sock  ADL Comments: Pt demos some difficulty with RLE dressing 2/2 decreased ROM.  Activity Tolerance:  Endurance: Tolerates 10 - 20 min exercise with multiple rests  Activity Tolerance Comments: Demos mild SOB with in room functional mobility, requesting to get into bed  Bed Mobility/Transfers: Bed Mobility  Bed Mobility: Yes  Bed Mobility 1  Bed Mobility 1: Sitting to supine  Level of Assistance 1: Minimal verbal cues  Bed Mobility Comments 1: Use of bed rails and cues for log roll    Transfers  Transfer: Yes  Transfer 1  Transfer From 1: Sit to, Stand to  Transfer to 1: Sit, Stand  Technique 1: Sit to stand, Stand to sit  Transfer Device 1: Walker  Transfer Level of Assistance 1: Contact guard  Transfers 2  Transfer From 2: Stand to  Transfer to 2: Bed  Transfer Device 2: Walker  Transfer Level of Assistance 2: Contact guard       Vision:Vision - Basic Assessment  Current Vision: No visual deficits  Sensation:  Light Touch: No apparent deficits       Coordination:  Movements are Fluid and Coordinated: Yes     Extremities: RUE   RUE : Within Functional Limits and LUE   LUE: Within Functional Limits        Outcome Measures:[facility] Daily Activity  Putting on and taking off regular lower body clothing: A little  Bathing (including washing, rinsing, drying): A little  Putting on and taking off regular upper body clothing: None  Toileting, which includes using toilet, bedpan or urinal: A little  Taking care of personal grooming such as brushing teeth: None  Eating Meals: None  Daily Activity - Total Score: 21         and OT Adult Other Outcome Measures  4AT: 0, negative    Education Documentation  Body Mechanics, taught by Jayda Spring OT at 1/25/2024 12:34 PM.  Learner: Patient  Readiness: Acceptance  Method:  Explanation  Response: Verbalizes Understanding    Precautions, taught by Jayda Spring OT at 1/25/2024 12:34 PM.  Learner: Patient  Readiness: Acceptance  Method: Explanation  Response: Verbalizes Understanding    ADL Training, taught by Jayda Spring OT at 1/25/2024 12:34 PM.  Learner: Patient  Readiness: Acceptance  Method: Explanation  Response: Verbalizes Understanding    Education Comments  No comments found.        OP EDUCATION:       Goals:  Encounter Problems       Encounter Problems (Active)       ADLs       Patient with complete lower body dressing with independent level of assistance donning and doffing all LE clothes  with no adaptive equipment while edge of bed  (Progressing)       Start:  01/25/24    Expected End:  02/15/24            Patient will complete toileting including hygiene clothing management/hygiene with independent level of assistance. (Progressing)       Start:  01/25/24    Expected End:  02/15/24            Pt will demo simulated IADL tasks in standing with dynamic reaching outside of base of support with no LOB and proper safety awareness.  (Progressing)       Start:  01/25/24    Expected End:  02/15/24               COGNITION/SAFETY       Patient will recall and adhere to MITT precautions during all functional mobility/ADL tasks in order to demonstrate improved understanding and promote healing post op (Progressing)       Start:  01/25/24    Expected End:  02/15/24               EXERCISE/STRENGTHENING       Pt will demonstrate I with energy conservation techniques to increase functional activity tolerance to x25+ min without rest breaks/while maintaining O2 sats.  (Progressing)       Start:  01/25/24    Expected End:  02/15/24               TRANSFERS       Patient will complete functional transfer to toilet with independent level of assistance. (Progressing)       Start:  01/25/24    Expected End:  02/15/24                            01/25/24 at 2:13 PM - Jayda Spring  OT

## 2024-01-25 NOTE — ADDENDUM NOTE
Addendum  created 01/25/24 0913 by Miguel Sharpe    Attestation recorded in Intraprocedure (Perfusion), Intraprocedure Attestations filed (Perfusion)       Deep Sutures: 3-0 Monocryl

## 2024-01-26 ENCOUNTER — APPOINTMENT (OUTPATIENT)
Dept: RADIOLOGY | Facility: HOSPITAL | Age: 69
DRG: 220 | End: 2024-01-26
Payer: MEDICARE

## 2024-01-26 DIAGNOSIS — Z98.890 STATUS POST MITRAL VALVE REPAIR: ICD-10-CM

## 2024-01-26 LAB
ALBUMIN SERPL BCP-MCNC: 3.5 G/DL (ref 3.4–5)
ANION GAP SERPL CALC-SCNC: 10 MMOL/L (ref 10–20)
BLOOD EXPIRATION DATE: NORMAL
BUN SERPL-MCNC: 15 MG/DL (ref 6–23)
CALCIUM SERPL-MCNC: 9.9 MG/DL (ref 8.6–10.6)
CHLORIDE SERPL-SCNC: 103 MMOL/L (ref 98–107)
CO2 SERPL-SCNC: 29 MMOL/L (ref 21–32)
CREAT SERPL-MCNC: 0.79 MG/DL (ref 0.5–1.3)
DISPENSE STATUS: NORMAL
EGFRCR SERPLBLD CKD-EPI 2021: >90 ML/MIN/1.73M*2
ERYTHROCYTE [DISTWIDTH] IN BLOOD BY AUTOMATED COUNT: 14.7 % (ref 11.5–14.5)
GLUCOSE BLD MANUAL STRIP-MCNC: 106 MG/DL (ref 74–99)
GLUCOSE BLD MANUAL STRIP-MCNC: 107 MG/DL (ref 74–99)
GLUCOSE BLD MANUAL STRIP-MCNC: 120 MG/DL (ref 74–99)
GLUCOSE BLD MANUAL STRIP-MCNC: 129 MG/DL (ref 74–99)
GLUCOSE SERPL-MCNC: 130 MG/DL (ref 74–99)
HCT VFR BLD AUTO: 23.5 % (ref 41–52)
HGB BLD-MCNC: 7.7 G/DL (ref 13.5–17.5)
MAGNESIUM SERPL-MCNC: 1.95 MG/DL (ref 1.6–2.4)
MCH RBC QN AUTO: 30.7 PG (ref 26–34)
MCHC RBC AUTO-ENTMCNC: 32.8 G/DL (ref 32–36)
MCV RBC AUTO: 94 FL (ref 80–100)
NRBC BLD-RTO: 0 /100 WBCS (ref 0–0)
PHOSPHATE SERPL-MCNC: 1.3 MG/DL (ref 2.5–4.9)
PLATELET # BLD AUTO: 108 X10*3/UL (ref 150–450)
POTASSIUM SERPL-SCNC: 3.9 MMOL/L (ref 3.5–5.3)
PRODUCT BLOOD TYPE: 6200
PRODUCT CODE: NORMAL
RBC # BLD AUTO: 2.51 X10*6/UL (ref 4.5–5.9)
SODIUM SERPL-SCNC: 138 MMOL/L (ref 136–145)
UNIT ABO: NORMAL
UNIT NUMBER: NORMAL
UNIT RH: NORMAL
UNIT VOLUME: 350
WBC # BLD AUTO: 10.6 X10*3/UL (ref 4.4–11.3)
XM INTEP: NORMAL

## 2024-01-26 PROCEDURE — 2500000004 HC RX 250 GENERAL PHARMACY W/ HCPCS (ALT 636 FOR OP/ED): Performed by: CLINICAL NURSE SPECIALIST

## 2024-01-26 PROCEDURE — 2500000001 HC RX 250 WO HCPCS SELF ADMINISTERED DRUGS (ALT 637 FOR MEDICARE OP): Performed by: CLINICAL NURSE SPECIALIST

## 2024-01-26 PROCEDURE — 80069 RENAL FUNCTION PANEL: CPT | Performed by: CLINICAL NURSE SPECIALIST

## 2024-01-26 PROCEDURE — 2500000001 HC RX 250 WO HCPCS SELF ADMINISTERED DRUGS (ALT 637 FOR MEDICARE OP)

## 2024-01-26 PROCEDURE — 1200000002 HC GENERAL ROOM WITH TELEMETRY DAILY

## 2024-01-26 PROCEDURE — 99232 SBSQ HOSP IP/OBS MODERATE 35: CPT

## 2024-01-26 PROCEDURE — 2500000004 HC RX 250 GENERAL PHARMACY W/ HCPCS (ALT 636 FOR OP/ED)

## 2024-01-26 PROCEDURE — 36430 TRANSFUSION BLD/BLD COMPNT: CPT

## 2024-01-26 PROCEDURE — 85027 COMPLETE CBC AUTOMATED: CPT | Performed by: CLINICAL NURSE SPECIALIST

## 2024-01-26 PROCEDURE — 71046 X-RAY EXAM CHEST 2 VIEWS: CPT

## 2024-01-26 PROCEDURE — 71046 X-RAY EXAM CHEST 2 VIEWS: CPT | Performed by: RADIOLOGY

## 2024-01-26 PROCEDURE — 82947 ASSAY GLUCOSE BLOOD QUANT: CPT

## 2024-01-26 PROCEDURE — 2500000001 HC RX 250 WO HCPCS SELF ADMINISTERED DRUGS (ALT 637 FOR MEDICARE OP): Performed by: SURGERY

## 2024-01-26 PROCEDURE — 83735 ASSAY OF MAGNESIUM: CPT | Performed by: CLINICAL NURSE SPECIALIST

## 2024-01-26 PROCEDURE — 94760 N-INVAS EAR/PLS OXIMETRY 1: CPT

## 2024-01-26 RX ORDER — METHOCARBAMOL 500 MG/1
500 TABLET, FILM COATED ORAL ONCE
Status: COMPLETED | OUTPATIENT
Start: 2024-01-26 | End: 2024-01-26

## 2024-01-26 RX ORDER — POLYETHYLENE GLYCOL 3350 17 G/17G
17 POWDER, FOR SOLUTION ORAL DAILY PRN
COMMUNITY
Start: 2024-01-26 | End: 2024-02-08 | Stop reason: ALTCHOICE

## 2024-01-26 RX ORDER — POTASSIUM CHLORIDE 20 MEQ/1
40 TABLET, EXTENDED RELEASE ORAL ONCE
Status: COMPLETED | OUTPATIENT
Start: 2024-01-26 | End: 2024-01-26

## 2024-01-26 RX ORDER — LANOLIN ALCOHOL/MO/W.PET/CERES
400 CREAM (GRAM) TOPICAL ONCE
Status: COMPLETED | OUTPATIENT
Start: 2024-01-26 | End: 2024-01-26

## 2024-01-26 RX ORDER — DOCUSATE SODIUM 100 MG/1
100 CAPSULE, LIQUID FILLED ORAL 2 TIMES DAILY PRN
COMMUNITY
Start: 2024-01-26 | End: 2024-02-08 | Stop reason: ALTCHOICE

## 2024-01-26 RX ORDER — FUROSEMIDE 10 MG/ML
20 INJECTION INTRAMUSCULAR; INTRAVENOUS ONCE
Status: COMPLETED | OUTPATIENT
Start: 2024-01-26 | End: 2024-01-26

## 2024-01-26 RX ADMIN — ACETAMINOPHEN 650 MG: 325 TABLET ORAL at 13:08

## 2024-01-26 RX ADMIN — METHOCARBAMOL 500 MG: 500 TABLET ORAL at 00:05

## 2024-01-26 RX ADMIN — HEPARIN SODIUM 5000 UNITS: 5000 INJECTION INTRAVENOUS; SUBCUTANEOUS at 05:17

## 2024-01-26 RX ADMIN — METOPROLOL TARTRATE 12.5 MG: 25 TABLET, FILM COATED ORAL at 21:00

## 2024-01-26 RX ADMIN — Medication 1 TABLET: at 10:01

## 2024-01-26 RX ADMIN — HEPARIN SODIUM 5000 UNITS: 5000 INJECTION INTRAVENOUS; SUBCUTANEOUS at 20:59

## 2024-01-26 RX ADMIN — ASPIRIN 81 MG 81 MG: 81 TABLET ORAL at 10:01

## 2024-01-26 RX ADMIN — ROSUVASTATIN 40 MG: 40 TABLET, FILM COATED ORAL at 21:00

## 2024-01-26 RX ADMIN — POTASSIUM PHOSPHATE, MONOBASIC 500 MG: 500 TABLET, SOLUBLE ORAL at 13:08

## 2024-01-26 RX ADMIN — POLYSACCHARIDE-IRON COMPLEX 150 MG: 150 CAPSULE ORAL at 10:01

## 2024-01-26 RX ADMIN — Medication 400 MG: at 11:15

## 2024-01-26 RX ADMIN — ACETAMINOPHEN 650 MG: 325 TABLET ORAL at 05:16

## 2024-01-26 RX ADMIN — ACETAMINOPHEN 650 MG: 325 TABLET ORAL at 10:01

## 2024-01-26 RX ADMIN — METHOCARBAMOL 500 MG: 500 TABLET ORAL at 21:34

## 2024-01-26 RX ADMIN — HEPARIN SODIUM 5000 UNITS: 5000 INJECTION INTRAVENOUS; SUBCUTANEOUS at 11:16

## 2024-01-26 RX ADMIN — METOPROLOL TARTRATE 12.5 MG: 25 TABLET, FILM COATED ORAL at 10:01

## 2024-01-26 RX ADMIN — POTASSIUM CHLORIDE 40 MEQ: 1500 TABLET, EXTENDED RELEASE ORAL at 11:15

## 2024-01-26 RX ADMIN — ACETAMINOPHEN 650 MG: 325 TABLET ORAL at 17:09

## 2024-01-26 RX ADMIN — POTASSIUM PHOSPHATE, MONOBASIC 500 MG: 500 TABLET, SOLUBLE ORAL at 21:00

## 2024-01-26 RX ADMIN — POLYETHYLENE GLYCOL 3350 17 G: 17 POWDER, FOR SOLUTION ORAL at 10:01

## 2024-01-26 RX ADMIN — ACETAMINOPHEN 650 MG: 325 TABLET ORAL at 00:05

## 2024-01-26 RX ADMIN — METHOCARBAMOL 500 MG: 500 TABLET ORAL at 17:09

## 2024-01-26 RX ADMIN — FUROSEMIDE 20 MG: 10 INJECTION, SOLUTION INTRAVENOUS at 11:15

## 2024-01-26 RX ADMIN — METHOCARBAMOL 500 MG: 500 TABLET ORAL at 10:01

## 2024-01-26 ASSESSMENT — COGNITIVE AND FUNCTIONAL STATUS - GENERAL
MOBILITY SCORE: 24
DAILY ACTIVITIY SCORE: 24

## 2024-01-26 ASSESSMENT — PAIN SCALES - GENERAL
PAINLEVEL_OUTOF10: 2
PAINLEVEL_OUTOF10: 1
PAINLEVEL_OUTOF10: 7
PAINLEVEL_OUTOF10: 2

## 2024-01-26 ASSESSMENT — PAIN - FUNCTIONAL ASSESSMENT
PAIN_FUNCTIONAL_ASSESSMENT: 0-10

## 2024-01-26 ASSESSMENT — PAIN DESCRIPTION - LOCATION
LOCATION: CHEST

## 2024-01-26 NOTE — PROGRESS NOTES
"CARDIAC SURGERY DAILY PROGRESS NOTE    Jake Brunner is a 68 year old male with a PMH significant for HTN, HPL, prostate cancer, HILTON uses CPAP, CAD, cataracts and ascending aortic aneurysm repair in 2015.  1/23 Today he presents s/p redo sternotomy MVr with Dr. Arboleda. EZEKIEL LAMA in OR.    1/23/2024 OPERATION: by Amarjit Arboleda  Redo median sternotomy, mitral valve repair    CTICU: uneventful   Transferred to the floor: 1/24/24     Interval History:   No acute events overnight.     SUBJECTIVE:  No complaints.     Objective   /63 (BP Location: Right arm, Patient Position: Lying)   Pulse 70   Temp 37.4 °C (99.3 °F) (Temporal)   Resp 15   Ht 1.727 m (5' 8\")   Wt 99.7 kg (219 lb 12.8 oz)   SpO2 93%   BMI 33.42 kg/m²   Pain Score: 1   3 Day Weight Change: 1.067 kg (2 lb 5.6 oz) per day    Intake and Output    Intake/Output Summary (Last 24 hours) at 1/26/2024 1511  Last data filed at 1/26/2024 1259  Gross per 24 hour   Intake 480 ml   Output 850 ml   Net -370 ml         Physical Exam  Physical Exam  Vitals and nursing note reviewed.   Constitutional:       General: He is not in acute distress.     Appearance: Normal appearance. He is not ill-appearing.      Comments: Patient alert and awake sitting up in chair, in no current distress.    HENT:      Head: Normocephalic.      Mouth/Throat:      Mouth: Mucous membranes are moist.   Eyes:      Conjunctiva/sclera: Conjunctivae normal.   Neck:      Comments: Trachea midline.  Cardiovascular:      Rate and Rhythm: Normal rate and regular rhythm.      Pulses: Normal pulses.      Heart sounds: Normal heart sounds. No murmur heard.     No gallop.      Comments: Tele: SR 60s-80s  +2 equal and even pulses in all extremities   Epicardial wires capped 1/26.     Pulmonary:      Effort: Pulmonary effort is normal. No respiratory distress.      Breath sounds: Normal breath sounds. No wheezing.      Comments: Equal and even chest expansion; thorax symmetric.   Diminished breath " sounds in bilateral bases.   Good inspiratory effort on RA.   Sternum Stable.    Abdominal:      General: Bowel sounds are normal. There is no distension.      Palpations: Abdomen is soft.      Tenderness: There is no abdominal tenderness.      Comments: +postop BM    Genitourinary:     Comments: Voiding independently   Musculoskeletal:      Cervical back: Neck supple.      Right lower le+ Edema present.      Left lower le+ Edema present.      Comments: WHITE; 5/5 strength throughout   Ambulating without gait aide.    Skin:     General: Skin is warm and dry.      Capillary Refill: Capillary refill takes less than 2 seconds.      Coloration: Skin is not jaundiced.      Comments: Mid Sternal Incision: well approximated; no s/s of infection, bleeding, or discharge - MARY  Sternum stable    Neurological:      General: No focal deficit present.      Mental Status: He is alert and oriented to person, place, and time.   Psychiatric:         Mood and Affect: Mood normal.         Behavior: Behavior normal. Behavior is cooperative.         Medications  Scheduled medications  acetaminophen, 650 mg, oral, q4h  aspirin, 81 mg, oral, Daily  heparin (porcine), 5,000 Units, subcutaneous, q8h  insulin lispro, 0-5 Units, subcutaneous, TID with meals  iron polysaccharides, 150 mg, oral, Daily  methocarbamol, 500 mg, oral, q8h EVELYN  metoprolol tartrate, 12.5 mg, oral, BID  multivitamin with minerals, 1 tablet, oral, Daily  polyethylene glycol, 17 g, oral, BID  potassium phosphate (monobasic), 500 mg, oral, BID  rosuvastatin, 40 mg, oral, Nightly  sennosides-docusate sodium, 2 tablet, oral, BID    Continuous medications   PRN medications  PRN medications: dextrose **OR** glucagon, melatonin, naloxone, ondansetron **OR** ondansetron, oxyCODONE, oxyCODONE, oxygen    Labs  Results for orders placed or performed during the hospital encounter of 24 (from the past 24 hour(s))   Transthoracic Echo (TTE) Complete   Result Value  Ref Range    AV pk андрей 2.17 m/s    AV mn grad 12.2 mmHg    LVOT diam 2.37 cm    LV biplane EF 76 %    MV avg E/e' ratio 23.82     MV E/A ratio 1.08     Tricuspid annular plane systolic excursion 1.7 cm    RV free wall pk S' 9.01 cm/s    LVIDd 4.21 cm    AV pk grad 18.8 mmHg    LV A4C EF 77.0    POCT GLUCOSE   Result Value Ref Range    POCT Glucose 125 (H) 74 - 99 mg/dL   POCT GLUCOSE   Result Value Ref Range    POCT Glucose 150 (H) 74 - 99 mg/dL   POCT GLUCOSE   Result Value Ref Range    POCT Glucose 120 (H) 74 - 99 mg/dL   Magnesium   Result Value Ref Range    Magnesium 1.95 1.60 - 2.40 mg/dL   CBC   Result Value Ref Range    WBC 10.6 4.4 - 11.3 x10*3/uL    nRBC 0.0 0.0 - 0.0 /100 WBCs    RBC 2.51 (L) 4.50 - 5.90 x10*6/uL    Hemoglobin 7.7 (L) 13.5 - 17.5 g/dL    Hematocrit 23.5 (L) 41.0 - 52.0 %    MCV 94 80 - 100 fL    MCH 30.7 26.0 - 34.0 pg    MCHC 32.8 32.0 - 36.0 g/dL    RDW 14.7 (H) 11.5 - 14.5 %    Platelets 108 (L) 150 - 450 x10*3/uL   Renal Function Panel   Result Value Ref Range    Glucose 130 (H) 74 - 99 mg/dL    Sodium 138 136 - 145 mmol/L    Potassium 3.9 3.5 - 5.3 mmol/L    Chloride 103 98 - 107 mmol/L    Bicarbonate 29 21 - 32 mmol/L    Anion Gap 10 10 - 20 mmol/L    Urea Nitrogen 15 6 - 23 mg/dL    Creatinine 0.79 0.50 - 1.30 mg/dL    eGFR >90 >60 mL/min/1.73m*2    Calcium 9.9 8.6 - 10.6 mg/dL    Phosphorus 1.3 (L) 2.5 - 4.9 mg/dL    Albumin 3.5 3.4 - 5.0 g/dL   POCT GLUCOSE   Result Value Ref Range    POCT Glucose 107 (H) 74 - 99 mg/dL           Transthoracic Echo (TTE) Complete With Contrast 01/25/2024    PHYSICIAN INTERPRETATION:  Left Ventricle: The left ventricular systolic function is hyperdynamic, with an estimated ejection fraction of 75-80%. There are no regional wall motion abnormalities. The left ventricular cavity size is normal. Left ventricular diastolic filling was indeterminate.  Left Atrium: The left atrium is mildly dilated.  Right Ventricle: The right ventricle is normal in size.  Unable to determine right ventricular systolic function.  Right Atrium: The right atrium is normal in size.  Aortic Valve: The aortic valve is trileaflet. There is mild aortic valve regurgitation. The peak instantaneous gradient of the aortic valve is 18.8 mmHg. The mean gradient of the aortic valve is 12.2 mmHg.  Mitral Valve: The mitral valve was not well visualized. There is no evidence of mitral valve regurgitation.  Tricuspid Valve: The tricuspid valve is structurally normal. There is trace tricuspid regurgitation.  Pulmonic Valve: The pulmonic valve is not well visualized. The pulmonic valve regurgitation was not well visualized.  Pericardium: There is no pericardial effusion noted.  Aorta: The aortic root is abnormal. There is mild dilatation of the ascending aorta. There is mild dilatation of the aortic root. Graft repair is noted.  In comparison to the previous echocardiogram(s): Compared with the prior study dated 1/23/2024 (intraoperative EULA), mitral regurgitation has decreased. Right venticular function could not be confidently assessed in the current study.    CONCLUSIONS:  1. Poorly visualized anatomical structures due to suboptimal image quality.  2. Left ventricular systolic function is hyperdynamic with a 75-80% estimated ejection fraction.  3. Mild aortic valve regurgitation.  4. Dilation of the ascending aorta with evidence of graft repair. The graft was not satisfactorily visualized.  5. Mitral valve leaflets are not well visualized. However there is no evidence of mitral regurgitation, and diastolic gradients are normal following reported leaflet repair.  6. Compared with the prior study dated 1/23/2024 (intraoperative EULA), mitral regurgitation has decreased. Right venticular function could not be confidently assessed in the current study.    [id number] Huey Sanchez MD  Electronically signed on 1/25/2024 at 8:45:47 PM    ** Final **     IMPRESSION & PLAN:  POD # 3 s/p redo sternotomy, mitral valve  repair  - Increase activity/ ambulation; PT/OT  - Encourage IS, C/DB; respiratory therapy; wean O2 as nicol   - Cardiac rehab referral   - Continue cardiac meds: ASA, BB, statin    - Pain and anticonstipation meds  - 2v CXR completed ; results pending   - Postop echo completed ; results as above.  - : wires capped    - Remove epicardial wires prior to discharge   - Tele until discharge  - Optimize nutrition and electrolytes    Rhythm  - Tele: SR 60s-80s  - Continue BB  - Adjust medications as tolerated    Acute Blood Loss Anemia   Recent Labs     24  0807 24  0811 24  0013 24  1423 24  1205 23  0744 23  1156   HGB 7.7* 7.8* 9.0* 10.9* 14.3 13.4* 12.9*   HCT 23.5* 23.5* 25.1* 32.6* 42.0 39.1* 38.6*     - MV, PO Iron x1mo; will continue at discharge   - Daily labs, transfuse as indicated    Thrombocytopenia  Recent Labs     24  0807 24  0811 24  0013 24  1423 24  1205 23  0744 23  1156   * 85* 87* 106* 209 204 170     - Etiology likely postop/CPB related  - Continue to trend with daily CBCs    Volume/Electrolyte Status: Preop wt Weight: 96.5 kg (212 lb 11.9 oz)   Vitals:    24 0511   Weight: 99.7 kg (219 lb 12.8 oz)   - Weight: 99.7, 101 kg  - Adjust diuresis as needed for postop cardiac surgery hypervolemia  - : Lasix 20mg IV x1   - Replete electrolytes for hypokalemia/hypomagnesemia/hypophosphatemia as needed; replaced K, Mg, phos .    - Daily weights and strict I&Os  - Daily RFP while admitted    Hypertension: home meds: amlodipine 5mg daily, losartan 50mg daily, metoprolol 75mg BID  Systolic (24hrs), Av , Min:111 , Max:119   - continue metoprolol 12.5mg BID   - continue to hold home amlodipine and losartan; resume losartan as tolerated  - additional antihypertensives as needed     Hyperlipidemia: home rosuvastatin 40mg daily, zetia 10mg daily  Lab Results   Component Value Date    CHOL 123  10/05/2023    HDL 43.5 10/05/2023    VLDL 23 10/05/2023    TRIG 114 10/05/2023    NHDL 80 10/05/2023   - continue rosuvastatin 40mg daily  - resume zetia at discharge   - follow up lipid panel with PCP/ cardiologist for ongoing lipid management    HILTON  -aggressive bronchial hygiene  -wean O2 as tolerated  -ABGs PRN  -continue home CPAP      VTE Prophylaxis: SCDs/TEDs, ambulation, SQ heparin  Code Status: Full Code    Dispo  - PT/OT recs home  - Would benefit from homecare for cardiac surgery carepath and RN visits  - Anticipate discharge 2-3 days  - Will continue to assess discharge needs      JACOBO Carrera-CNP  Cardiac Surgery [facility]  [facility]  Team Phone [phone]    1/26/2024  3:11 PM

## 2024-01-26 NOTE — CARE PLAN
The patient's goals for the shift include      The clinical goals for the shift include Pt will remain hemodynamically stable

## 2024-01-26 NOTE — DISCHARGE INSTRUCTIONS
Don't forget to “Keep Your Move in the Tube!!”     Please refer to the “Move in the Tube” handout.  -- Load bearing activities can be completed if you are “staying in the tube.” If you are attempting load bearing activities, let pain be your guide with when trying an activity “out of the tube”. If an activity hurts or is uncomfortable go back to doing it while you stay “in the tube”. There is no time limit to “stay in the tube”.     -- Non-load bearing activities, which are your activities of daily living, can be completed with your arms “out of the tube” as long as you remain pain free. Some activities of daily living examples are dressing, personal care, showering, washing hair, and toilet hygiene.     Don't forget to KEEP YOUR MOVE IN THE TUBE and think of a PEDRO Scales dinosaur!                                       **IMPORTANT**  Prevention of Infective Endocarditis (Heart Infections) After Cardiac Surgery:    Infective endocarditis occurs when bacteria enters the bloodstream and then attaches to the heart. Patients with a new heart valve, repaired heart valve, or graft used to repair/replace their aorta are at higher risk for developing this because of the prosthetic (man-made) medical material in their heart. Endocarditis is rare but when you undergo certain medical or dental procedures, as listed below, it can give bacteria an opportunity to enter the blood and cause this type of infection. To prevent this, preventative antibiotics taken before these procedures are required.     Antibiotics are always required PRIOR to the following:  - Dental work with gingival, periapical, or other gingival perforation (such as tooth extractions, dental abscess drainage, and dental cleanings)  - Respiratory procedures with incisions or biopsies   - Cardiac or vascular procedures to place or replace prosthetic material (such as heart valves, stents, etc.)    Antibiotics are required in the following situations ONLY if an  infection is already present:  - Skin or soft tissue procedures with infected tissue  - Gastrointestinal procedures with GI infections  - Urinary or genital procedure with acute genitourinary infections    Antibiotic examples you should expect to be prescribed:  - Amoxicillin or Ampicillin are usually the  first choice  - Cephalexin, Clindamycin, or Vancomycin may be used if you are unable to tolerate/allergic to Amoxicillin or Ampicillin  - Amoxicillin or Ampicillin (if allergic, use Vancomycin) will cover for enterococcus if you have a known acute biliary tract infection   - Specific antibiotics for a known organism should be used when treating an active infection    Please notify your dentist/primary physician/cardiologist PRIOR to these types of procedures to obtain the proper antibiotics and prevent a serious infection in your heart. When in doubt, always ask!   Additionally, good oral hygiene (routine brushing, flossing, and dental care) and prompt treatment of other infections (such as UTIs and skin infections) are equally as important to prevent endocarditis!!          Additional Post-Operative Instructions:  - Remember to use your Incentive spirometer 10x/hr while awake. Remember to cough and deep breath.  - No NSAIDs (common over-the-counter NSAIDs are ibuprofen/Motrin/Advil, naproxen/Naprosyn/Aleve) for 3 months after cardiac surgery; if NSAIDs needed after 3 months, clear use with cardiologist before starting.       Your home medications may have changed after surgery. Carefully compare this list with your prescription bottles at home and set aside any medications you are told to not take so you do not confuse them. Do not dispose of any medications until your follow-up, since your doctors may restart some at your follow-up appointments. It is important to bring a complete, current list of your medications to any medical appointments or hospitalizations.    For any questions about your discharge, please  call the cardiac surgery office at [phone]

## 2024-01-27 LAB
ACT BLD: 102 SEC (ref 96–152)
ACT BLD: 410 SEC (ref 96–152)
ACT BLD: 453 SEC (ref 96–152)
ACT BLD: 517 SEC (ref 96–152)
ACT BLD: 552 SEC (ref 96–152)
ACT BLD: 575 SEC (ref 96–152)
ACT BLD: 582 SEC (ref 96–152)
ACT BLD: 84 SEC (ref 96–152)
ALBUMIN SERPL BCP-MCNC: 3.3 G/DL (ref 3.4–5)
ANION GAP SERPL CALC-SCNC: 9 MMOL/L (ref 10–20)
BUN SERPL-MCNC: 14 MG/DL (ref 6–23)
CALCIUM SERPL-MCNC: 9.9 MG/DL (ref 8.6–10.6)
CHLORIDE SERPL-SCNC: 107 MMOL/L (ref 98–107)
CO2 SERPL-SCNC: 29 MMOL/L (ref 21–32)
CREAT SERPL-MCNC: 0.81 MG/DL (ref 0.5–1.3)
EGFRCR SERPLBLD CKD-EPI 2021: >90 ML/MIN/1.73M*2
ERYTHROCYTE [DISTWIDTH] IN BLOOD BY AUTOMATED COUNT: 14.9 % (ref 11.5–14.5)
GLUCOSE BLD MANUAL STRIP-MCNC: 114 MG/DL (ref 74–99)
GLUCOSE SERPL-MCNC: 112 MG/DL (ref 74–99)
HCT VFR BLD AUTO: 23.7 % (ref 41–52)
HGB BLD-MCNC: 7.5 G/DL (ref 13.5–17.5)
MAGNESIUM SERPL-MCNC: 1.92 MG/DL (ref 1.6–2.4)
MCH RBC QN AUTO: 29.4 PG (ref 26–34)
MCHC RBC AUTO-ENTMCNC: 31.6 G/DL (ref 32–36)
MCV RBC AUTO: 93 FL (ref 80–100)
NRBC BLD-RTO: 0 /100 WBCS (ref 0–0)
PHOSPHATE SERPL-MCNC: 1.9 MG/DL (ref 2.5–4.9)
PLATELET # BLD AUTO: 152 X10*3/UL (ref 150–450)
POTASSIUM SERPL-SCNC: 4.1 MMOL/L (ref 3.5–5.3)
RBC # BLD AUTO: 2.55 X10*6/UL (ref 4.5–5.9)
SODIUM SERPL-SCNC: 141 MMOL/L (ref 136–145)
WBC # BLD AUTO: 8.9 X10*3/UL (ref 4.4–11.3)

## 2024-01-27 PROCEDURE — 2500000004 HC RX 250 GENERAL PHARMACY W/ HCPCS (ALT 636 FOR OP/ED): Performed by: NURSE PRACTITIONER

## 2024-01-27 PROCEDURE — 1200000002 HC GENERAL ROOM WITH TELEMETRY DAILY

## 2024-01-27 PROCEDURE — 85027 COMPLETE CBC AUTOMATED: CPT | Performed by: CLINICAL NURSE SPECIALIST

## 2024-01-27 PROCEDURE — 2500000001 HC RX 250 WO HCPCS SELF ADMINISTERED DRUGS (ALT 637 FOR MEDICARE OP): Performed by: CLINICAL NURSE SPECIALIST

## 2024-01-27 PROCEDURE — 2500000001 HC RX 250 WO HCPCS SELF ADMINISTERED DRUGS (ALT 637 FOR MEDICARE OP)

## 2024-01-27 PROCEDURE — 82947 ASSAY GLUCOSE BLOOD QUANT: CPT

## 2024-01-27 PROCEDURE — 2500000004 HC RX 250 GENERAL PHARMACY W/ HCPCS (ALT 636 FOR OP/ED): Performed by: CLINICAL NURSE SPECIALIST

## 2024-01-27 PROCEDURE — 82565 ASSAY OF CREATININE: CPT | Performed by: CLINICAL NURSE SPECIALIST

## 2024-01-27 PROCEDURE — 83735 ASSAY OF MAGNESIUM: CPT | Performed by: CLINICAL NURSE SPECIALIST

## 2024-01-27 RX ORDER — ACETAMINOPHEN 325 MG/1
650 TABLET ORAL EVERY 6 HOURS
Status: DISCONTINUED | OUTPATIENT
Start: 2024-01-27 | End: 2024-01-28 | Stop reason: HOSPADM

## 2024-01-27 RX ORDER — MAGNESIUM SULFATE HEPTAHYDRATE 40 MG/ML
2 INJECTION, SOLUTION INTRAVENOUS ONCE
Status: COMPLETED | OUTPATIENT
Start: 2024-01-27 | End: 2024-01-27

## 2024-01-27 RX ORDER — METHOCARBAMOL 500 MG/1
500 TABLET, FILM COATED ORAL EVERY 8 HOURS SCHEDULED
Status: DISCONTINUED | OUTPATIENT
Start: 2024-01-28 | End: 2024-01-28 | Stop reason: HOSPADM

## 2024-01-27 RX ADMIN — Medication 1 TABLET: at 08:31

## 2024-01-27 RX ADMIN — SENNOSIDES AND DOCUSATE SODIUM 2 TABLET: 8.6; 5 TABLET ORAL at 21:04

## 2024-01-27 RX ADMIN — METOPROLOL TARTRATE 12.5 MG: 25 TABLET, FILM COATED ORAL at 21:04

## 2024-01-27 RX ADMIN — MAGNESIUM SULFATE HEPTAHYDRATE 2 G: 40 INJECTION, SOLUTION INTRAVENOUS at 11:50

## 2024-01-27 RX ADMIN — ACETAMINOPHEN 650 MG: 325 TABLET ORAL at 11:51

## 2024-01-27 RX ADMIN — POTASSIUM PHOSPHATE, MONOBASIC 500 MG: 500 TABLET, SOLUBLE ORAL at 21:04

## 2024-01-27 RX ADMIN — ACETAMINOPHEN 650 MG: 325 TABLET ORAL at 05:37

## 2024-01-27 RX ADMIN — HEPARIN SODIUM 5000 UNITS: 5000 INJECTION INTRAVENOUS; SUBCUTANEOUS at 05:37

## 2024-01-27 RX ADMIN — POLYSACCHARIDE-IRON COMPLEX 150 MG: 150 CAPSULE ORAL at 08:31

## 2024-01-27 RX ADMIN — ASPIRIN 81 MG 81 MG: 81 TABLET ORAL at 08:30

## 2024-01-27 RX ADMIN — METOPROLOL TARTRATE 12.5 MG: 25 TABLET, FILM COATED ORAL at 08:30

## 2024-01-27 RX ADMIN — HEPARIN SODIUM 5000 UNITS: 5000 INJECTION INTRAVENOUS; SUBCUTANEOUS at 21:04

## 2024-01-27 RX ADMIN — METHOCARBAMOL 500 MG: 500 TABLET ORAL at 23:15

## 2024-01-27 RX ADMIN — POLYETHYLENE GLYCOL 3350 17 G: 17 POWDER, FOR SOLUTION ORAL at 21:05

## 2024-01-27 RX ADMIN — HEPARIN SODIUM 5000 UNITS: 5000 INJECTION INTRAVENOUS; SUBCUTANEOUS at 11:52

## 2024-01-27 RX ADMIN — METHOCARBAMOL 500 MG: 500 TABLET ORAL at 01:17

## 2024-01-27 RX ADMIN — ACETAMINOPHEN 650 MG: 325 TABLET ORAL at 01:17

## 2024-01-27 RX ADMIN — ACETAMINOPHEN 650 MG: 325 TABLET ORAL at 21:30

## 2024-01-27 RX ADMIN — POTASSIUM PHOSPHATE, MONOBASIC 500 MG: 500 TABLET, SOLUBLE ORAL at 08:30

## 2024-01-27 RX ADMIN — ROSUVASTATIN 40 MG: 40 TABLET, FILM COATED ORAL at 21:04

## 2024-01-27 ASSESSMENT — COGNITIVE AND FUNCTIONAL STATUS - GENERAL
MOBILITY SCORE: 24
DAILY ACTIVITIY SCORE: 24

## 2024-01-27 ASSESSMENT — PAIN SCALES - GENERAL
PAINLEVEL_OUTOF10: 0 - NO PAIN
PAINLEVEL_OUTOF10: 0 - NO PAIN
PAINLEVEL_OUTOF10: 4
PAINLEVEL_OUTOF10: 0 - NO PAIN
PAINLEVEL_OUTOF10: 0 - NO PAIN

## 2024-01-27 ASSESSMENT — PAIN - FUNCTIONAL ASSESSMENT
PAIN_FUNCTIONAL_ASSESSMENT: 0-10

## 2024-01-27 NOTE — CARE PLAN
The patient's goals for the shift include      The clinical goals for the shift include pt will remain HDS    Over the shift, the patient did not make progress toward the following goals. Barriers to progression include . Recommendations to address these barriers include .

## 2024-01-27 NOTE — SIGNIFICANT EVENT
Epicardial wires pulled/removed without difficulty. Patient remained hemodynamically stable. On bed rest for 1 hour- with frequent vital signs q15 minutes.

## 2024-01-27 NOTE — PROGRESS NOTES
"CARDIAC SURGERY DAILY PROGRESS NOTE    Jake Brunner is a 68 year old male with a PMH significant for HTN, HPL, prostate cancer, HILTON uses CPAP, CAD, cataracts and ascending aortic aneurysm repair in 2015.  1/23 Today he presents s/p redo sternotomy MVr with Dr. Arboleda. ALVINOOT DAINA in OR.    1/23/2024 OPERATION: by Amarjit Arboleda  Redo median sternotomy, mitral valve repair    CTICU: uneventful   Transferred to the floor: 1/24/24     Interval History:   No acute events overnight.     SUBJECTIVE:  No complaints.     Objective   /57 (BP Location: Left arm, Patient Position: Sitting)   Pulse 72   Temp 36.2 °C (97.2 °F) (Temporal)   Resp 18   Ht 1.727 m (5' 8\")   Wt 99.4 kg (219 lb 2.2 oz)   SpO2 95%   BMI 33.32 kg/m²   Pain Score: 0 - No pain   3 Day Weight Change: Unable to Calculate    Intake and Output    Intake/Output Summary (Last 24 hours) at 1/27/2024 1639  Last data filed at 1/27/2024 0923  Gross per 24 hour   Intake 240 ml   Output --   Net 240 ml         Physical Exam  Physical Exam  Vitals and nursing note reviewed.   Constitutional:       General: He is not in acute distress.     Appearance: Normal appearance. He is not ill-appearing.      Comments: Patient alert and awake sitting up in chair, in no current distress.    HENT:      Mouth/Throat:      Mouth: Mucous membranes are moist.   Eyes:      Conjunctiva/sclera: Conjunctivae normal.   Neck:      Comments: Trachea midline.  Cardiovascular:      Rate and Rhythm: Normal rate and regular rhythm.      Pulses: Normal pulses.      Heart sounds: Normal heart sounds. No murmur heard.     No gallop.      Comments: Tele: SR 60s-80s  +2 equal and even pulses in all extremities   Epicardial wires capped 1/26.   Epicardial wires reoved/pulled 1/27    Pulmonary:      Effort: Pulmonary effort is normal. No respiratory distress.      Breath sounds: Normal breath sounds. No wheezing.      Comments: Equal and even chest expansion; thorax symmetric.   Diminished " breath sounds in bilateral bases.   Good inspiratory effort on RA.   Sternum Stable.    Abdominal:      General: Bowel sounds are normal. There is no distension.      Palpations: Abdomen is soft.      Tenderness: There is no abdominal tenderness.      Comments: +postop BM    Genitourinary:     Comments: Voiding independently   Musculoskeletal:      Cervical back: Neck supple.      Right lower le+ Edema present.      Left lower le+ Edema present.      Comments: WHITE; 5/5 strength throughout   Ambulating without gait aide.    Skin:     General: Skin is warm and dry.      Capillary Refill: Capillary refill takes less than 2 seconds.      Coloration: Skin is not jaundiced.      Comments: Mid Sternal Incision: well approximated; no s/s of infection, bleeding, or discharge - MARY  Sternum stable    Neurological:      General: No focal deficit present.      Mental Status: He is alert and oriented to person, place, and time.   Psychiatric:         Mood and Affect: Mood normal.         Behavior: Behavior normal. Behavior is cooperative.         Medications  Scheduled medications  acetaminophen, 650 mg, oral, q4h  aspirin, 81 mg, oral, Daily  heparin (porcine), 5,000 Units, subcutaneous, q8h  iron polysaccharides, 150 mg, oral, Daily  metoprolol tartrate, 12.5 mg, oral, BID  multivitamin with minerals, 1 tablet, oral, Daily  polyethylene glycol, 17 g, oral, BID  potassium phosphate (monobasic), 500 mg, oral, BID  rosuvastatin, 40 mg, oral, Nightly  sennosides-docusate sodium, 2 tablet, oral, BID    Continuous medications   PRN medications  PRN medications: dextrose **OR** glucagon, melatonin, naloxone, ondansetron **OR** ondansetron, oxyCODONE, oxyCODONE, oxygen    Labs  Results for orders placed or performed during the hospital encounter of 24 (from the past 24 hour(s))   POCT GLUCOSE   Result Value Ref Range    POCT Glucose 106 (H) 74 - 99 mg/dL   POCT GLUCOSE   Result Value Ref Range    POCT Glucose 129 (H)  74 - 99 mg/dL   Magnesium   Result Value Ref Range    Magnesium 1.92 1.60 - 2.40 mg/dL   CBC   Result Value Ref Range    WBC 8.9 4.4 - 11.3 x10*3/uL    nRBC 0.0 0.0 - 0.0 /100 WBCs    RBC 2.55 (L) 4.50 - 5.90 x10*6/uL    Hemoglobin 7.5 (L) 13.5 - 17.5 g/dL    Hematocrit 23.7 (L) 41.0 - 52.0 %    MCV 93 80 - 100 fL    MCH 29.4 26.0 - 34.0 pg    MCHC 31.6 (L) 32.0 - 36.0 g/dL    RDW 14.9 (H) 11.5 - 14.5 %    Platelets 152 150 - 450 x10*3/uL   Renal Function Panel   Result Value Ref Range    Glucose 112 (H) 74 - 99 mg/dL    Sodium 141 136 - 145 mmol/L    Potassium 4.1 3.5 - 5.3 mmol/L    Chloride 107 98 - 107 mmol/L    Bicarbonate 29 21 - 32 mmol/L    Anion Gap 9 (L) 10 - 20 mmol/L    Urea Nitrogen 14 6 - 23 mg/dL    Creatinine 0.81 0.50 - 1.30 mg/dL    eGFR >90 >60 mL/min/1.73m*2    Calcium 9.9 8.6 - 10.6 mg/dL    Phosphorus 1.9 (L) 2.5 - 4.9 mg/dL    Albumin 3.3 (L) 3.4 - 5.0 g/dL   POCT GLUCOSE   Result Value Ref Range    POCT Glucose 114 (H) 74 - 99 mg/dL           Transthoracic Echo (TTE) Complete With Contrast 01/25/2024    PHYSICIAN INTERPRETATION:  Left Ventricle: The left ventricular systolic function is hyperdynamic, with an estimated ejection fraction of 75-80%. There are no regional wall motion abnormalities. The left ventricular cavity size is normal. Left ventricular diastolic filling was indeterminate.  Left Atrium: The left atrium is mildly dilated.  Right Ventricle: The right ventricle is normal in size. Unable to determine right ventricular systolic function.  Right Atrium: The right atrium is normal in size.  Aortic Valve: The aortic valve is trileaflet. There is mild aortic valve regurgitation. The peak instantaneous gradient of the aortic valve is 18.8 mmHg. The mean gradient of the aortic valve is 12.2 mmHg.  Mitral Valve: The mitral valve was not well visualized. There is no evidence of mitral valve regurgitation.  Tricuspid Valve: The tricuspid valve is structurally normal. There is trace  tricuspid regurgitation.  Pulmonic Valve: The pulmonic valve is not well visualized. The pulmonic valve regurgitation was not well visualized.  Pericardium: There is no pericardial effusion noted.  Aorta: The aortic root is abnormal. There is mild dilatation of the ascending aorta. There is mild dilatation of the aortic root. Graft repair is noted.  In comparison to the previous echocardiogram(s): Compared with the prior study dated 1/23/2024 (intraoperative EULA), mitral regurgitation has decreased. Right venticular function could not be confidently assessed in the current study.    CONCLUSIONS:  1. Poorly visualized anatomical structures due to suboptimal image quality.  2. Left ventricular systolic function is hyperdynamic with a 75-80% estimated ejection fraction.  3. Mild aortic valve regurgitation.  4. Dilation of the ascending aorta with evidence of graft repair. The graft was not satisfactorily visualized.  5. Mitral valve leaflets are not well visualized. However there is no evidence of mitral regurgitation, and diastolic gradients are normal following reported leaflet repair.  6. Compared with the prior study dated 1/23/2024 (intraoperative EULA), mitral regurgitation has decreased. Right venticular function could not be confidently assessed in the current study.    [id number] Huey Sanchez MD  Electronically signed on 1/25/2024 at 8:45:47 PM    ** Final **     IMPRESSION & PLAN:  POD # 4 s/p redo sternotomy, mitral valve repair  - Increase activity/ ambulation; PT/OT  - Encourage IS, C/DB; respiratory therapy; wean O2 as nicol   - Cardiac rehab referral   - Continue cardiac meds: ASA, BB, statin    - Pain and anticonstipation meds  - 2v CXR completed 1/26; results pending   - Postop echo completed 1/25; results as above.  - 1/26: wires capped   - 1/27 epicardial wires removed   - Remove epicardial wires prior to discharge   - Tele until discharge  - Optimize nutrition and electrolytes    Rhythm  - Tele:   60s-80s  - Continue BB  - Adjust medications as tolerated    Acute Blood Loss Anemia   Recent Labs     24  0649 24  0807 24  0811 24  0013 24  1423 24  1205 23  0744   HGB 7.5* 7.7* 7.8* 9.0* 10.9* 14.3 13.4*   HCT 23.7* 23.5* 23.5* 25.1* 32.6* 42.0 39.1*     - MV, PO Iron x1mo; will continue at discharge   - Daily labs, transfuse as indicated    Thrombocytopenia  Recent Labs     24  0649 24  0807 24  0811 24  0013 24  1423 24  1205 23  0744    108* 85* 87* 106* 209 204     - Etiology likely postop/CPB related  - Continue to trend with daily CBCs    Volume/Electrolyte Status: Preop wt Weight: 96.5 kg (212 lb 11.9 oz)   Vitals:    24 0600   Weight: 99.4 kg (219 lb 2.2 oz)   - Weight: 99.4 (99.7, 101 kg)  - Adjust diuresis as needed for postop cardiac surgery hypervolemia  - : Lasix 20mg IV x1   - Replete electrolytes for hypokalemia/hypomagnesemia/hypophosphatemia as needed; replaced K, Mg, phos .  Repleted Mg   - Daily weights and strict I&Os  - Daily RFP while admitted    Hypertension: home meds: amlodipine 5mg daily, losartan 50mg daily, metoprolol 75mg BID  Systolic (24hrs), Av , Min:106 , Max:137   - continue metoprolol 12.5mg BID   - continue to hold home amlodipine and losartan; resume losartan as tolerated  - additional antihypertensives as needed     Hyperlipidemia: home rosuvastatin 40mg daily, zetia 10mg daily  Lab Results   Component Value Date    CHOL 123 10/05/2023    HDL 43.5 10/05/2023    VLDL 23 10/05/2023    TRIG 114 10/05/2023    NHDL 80 10/05/2023   - continue rosuvastatin 40mg daily  - resume zetia at discharge   - follow up lipid panel with PCP/ cardiologist for ongoing lipid management    HILTON  -aggressive bronchial hygiene  -wean O2 as tolerated  -ABGs PRN  -continue home CPAP      VTE Prophylaxis: SCDs/TEDs, ambulation, SQ heparin  Code Status: Full Code    Dispo  - PT/OT recs  home  - Would benefit from homecare for cardiac surgery carepath and RN visits  - Anticipate discharge 2-3 days  - Will continue to assess discharge needs      JACOBO Hall-CNP  Cardiac Surgery [facility]  [facility]  Team Phone [phone]    1/27/2024  4:39 PM

## 2024-01-28 ENCOUNTER — HOME HEALTH ADMISSION (OUTPATIENT)
Dept: HOME HEALTH SERVICES | Facility: HOME HEALTH | Age: 69
End: 2024-01-28
Payer: MEDICARE

## 2024-01-28 VITALS
SYSTOLIC BLOOD PRESSURE: 147 MMHG | RESPIRATION RATE: 18 BRPM | WEIGHT: 219.14 LBS | BODY MASS INDEX: 33.21 KG/M2 | HEIGHT: 68 IN | HEART RATE: 83 BPM | DIASTOLIC BLOOD PRESSURE: 81 MMHG | TEMPERATURE: 97.7 F | OXYGEN SATURATION: 97 %

## 2024-01-28 PROBLEM — Z98.890 STATUS POST MITRAL VALVE REPAIR: Status: ACTIVE | Noted: 2024-01-28

## 2024-01-28 PROBLEM — G89.18 ACUTE POSTOPERATIVE PAIN: Status: RESOLVED | Noted: 2024-01-24 | Resolved: 2024-01-28

## 2024-01-28 LAB
ALBUMIN SERPL BCP-MCNC: 3.3 G/DL (ref 3.4–5)
ANION GAP SERPL CALC-SCNC: 11 MMOL/L (ref 10–20)
BUN SERPL-MCNC: 14 MG/DL (ref 6–23)
CALCIUM SERPL-MCNC: 9.8 MG/DL (ref 8.6–10.6)
CHLORIDE SERPL-SCNC: 107 MMOL/L (ref 98–107)
CO2 SERPL-SCNC: 25 MMOL/L (ref 21–32)
CREAT SERPL-MCNC: 0.75 MG/DL (ref 0.5–1.3)
EGFRCR SERPLBLD CKD-EPI 2021: >90 ML/MIN/1.73M*2
ERYTHROCYTE [DISTWIDTH] IN BLOOD BY AUTOMATED COUNT: 15.2 % (ref 11.5–14.5)
GLUCOSE BLD MANUAL STRIP-MCNC: 128 MG/DL (ref 74–99)
GLUCOSE SERPL-MCNC: 212 MG/DL (ref 74–99)
HCT VFR BLD AUTO: 24 % (ref 41–52)
HGB BLD-MCNC: 7.9 G/DL (ref 13.5–17.5)
MAGNESIUM SERPL-MCNC: 1.98 MG/DL (ref 1.6–2.4)
MCH RBC QN AUTO: 31 PG (ref 26–34)
MCHC RBC AUTO-ENTMCNC: 32.9 G/DL (ref 32–36)
MCV RBC AUTO: 94 FL (ref 80–100)
NRBC BLD-RTO: 0 /100 WBCS (ref 0–0)
PHOSPHATE SERPL-MCNC: 2.1 MG/DL (ref 2.5–4.9)
PLATELET # BLD AUTO: 197 X10*3/UL (ref 150–450)
POTASSIUM SERPL-SCNC: 4.2 MMOL/L (ref 3.5–5.3)
RBC # BLD AUTO: 2.55 X10*6/UL (ref 4.5–5.9)
SODIUM SERPL-SCNC: 139 MMOL/L (ref 136–145)
WBC # BLD AUTO: 8.2 X10*3/UL (ref 4.4–11.3)

## 2024-01-28 PROCEDURE — 2500000004 HC RX 250 GENERAL PHARMACY W/ HCPCS (ALT 636 FOR OP/ED): Performed by: CLINICAL NURSE SPECIALIST

## 2024-01-28 PROCEDURE — 2500000001 HC RX 250 WO HCPCS SELF ADMINISTERED DRUGS (ALT 637 FOR MEDICARE OP): Performed by: STUDENT IN AN ORGANIZED HEALTH CARE EDUCATION/TRAINING PROGRAM

## 2024-01-28 PROCEDURE — 83735 ASSAY OF MAGNESIUM: CPT | Performed by: CLINICAL NURSE SPECIALIST

## 2024-01-28 PROCEDURE — 2500000001 HC RX 250 WO HCPCS SELF ADMINISTERED DRUGS (ALT 637 FOR MEDICARE OP): Performed by: CLINICAL NURSE SPECIALIST

## 2024-01-28 PROCEDURE — 85027 COMPLETE CBC AUTOMATED: CPT | Performed by: CLINICAL NURSE SPECIALIST

## 2024-01-28 PROCEDURE — 82947 ASSAY GLUCOSE BLOOD QUANT: CPT

## 2024-01-28 PROCEDURE — 80069 RENAL FUNCTION PANEL: CPT | Performed by: CLINICAL NURSE SPECIALIST

## 2024-01-28 PROCEDURE — 2500000001 HC RX 250 WO HCPCS SELF ADMINISTERED DRUGS (ALT 637 FOR MEDICARE OP)

## 2024-01-28 RX ORDER — METHOCARBAMOL 500 MG/1
500 TABLET, FILM COATED ORAL 3 TIMES DAILY PRN
Qty: 9 TABLET | Refills: 0 | Status: SHIPPED | OUTPATIENT
Start: 2024-01-28 | End: 2024-03-01 | Stop reason: WASHOUT

## 2024-01-28 RX ORDER — METOPROLOL TARTRATE 25 MG/1
12.5 TABLET, FILM COATED ORAL 2 TIMES DAILY
Qty: 30 TABLET | Refills: 0 | Status: SHIPPED | OUTPATIENT
Start: 2024-01-28 | End: 2024-03-04 | Stop reason: SDUPTHER

## 2024-01-28 RX ADMIN — METHOCARBAMOL 500 MG: 500 TABLET ORAL at 08:11

## 2024-01-28 RX ADMIN — Medication 1 TABLET: at 08:11

## 2024-01-28 RX ADMIN — POLYSACCHARIDE-IRON COMPLEX 150 MG: 150 CAPSULE ORAL at 08:11

## 2024-01-28 RX ADMIN — HEPARIN SODIUM 5000 UNITS: 5000 INJECTION INTRAVENOUS; SUBCUTANEOUS at 05:32

## 2024-01-28 RX ADMIN — ACETAMINOPHEN 650 MG: 325 TABLET ORAL at 05:32

## 2024-01-28 RX ADMIN — ASPIRIN 81 MG 81 MG: 81 TABLET ORAL at 08:11

## 2024-01-28 RX ADMIN — METOPROLOL TARTRATE 12.5 MG: 25 TABLET, FILM COATED ORAL at 08:11

## 2024-01-28 RX ADMIN — POTASSIUM PHOSPHATE, MONOBASIC 500 MG: 500 TABLET, SOLUBLE ORAL at 08:11

## 2024-01-28 ASSESSMENT — PAIN - FUNCTIONAL ASSESSMENT
PAIN_FUNCTIONAL_ASSESSMENT: 0-10
PAIN_FUNCTIONAL_ASSESSMENT: 0-10

## 2024-01-28 ASSESSMENT — PAIN SCALES - GENERAL
PAINLEVEL_OUTOF10: 0 - NO PAIN
PAINLEVEL_OUTOF10: 0 - NO PAIN

## 2024-01-28 ASSESSMENT — COGNITIVE AND FUNCTIONAL STATUS - GENERAL
MOBILITY SCORE: 24
DAILY ACTIVITIY SCORE: 24

## 2024-01-28 NOTE — DISCHARGE SUMMARY
Discharge Diagnosis  Mitral regurgitation s/p MV repair.     Issues Requiring Follow-Up  Follow up with cardiothoracic surgery post discharge   Follow up labs.   Follow up surgical pathology(mitral valve)    Test Results Pending At Discharge  Pending Labs       Order Current Status    CBC Collected (01/28/24 0835)    Magnesium In process    Renal Function Panel In process    Surgical Pathology Exam In process            Hospital Course  Jake Brunner is a 69 y.o. male with a PMH significant for HTN, HPL, prostate cancer, HILTON uses CPAP, CAD, cataracts and ascending aortic aneurysm repair in 2015 presented to [facility] on 1/23 Tfor a redo sternotomy MVR with Dr. Arboleda.    1/23/2024 OPERATION: by Amarjit Arboleda  Redo median sternotomy, mitral valve repair  While in the OR  Initially the mitral valve repair appeared excellent without any insufficiency.  However over time the patient developed DAINA of the mitral valve and severe mitral regurgitation.  Despite medical maneuvers the DAINA continued and the severe MR. The left atriotomy was reopened post removal of the annuloplasty band, the mitral valve appeared to be competent without any regurgitation.  therefore there was no annuloplasty band placed.      CTICU: uneventful post op course.  Transferred to the floor 1/24:     Floor Course:  Pre/Post echo : normal EF w/ mild AI  - Post OP echo: 1/25/2024  LVEF is hyperdynamic with a 75-80%. Mild AR. S/p TAA graft repair. There is no evidence of mitral regurgitation, and diastolic gradients are normal following reported leaflet repair. Compared with the prior study dated 1/23/2024 (intraoperative EULA), mitral regurgitation has decreased.  - pain well controlled (off oxycodone, on APAP scheduled).   - muscle spasms - PRN methocarbamol (TID PRN x 3 days)  - Patient was diuresed for fluid volume overload post cardiac surgery;   Preop weight: 96.5kg, discharge wt: 99.4 kg  - On ASA 81, rosuvastatin, zetia and metop 12.5 BID by  discharge  - Epicardial wires CUT on 1/27  - Telemetry at discharge : NSR  - 2v CXR done 1/26  - Postop echo done completed on 1/25  - Cardiac rehab referral was placed  - PT recs home  - Anticipate discharge to home with homecare    On day of discharge, vital signs were stable and no acute distress was noted. All questions were answered. After VS and labs were reviewed it was determined the patient was stable for discharge.     Discharged on 1/28  =========================    Hospital day of discharge management- spent >30 minutes coordinating the discharge and counseling/educating patient and family regarding discharge instructions.       Past Medical History:  Diagnosis Date  · Alcohol abuse, in remission    · Cataract    · Coronary artery disease       Hypertension     Severe mitral valve regurgitation.  · Hyperlipidemia    · Prostate cancer (CMS/HCC)   · Sleep apnea      Uses CPAP  · Vision loss      Wears contacts     Surgical History  Past Surgical History:  Procedure Laterality Date  · ANKLE SURGERY Left      Ankle Surgery  · ARTERIAL ANEURYSM REPAIR   12/2015    Aortic Aneurysm Repair Ascending Aorta  · CARDIAC CATHETERIZATION N/A 12/11/2023    Procedure: Left And Right Heart Cath, With LV;  Surgeon: Owen Choi MD;  Location: [facility] Cardiac Cath Lab;  Service: Cardiovascular;  Laterality: N/A;  Scheduled 12/11 @ 9:00am, EULA w/ anesthesia @ 7:30am  · COLONOSCOPY      · CT CHEST W AND WO IV CONTRAST   10/18/2023    Thoracic aortic atherosclerosis.  · FEMUR FRACTURE SURGERY   01/27/2016    Femur Repair  · LIPOMA RESECTION   2022    back  · OTHER SURGICAL HISTORY   01/27/2016    Wrist Surgery     Prescriptions Prior to Admission  Medications Prior to Admission  Medication Sig Dispense Refill Last Dose  · amLODIPine (Norvasc) 10 mg tablet Take 1 tablet (10 mg) by mouth once daily. (Patient taking differently: Take 0.5 tablets (5 mg) by mouth once daily.) 90 tablet 3    · aspirin 81 mg EC tablet Take 1 tablet  (81 mg) by mouth once daily.        · chlorhexidine (Hibiclens) 4 % external liquid Use as directed daily preoperatively 473 mL 0    · chlorhexidine (Peridex) 0.12 % solution Swish and spit. Do not swallow. Use the night before and morning of surgery as directed 15 mL 0    · ezetimibe (Zetia) 10 mg tablet Take 1 tablet (10 mg) by mouth once daily. 90 tablet 3    · losartan (Cozaar) 50 mg tablet Take 1 tablet (50 mg) by mouth once daily. DAILY 90 tablet 3    · metoprolol tartrate (Lopressor) 50 mg tablet Take 1.5 tablets by mouth 2 times a day. Discontinue Metoprolol Succinate 270 tablet 3    · multivitamin tablet Take 1 tablet by mouth once daily.        · rosuvastatin (Crestor) 40 mg tablet Take 1 tablet (40 mg) by mouth once daily. DAILY 90 tablet 2       Patient has no known allergies.    Social History   Tobacco Use  · Smoking status: Former      Types: Cigarettes  · Smokeless tobacco: Never  Vaping Use  · Vaping Use: Never used  Substance Use Topics  · Alcohol use: Never  · Drug use: Never     Cardiac Testing:      TTE: 11/09/2024  CONCLUSIONS:   1. Left ventricular systolic function is normal with a 65% estimated ejection fraction.   2. Abnormal septal motion consistent with post-operative status.   3. The left atrium is moderately dilated.   4. The right atrium is moderately dilated.   5. Flail segment of the posterior mitral valve leaflet.   6. Severe mitral valve regurgitation.   7. Mildly elevated RVSP.   8. Mild to moderate aortic valve regurgitation.     [facility]: 11/10/2024  CONCLUSIONS:   1. Moderate 2 vessel CAD in a right dominant system.   2. Elevated left and right heart filling pressures.   3. Mild pulmonary hypertension with a PVR of 1.2 Wood units.   4. No evidence of intracardiac shunt.   5. Preserved cardiac index with a normal .    Pertinent Physical Exam At Time of Discharge  Physical Exam  Constitutional:       General: He is not in acute distress.     Appearance: Normal appearance. He is  normal weight.   Cardiovascular:      Rate and Rhythm: Normal rate and regular rhythm.      Pulses: Normal pulses.      Heart sounds: No murmur heard.  Pulmonary:      Effort: Pulmonary effort is normal. No respiratory distress.      Breath sounds: Normal breath sounds.   Abdominal:      General: Bowel sounds are normal. There is no distension.      Palpations: Abdomen is soft.      Tenderness: There is no abdominal tenderness.   Musculoskeletal:      Right lower leg: No edema.      Left lower leg: No edema.   Skin:     General: Skin is warm and dry.   Neurological:      Mental Status: He is alert and oriented to person, place, and time. Mental status is at baseline.   Psychiatric:         Mood and Affect: Mood normal.         Home Medications     Medication List      START taking these medications     docusate sodium 100 mg capsule; Commonly known as: Colace; Take 1   capsule (100 mg) by mouth 2 times a day as needed for constipation.   methocarbamol 500 mg tablet; Commonly known as: Robaxin; Take 1 tablet   (500 mg) by mouth 3 times a day as needed for muscle spasms for up to 3   days.   polyethylene glycol 17 gram packet; Commonly known as: Glycolax,   Miralax; Take 17 g by mouth once daily as needed (constipation).     CHANGE how you take these medications     metoprolol tartrate 25 mg tablet; Commonly known as: Lopressor; Take 0.5   tablets (12.5 mg) by mouth 2 times a day.; What changed: medication   strength, how much to take, additional instructions     CONTINUE taking these medications     aspirin 81 mg EC tablet   ezetimibe 10 mg tablet; Commonly known as: Zetia; Take 1 tablet (10 mg)   by mouth once daily.   multivitamin tablet   rosuvastatin 40 mg tablet; Commonly known as: Crestor; Take 1 tablet (40   mg) by mouth once daily. DAILY     STOP taking these medications     amLODIPine 10 mg tablet; Commonly known as: Norvasc   chlorhexidine 0.12 % solution; Commonly known as: Peridex   chlorhexidine 4 %  external liquid; Commonly known as: Hibiclens   losartan 50 mg tablet; Commonly known as: Cozaar       Outpatient Follow-Up  Future Appointments   Date Time Provider Department [place]   2/8/2024  1:00 PM JACOBO Alfaro-CNP [id number] [facility]   2/12/2024  1:00 PM CARDSURG [facility] [id number] NURSE [id number] [facility]   3/7/2024  1:40 PM Amarjit Arboleda MD [id number] [facility]   4/3/2024  9:00 AM Neo Hanna MD [id number] [facility]   6/25/2024 11:00 AM Pacheco Buckner APRN-CNP [id number] [facility]       Tatiana Pérez MD

## 2024-01-29 LAB
LABORATORY COMMENT REPORT: NORMAL
PATH REPORT.FINAL DX SPEC: NORMAL
PATH REPORT.GROSS SPEC: NORMAL
PATH REPORT.RELEVANT HX SPEC: NORMAL
PATH REPORT.TOTAL CANCER: NORMAL

## 2024-02-02 ENCOUNTER — HOME CARE VISIT (OUTPATIENT)
Dept: HOME HEALTH SERVICES | Facility: HOME HEALTH | Age: 69
End: 2024-02-02
Payer: MEDICARE

## 2024-02-02 ENCOUNTER — TELEPHONE (OUTPATIENT)
Dept: CARDIOLOGY | Facility: HOSPITAL | Age: 69
End: 2024-02-02
Payer: MEDICARE

## 2024-02-02 VITALS
RESPIRATION RATE: 18 BRPM | HEART RATE: 80 BPM | SYSTOLIC BLOOD PRESSURE: 120 MMHG | DIASTOLIC BLOOD PRESSURE: 80 MMHG | TEMPERATURE: 98.3 F | OXYGEN SATURATION: 97 %

## 2024-02-02 LAB — RIGHT VENTRICLE PEAK SYSTOLIC PRESSURE: 10.7 MMHG

## 2024-02-02 PROCEDURE — 0023 HH SOC

## 2024-02-02 PROCEDURE — G0151 HHCP-SERV OF PT,EA 15 MIN: HCPCS

## 2024-02-02 ASSESSMENT — GAIT ASSESSMENTS
BALANCE AND GAIT SCORE: 20
WALKING STANCE: 0 - HEELS APART
PATH: 1 - MILD/MODERATE DEVIATION OR USES WALKING AID
STEP SYMMETRY: 1 - RIGHT AND LEFT STEP LENGTH APPEAR EQUAL
TRUNK SCORE: 1
INITIATION OF GAIT IMMEDIATELY AFTER GO: 0 - ANY HESITANCY OR MULTIPLE ATTEMPTS TO START
TRUNK: 1 - NO SWAY BUT FLEXION OF KNEES OR BACK OR SPREADS ARMS WHILE WALKING
GAIT SCORE: 8
PATH SCORE: 1
STEP CONTINUITY: 1 - STEPS APPEAR CONTINUOUS

## 2024-02-02 ASSESSMENT — BALANCE ASSESSMENTS
STANDING BALANCE: 1 - STEADY BUT WIDE STANCE AND USES CANE OR OTHER SUPPORT
IMMEDIATE STANDING BALANCE FIRST 5 SECONDS: 2 - STEADY WITHOUT WALKER OR OTHER SUPPORT
NUDGED: 2 - STEADY
ATTEMPTS TO ARISE: 2 - ABLE TO RISE, ONE ATTEMPT
BALANCE SCORE: 12
NUDGED SCORE: 2
SITTING DOWN: 1 - USES ARMS OR NOT SMOOTH MOTION
TURNING 360 DEGREES STEPS: 0 - DISCONTINUOUS STEPS
ARISES: 1 - ABLE, USES ARMS TO HELP
EYES CLOSED AT MAXIMUM POSITION NUDGED: 1 - STEADY
SITTING BALANCE: 1 - STEADY, SAFE
ARISING SCORE: 1

## 2024-02-02 ASSESSMENT — ENCOUNTER SYMPTOMS
PERSON REPORTING PAIN: PATIENT
DENIES PAIN: 1

## 2024-02-02 ASSESSMENT — ACTIVITIES OF DAILY LIVING (ADL)
AMBULATION ASSISTANCE ON FLAT SURFACES: 1
ENTERING_EXITING_HOME: STAND BY ASSIST
OASIS_M1830: 05

## 2024-02-02 NOTE — HOME HEALTH
S: This pt was referred to home health PT following hospitalization for mitral valve repair. He is demonstrating increased difficulty with mobility as a result.    B: Pt lives with his wife in a multistory house with 3 stairs to exit the building. His bedroom is on the 2nd floor with 13 stairs to navigate. Pt is not using any assistive device for all ambulation but gets short of breath and fatigues much more quickly than prior to current episode of care. Pt reports no recent falls and rates current pain level at 0/10. Medications reconciled in the home and educated on the risks involved with use of aspirin.    A: Pt presents with B LE weakness affecting gait / stairs and balance, as well as gait and balance impairment as illustrated by Tinetti score of 20/28. Pt ambulates without assistive device with SBA demonstrating impaired endurance. Most of visit spen attempting to contact physicians regarding pt's high HR without success. Left messages at surgeon's office which recommended contacting cardiologist's office. Cardiologist and his PA are not in today and left message there as well. Sent message via secure chat to PCP, cardiologist and PA regarding and did not receive a reply during visit. PT recommended pt go to ER to be assessed for high HR, but pt wanted to wait until he heard back from a doctor and to see if his HR would come back down. PT instructed pt in how to assess HR by using carotid artery, and continued to monitor using pulse-ox throughout visit. HR did finally come down to 80 after about an hour from initial assessment. PT instructed pt to continue to monitor and to go to ER if it rises again. Pictures with written instructions of seated B LE AROM exercises issued, practiced and reviewed for HEP with good return demo and good understanding.    R: Pt will benefit from skilled PT for LE strengthening to facilitate gait / stairs and balance, as well as transfer, gait and balance training to improve  functional mobility and independence.

## 2024-02-02 NOTE — TELEPHONE ENCOUNTER
Home care nurse called to say that he was visiting this patient and his heart rate was 144 which seemed high to him. He asked if we would call the patient back.  [phone]    Patient is post valve procedure and from what I can see in the discharge he is not on any anticoagulation medication?

## 2024-02-02 NOTE — TELEPHONE ENCOUNTER
"SP MVR. Initial HR was 144, came down to 84. Denies lightheadedness and dizziness. Has been experiencing palapations more frequently since the surgery.     He states the initial HR may have been taken too soon after activity with physical therapy. He has also been drinking \"not much\" water.     I instructed him to increase fluid intake, to attend appointment next week, and to go to the ED if dizziness, SOB, chest pain occurs, of if the palpitations last more than 30 minutes. He verbalized understanding.   "

## 2024-02-06 ENCOUNTER — HOME CARE VISIT (OUTPATIENT)
Dept: HOME HEALTH SERVICES | Facility: HOME HEALTH | Age: 69
End: 2024-02-06
Payer: MEDICARE

## 2024-02-06 VITALS
TEMPERATURE: 98.3 F | HEART RATE: 80 BPM | RESPIRATION RATE: 18 BRPM | SYSTOLIC BLOOD PRESSURE: 128 MMHG | DIASTOLIC BLOOD PRESSURE: 70 MMHG

## 2024-02-06 PROCEDURE — G0151 HHCP-SERV OF PT,EA 15 MIN: HCPCS

## 2024-02-07 ENCOUNTER — OFFICE VISIT (OUTPATIENT)
Dept: PRIMARY CARE | Facility: CLINIC | Age: 69
End: 2024-02-07
Payer: MEDICARE

## 2024-02-07 ENCOUNTER — LAB (OUTPATIENT)
Dept: LAB | Facility: LAB | Age: 69
End: 2024-02-07
Payer: MEDICARE

## 2024-02-07 VITALS
OXYGEN SATURATION: 94 % | WEIGHT: 207 LBS | DIASTOLIC BLOOD PRESSURE: 84 MMHG | HEART RATE: 80 BPM | SYSTOLIC BLOOD PRESSURE: 128 MMHG | TEMPERATURE: 97.5 F | BODY MASS INDEX: 31.37 KG/M2 | HEIGHT: 68 IN

## 2024-02-07 DIAGNOSIS — D64.9 ANEMIA, UNSPECIFIED TYPE: ICD-10-CM

## 2024-02-07 DIAGNOSIS — I34.0 NONRHEUMATIC MITRAL VALVE REGURGITATION: Primary | ICD-10-CM

## 2024-02-07 DIAGNOSIS — I34.0 NONRHEUMATIC MITRAL VALVE REGURGITATION: ICD-10-CM

## 2024-02-07 DIAGNOSIS — I05.9 MITRAL VALVE DISORDER: ICD-10-CM

## 2024-02-07 DIAGNOSIS — R00.2 PALPITATIONS: ICD-10-CM

## 2024-02-07 LAB
ALBUMIN SERPL BCP-MCNC: 4 G/DL (ref 3.4–5)
ALP SERPL-CCNC: 59 U/L (ref 33–136)
ALT SERPL W P-5'-P-CCNC: 23 U/L (ref 10–52)
ANION GAP SERPL CALC-SCNC: 13 MMOL/L (ref 10–20)
AST SERPL W P-5'-P-CCNC: 24 U/L (ref 9–39)
BASOPHILS # BLD AUTO: 0.13 X10*3/UL (ref 0–0.1)
BASOPHILS NFR BLD AUTO: 1.4 %
BILIRUB SERPL-MCNC: 0.5 MG/DL (ref 0–1.2)
BUN SERPL-MCNC: 16 MG/DL (ref 6–23)
CALCIUM SERPL-MCNC: 10.6 MG/DL (ref 8.6–10.6)
CHLORIDE SERPL-SCNC: 109 MMOL/L (ref 98–107)
CO2 SERPL-SCNC: 26 MMOL/L (ref 21–32)
CREAT SERPL-MCNC: 0.79 MG/DL (ref 0.5–1.3)
EGFRCR SERPLBLD CKD-EPI 2021: >90 ML/MIN/1.73M*2
EOSINOPHIL # BLD AUTO: 0.56 X10*3/UL (ref 0–0.7)
EOSINOPHIL NFR BLD AUTO: 5.9 %
ERYTHROCYTE [DISTWIDTH] IN BLOOD BY AUTOMATED COUNT: 15.7 % (ref 11.5–14.5)
GLUCOSE SERPL-MCNC: 95 MG/DL (ref 74–99)
HCT VFR BLD AUTO: 28.6 % (ref 41–52)
HGB BLD-MCNC: 9 G/DL (ref 13.5–17.5)
IMM GRANULOCYTES # BLD AUTO: 0.05 X10*3/UL (ref 0–0.7)
IMM GRANULOCYTES NFR BLD AUTO: 0.5 % (ref 0–0.9)
LYMPHOCYTES # BLD AUTO: 1.54 X10*3/UL (ref 1.2–4.8)
LYMPHOCYTES NFR BLD AUTO: 16.3 %
MCH RBC QN AUTO: 29.4 PG (ref 26–34)
MCHC RBC AUTO-ENTMCNC: 31.5 G/DL (ref 32–36)
MCV RBC AUTO: 94 FL (ref 80–100)
MONOCYTES # BLD AUTO: 0.95 X10*3/UL (ref 0.1–1)
MONOCYTES NFR BLD AUTO: 10.1 %
NEUTROPHILS # BLD AUTO: 6.2 X10*3/UL (ref 1.2–7.7)
NEUTROPHILS NFR BLD AUTO: 65.8 %
NRBC BLD-RTO: 0 /100 WBCS (ref 0–0)
PLATELET # BLD AUTO: 551 X10*3/UL (ref 150–450)
POTASSIUM SERPL-SCNC: 4.9 MMOL/L (ref 3.5–5.3)
PROT SERPL-MCNC: 6.5 G/DL (ref 6.4–8.2)
RBC # BLD AUTO: 3.06 X10*6/UL (ref 4.5–5.9)
SODIUM SERPL-SCNC: 143 MMOL/L (ref 136–145)
WBC # BLD AUTO: 9.4 X10*3/UL (ref 4.4–11.3)

## 2024-02-07 PROCEDURE — 1111F DSCHRG MED/CURRENT MED MERGE: CPT | Performed by: STUDENT IN AN ORGANIZED HEALTH CARE EDUCATION/TRAINING PROGRAM

## 2024-02-07 PROCEDURE — 1160F RVW MEDS BY RX/DR IN RCRD: CPT | Performed by: STUDENT IN AN ORGANIZED HEALTH CARE EDUCATION/TRAINING PROGRAM

## 2024-02-07 PROCEDURE — 84443 ASSAY THYROID STIM HORMONE: CPT

## 2024-02-07 PROCEDURE — 3074F SYST BP LT 130 MM HG: CPT | Performed by: STUDENT IN AN ORGANIZED HEALTH CARE EDUCATION/TRAINING PROGRAM

## 2024-02-07 PROCEDURE — 80053 COMPREHEN METABOLIC PANEL: CPT

## 2024-02-07 PROCEDURE — 1036F TOBACCO NON-USER: CPT | Performed by: STUDENT IN AN ORGANIZED HEALTH CARE EDUCATION/TRAINING PROGRAM

## 2024-02-07 PROCEDURE — 99214 OFFICE O/P EST MOD 30 MIN: CPT | Performed by: STUDENT IN AN ORGANIZED HEALTH CARE EDUCATION/TRAINING PROGRAM

## 2024-02-07 PROCEDURE — 3079F DIAST BP 80-89 MM HG: CPT | Performed by: STUDENT IN AN ORGANIZED HEALTH CARE EDUCATION/TRAINING PROGRAM

## 2024-02-07 PROCEDURE — 1159F MED LIST DOCD IN RCRD: CPT | Performed by: STUDENT IN AN ORGANIZED HEALTH CARE EDUCATION/TRAINING PROGRAM

## 2024-02-07 PROCEDURE — 1126F AMNT PAIN NOTED NONE PRSNT: CPT | Performed by: STUDENT IN AN ORGANIZED HEALTH CARE EDUCATION/TRAINING PROGRAM

## 2024-02-07 PROCEDURE — 3008F BODY MASS INDEX DOCD: CPT | Performed by: STUDENT IN AN ORGANIZED HEALTH CARE EDUCATION/TRAINING PROGRAM

## 2024-02-07 PROCEDURE — 85025 COMPLETE CBC W/AUTO DIFF WBC: CPT

## 2024-02-07 NOTE — HOME HEALTH
S: PT follow up visit. Pt being seen for decreased functional mobility related to     B: Pt reports he has been feeling much better overall. He has been working on the HEP and walking for exercise often throughout the day. He purchased a pulse-ox and has been monitoring his HR, which has been mostly good other than 1 period when it was in the 140s again and lasted a couple of hours, but after that it came down to around 80 and has been in that range since then. He reports no falls, no change in medications, and rates current pain level at 0/10. He has an appointment with his PCP tomorrow and then the cardiologist on Thursday. He will discuss cardiac rehab with them and then decide if he wants to continue with HH PT.    A: Pt instructed in and performed standing B LE AROM exercises 10x each. Incisions inspected and healing well with no seepage or signs of infection. Incisions fully approximated. Picture taken and uploaded to chart.    R: Pt demonstrating improved exercise tolerance and requires continued PT for further strengthening, gait and balance training to improve functional mobility and endurance.

## 2024-02-07 NOTE — PATIENT INSTRUCTIONS
Please stop at the lab ([address]) to complete your blood and/or urine work that I've ordered for you.    I will contact you with the results at my soonest convenience. I strongly urge you to use [employer] as this is the quickest and easiest way to access your results and receive my correspondences.      Follow up with your specialists as previously scheduled.

## 2024-02-07 NOTE — PROGRESS NOTES
"Subjective   Patient ID: Jake Brunner is a 69 y.o. male who presents for No chief complaint on file..    HPI   See excellent discharge summary from recent hospitalization. Briefly, admitted for mitral valve repair performed on 1/23/24. He is recovering nicely. He has some mild discomfort at the thoracotomy. Experiences mild Neff as he is recovering. Also with an anemia since surgery. Requesting repeat labs. Has cardiology follow up and presumably cardiac rehab soon. Working with PT at home with homecare in the interim.     PMHx, FHx, Social Hx, Surg Hx personally reviewed at this appointment. No pertinent findings and/or changes from prior (if applicable).    ROS: Denies wt gain/loss f/c HA LoC CP SOB NVDC. See HPI above, and scanned sheet (if applicable). All other systems are reviewed and are without complaint.       Review of Systems    Objective   /84   Pulse 80   Temp 36.4 °C (97.5 °F)   Ht 1.727 m (5' 8\")   Wt 93.9 kg (207 lb)   SpO2 94%   BMI 31.47 kg/m²     Physical Exam  Gen: obese, NAD. AAO x3.  HEENT: NC/AT. Anicteric sclera, symmetric pupils. MMM no thrush.  Neck: Soft, supple. No LAD. No goiter.  CV:  Soft systolic murmur, reduced in intensity compared to preop.  nl s1s2. Healing scar from thoracotomy.   Pulm: CTAB no w/r/r, good air exchange  GI: obese, soft NTND BS+ no hsm  Ext: WWP no edema  Neuro: II-XII grossly intact, nonfocal systemic findings  MSK: 5/5 strength b/l UE and LE  Gait: unremarkable     Lab Results   Component Value Date    WBC 8.2 01/28/2024    HGB 7.9 (L) 01/28/2024    HCT 24.0 (L) 01/28/2024     01/28/2024    CHOL 123 10/05/2023    TRIG 114 10/05/2023    HDL 43.5 10/05/2023    ALT 18 01/09/2024    AST 18 01/09/2024     01/28/2024    K 4.2 01/28/2024     01/28/2024    CREATININE 0.75 01/28/2024    BUN 14 01/28/2024    CO2 25 01/28/2024    TSH 1.92 02/16/2021    PSA 0.11 01/09/2024    INR 1.2 (H) 01/23/2024     par    Anesthesia Intraoperative " Transesophageal Echocardiogram  [facility] Dept of Anesthesiology, [address] [place] [address]                      Tel [phone] and Fax [phone]    TRANSESOPHAGEAL ECHOCARDIOGRAM REPORT       Patient Name:      WILLOW WILLINGHAMBLATT     Reading Physician: [id number] Williams Schwartz MD  Study Date:        1/23/2024            Ordering Provider: [id number] RAY SWANNALYSIA  MRN/PID:           [id number]             Fellow:  Accession#:        [id number]         Nurse:  Date of Birth/Age: 1955 / 68 years Sonographer:  Gender:            M                    Additional Staff:  BSA:               m2                   Encounter#:        [id number]    Study Type:    ANESTHESIA INTRAOPERATIVE EULA  Diagnosis/ICD: Nonrheumatic mitral (valve) insufficiency-I34.0    PHYSICIAN INTERPRETATION:  Left Ventricle: The left ventricular systolic function is normal, with an estimated ejection fraction of 55-60%. The left ventricular cavity size is normal. Left ventricular diastolic filling was not assessed.  Left Atrium: The left atrium is mildly dilated. There is no thrombus visualized in the left atrial appendage.  Right Ventricle: The right ventricle is normal in size. There is normal right ventricular global systolic function. RVSP 23 mmhg.  Right Atrium: The right atrium is normal in size.  Aortic Valve: The aortic valve is trileaflet. There is no evidence of aortic valve stenosis.  There is trivial aortic valve regurgitation.  Mitral Valve: The mitral valve is mildly thickened. There is severe mitral valve regurgitation. Posterior leaflet flail , aneriorly directed jet.  Tricuspid Valve: The tricuspid valve is structurally normal. There is trace tricuspid regurgitation.  Pulmonic Valve: The pulmonic valve is structurally normal. There is physiologic pulmonic valve regurgitation.  Pericardium: There is no pericardial effusion noted.  Aorta: The aortic root is  normal. There is no evidence of aortic dissection. The ascending Ao diameter is 3.0 cm. The descending aorta is classified as a Grade 3 [moderate atheroma >3-5 mm (no mobile/ulcerated component)] atherosclerosis.  In comparison to the previous echocardiogram(s): Not performed on intraoperative study.       CONCLUSIONS:   1. Left ventricular systolic function is normal with a 55-60% estimated ejection fraction.   2. Severe mitral valve regurgitation.   3. Aortic valve stenosis is not present.    POST CARDIOPULMONARY BYPASS REPORT:  Patient has a normal sinus rhythm. Patient is on no inotropic support. There is mild mitral regurgitation. No evidence of post aortic cannulation dissection.     QUANTITATIVE DATA SUMMARY:  TRICUSPID VALVE/RVSP:                              Normal Ranges:  Peak TR Velocity: 1.39 m/s  RV Syst Pressure: 10.7 mmHg (< 30mmHg)       [id number] Williams Schwartz MD  Electronically signed on 2/2/2024 at 10:24:23 AM       ** Final **      Assessment/Plan   # CV  1.  Mitral Regurge: improved since MVR  - continue current meds  - follow up cardiology  - anticipate cardiac rehab    2. HTN: at/near goal at home  - continue current regimen  - routine lab work if not recent  - continue lifestyle modifications     3.  H/o Ascending aorta dissection s/p surgery  - tight BP control  - follow up cardiology    4. HLD: stable  - lipid panel, ASCVD+ score based on these values if age appropriate  - continue statin    # Anemia: post-op  - recheck CBC     # Obesity: BMI > 30  - counselled on wt loss via diet, exercise, and other lifestyle modifications       # Prostate Ca: resolved  - follow up urology

## 2024-02-08 ENCOUNTER — TELEPHONE (OUTPATIENT)
Dept: CARDIOLOGY | Facility: HOSPITAL | Age: 69
End: 2024-02-08

## 2024-02-08 ENCOUNTER — HOSPITAL ENCOUNTER (OUTPATIENT)
Dept: CARDIOLOGY | Facility: HOSPITAL | Age: 69
Discharge: HOME | End: 2024-02-08
Payer: MEDICARE

## 2024-02-08 ENCOUNTER — OFFICE VISIT (OUTPATIENT)
Dept: CARDIOLOGY | Facility: HOSPITAL | Age: 69
End: 2024-02-08
Payer: MEDICARE

## 2024-02-08 VITALS
HEIGHT: 68 IN | HEART RATE: 81 BPM | WEIGHT: 206 LBS | DIASTOLIC BLOOD PRESSURE: 80 MMHG | SYSTOLIC BLOOD PRESSURE: 137 MMHG | OXYGEN SATURATION: 97 % | BODY MASS INDEX: 31.22 KG/M2

## 2024-02-08 DIAGNOSIS — Z51.89 ENCOUNTER FOR CARDIAC REHABILITATION: ICD-10-CM

## 2024-02-08 DIAGNOSIS — R00.2 PALPITATIONS: ICD-10-CM

## 2024-02-08 DIAGNOSIS — Z98.890 STATUS POST MITRAL VALVE REPAIR: ICD-10-CM

## 2024-02-08 DIAGNOSIS — Z98.890 STATUS POST MITRAL VALVE REPAIR: Primary | ICD-10-CM

## 2024-02-08 DIAGNOSIS — E78.2 MIXED HYPERLIPIDEMIA: Primary | ICD-10-CM

## 2024-02-08 LAB — TSH SERPL-ACNC: 1.71 MIU/L (ref 0.44–3.98)

## 2024-02-08 PROCEDURE — 1126F AMNT PAIN NOTED NONE PRSNT: CPT | Performed by: NURSE PRACTITIONER

## 2024-02-08 PROCEDURE — 1160F RVW MEDS BY RX/DR IN RCRD: CPT | Performed by: NURSE PRACTITIONER

## 2024-02-08 PROCEDURE — 93248 EXT ECG>7D<15D REV&INTERPJ: CPT | Performed by: INTERNAL MEDICINE

## 2024-02-08 PROCEDURE — 3008F BODY MASS INDEX DOCD: CPT | Performed by: NURSE PRACTITIONER

## 2024-02-08 PROCEDURE — 93010 ELECTROCARDIOGRAM REPORT: CPT | Performed by: INTERNAL MEDICINE

## 2024-02-08 PROCEDURE — 1036F TOBACCO NON-USER: CPT | Performed by: NURSE PRACTITIONER

## 2024-02-08 PROCEDURE — 93005 ELECTROCARDIOGRAM TRACING: CPT | Mod: 59 | Performed by: NURSE PRACTITIONER

## 2024-02-08 PROCEDURE — 99214 OFFICE O/P EST MOD 30 MIN: CPT | Mod: 25 | Performed by: NURSE PRACTITIONER

## 2024-02-08 PROCEDURE — 93246 EXT ECG>7D<15D RECORDING: CPT

## 2024-02-08 PROCEDURE — 3075F SYST BP GE 130 - 139MM HG: CPT | Performed by: NURSE PRACTITIONER

## 2024-02-08 PROCEDURE — 99214 OFFICE O/P EST MOD 30 MIN: CPT | Performed by: NURSE PRACTITIONER

## 2024-02-08 PROCEDURE — 3079F DIAST BP 80-89 MM HG: CPT | Performed by: NURSE PRACTITIONER

## 2024-02-08 PROCEDURE — 1111F DSCHRG MED/CURRENT MED MERGE: CPT | Performed by: NURSE PRACTITIONER

## 2024-02-08 PROCEDURE — 1159F MED LIST DOCD IN RCRD: CPT | Performed by: NURSE PRACTITIONER

## 2024-02-08 ASSESSMENT — ENCOUNTER SYMPTOMS
DEPRESSION: 0
OCCASIONAL FEELINGS OF UNSTEADINESS: 0
LOSS OF SENSATION IN FEET: 0

## 2024-02-08 NOTE — PATIENT INSTRUCTIONS
Cardiac Rehab referral   Cardiac rehab stress test  You must take an antibiotic before any dental procedures or cleanings  Continue current cardiovascular medications  Follow up in 3 months  Event monitor  Check TSH

## 2024-02-09 ENCOUNTER — TELEPHONE (OUTPATIENT)
Dept: CARDIAC REHAB | Facility: CLINIC | Age: 69
End: 2024-02-09
Payer: MEDICARE

## 2024-02-09 PROCEDURE — G0180 MD CERTIFICATION HHA PATIENT: HCPCS | Performed by: THORACIC SURGERY (CARDIOTHORACIC VASCULAR SURGERY)

## 2024-02-12 ENCOUNTER — TELEMEDICINE CLINICAL SUPPORT (OUTPATIENT)
Dept: CARDIAC SURGERY | Facility: HOSPITAL | Age: 69
End: 2024-02-12
Payer: MEDICARE

## 2024-02-12 NOTE — PROGRESS NOTES
Contacting Jake status post 1/23/24 mitral repair. Doing very well at home weight today 198 lbs. BP ranging 125-135/60-70, heart rate 80-85. Did have elevated heart rate 150 pcp placed holter monitor. Using spirometer moving 2500 cc. Occasional night sweats without fever or chills. Appetite good, sleeping well. Stated incisions intact. He will call to schedule his follow up echo. Confirmed our appointment 3/7/24. Jen Bobo RN

## 2024-02-14 ENCOUNTER — HOME CARE VISIT (OUTPATIENT)
Dept: HOME HEALTH SERVICES | Facility: HOME HEALTH | Age: 69
End: 2024-02-14
Payer: MEDICARE

## 2024-02-14 VITALS
HEART RATE: 82 BPM | RESPIRATION RATE: 16 BRPM | TEMPERATURE: 97.8 F | SYSTOLIC BLOOD PRESSURE: 140 MMHG | DIASTOLIC BLOOD PRESSURE: 70 MMHG

## 2024-02-14 PROCEDURE — G0151 HHCP-SERV OF PT,EA 15 MIN: HCPCS

## 2024-02-15 NOTE — HOME HEALTH
S: PT follow up visit. Pt being seen for decreased functional mobility related to mitral valve repair.    B: Pt reports he has been feeling much better and has not had any incidents of high HR of late. He is wearing a heart monitor but it is currently not working and he has to go in this afternoon to have it replaced. He has been working on his HEP daily.  reports no falls, no change in medications, and rates current pain level at 0/10. His cardiologist placed and order for him to start cardiac rehab in 2 weeks, and he will call to schedule this afternoon.    A: Pt instructed in and performed seated B LE PREs, standing rows, B trunk rotation and B shoulder ER with scapular retraction using green band 10x each. Instructed in and performed doorway pect stretch 3 x 20 seconds. Incision inspected, healing well and fully approximated with no signs of infection. Picture taken and uploaded to chart.    R: Pt demonstrating improved exercise tolerance and requires continued PT for further strengthening, gait and balance training to improve functional mobility and endurance.

## 2024-02-19 ENCOUNTER — CLINICAL SUPPORT (OUTPATIENT)
Dept: CARDIAC REHAB | Facility: CLINIC | Age: 69
End: 2024-02-19
Payer: MEDICARE

## 2024-02-19 VITALS
DIASTOLIC BLOOD PRESSURE: 72 MMHG | BODY MASS INDEX: 30.01 KG/M2 | OXYGEN SATURATION: 98 % | HEART RATE: 82 BPM | SYSTOLIC BLOOD PRESSURE: 134 MMHG | WEIGHT: 198 LBS | HEIGHT: 68 IN

## 2024-02-19 DIAGNOSIS — Z98.890 STATUS POST MITRAL VALVE REPAIR: ICD-10-CM

## 2024-02-19 ASSESSMENT — DUKE ACTIVITY SCORE INDEX (DASI)
CAN YOU DO MODERATE WORK AROUND THE HOUSE LIKE VACUUMING, SWEEPING FLOORS OR CARRYING GROCERIES: YES
CAN YOU PARTICIPATE IN STRENOUS SPORTS LIKE SWIMMING, SINGLES TENNIS, FOOTBALL, BASKETBALL, OR SKIING: NO
CAN YOU RUN A SHORT DISTANCE: NO
CAN YOU WALK INDOORS, SUCH AS AROUND YOUR HOUSE: YES
CAN YOU DO HEAVY WORK AROUND THE HOUSE LIKE SCRUBBING FLOORS OR LIFTING AND MOVING HEAVY FURNITURE: NO
TOTAL_SCORE: 24.2
CAN YOU PARTICIPATE IN MODERATE RECREATIONAL ACTIVITIES LIKE GOLF, BOWLING, DANCING, DOUBLES TENNIS OR THROWING A BASEBALL OR FOOTBALL: NO
CAN YOU WALK A BLOCK OR TWO ON LEVEL GROUND: YES
DASI METS SCORE: 5.7
CAN YOU TAKE CARE OF YOURSELF (EAT, DRESS, BATHE, OR USE TOILET): YES
CAN YOU DO LIGHT WORK AROUND THE HOUSE LIKE DUSTING OR WASHING DISHES: YES
CAN YOU HAVE SEXUAL RELATIONS: YES
CAN YOU DO YARD WORK LIKE RAKING LEAVES, WEEDING OR PUSHING A MOWER: NO
CAN YOU CLIMB A FLIGHT OF STAIRS OR WALK UP A HILL: YES

## 2024-02-19 ASSESSMENT — PATIENT HEALTH QUESTIONNAIRE - PHQ9
4. FEELING TIRED OR HAVING LITTLE ENERGY: MORE THAN HALF THE DAYS
1. LITTLE INTEREST OR PLEASURE IN DOING THINGS: NOT AT ALL
5. POOR APPETITE OR OVEREATING: NOT AT ALL
SUM OF ALL RESPONSES TO PHQ9 QUESTIONS 1 & 2: 0
8. MOVING OR SPEAKING SO SLOWLY THAT OTHER PEOPLE COULD HAVE NOTICED. OR THE OPPOSITE, BEING SO FIGETY OR RESTLESS THAT YOU HAVE BEEN MOVING AROUND A LOT MORE THAN USUAL: NOT AT ALL
6. FEELING BAD ABOUT YOURSELF - OR THAT YOU ARE A FAILURE OR HAVE LET YOURSELF OR YOUR FAMILY DOWN: NOT AT ALL
9. THOUGHTS THAT YOU WOULD BE BETTER OFF DEAD, OR OF HURTING YOURSELF: NOT AT ALL
7. TROUBLE CONCENTRATING ON THINGS, SUCH AS READING THE NEWSPAPER OR WATCHING TELEVISION: NOT AT ALL
3. TROUBLE FALLING OR STAYING ASLEEP OR SLEEPING TOO MUCH: NOT AT ALL
SUM OF ALL RESPONSES TO PHQ QUESTIONS 1-9: 2
2. FEELING DOWN, DEPRESSED OR HOPELESS: NOT AT ALL
SUM OF ALL RESPONSES TO PHQ QUESTIONS 1-9: 2

## 2024-02-19 NOTE — PROGRESS NOTES
"Chief Complaint:   Mitral Valve Repair, SOB (improving), and Palpitations     History Of Present Illness:    Jake Brunner is a 69 y.o. male presenting for follow up. Seen in collaboration with Dr. Choi. He was admitted at [facility] 1/23/24 to 1/28/24 for redo MVR with Dr. Arboleda. Of note, while in the OR he developed DAINA of the mitral valve and severe mitral regurgitation. Despite medical maneuvers the DAINA continued and the severe MR. The left atriotomy was reopened post removal of the annuloplasty band, the mitral valve appeared to be competent without any regurgitation. Post operative echocardiogram showed normal LV function of 75-80%, mild AI and no mitral valve regurgitation. He was diuresed post operatively. Since discharge he has noted a chest tightness that resolves with Tylenol. He has been able to ambulate up a flight of stairs without dyspnea or chest pain. He has been walking for 10 minutes three times a day without chest pain or dyspnea. He has dyspnea only when talking for a prolonged period of time that has been improving. Approximately 1.5 weeks ago he was working with physical therapy at which time heart rate was noted to 150 bpm on pulse oximetry. He subsequently had another episode a few days later noting heart to be around 150 bpm persisting for 1 hour. Normally heart rate has been 72 to 90 bpm. He has lost 12 pounds since surgery. His systolic blood pressure has been 127 to 137 at home. Denies  orthopnea, pnd, lightheadedness, dizziness, syncope, lower extremity edema, or bleeding issues.       Last Recorded Vitals:  Vitals:    02/08/24 1312   BP: 137/80   BP Location: Left arm   Patient Position: Sitting   BP Cuff Size: Adult   Pulse: 81   SpO2: 97%   Weight: 93.4 kg (206 lb)   Height: 1.727 m (5' 8\")       Past Medical History:  He has a past medical history of Alcohol abuse, in remission, Cataract, Closed displaced comminuted fracture of shaft of right tibia (07/31/2019), " Closed displaced fracture of body of right scapula (07/31/2019), Closed displaced fracture of shaft of right clavicle (07/31/2019), Coronary artery disease, Essential (primary) hypertension, Heart valve disease, History of transesophageal echocardiography (EULA) (12/11/2023), Hyperlipidemia, Prostate cancer (CMS/HCC) (01/09/2024), Shortness of breath, Sleep apnea, and Vision loss.    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      Social History:  He reports that he has quit smoking. His smoking use included cigarettes. He has never used smokeless tobacco. He reports that he does not drink alcohol and does not use drugs.    Family History:  Family History   Problem Relation Name Age of Onset    Hyperlipidemia Mother      Coronary artery disease Father      Other (MYOCARDIAL INFARCTION) Father      Heart attack Father          Allergies:  Patient has no known allergies.    Outpatient Medications:  Current Outpatient Medications   Medication Instructions    aspirin 81 mg EC tablet 1 tablet, oral, Daily    ezetimibe (ZETIA) 10 mg, oral, Daily    methocarbamol (ROBAXIN) 500 mg, oral, 3 times daily PRN    metoprolol tartrate (LOPRESSOR) 12.5 mg, oral, 2 times daily    multivitamin tablet 1 tablet, oral, Daily    rosuvastatin (CRESTOR) 40 mg, oral, Daily, DAILY       Physical Exam:  GENERAL: alert, cooperative, pleasant, in no acute distress  SKIN: warm and dry  NECK: Normal JVD, negative HJR  CARDIAC: Regular rate and rhythm with 2/6 holosystolic murmur at apex  CHEST: Normal respiratory efforts, lungs clear to auscultation bilaterally.  ABDOMEN: soft, nontender, nondistended  EXTREMITIES: no edema, +2 palpable RP bilaterally       Last  "Labs:  CBC -  Lab Results   Component Value Date    WBC 9.4 02/07/2024    HGB 9.0 (L) 02/07/2024    HCT 28.6 (L) 02/07/2024    MCV 94 02/07/2024     (H) 02/07/2024       CMP -  Lab Results   Component Value Date    CALCIUM 10.6 02/07/2024    PHOS 2.1 (L) 01/28/2024    PROT 6.5 02/07/2024    ALBUMIN 4.0 02/07/2024    AST 24 02/07/2024    ALT 23 02/07/2024    ALKPHOS 59 02/07/2024    BILITOT 0.5 02/07/2024       LIPID PANEL -   Lab Results   Component Value Date    CHOL 123 10/05/2023    TRIG 114 10/05/2023    HDL 43.5 10/05/2023    CHHDL 2.8 10/05/2023    LDLF 51 06/01/2022    VLDL 23 10/05/2023    NHDL 80 10/05/2023       RENAL FUNCTION PANEL -   Lab Results   Component Value Date    GLUCOSE 95 02/07/2024     02/07/2024    K 4.9 02/07/2024     (H) 02/07/2024    CO2 26 02/07/2024    ANIONGAP 13 02/07/2024    BUN 16 02/07/2024    CREATININE 0.79 02/07/2024    GFRMALE >90 07/11/2023    CALCIUM 10.6 02/07/2024    PHOS 2.1 (L) 01/28/2024    ALBUMIN 4.0 02/07/2024        No results found for: \"BNP\", \"HGBA1C\"    Last Cardiology Tests:  ECG:  Obtained and reviewed EKG- normal sinus rhythm HR 81, possible left atrial enlargement, T wave abnormality consider lateral ischemia.     Echo:  Transthoracic Echo (TTE) Complete 01/25/2024  CONCLUSIONS:   1. Poorly visualized anatomical structures due to suboptimal image quality.   2. Left ventricular systolic function is hyperdynamic with a 75-80% estimated ejection fraction.   3. Mild aortic valve regurgitation.   4. Dilation of the ascending aorta with evidence of graft repair. The graft was not satisfactorily visualized.   5. Mitral valve leaflets are not well visualized. However there is no evidence of mitral regurgitation, and diastolic gradients are normal following reported leaflet repair.   6. Compared with the prior study dated 1/23/2024 (intraoperative EULA), mitral regurgitation has decreased. Right venticular function could not be confidently assessed in " the current study.    Ejection Fractions:  EF   Date/Time Value Ref Range Status   01/25/2024 03:54 PM 76 %        12/18/23 left and right heart cath  Coronary Lesion Summary:  Vessel          Stenosis     Vessel Segment  LAD           40% stenosis      proximal  LAD           40% stenosis        mid  LAD          50-60% stenosis mid to distal  2nd Diagonal  30% stenosis      proximal  Circumflex    50% stenosis        mid  OM 2          40% stenosis        mid    CONCLUSIONS:   1. Moderate 2 vessel CAD in a right dominant system.   2. Elevated left and right heart filling pressures.   3. Mild pulmonary hypertension with a PVR of 1.2 Wood units.   4. No evidence of intracardiac shunt.   5. Preserved cardiac index with a normal .        Assessment/Plan   Problem List Items Addressed This Visit          Cardiac and Vasculature    Hyperlipidemia - Primary    Relevant Orders    ECG 12 lead (Clinic Performed) (Completed)    Status post mitral valve repair    Relevant Orders    Stress Test     Other Visit Diagnoses       Palpitations        Relevant Orders    TSH with reflex to Free T4 if abnormal (Completed)    Holter or Event Cardiac Monitor    Encounter for cardiac rehabilitation        Relevant Orders    Stress Test          In summary Mr. Brunner is a pleasant 69 year-old white male with a past medical history significant for type A aortic dissection status post replacement of his ascending aorta, hypertension, hyperlipidemia, postoperative atrial fibrillation, mitral regurgitation, coronary atherosclerosis on CT, hepatic steatosis, alcohol abuse, and remote traumatic subdural hematoma. He was admitted at [facility] 1/23/24 to 1/28/24 for redo MVR with Dr. Arboleda. Of note, while in the OR he developed DAINA of the mitral valve and severe mitral regurgitation. Despite medical maneuvers the DAINA continued and the severe MR. The left atriotomy was reopened post removal of the annuloplasty band, the  mitral valve appeared to be competent without any regurgitation. Post operative echocardiogram showed normal LV function of 75-80%, mild AI and no mitral valve regurgitation. Since discharge he has been feeling better. He has mild dyspnea only when talking for a prolonged period of time that has been improving. I did order an event monitor for surveillance of an arrhythmia as his heart rate was noted be 150 bpm on two different occasions. I have also ordered a TSH. I have referred him to cardiac rehab. He will follow up with cardiac surgery as scheduled. I have educated him he should have antibiotic prior to any dental procedures or cleanings. He will continue current cardiovascular medications. He will follow up in 3 months.       Diana Alcantar, APRN-CNP

## 2024-02-19 NOTE — PROGRESS NOTES
"  Cardiac Rehabilitation Initial Treatment Plan    Name: Jake Brunner  Medical Record Number: [id number]  YOB: 1955  Age: 69 y.o.    Today’s Date: 2/19/2024  Primary Care Physician: Neo Hanna MD  Referring Physician: Owen Choi MD  Program Location: [address]     General  Primary Diagnosis:   1. Status post mitral valve repair  Referral to Cardiac Rehab         Onset/Date of Diagnosis: 1/23/2024    Initial Assessment, not yet started program.    AACVPR Risk Stratification:  moderate    Falls Risk: Medium  Psychosocial Assessment   Pre: 1      Sent PH-Q 9 to MD if score > 20: No; score < 20    Pt reported/currently experiencing stress: No  Patient uses stress management skills: Yes   History of: no history of anxiety or depression  Currently seeing a mental health provider: No  Social Support: Yes, Whom:family  Quality of Life Survey: SF-36   SF-36 Pre Post   Physical Component Score  TBD   Mental Component Score  TBD     Learning Assessment:  Learning assessment/barriers: Work  Preferred learning method: Visual  Barriers: None  Comments:    Stages of Change:Preparation    Psychosocial Plan    Goal Status: Initial Assessment; goals not yet started    Psychosocial Goals: Maintain or lower PH-Q 9 score by discharge    Psychosocial Interventions/Education: To be done in Cardiac Rehab.    Nutrition Assessment:    Hyperlipidemia: Yes     Lipids:   Lab Results   Component Value Date    CHOL 123 10/05/2023    HDL 43.5 10/05/2023    LDLF 51 06/01/2022    TRIG 114 10/05/2023       Current Dietary Guidelines: Low fat, Low sodium  Barriers to dietary change: no    Diet Habit Survey: Picture Your Plate  Pre: 73  Post: To be done at discharge.    Diabetes Assessment    No results found for: \"HGBA1C\"    History of Diabetes: No    Weight Management       No data recorded    Nutrition Plan    Goal Status: Initial Assessment; goals not yet started    Nutrition Goals: Lipid Goal: HDL>45, LDL " <70, Total <180, Trigs <150, Improve Diet Habit Survey score by 5-10 points by discharge, Adapt a low-sodium, DASH diet prior to discharge, Adapt a Mediterranean focused diet prior to discharge, and Lose 1lb/week while enrolled in program    Nutrition Interventions/Education:   To be done in Cardiac Rehab.      Exercise Assessment    No  Mode: NA  Frequency: NA  Duration: NA    Exercise Prescription     Exercise Prescription based on: Pre-rehab stress test   Frequency:  3 days/week   Mode: Treadmill, NuStep, and Recumbent Cycle   Duration: 30 total aerobic minutes   Intensity: RPE 12-16  Target HR:  To be calculated after 6 attended sessions.  MET Level: 2.9  Patient wears supplemental O2:      Modality Workload METs Duration (minutes)   1 Pre-Exercise   2:00   2 Treadmill 2.4  2.9 8 :00   3 Recumbent Bike 4@55  2.9 8 :00   4 NuStep 4@62  2.9 8 :00   5 Cooldown    5 :00   6 Post-Exercise   2:00     Resistance Training: No   Home Exercise Prescription given: To be given at session # 6    Exercise Plan    Goal Status: Initial Assessment; goals not yet started    Exercise Goals: Increase exercise MET level by 5-10% each week, Increase total exercise duration to 30-45 minutes, and Obtain 150 minutes/week of moderate intensity aerobic exercise    Exercise Interventions/Education:   To be done in Cardiac Rehab.      Other Core Components/Risk Factor Assessment:    Medication adherence  Current Medications:   Medication Documentation Review Audit       Reviewed by Neo Hanna MD (Physician) on 02/07/24 at 1103      Medication Order Taking? Sig Documenting Provider Last Dose Status   aspirin 81 mg EC tablet [id number] No Take 1 tablet by mouth once daily. Historical Provider, MD 1/10/2024 Active   docusate sodium (Colace) 100 mg capsule [id number]  Take 1 capsule (100 mg) by mouth 2 times a day as needed for constipation. Kinza Quinones, APRN-CNP  Active   ezetimibe (Zetia) 10 mg tablet [id number] No Take 1 tablet (10 mg)  by mouth once daily. Neo Hanna MD 1/10/2024 Active   methocarbamol (Robaxin) 500 mg tablet [id number]  Take 1 tablet (500 mg) by mouth 3 times a day as needed for muscle spasms for up to 3 days. Tatiana Pérez MD   24 2359   metoprolol tartrate (Lopressor) 25 mg tablet [id number]  Take 0.5 tablets (12.5 mg) by mouth 2 times a day. Tatiana Pérez MD  Active   multivitamin tablet [id number] No Take 1 tablet by mouth once daily. Krishna Blue MD 1/10/2024 Active   polyethylene glycol (Glycolax, Miralax) 17 gram packet [id number]  Take 17 g by mouth once daily as needed (constipation). Kinza Quinones, APRN-CNP  Active   rosuvastatin (Crestor) 40 mg tablet [id number] No Take 1 tablet (40 mg) by mouth once daily. DAILY Neo Hanna MD 1/10/2024 Active                                 Medication compliance: Yes   Uses pill box/organizer: Yes    Carries medication list: Yes     Blood Pressure Management  History of Hypertension: Yes   Medication Changes: Yes   Resting BP:  There were no vitals taken for this visit.     Heart Failure Management  Hx of Heart Failure: No    Smoking/Tobacco Assessment  Social History     Tobacco Use   Smoking Status Former    Types: Cigarettes   Smokeless Tobacco Never       Other Core Component Plan    Goal Status: Initial Assessment; goals not yet started    Other Core Component Goals: Verbalize medication usage and drug actions by discharge    Other Core Component Interventions/Education:   Verbalize medication usage and drug actions by discharge and Achieve resting BP of < 130/80 by discharge    Individual Patient Goals:    Be able to do the rower 3-5 days a week  for 15 min by discharge  Establish a regular aerobic activity for 30 min 5  days a week by discharge    Goal Status: Initial Assessment; goals not yet started    Staff Comments:      Rehab Staff Signature: Vee Berry RN

## 2024-02-20 DIAGNOSIS — Z98.890 STATUS POST MITRAL VALVE REPAIR: Primary | ICD-10-CM

## 2024-02-21 ENCOUNTER — HOME CARE VISIT (OUTPATIENT)
Dept: HOME HEALTH SERVICES | Facility: HOME HEALTH | Age: 69
End: 2024-02-21
Payer: MEDICARE

## 2024-02-21 VITALS
HEART RATE: 77 BPM | DIASTOLIC BLOOD PRESSURE: 72 MMHG | RESPIRATION RATE: 16 BRPM | TEMPERATURE: 98 F | SYSTOLIC BLOOD PRESSURE: 122 MMHG

## 2024-02-21 PROCEDURE — G0151 HHCP-SERV OF PT,EA 15 MIN: HCPCS

## 2024-02-21 ASSESSMENT — ACTIVITIES OF DAILY LIVING (ADL)
HOME_HEALTH_OASIS: 00
OASIS_M1830: 00

## 2024-02-21 ASSESSMENT — BALANCE ASSESSMENTS
NUDGED: 2 - STEADY
NUDGED SCORE: 2
BALANCE SCORE: 15
ARISING SCORE: 1
SITTING BALANCE: 1 - STEADY, SAFE
TURNING 360 DEGREES STEPS: 1 - CONTINUOUS STEPS
SITTING DOWN: 2 - SAFE, SMOOTH MOTION
STANDING BALANCE: 2 - NARROW STANCE WITHOUT SUPPORT
IMMEDIATE STANDING BALANCE FIRST 5 SECONDS: 2 - STEADY WITHOUT WALKER OR OTHER SUPPORT
ATTEMPTS TO ARISE: 2 - ABLE TO RISE, ONE ATTEMPT
ARISES: 1 - ABLE, USES ARMS TO HELP
EYES CLOSED AT MAXIMUM POSITION NUDGED: 1 - STEADY

## 2024-02-21 ASSESSMENT — GAIT ASSESSMENTS
PATH: 2 - STRAIGHT WITHOUT WALKING AID
WALKING STANCE: 1 - HEELS ALMOST TOUCHING WHILE WALKING
GAIT SCORE: 12
STEP SYMMETRY: 1 - RIGHT AND LEFT STEP LENGTH APPEAR EQUAL
INITIATION OF GAIT IMMEDIATELY AFTER GO: 1 - NO HESITANCY
TRUNK: 2 - NO SWAY, NO FLEXION, NO USE OF ARMS, NO WALKING AID
BALANCE AND GAIT SCORE: 27
TRUNK SCORE: 2
STEP CONTINUITY: 1 - STEPS APPEAR CONTINUOUS
PATH SCORE: 2

## 2024-02-21 ASSESSMENT — ENCOUNTER SYMPTOMS
PERSON REPORTING PAIN: PATIENT
DENIES PAIN: 1

## 2024-02-21 NOTE — HOME HEALTH
S: PT reassessment for discharge. Pt being seen for decreased mobility related to mitral valve repair.     B: Pt reports he has been feeling very good of late, walking around a lot. He reports no falls, no change in medications, and rates current pain level at . Pt has pictoral handouts for HEP and has been doing them daily. He notices particular relief from the doorway pectoralis stretch. He is scheduled to start cardiac rehab this Friday.    A: Pt demonstrates good improvement in functional mobility as illustrated by improved gait technique, independence and endurance and improved Tinetti score from 19/28 on SOC to 27/28 today. Pt ambulates safely and independently without assistive device within the house. He is comfortable and independent with HEP and has pictoral handouts for reference. He is appropriate for discharge from home health at this time and transition to cardiac rehab.    P: Pt has met goals and is in agreement with discharge from home health PT today.

## 2024-02-21 NOTE — Clinical Note
Pt discharged from home health PT today due to goals met. He is doing very well with mobility. He is scheduled to start cardiac rehab this Friday.

## 2024-02-23 ENCOUNTER — CLINICAL SUPPORT (OUTPATIENT)
Dept: CARDIAC REHAB | Facility: CLINIC | Age: 69
End: 2024-02-23
Payer: MEDICARE

## 2024-02-23 DIAGNOSIS — Z98.890 STATUS POST MITRAL VALVE REPAIR: ICD-10-CM

## 2024-02-23 PROCEDURE — 93798 PHYS/QHP OP CAR RHAB W/ECG: CPT | Mod: KX | Performed by: INTERNAL MEDICINE

## 2024-02-26 ENCOUNTER — APPOINTMENT (OUTPATIENT)
Dept: CARDIAC REHAB | Facility: CLINIC | Age: 69
End: 2024-02-26
Payer: MEDICARE

## 2024-02-26 ENCOUNTER — HOSPITAL ENCOUNTER (OUTPATIENT)
Dept: CARDIOLOGY | Facility: HOSPITAL | Age: 69
Discharge: HOME | End: 2024-02-26
Payer: MEDICARE

## 2024-02-26 DIAGNOSIS — Z98.890 STATUS POST MITRAL VALVE REPAIR: ICD-10-CM

## 2024-02-26 DIAGNOSIS — I34.89 OTHER NONRHEUMATIC MITRAL VALVE DISORDERS: ICD-10-CM

## 2024-02-26 DIAGNOSIS — Z51.89 ENCOUNTER FOR CARDIAC REHABILITATION: ICD-10-CM

## 2024-02-26 LAB
ATRIAL RATE: 81 BPM
P AXIS: 25 DEGREES
P OFFSET: 197 MS
P ONSET: 138 MS
PR INTERVAL: 156 MS
Q ONSET: 216 MS
QRS COUNT: 14 BEATS
QRS DURATION: 86 MS
QT INTERVAL: 358 MS
QTC CALCULATION(BAZETT): 415 MS
QTC FREDERICIA: 395 MS
R AXIS: 8 DEGREES
T AXIS: -19 DEGREES
T OFFSET: 395 MS
VENTRICULAR RATE: 81 BPM

## 2024-02-26 PROCEDURE — 93018 CV STRESS TEST I&R ONLY: CPT | Performed by: INTERNAL MEDICINE

## 2024-02-26 PROCEDURE — 93016 CV STRESS TEST SUPVJ ONLY: CPT | Performed by: INTERNAL MEDICINE

## 2024-02-26 PROCEDURE — 93017 CV STRESS TEST TRACING ONLY: CPT

## 2024-02-28 ENCOUNTER — CLINICAL SUPPORT (OUTPATIENT)
Dept: CARDIAC REHAB | Facility: CLINIC | Age: 69
End: 2024-02-28
Payer: MEDICARE

## 2024-02-28 DIAGNOSIS — Z98.890 STATUS POST MITRAL VALVE REPAIR: ICD-10-CM

## 2024-02-28 PROCEDURE — 93798 PHYS/QHP OP CAR RHAB W/ECG: CPT

## 2024-03-01 ENCOUNTER — CLINICAL SUPPORT (OUTPATIENT)
Dept: CARDIAC REHAB | Facility: CLINIC | Age: 69
End: 2024-03-01
Payer: MEDICARE

## 2024-03-01 DIAGNOSIS — Z98.890 STATUS POST MITRAL VALVE REPAIR: ICD-10-CM

## 2024-03-01 PROBLEM — D64.9 ANEMIA: Status: RESOLVED | Noted: 2024-02-07 | Resolved: 2024-03-01

## 2024-03-01 PROBLEM — F10.11 HISTORY OF ALCOHOL ABUSE: Status: RESOLVED | Noted: 2024-03-01 | Resolved: 2024-03-01

## 2024-03-01 PROBLEM — R00.2 PALPITATIONS: Status: RESOLVED | Noted: 2024-02-07 | Resolved: 2024-03-01

## 2024-03-01 PROBLEM — Z86.79 HISTORY OF THORACIC AORTIC ANEURYSM REPAIR: Status: ACTIVE | Noted: 2023-01-22

## 2024-03-01 PROCEDURE — 93798 PHYS/QHP OP CAR RHAB W/ECG: CPT

## 2024-03-01 NOTE — PROGRESS NOTES
Jake returns status post 1/23/24 redo sternotomy mitral valve repair. Jen Bobo RN    Patient returns to clinic 6 weeks status post redo median sternotomy and mitral valve repair.  Patient is feeling well without complaints.  Patient began cardiac rehab about 1 week ago.  On physical exam his incision is well-healed and his sternum is stable.  Patient will get a chest x-ray and we are waiting the reading of his echocardiogram.  However his echocardiogram prior to discharge demonstrated a well-functioning repaired mitral valve without any insufficiency.  At this time patient may resume his normal activities including exercising, driving, and cardiac rehab.  Patient instructed to return to clinic as needed.

## 2024-03-04 ENCOUNTER — CLINICAL SUPPORT (OUTPATIENT)
Dept: CARDIAC REHAB | Facility: CLINIC | Age: 69
End: 2024-03-04
Payer: MEDICARE

## 2024-03-04 DIAGNOSIS — Z98.890 S/P MVR (MITRAL VALVE REPAIR): ICD-10-CM

## 2024-03-04 DIAGNOSIS — Z98.890 STATUS POST MITRAL VALVE REPAIR: ICD-10-CM

## 2024-03-04 PROCEDURE — 93798 PHYS/QHP OP CAR RHAB W/ECG: CPT | Mod: KX

## 2024-03-04 RX ORDER — METOPROLOL TARTRATE 25 MG/1
12.5 TABLET, FILM COATED ORAL 2 TIMES DAILY
Qty: 30 TABLET | Refills: 0 | Status: SHIPPED | OUTPATIENT
Start: 2024-03-04 | End: 2024-03-18 | Stop reason: SDUPTHER

## 2024-03-05 ENCOUNTER — HOSPITAL ENCOUNTER (OUTPATIENT)
Dept: CARDIOLOGY | Facility: HOSPITAL | Age: 69
Discharge: HOME | End: 2024-03-05
Payer: MEDICARE

## 2024-03-05 DIAGNOSIS — Z98.890 STATUS POST MITRAL VALVE REPAIR: ICD-10-CM

## 2024-03-05 DIAGNOSIS — I34.9 NONRHEUMATIC MITRAL VALVE DISORDER, UNSPECIFIED: ICD-10-CM

## 2024-03-05 PROCEDURE — 93306 TTE W/DOPPLER COMPLETE: CPT

## 2024-03-05 PROCEDURE — 93306 TTE W/DOPPLER COMPLETE: CPT | Performed by: INTERNAL MEDICINE

## 2024-03-06 ENCOUNTER — CLINICAL SUPPORT (OUTPATIENT)
Dept: CARDIAC REHAB | Facility: CLINIC | Age: 69
End: 2024-03-06
Payer: MEDICARE

## 2024-03-06 DIAGNOSIS — Z98.890 STATUS POST MITRAL VALVE REPAIR: ICD-10-CM

## 2024-03-06 PROCEDURE — 93798 PHYS/QHP OP CAR RHAB W/ECG: CPT | Mod: KX | Performed by: INTERNAL MEDICINE

## 2024-03-07 ENCOUNTER — HOSPITAL ENCOUNTER (OUTPATIENT)
Dept: RADIOLOGY | Facility: HOSPITAL | Age: 69
Discharge: HOME | End: 2024-03-07
Payer: MEDICARE

## 2024-03-07 ENCOUNTER — OFFICE VISIT (OUTPATIENT)
Dept: CARDIAC SURGERY | Facility: HOSPITAL | Age: 69
End: 2024-03-07
Payer: MEDICARE

## 2024-03-07 VITALS
DIASTOLIC BLOOD PRESSURE: 58 MMHG | HEART RATE: 92 BPM | SYSTOLIC BLOOD PRESSURE: 140 MMHG | OXYGEN SATURATION: 96 % | HEIGHT: 68 IN | BODY MASS INDEX: 31.07 KG/M2 | WEIGHT: 205 LBS

## 2024-03-07 DIAGNOSIS — Z98.890 STATUS POST MITRAL VALVE REPAIR: ICD-10-CM

## 2024-03-07 DIAGNOSIS — Z09 POSTOP CHECK: ICD-10-CM

## 2024-03-07 LAB
AORTIC VALVE MEAN GRADIENT: 3.5 MMHG
AORTIC VALVE PEAK VELOCITY: 1.23 M/S
AV PEAK GRADIENT: 6.1 MMHG
AVA (PEAK VEL): 4.03 CM2
AVA (VTI): 3.67 CM2
EJECTION FRACTION APICAL 4 CHAMBER: 69.6
EJECTION FRACTION: 71 %
LEFT ATRIUM VOLUME AREA LENGTH INDEX BSA: 49.8 ML/M2
LEFT VENTRICLE INTERNAL DIMENSION DIASTOLE: 5.05 CM (ref 3.5–6)
LEFT VENTRICULAR OUTFLOW TRACT DIAMETER: 2.23 CM
MITRAL VALVE E/A RATIO: 1.22
RIGHT VENTRICLE FREE WALL PEAK S': 11 CM/S
RIGHT VENTRICLE PEAK SYSTOLIC PRESSURE: 52 MMHG
TRICUSPID ANNULAR PLANE SYSTOLIC EXCURSION: 1.7 CM

## 2024-03-07 PROCEDURE — 71046 X-RAY EXAM CHEST 2 VIEWS: CPT | Performed by: RADIOLOGY

## 2024-03-07 PROCEDURE — 71046 X-RAY EXAM CHEST 2 VIEWS: CPT

## 2024-03-07 ASSESSMENT — COLUMBIA-SUICIDE SEVERITY RATING SCALE - C-SSRS
6. HAVE YOU EVER DONE ANYTHING, STARTED TO DO ANYTHING, OR PREPARED TO DO ANYTHING TO END YOUR LIFE?: NO
2. HAVE YOU ACTUALLY HAD ANY THOUGHTS OF KILLING YOURSELF?: NO
1. IN THE PAST MONTH, HAVE YOU WISHED YOU WERE DEAD OR WISHED YOU COULD GO TO SLEEP AND NOT WAKE UP?: NO

## 2024-03-07 ASSESSMENT — PAIN SCALES - GENERAL: PAINLEVEL: 0-NO PAIN

## 2024-03-07 ASSESSMENT — ENCOUNTER SYMPTOMS
OCCASIONAL FEELINGS OF UNSTEADINESS: 0
DEPRESSION: 0
LOSS OF SENSATION IN FEET: 0

## 2024-03-08 ENCOUNTER — CLINICAL SUPPORT (OUTPATIENT)
Dept: CARDIAC REHAB | Facility: CLINIC | Age: 69
End: 2024-03-08
Payer: MEDICARE

## 2024-03-08 DIAGNOSIS — Z98.890 STATUS POST MITRAL VALVE REPAIR: ICD-10-CM

## 2024-03-08 PROCEDURE — 93798 PHYS/QHP OP CAR RHAB W/ECG: CPT | Mod: KX | Performed by: INTERNAL MEDICINE

## 2024-03-11 ENCOUNTER — CLINICAL SUPPORT (OUTPATIENT)
Dept: CARDIAC REHAB | Facility: CLINIC | Age: 69
End: 2024-03-11
Payer: MEDICARE

## 2024-03-11 DIAGNOSIS — Z98.890 STATUS POST MITRAL VALVE REPAIR: ICD-10-CM

## 2024-03-11 PROCEDURE — 93798 PHYS/QHP OP CAR RHAB W/ECG: CPT | Mod: KX | Performed by: INTERNAL MEDICINE

## 2024-03-13 ENCOUNTER — CLINICAL SUPPORT (OUTPATIENT)
Dept: CARDIAC REHAB | Facility: CLINIC | Age: 69
End: 2024-03-13
Payer: MEDICARE

## 2024-03-13 DIAGNOSIS — Z98.890 STATUS POST MITRAL VALVE REPAIR: ICD-10-CM

## 2024-03-13 PROCEDURE — 93798 PHYS/QHP OP CAR RHAB W/ECG: CPT | Mod: KX | Performed by: INTERNAL MEDICINE

## 2024-03-15 ENCOUNTER — CLINICAL SUPPORT (OUTPATIENT)
Dept: CARDIAC REHAB | Facility: CLINIC | Age: 69
End: 2024-03-15
Payer: MEDICARE

## 2024-03-15 DIAGNOSIS — Z98.890 STATUS POST MITRAL VALVE REPAIR: ICD-10-CM

## 2024-03-15 PROCEDURE — 93798 PHYS/QHP OP CAR RHAB W/ECG: CPT | Mod: KX | Performed by: INTERNAL MEDICINE

## 2024-03-18 ENCOUNTER — CLINICAL SUPPORT (OUTPATIENT)
Dept: CARDIAC REHAB | Facility: CLINIC | Age: 69
End: 2024-03-18
Payer: MEDICARE

## 2024-03-18 ENCOUNTER — DOCUMENTATION (OUTPATIENT)
Dept: CARDIAC REHAB | Facility: CLINIC | Age: 69
End: 2024-03-18

## 2024-03-18 DIAGNOSIS — Z98.890 S/P MVR (MITRAL VALVE REPAIR): ICD-10-CM

## 2024-03-18 DIAGNOSIS — Z98.890 STATUS POST MITRAL VALVE REPAIR: ICD-10-CM

## 2024-03-18 PROCEDURE — 93798 PHYS/QHP OP CAR RHAB W/ECG: CPT | Mod: KX | Performed by: INTERNAL MEDICINE

## 2024-03-18 RX ORDER — METOPROLOL TARTRATE 25 MG/1
12.5 TABLET, FILM COATED ORAL 2 TIMES DAILY
Qty: 90 TABLET | Refills: 3 | Status: SHIPPED | OUTPATIENT
Start: 2024-03-18 | End: 2025-03-18

## 2024-03-18 NOTE — PROGRESS NOTES
"  Cardiac Rehabilitation 30 Day Reassessment    Name: Jake Brunner  Medical Record Number: [id number]  YOB: 1955  Age: 69 y.o.    Today’s Date: 3/18/2024  Primary Care Physician: Neo Hanna MD  Referring Physician: Owen Choi MD  Program Location: [address]  Primary Diagnosis:   MVR     Onset/Date of Diagnosis: 1/23/2024    Initial Assessment, not yet started program.    AACVPR Risk Stratification:  moderate    Falls Risk: Medium  Psychosocial Assessment   Pre: 1      Sent PH-Q 9 to MD if score > 20: No; score < 20    Pt reported/currently experiencing stress: No  Patient uses stress management skills: Yes   History of: no history of anxiety or depression  Currently seeing a mental health provider: No  Social Support: Yes, Whom:family  Quality of Life Survey: SF-36   SF-36 Pre Post   Physical Component Score  TBD   Mental Component Score  TBD     Learning Assessment:  Learning assessment/barriers: Work  Preferred learning method: Visual  Barriers: None  Comments:    Stages of Change:Preparation    Psychosocial Plan    Goal Status: In progress    Psychosocial Goals: Demonstrating proper techniques for stress management, Maintain or lower PH-Q 9 score by discharge, and Identify strategies for managing depression    Psychosocial Interventions/Education:  Pt given stress booklet to review    Nutrition Assessment:    Hyperlipidemia: Yes     Lipids:   Lab Results   Component Value Date    CHOL 123 10/05/2023    HDL 43.5 10/05/2023    LDLF 51 06/01/2022    TRIG 114 10/05/2023       Current Dietary Guidelines: Low fat, Low sodium  Barriers to dietary change: no    Diet Habit Survey: Picture Your Plate  Pre: 73  Post: To be done at discharge.    Diabetes Assessment    No results found for: \"HGBA1C\"    History of Diabetes: No    Weight Management       No data recorded    Nutrition Plan    Goal Status: Initial Assessment; goals not yet started    Nutrition Goals: Lipid Goal: " HDL>45, LDL <70, Total <180, Trigs <150, Improve Diet Habit Survey score by 5-10 points by discharge, Adapt a low-sodium, DASH diet prior to discharge, Adapt a Mediterranean focused diet prior to discharge, and Lose 1lb/week while enrolled in program    Nutrition Interventions/Education:   Patient advised to follow a heart healthy diet and low sodium.      Exercise Assessment    No  Mode: NA  Frequency: NA  Duration: NA    Exercise Prescription     Exercise Prescription based on: Pre-rehab stress test   Frequency:  3 days/week   Mode: Treadmill, NuStep, and Recumbent Cycle   Duration: 33 total aerobic minutes   Intensity: RPE 12-16  Target HR:  To be calculated after 6 attended sessions.  MET Level: 2.9  Patient wears supplemental O2: N/A     Modality Workload METs Duration (minutes)   1 Pre-Exercise   2:00   2 Treadmill 2.5@1%  3.3 11 :00   3 Recumbent Bike 5@50  3.7 11 :00   4 NuStep 6@80  3.4 11:00   5 Cooldown    5 :00   6 Post-Exercise   2:00     Resistance Training: No   Home Exercise Prescription given: To be given at session # 6    Exercise Plan    Goal Status: Initial Assessment; goals not yet started    Exercise Goals: Increase exercise MET level by 5-10% each week, Increase total exercise duration to 30-45 minutes, and Obtain 150 minutes/week of moderate intensity aerobic exercise    Exercise Interventions/Education:   To be done in Cardiac Rehab.      Other Core Components/Risk Factor Assessment:    Medication adherence  Current Medications:   Medication Documentation Review Audit       Reviewed by Sully Guardado MA (Medical Assistant) on 03/07/24 at 1350      Medication Order Taking? Sig Documenting Provider Last Dose Status   aspirin 81 mg EC tablet [id number] No Take 1 tablet (81 mg) by mouth once daily. Historical Provider, MD Taking Active   ezetimibe (Zetia) 10 mg tablet [id number] No Take 1 tablet (10 mg) by mouth once daily. Neo Hanna MD Taking Active   Patient not taking:  Discontinued  03/01/24 1356   Discontinued 03/04/24 1820   metoprolol tartrate (Lopressor) 25 mg tablet [id number]  Take 0.5 tablets (12.5 mg) by mouth 2 times a day. Neo Hanna MD  Active   multivitamin tablet [id number] No Take 1 tablet by mouth once daily. Historical Provider, MD Taking Active   rosuvastatin (Crestor) 40 mg tablet [id number] No Take 1 tablet (40 mg) by mouth once daily. DAILY Neo Hanna MD Taking Active                                 Medication compliance: Yes   Uses pill box/organizer: Yes    Carries medication list: Yes     Blood Pressure Management  History of Hypertension: Yes   Medication Changes: Yes   Resting BP:  There were no vitals taken for this visit.     Heart Failure Management  Hx of Heart Failure: No    Smoking/Tobacco Assessment  Social History     Tobacco Use   Smoking Status Former    Types: Cigarettes   Smokeless Tobacco Never       Other Core Component Plan    Goal Status: In progress    Other Core Component Goals: Verbalize medication usage and drug actions by discharge and Achieve resting BP of < 130/80 by discharge    Other Core Component Interventions/Education:   Verbalize medication usage and drug actions by discharge and Achieve resting BP of < 130/80 by discharge    Individual Patient Goals:    Be able to do the rower 3-5 days a week  for 15 min by discharge  Establish a regular aerobic activity for 30 min 5  days a week by discharge    Goal Status: In progress    Staff Comments:  Mr Brunner has attended 10 visits of cardiac rehab. He is progressing without any issues. No chest pain or ectopy noted. He was advised to continue his exercises at home to help with his strength and endurance. He is educated on the importance of a heart healthy diet.    Rehab Staff Signature: Vee Berry RN

## 2024-03-20 ENCOUNTER — CLINICAL SUPPORT (OUTPATIENT)
Dept: CARDIAC REHAB | Facility: CLINIC | Age: 69
End: 2024-03-20
Payer: MEDICARE

## 2024-03-20 DIAGNOSIS — Z98.890 STATUS POST MITRAL VALVE REPAIR: ICD-10-CM

## 2024-03-20 PROCEDURE — 93798 PHYS/QHP OP CAR RHAB W/ECG: CPT | Performed by: INTERNAL MEDICINE

## 2024-03-22 ENCOUNTER — CLINICAL SUPPORT (OUTPATIENT)
Dept: CARDIAC REHAB | Facility: CLINIC | Age: 69
End: 2024-03-22
Payer: MEDICARE

## 2024-03-22 DIAGNOSIS — Z98.890 STATUS POST MITRAL VALVE REPAIR: ICD-10-CM

## 2024-03-22 PROCEDURE — 93798 PHYS/QHP OP CAR RHAB W/ECG: CPT | Mod: KX | Performed by: INTERNAL MEDICINE

## 2024-03-25 ENCOUNTER — CLINICAL SUPPORT (OUTPATIENT)
Dept: CARDIAC REHAB | Facility: CLINIC | Age: 69
End: 2024-03-25
Payer: MEDICARE

## 2024-03-25 DIAGNOSIS — Z98.890 STATUS POST MITRAL VALVE REPAIR: ICD-10-CM

## 2024-03-25 PROCEDURE — 93798 PHYS/QHP OP CAR RHAB W/ECG: CPT | Mod: KX | Performed by: INTERNAL MEDICINE

## 2024-03-27 ENCOUNTER — CLINICAL SUPPORT (OUTPATIENT)
Dept: CARDIAC REHAB | Facility: CLINIC | Age: 69
End: 2024-03-27
Payer: MEDICARE

## 2024-03-27 DIAGNOSIS — Z98.890 STATUS POST MITRAL VALVE REPAIR: ICD-10-CM

## 2024-03-27 PROCEDURE — 93798 PHYS/QHP OP CAR RHAB W/ECG: CPT | Mod: KX | Performed by: INTERNAL MEDICINE

## 2024-03-29 ENCOUNTER — CLINICAL SUPPORT (OUTPATIENT)
Dept: CARDIAC REHAB | Facility: CLINIC | Age: 69
End: 2024-03-29
Payer: MEDICARE

## 2024-03-29 DIAGNOSIS — Z98.890 STATUS POST MITRAL VALVE REPAIR: ICD-10-CM

## 2024-03-29 PROCEDURE — 93798 PHYS/QHP OP CAR RHAB W/ECG: CPT | Mod: KX | Performed by: INTERNAL MEDICINE

## 2024-04-01 ENCOUNTER — CLINICAL SUPPORT (OUTPATIENT)
Dept: CARDIAC REHAB | Facility: CLINIC | Age: 69
End: 2024-04-01
Payer: MEDICARE

## 2024-04-01 DIAGNOSIS — Z98.890 STATUS POST MITRAL VALVE REPAIR: ICD-10-CM

## 2024-04-01 LAB — BODY SURFACE AREA: 2.12 M2

## 2024-04-01 PROCEDURE — 93798 PHYS/QHP OP CAR RHAB W/ECG: CPT | Mod: KX | Performed by: INTERNAL MEDICINE

## 2024-04-03 ENCOUNTER — APPOINTMENT (OUTPATIENT)
Dept: CARDIAC REHAB | Facility: CLINIC | Age: 69
End: 2024-04-03
Payer: MEDICARE

## 2024-04-03 ENCOUNTER — OFFICE VISIT (OUTPATIENT)
Dept: PRIMARY CARE | Facility: CLINIC | Age: 69
End: 2024-04-03
Payer: MEDICARE

## 2024-04-03 VITALS
HEART RATE: 90 BPM | BODY MASS INDEX: 31.32 KG/M2 | SYSTOLIC BLOOD PRESSURE: 128 MMHG | TEMPERATURE: 97.5 F | DIASTOLIC BLOOD PRESSURE: 65 MMHG | OXYGEN SATURATION: 97 % | WEIGHT: 206 LBS

## 2024-04-03 DIAGNOSIS — I10 HYPERTENSION, UNSPECIFIED TYPE: ICD-10-CM

## 2024-04-03 DIAGNOSIS — L81.4 SKIN SPOTS-AGING: ICD-10-CM

## 2024-04-03 DIAGNOSIS — D22.9 SKIN MOLE: ICD-10-CM

## 2024-04-03 DIAGNOSIS — E78.2 MIXED HYPERLIPIDEMIA: ICD-10-CM

## 2024-04-03 DIAGNOSIS — I05.9 MITRAL VALVE DISORDER: Primary | ICD-10-CM

## 2024-04-03 DIAGNOSIS — G47.33 OBSTRUCTIVE SLEEP APNEA SYNDROME: ICD-10-CM

## 2024-04-03 PROCEDURE — 3078F DIAST BP <80 MM HG: CPT | Performed by: STUDENT IN AN ORGANIZED HEALTH CARE EDUCATION/TRAINING PROGRAM

## 2024-04-03 PROCEDURE — 3074F SYST BP LT 130 MM HG: CPT | Performed by: STUDENT IN AN ORGANIZED HEALTH CARE EDUCATION/TRAINING PROGRAM

## 2024-04-03 PROCEDURE — 1160F RVW MEDS BY RX/DR IN RCRD: CPT | Performed by: STUDENT IN AN ORGANIZED HEALTH CARE EDUCATION/TRAINING PROGRAM

## 2024-04-03 PROCEDURE — 3008F BODY MASS INDEX DOCD: CPT | Performed by: STUDENT IN AN ORGANIZED HEALTH CARE EDUCATION/TRAINING PROGRAM

## 2024-04-03 PROCEDURE — 1159F MED LIST DOCD IN RCRD: CPT | Performed by: STUDENT IN AN ORGANIZED HEALTH CARE EDUCATION/TRAINING PROGRAM

## 2024-04-03 PROCEDURE — 99213 OFFICE O/P EST LOW 20 MIN: CPT | Performed by: STUDENT IN AN ORGANIZED HEALTH CARE EDUCATION/TRAINING PROGRAM

## 2024-04-03 NOTE — PATIENT INSTRUCTIONS
Your blood work is up to date; no need for additional draw at this appointment    Your medications are up to date today, I will renew them when a refill is due.     Follow up with your specialists and cardiac rehab as previously scheduled.     Good luck with your eye procedure.     I have referred you to dermatology for moles and/or a rash. Please call [phone] to schedule this appointment.

## 2024-04-03 NOTE — PROGRESS NOTES
Subjective   Patient ID: Jake Brunner is a 69 y.o. male who presents for No chief complaint on file..    HPI     See my prior note. Doing great since his mitral valve repair. No SoB or Neff. Doing great in the cardiac rehab. His mild Neff is improving now that he's exercising more.     Unrelated to his heart, he shows me all of his aging spots and moles on his back. Has been 10+ years since his last dermatology apt, he requests referral to a specialist.     Review of Systems    Objective   /65   Pulse 90   Temp 36.4 °C (97.5 °F)   Wt 93.4 kg (206 lb)   SpO2 97%   BMI 31.32 kg/m²     Physical Exam  Gen: obese, NAD. AAO x3.  HEENT: NC/AT. Anicteric sclera, symmetric pupils. MMM no thrush.  Neck: Soft, supple. No LAD. No goiter.  CV:  Soft systolic murmur, reduced in intensity compared to preop.  nl s1s2. Healing scar from thoracotomy.   Pulm: CTAB no w/r/r, good air exchange  GI: obese, soft NTND BS+ no hsm  Ext: WWP no edema  Neuro: II-XII grossly intact, nonfocal systemic findings  MSK: 5/5 strength b/l UE and LE  Gait: unremarkable       Assessment/Plan   No major changes to plan.     # CV  1.  Mitral Regurge: improved since MVR  - continue current meds  - follow up cardiology     2. HTN: at/near goal at home  - continue current regimen  - routine lab work if not recent  - continue lifestyle modifications     3.  H/o Ascending aorta dissection s/p surgery  - tight BP control  - follow up cardiology     4. HLD: stable  - lipid panel, ASCVD+ score based on these values if age appropriate  - continue statin     # Skin check/moles  - derm referral      # Obesity: BMI > 30, improving  - counselled on wt loss via diet, exercise, and other lifestyle modifications       # Prostate Ca: resolved  - follow up urology    # HILTON on CPAP  - continue using CPAP

## 2024-04-05 ENCOUNTER — CLINICAL SUPPORT (OUTPATIENT)
Dept: CARDIAC REHAB | Facility: CLINIC | Age: 69
End: 2024-04-05
Payer: MEDICARE

## 2024-04-05 DIAGNOSIS — Z98.890 STATUS POST MITRAL VALVE REPAIR: ICD-10-CM

## 2024-04-05 PROCEDURE — 93798 PHYS/QHP OP CAR RHAB W/ECG: CPT | Mod: KX | Performed by: INTERNAL MEDICINE

## 2024-04-08 ENCOUNTER — CLINICAL SUPPORT (OUTPATIENT)
Dept: CARDIAC REHAB | Facility: CLINIC | Age: 69
End: 2024-04-08
Payer: MEDICARE

## 2024-04-08 DIAGNOSIS — Z98.890 STATUS POST MITRAL VALVE REPAIR: ICD-10-CM

## 2024-04-08 PROCEDURE — 93798 PHYS/QHP OP CAR RHAB W/ECG: CPT | Mod: KX | Performed by: INTERNAL MEDICINE

## 2024-04-10 ENCOUNTER — CLINICAL SUPPORT (OUTPATIENT)
Dept: CARDIAC REHAB | Facility: CLINIC | Age: 69
End: 2024-04-10
Payer: MEDICARE

## 2024-04-10 DIAGNOSIS — Z98.890 STATUS POST MITRAL VALVE REPAIR: ICD-10-CM

## 2024-04-10 PROCEDURE — 93798 PHYS/QHP OP CAR RHAB W/ECG: CPT | Mod: KX | Performed by: INTERNAL MEDICINE

## 2024-04-12 ENCOUNTER — CLINICAL SUPPORT (OUTPATIENT)
Dept: CARDIAC REHAB | Facility: CLINIC | Age: 69
End: 2024-04-12
Payer: MEDICARE

## 2024-04-12 DIAGNOSIS — Z98.890 STATUS POST MITRAL VALVE REPAIR: ICD-10-CM

## 2024-04-12 PROCEDURE — 93798 PHYS/QHP OP CAR RHAB W/ECG: CPT | Mod: KX | Performed by: INTERNAL MEDICINE

## 2024-04-15 ENCOUNTER — CLINICAL SUPPORT (OUTPATIENT)
Dept: CARDIAC REHAB | Facility: CLINIC | Age: 69
End: 2024-04-15
Payer: MEDICARE

## 2024-04-15 ENCOUNTER — TELEPHONE (OUTPATIENT)
Dept: CARDIOLOGY | Facility: HOSPITAL | Age: 69
End: 2024-04-15

## 2024-04-15 DIAGNOSIS — Z98.890 STATUS POST MITRAL VALVE REPAIR: ICD-10-CM

## 2024-04-15 PROCEDURE — 93798 PHYS/QHP OP CAR RHAB W/ECG: CPT | Mod: KX | Performed by: INTERNAL MEDICINE

## 2024-04-15 NOTE — TELEPHONE ENCOUNTER
Patient reports he has not experienced any palpitations in over a month. He does experience SOB. He states he is fine during PT, but is breathless after using stairs.

## 2024-04-15 NOTE — TELEPHONE ENCOUNTER
----- Message from JACOBO Alfaro-CNP sent at 4/11/2024 10:35 PM EDT -----  Average HR 82. No atrial fibrillation or flutter. 2 brief episodes of NSVT. 14 episodes of SVT longest episode 18 seconds. If asymptomatic no changes to meds.

## 2024-04-16 ENCOUNTER — DOCUMENTATION (OUTPATIENT)
Dept: CARDIAC REHAB | Facility: CLINIC | Age: 69
End: 2024-04-16
Payer: MEDICARE

## 2024-04-16 NOTE — PROGRESS NOTES
"  Cardiac Rehabilitation 60 Day Reassessment    Name: Jake Brunner  Medical Record Number: [id number]  YOB: 1955  Age: 69 y.o.    Today’s Date: 4/16/2024  Primary Care Physician: Neo Hanna MD  Referring Physician: Owen Choi MD  Program Location: [address]  Primary Diagnosis:   MVR     Onset/Date of Diagnosis: 1/23/2024    Patient healing no issues with incision, no chest discomfort    AACVPR Risk Stratification:  moderate    Falls Risk: Medium  Psychosocial Assessment   Pre: 1  Post: at D/C    Sent PH-Q 9 to MD if score > 20: No; score < 20    Pt reported/currently experiencing stress: No  Patient uses stress management skills: Yes   History of: no history of anxiety or depression  Currently seeing a mental health provider: No  Social Support: Yes, Whom:family  Quality of Life Survey: SF-36   SF-36 Pre Post   Physical Component Score  TBD   Mental Component Score  TBD     Learning Assessment:  Learning assessment/barriers: Work  Preferred learning method: Visual  Barriers: None  Comments:    Stages of Change:Action    Psychosocial Plan    Goal Status: In progress    Psychosocial Goals: Demonstrating proper techniques for stress management, Maintain or lower PH-Q 9 score by discharge, and Identify strategies for managing depression    Psychosocial Interventions/Education:  Pt given stress booklet to review    Nutrition Assessment:    Hyperlipidemia: Yes     Lipids:   Lab Results   Component Value Date    CHOL 123 10/05/2023    HDL 43.5 10/05/2023    LDLF 51 06/01/2022    TRIG 114 10/05/2023       Current Dietary Guidelines: Low fat, Low sodium  Barriers to dietary change: no    Diet Habit Survey: Picture Your Plate  Pre: 73  Post: To be done at discharge.    Diabetes Assessment    No results found for: \"HGBA1C\"    History of Diabetes: No    Weight Management  93.4kg (206lbs)  BMI  31.32  HT: 68 in     No data recorded    Nutrition Plan    Goal Status: In " progress    Nutrition Goals: Lipid Goal: HDL>45, LDL <70, Total <180, Trigs <150, Improve Diet Habit Survey score by 5-10 points by discharge, Adapt a low-sodium, DASH diet prior to discharge, Adapt a Mediterranean focused diet prior to discharge, and Lose 1lb/week while enrolled in program    Nutrition Interventions/Education:   Patient advised to follow a heart healthy diet and low sodium.  2/28/24 reviewed PYP with Pt.Encouraged patient to decrease intake of sweets.reviewed added sugars and reading labels for added sugars/ROSALIND  3/6/24 Reviewed heart healthy diet tips with patient, gave handout to review/ROSALIND    Exercise Assessment    No  Mode: NA  Frequency: NA  Duration: NA    Exercise Prescription     Exercise Prescription based on: Pre-rehab stress test   Frequency:  3 days/week   Mode: Treadmill, NuStep, and Recumbent Cycle   Duration:42 total aerobic minutes   Intensity: RPE 12-16  Target HR:   108-118  MET Level: 4  Patient wears supplemental O2: N/A     Modality Workload METs Duration (minutes)   1 Pre-Exercise   2:00   2 Treadmill 2.5@2%  4 14 :00   3 Recumbent Bike 6@65  3.7 14 :00   4 NuStep 8@90  3.7 14:00   5 Cooldown    5 :00   6 Post-Exercise   2:00     Resistance Training: No   Home Exercise Prescription given: Yes    Exercise Plan    Goal Status: In progress    Exercise Goals: Increase exercise MET level by 5-10% each week, Increase total exercise duration to 30-45 minutes, and Obtain 150 minutes/week of moderate intensity aerobic exercise    Exercise Interventions/Education:   Pt educated on the importance of exercising on his off days to meet to goal of 150 min per week . He was given education on the importance of exercise on his cardiovascular health.      Other Core Components/Risk Factor Assessment:    Medication adherence  Current Medications:   Medication Documentation Review Audit       Reviewed by Sully Guardado MA (Medical Assistant) on 03/07/24 at 1350      Medication Order Taking? Sig  Documenting Provider Last Dose Status   aspirin 81 mg EC tablet [id number] No Take 1 tablet (81 mg) by mouth once daily. Historical Provider, MD Taking Active   ezetimibe (Zetia) 10 mg tablet [id number] No Take 1 tablet (10 mg) by mouth once daily. Neo Hanna MD Taking Active   Patient not taking:  Discontinued 03/01/24 1356   Discontinued 03/04/24 1820   metoprolol tartrate (Lopressor) 25 mg tablet [id number]  Take 0.5 tablets (12.5 mg) by mouth 2 times a day. Neo Hanna MD  Active   multivitamin tablet [id number] No Take 1 tablet by mouth once daily. Historical Provider, MD Taking Active   rosuvastatin (Crestor) 40 mg tablet [id number] No Take 1 tablet (40 mg) by mouth once daily. DAILY Neo Hanna MD Taking Active                                 Medication compliance: Yes   Uses pill box/organizer: Yes    Carries medication list: Yes     Blood Pressure Management  History of Hypertension: Yes   Medication Changes: Yes   Resting BP:  There were no vitals taken for this visit.     Heart Failure Management  Hx of Heart Failure: No    Smoking/Tobacco Assessment  Social History     Tobacco Use   Smoking Status Former    Types: Cigarettes   Smokeless Tobacco Never       Other Core Component Plan    Goal Status: In progress    Other Core Component Goals: Verbalize medication usage and drug actions by discharge and Achieve resting BP of < 130/80 by discharge    Other Core Component Interventions/Education:   Verbalize medication usage and drug actions by discharge and Achieve resting BP of < 130/80 by discharge    Individual Patient Goals:    Be able to do the rower 3-5 days a week  for 15 min by discharge  Establish a regular aerobic activity for 30 min 5  days a week by discharge    Goal Status: In progress    Staff Comments:  Mr Brunner has attended 21 visits of cardiac rehab. He is progressed without any difficulties and no ectopy noted. He is back to a regular routine at home and was able to ride his  motorcycle and feels more confident. He was educated on the importance of continuing to work on his strength and and endurance.    Rehab Staff Signature: Vee Berry RN

## 2024-04-17 ENCOUNTER — CLINICAL SUPPORT (OUTPATIENT)
Dept: CARDIAC REHAB | Facility: CLINIC | Age: 69
End: 2024-04-17
Payer: MEDICARE

## 2024-04-17 DIAGNOSIS — Z98.890 STATUS POST MITRAL VALVE REPAIR: ICD-10-CM

## 2024-04-17 PROCEDURE — 93798 PHYS/QHP OP CAR RHAB W/ECG: CPT | Mod: KX

## 2024-04-19 ENCOUNTER — CLINICAL SUPPORT (OUTPATIENT)
Dept: CARDIAC REHAB | Facility: CLINIC | Age: 69
End: 2024-04-19
Payer: MEDICARE

## 2024-04-19 DIAGNOSIS — Z98.890 STATUS POST MITRAL VALVE REPAIR: ICD-10-CM

## 2024-04-19 PROCEDURE — 93798 PHYS/QHP OP CAR RHAB W/ECG: CPT | Mod: KX | Performed by: INTERNAL MEDICINE

## 2024-04-22 ENCOUNTER — CLINICAL SUPPORT (OUTPATIENT)
Dept: CARDIAC REHAB | Facility: CLINIC | Age: 69
End: 2024-04-22
Payer: MEDICARE

## 2024-04-22 DIAGNOSIS — Z98.890 STATUS POST MITRAL VALVE REPAIR: ICD-10-CM

## 2024-04-22 PROCEDURE — 93798 PHYS/QHP OP CAR RHAB W/ECG: CPT | Mod: KX | Performed by: INTERNAL MEDICINE

## 2024-04-24 ENCOUNTER — CLINICAL SUPPORT (OUTPATIENT)
Dept: CARDIAC REHAB | Facility: CLINIC | Age: 69
End: 2024-04-24
Payer: MEDICARE

## 2024-04-24 DIAGNOSIS — Z98.890 STATUS POST MITRAL VALVE REPAIR: ICD-10-CM

## 2024-04-24 PROCEDURE — 93798 PHYS/QHP OP CAR RHAB W/ECG: CPT

## 2024-04-26 ENCOUNTER — APPOINTMENT (OUTPATIENT)
Dept: CARDIAC REHAB | Facility: CLINIC | Age: 69
End: 2024-04-26
Payer: MEDICARE

## 2024-04-29 ENCOUNTER — CLINICAL SUPPORT (OUTPATIENT)
Dept: CARDIAC REHAB | Facility: CLINIC | Age: 69
End: 2024-04-29
Payer: MEDICARE

## 2024-04-29 DIAGNOSIS — Z98.890 STATUS POST MITRAL VALVE REPAIR: ICD-10-CM

## 2024-04-29 PROCEDURE — 93798 PHYS/QHP OP CAR RHAB W/ECG: CPT | Mod: KX | Performed by: INTERNAL MEDICINE

## 2024-05-01 ENCOUNTER — CLINICAL SUPPORT (OUTPATIENT)
Dept: CARDIAC REHAB | Facility: CLINIC | Age: 69
End: 2024-05-01
Payer: MEDICARE

## 2024-05-01 DIAGNOSIS — Z98.890 STATUS POST MITRAL VALVE REPAIR: ICD-10-CM

## 2024-05-01 PROCEDURE — 93798 PHYS/QHP OP CAR RHAB W/ECG: CPT | Mod: KX | Performed by: INTERNAL MEDICINE

## 2024-05-03 ENCOUNTER — CLINICAL SUPPORT (OUTPATIENT)
Dept: CARDIAC REHAB | Facility: CLINIC | Age: 69
End: 2024-05-03
Payer: MEDICARE

## 2024-05-03 DIAGNOSIS — Z98.890 STATUS POST MITRAL VALVE REPAIR: ICD-10-CM

## 2024-05-03 PROCEDURE — 93798 PHYS/QHP OP CAR RHAB W/ECG: CPT | Mod: KX | Performed by: INTERNAL MEDICINE

## 2024-05-06 ENCOUNTER — CLINICAL SUPPORT (OUTPATIENT)
Dept: CARDIAC REHAB | Facility: CLINIC | Age: 69
End: 2024-05-06
Payer: MEDICARE

## 2024-05-06 DIAGNOSIS — Z98.890 STATUS POST MITRAL VALVE REPAIR: ICD-10-CM

## 2024-05-06 PROCEDURE — 93798 PHYS/QHP OP CAR RHAB W/ECG: CPT | Mod: KX | Performed by: INTERNAL MEDICINE

## 2024-05-08 ENCOUNTER — OFFICE VISIT (OUTPATIENT)
Dept: CARDIOLOGY | Facility: HOSPITAL | Age: 69
End: 2024-05-08
Payer: MEDICARE

## 2024-05-08 ENCOUNTER — DOCUMENTATION (OUTPATIENT)
Dept: CARDIAC REHAB | Facility: CLINIC | Age: 69
End: 2024-05-08

## 2024-05-08 ENCOUNTER — CLINICAL SUPPORT (OUTPATIENT)
Dept: CARDIAC REHAB | Facility: CLINIC | Age: 69
End: 2024-05-08
Payer: MEDICARE

## 2024-05-08 VITALS
SYSTOLIC BLOOD PRESSURE: 140 MMHG | HEIGHT: 68 IN | HEART RATE: 76 BPM | BODY MASS INDEX: 30.92 KG/M2 | DIASTOLIC BLOOD PRESSURE: 62 MMHG | WEIGHT: 204 LBS | OXYGEN SATURATION: 97 %

## 2024-05-08 DIAGNOSIS — Z98.890 STATUS POST MITRAL VALVE REPAIR: ICD-10-CM

## 2024-05-08 DIAGNOSIS — I25.10 CORONARY ARTERY DISEASE INVOLVING NATIVE CORONARY ARTERY OF NATIVE HEART WITHOUT ANGINA PECTORIS: ICD-10-CM

## 2024-05-08 DIAGNOSIS — I10 PRIMARY HYPERTENSION: ICD-10-CM

## 2024-05-08 DIAGNOSIS — E78.5 HYPERLIPIDEMIA, UNSPECIFIED HYPERLIPIDEMIA TYPE: Primary | ICD-10-CM

## 2024-05-08 PROCEDURE — 99214 OFFICE O/P EST MOD 30 MIN: CPT | Performed by: NURSE PRACTITIONER

## 2024-05-08 PROCEDURE — 93798 PHYS/QHP OP CAR RHAB W/ECG: CPT | Mod: KX | Performed by: INTERNAL MEDICINE

## 2024-05-08 RX ORDER — PREDNISOLONE ACETATE 10 MG/ML
SUSPENSION/ DROPS OPHTHALMIC 4 TIMES DAILY
COMMUNITY
Start: 2024-04-25 | End: 2024-05-23

## 2024-05-08 NOTE — PATIENT INSTRUCTIONS
Continue current cardiovascular medications  Check blood work CBC and fasting lipid panel  Follow up as scheduled  Continue physical activity. Please call if your blood pressures are consistently elevated at home. Greater than 140/80

## 2024-05-08 NOTE — PROGRESS NOTES
"  Cardiac Rehabilitation 90 Day Reassessment    Name: Jake Brunner  Medical Record Number: [id number]  YOB: 1955  Age: 69 y.o.    Today’s Date: 5/8/2024  Primary Care Physician: Neo Hanna MD  Referring Physician: Owen Choi MD  Program Location: [address]  Primary Diagnosis:   MVR     Onset/Date of Diagnosis: 1/23/2024    Patient healing no issues with incision, no chest discomfort    AACVPR Risk Stratification:  moderate    Falls Risk: Medium  Psychosocial Assessment   Pre: 1  Post: at D/C    Sent PH-Q 9 to MD if score > 20: No; score < 20    Pt reported/currently experiencing stress: No  Patient uses stress management skills: Yes   History of: no history of anxiety or depression  Currently seeing a mental health provider: No  Social Support: Yes, Whom:family  Quality of Life Survey: SF-36   SF-36 Pre Post   Physical Component Score  TBD   Mental Component Score  TBD     Learning Assessment:  Learning assessment/barriers: Work  Preferred learning method: Visual  Barriers: None  Comments:    Stages of Change:Action    Psychosocial Plan    Goal Status: In progress    Psychosocial Goals: Demonstrating proper techniques for stress management, Maintain or lower PH-Q 9 score by discharge, and Identify strategies for managing depression    Psychosocial Interventions/Education:  Pt given stress booklet to review  He is feeling better and is in good spirits  Nutrition Assessment:    Hyperlipidemia: Yes     Lipids:   Lab Results   Component Value Date    CHOL 123 10/05/2023    HDL 43.5 10/05/2023    LDLF 51 06/01/2022    TRIG 114 10/05/2023       Current Dietary Guidelines: Low fat, Low sodium  Barriers to dietary change: no    Diet Habit Survey: Picture Your Plate  Pre: 73  Post: To be done at discharge.    Diabetes Assessment    No results found for: \"HGBA1C\"    History of Diabetes: No    Weight Management  93.4kg (206lbs)  BMI  31.32  HT: 68 in     No data " recorded    Nutrition Plan    Goal Status: In progress    Nutrition Goals: Lipid Goal: HDL>45, LDL <70, Total <180, Trigs <150, Improve Diet Habit Survey score by 5-10 points by discharge, Adapt a low-sodium, DASH diet prior to discharge, Adapt a Mediterranean focused diet prior to discharge, and Lose 1lb/week while enrolled in program    Nutrition Interventions/Education:   Patient advised to follow a heart healthy diet and low sodium.  2/28/24 reviewed PYP with Pt.Encouraged patient to decrease intake of sweets.reviewed added sugars and reading labels for added sugars/ROSALIND  3/6/24 Reviewed heart healthy diet tips with patient, gave handout to review/ROSALIND  5/8 he has made changes in his diet habits and is continuing to work on to work on healthy choices    Exercise Assessment    No  Mode: NA  Frequency: NA  Duration: NA    Exercise Prescription     Exercise Prescription based on: Pre-rehab stress test   Frequency:  3 days/week   Mode: Treadmill, NuStep, and Recumbent Cycle   Duration:42 total aerobic minutes   Intensity: RPE 12-16  Target HR:   108-118  MET Level: 4  Patient wears supplemental O2: N/A     Modality Workload METs Duration (minutes)   1 Pre-Exercise   2:00   2 Treadmill 2.6@3%  4.1 15 :00   3 Recumbent Bike 6@65  3.8 15 :00   4 NuStep 9@94  3.7 15:00   5 Cooldown    5 :00   6 Post-Exercise   2:00     Resistance Training: No   Home Exercise Prescription given: Yes    Exercise Plan    Goal Status: Met    Exercise Goals: Increase exercise MET level by 5-10% each week, Increase total exercise duration to 30-45 minutes, and Obtain 150 minutes/week of moderate intensity aerobic exercise    Exercise Interventions/Education:   Pt educated on the importance of exercising on his off days to meet to goal of 150 min per week . He was given education on the importance of exercise on his cardiovascular health.  He has started to get back to his regular routine at home and has more endurance      Other Core  Components/Risk Factor Assessment:    Medication adherence  Current Medications:   Medication Documentation Review Audit       Reviewed by Sully Guardado MA (Medical Assistant) on 03/07/24 at 1350      Medication Order Taking? Sig Documenting Provider Last Dose Status   aspirin 81 mg EC tablet [id number] No Take 1 tablet (81 mg) by mouth once daily. Historical Provider, MD Taking Active   ezetimibe (Zetia) 10 mg tablet [id number] No Take 1 tablet (10 mg) by mouth once daily. Neo Hanna MD Taking Active   Patient not taking:  Discontinued 03/01/24 1356   Discontinued 03/04/24 1820   metoprolol tartrate (Lopressor) 25 mg tablet [id number]  Take 0.5 tablets (12.5 mg) by mouth 2 times a day. Neo Hanna MD  Active   multivitamin tablet [id number] No Take 1 tablet by mouth once daily. Historical Provider, MD Taking Active   rosuvastatin (Crestor) 40 mg tablet [id number] No Take 1 tablet (40 mg) by mouth once daily. DAILY Neo Hanna MD Taking Active                                 Medication compliance: Yes   Uses pill box/organizer: Yes    Carries medication list: Yes     Blood Pressure Management  History of Hypertension: Yes   Medication Changes: Yes   Resting BP:  128/65  Heart Failure Management  Hx of Heart Failure: No    Smoking/Tobacco Assessment  Social History     Tobacco Use   Smoking Status Former    Types: Cigarettes   Smokeless Tobacco Never       Other Core Component Plan    Goal Status: Met    Other Core Component Goals: Verbalize medication usage and drug actions by discharge and Achieve resting BP of < 130/80 by discharge    Other Core Component Interventions/Education:   Verbalize medication usage and drug actions by discharge and Achieve resting BP of < 130/80 by discharge  His BP is appropriate with exercise. He is compliant with his medications.    Individual Patient Goals:    Be able to do the rower 3-5 days a week  for 15 min by discharge  Establish a regular aerobic activity for 30  min 5  days a week by discharge    Goal Status: In progress    Staff Comments:  Mr Brunner has attended 30 visits of cardiac rehab. He is progressed without any difficulties and no ectopy noted. He is back to a regular routine at home and was able to ride his motorcycle and feels more confident. He was educated on the importance of continuing to work on his strength and and endurance which he has and is in good spirits.     Rehab Staff Signature: Vee Berry RN

## 2024-05-08 NOTE — PROGRESS NOTES
"Chief Complaint:   Follow-up     History Of Present Illness:    Jake Brunner is a 69 y.o. male presenting for follow up. Seen in collaboration with Dr. Choi. He has been participating in cardiac rehab without chest pain or dyspnea. He sometimes feels he cannot take a deep breath, but this is not consistent. He has intentionally been losing weight. He reports at cardiac rehab systolic blood pressures have 1 teens to 120s. At home his systolic blood pressure has been 130 to 132. He has a very rare palpitation described as a skipped beat that lasts a second. Denies chest pain, orthopnea, pnd, lightheadedness, dizziness, syncope, lower extremity edema, or bleeding issues.       Last Recorded Vitals:  Vitals:    05/08/24 1311   BP: 145/84   BP Location: Right arm   Pulse: 76   SpO2: 97%   Weight: 92.5 kg (204 lb)   Height: 1.727 m (5' 8\")       Past Medical History:  He has a past medical history of Alcohol abuse, in remission, Anemia (02/07/2024), Cataract, Closed displaced comminuted fracture of shaft of right tibia (07/31/2019), Closed displaced fracture of body of right scapula (07/31/2019), Closed displaced fracture of shaft of right clavicle (07/31/2019), Coronary artery disease, Essential (primary) hypertension, Heart valve disease, History of alcohol abuse (03/01/2024), History of transesophageal echocardiography (EULA) (12/11/2023), Hyperlipidemia, Palpitations (02/07/2024), Prostate cancer (Multi) (01/09/2024), Shortness of breath, Sleep apnea, and Vision loss.    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      Social History:  He reports that he has quit smoking. His smoking use included cigarettes. He has never used " smokeless tobacco. He reports that he does not drink alcohol and does not use drugs.    Family History:  Family History   Problem Relation Name Age of Onset    Hyperlipidemia Mother      Coronary artery disease Father      Other (MYOCARDIAL INFARCTION) Father      Heart attack Father          Allergies:  Patient has no known allergies.    Outpatient Medications:  Current Outpatient Medications   Medication Instructions    aspirin 81 mg EC tablet 1 tablet, oral, Daily    ezetimibe (ZETIA) 10 mg, oral, Daily    metoprolol tartrate (LOPRESSOR) 12.5 mg, oral, 2 times daily    multivitamin tablet 1 tablet, oral, Daily    prednisoLONE acetate (Pred-Forte) 1 % ophthalmic suspension ophthalmic (eye), 4 times daily    rosuvastatin (CRESTOR) 40 mg, oral, Daily, DAILY       Physical Exam:  GENERAL: alert, cooperative, pleasant, in no acute distress  SKIN: warm and dry  NECK: Normal JVD, negative HJR  CARDIAC: Regular rate and rhythm with 2/6 holosystolic murmur heard through precordium.  CHEST: Normal respiratory efforts, lungs clear to auscultation bilaterally.  ABDOMEN: soft, nontender, nondistended  EXTREMITIES: no edema, +2 palpable RP bilaterally       Last Labs:  CBC -  Lab Results   Component Value Date    WBC 9.4 02/07/2024    HGB 9.0 (L) 02/07/2024    HCT 28.6 (L) 02/07/2024    MCV 94 02/07/2024     (H) 02/07/2024       CMP -  Lab Results   Component Value Date    CALCIUM 10.6 02/07/2024    PHOS 2.1 (L) 01/28/2024    PROT 6.5 02/07/2024    ALBUMIN 4.0 02/07/2024    AST 24 02/07/2024    ALT 23 02/07/2024    ALKPHOS 59 02/07/2024    BILITOT 0.5 02/07/2024       LIPID PANEL -   Lab Results   Component Value Date    CHOL 123 10/05/2023    TRIG 114 10/05/2023    HDL 43.5 10/05/2023    CHHDL 2.8 10/05/2023    LDLF 51 06/01/2022    VLDL 23 10/05/2023    NHDL 80 10/05/2023       RENAL FUNCTION PANEL -   Lab Results   Component Value Date    GLUCOSE 95 02/07/2024     02/07/2024    K 4.9 02/07/2024     (H)  "02/07/2024    CO2 26 02/07/2024    ANIONGAP 13 02/07/2024    BUN 16 02/07/2024    CREATININE 0.79 02/07/2024    GFRMALE >90 07/11/2023    CALCIUM 10.6 02/07/2024    PHOS 2.1 (L) 01/28/2024    ALBUMIN 4.0 02/07/2024        No results found for: \"BNP\", \"HGBA1C\"    Last Cardiology Tests:  ECG:  Obtained and reviewed EKG- normal sinus rhythm HR 81, possible left atrial enlargement, T wave abnormality consider lateral ischemia.     Echo:  3/5/24  CONCLUSIONS:   1. Left ventricular systolic function is normal with a 65-70% estimated ejection fraction.   2. Poorly visualized anatomical structures due to suboptimal image quality.   3. Abnormal septal motion consistent with post-operative status.   4. The left atrium is severely dilated.   5. S/p MV repair with 30mm Simuplus flexible annuloplasty band with mean MV gradient of 6.6mmHg and anteroseptally directed MR which is difficult to assess given image quality. Possibly mild.   6. Moderately elevated right ventricular systolic pressure.   7. Mild aortic valve regurgitation.   8. Compared wtihthe prior exam from 1/25/2024 the prior images were more technically difficult than today's, however no obvious MR was seen on the prior study. There was hyperdynamic LV systolic function at that time. The mean MV gradient was lower previously at 2.8mmHg. The MR appears to be new on the current exam. The RVSP is moderately elevated today and there was insuficent TR on the prior study to estimate.   9. There is moderate dilatation of the aortic root.      Transthoracic Echo (TTE) Complete 01/25/2024  CONCLUSIONS:   1. Poorly visualized anatomical structures due to suboptimal image quality.   2. Left ventricular systolic function is hyperdynamic with a 75-80% estimated ejection fraction.   3. Mild aortic valve regurgitation.   4. Dilation of the ascending aorta with evidence of graft repair. The graft was not satisfactorily visualized.   5. Mitral valve leaflets are not well visualized. " However there is no evidence of mitral regurgitation, and diastolic gradients are normal following reported leaflet repair.   6. Compared with the prior study dated 1/23/2024 (intraoperative EULA), mitral regurgitation has decreased. Right venticular function could not be confidently assessed in the current study.    Ejection Fractions:  EF   Date/Time Value Ref Range Status   03/05/2024 03:48 PM 71 %        12/18/23 left and right heart cath  Coronary Lesion Summary:  Vessel          Stenosis     Vessel Segment  LAD           40% stenosis      proximal  LAD           40% stenosis        mid  LAD          50-60% stenosis mid to distal  2nd Diagonal  30% stenosis      proximal  Circumflex    50% stenosis        mid  OM 2          40% stenosis        mid    CONCLUSIONS:   1. Moderate 2 vessel CAD in a right dominant system.   2. Elevated left and right heart filling pressures.   3. Mild pulmonary hypertension with a PVR of 1.2 Wood units.   4. No evidence of intracardiac shunt.   5. Preserved cardiac index with a normal .    Holter Monitor 2/14/24 to 2/28/24- 2 episodes NSVT longest episode 5 beats. 14 episodes of SVT longest episode 18 seconds. Average HR 82.     Assessment/Plan   Jake was seen today for follow-up.  Diagnoses and all orders for this visit:  Hyperlipidemia, unspecified hyperlipidemia type (Primary)  -     Lipid panel; Future  Coronary artery disease involving native coronary artery of native heart without angina pectoris  -     CBC; Future  Primary hypertension      In summary Mr. Brunner is a pleasant 69 year-old white male with a past medical history significant for type A aortic dissection status post replacement of his ascending aorta, hypertension, hyperlipidemia, postoperative atrial fibrillation, mitral regurgitation s/p MV repair, coronary atherosclerosis on CT, hepatic steatosis, alcohol abuse, and remote traumatic subdural hematoma. He has been participating in cardiac rehab  without symptoms. His blood pressure is mildly elevated today. His blood pressure at cardiac rehab has been normal per his report. I did not adjust medications. His prior lipid panel was marginal. I did ordered a fasting lipid panel and CBC for surveillance of anemia. He should have antibiotic prior to any dental procedures or cleanings. He will continue current cardiovascular medications. He will continue current cardiovascular medications. He will follow up as scheduled.       Diana Alcantar, APRN-CNP

## 2024-05-10 ENCOUNTER — APPOINTMENT (OUTPATIENT)
Dept: CARDIAC REHAB | Facility: CLINIC | Age: 69
End: 2024-05-10
Payer: MEDICARE

## 2024-05-13 ENCOUNTER — CLINICAL SUPPORT (OUTPATIENT)
Dept: CARDIAC REHAB | Facility: CLINIC | Age: 69
End: 2024-05-13
Payer: MEDICARE

## 2024-05-13 DIAGNOSIS — Z98.890 STATUS POST MITRAL VALVE REPAIR: ICD-10-CM

## 2024-05-13 PROCEDURE — 93798 PHYS/QHP OP CAR RHAB W/ECG: CPT | Performed by: INTERNAL MEDICINE

## 2024-05-15 ENCOUNTER — CLINICAL SUPPORT (OUTPATIENT)
Dept: CARDIAC REHAB | Facility: CLINIC | Age: 69
End: 2024-05-15
Payer: MEDICARE

## 2024-05-15 DIAGNOSIS — Z98.890 STATUS POST MITRAL VALVE REPAIR: ICD-10-CM

## 2024-05-15 PROCEDURE — 93798 PHYS/QHP OP CAR RHAB W/ECG: CPT | Mod: KX | Performed by: INTERNAL MEDICINE

## 2024-05-17 ENCOUNTER — CLINICAL SUPPORT (OUTPATIENT)
Dept: CARDIAC REHAB | Facility: CLINIC | Age: 69
End: 2024-05-17
Payer: MEDICARE

## 2024-05-17 DIAGNOSIS — Z98.890 STATUS POST MITRAL VALVE REPAIR: ICD-10-CM

## 2024-05-17 PROCEDURE — 93798 PHYS/QHP OP CAR RHAB W/ECG: CPT | Mod: KX | Performed by: INTERNAL MEDICINE

## 2024-05-20 ENCOUNTER — LAB (OUTPATIENT)
Dept: LAB | Facility: LAB | Age: 69
End: 2024-05-20
Payer: MEDICARE

## 2024-05-20 ENCOUNTER — CLINICAL SUPPORT (OUTPATIENT)
Dept: CARDIAC REHAB | Facility: CLINIC | Age: 69
End: 2024-05-20
Payer: MEDICARE

## 2024-05-20 DIAGNOSIS — E78.5 HYPERLIPIDEMIA, UNSPECIFIED HYPERLIPIDEMIA TYPE: ICD-10-CM

## 2024-05-20 DIAGNOSIS — I25.10 CORONARY ARTERY DISEASE INVOLVING NATIVE CORONARY ARTERY OF NATIVE HEART WITHOUT ANGINA PECTORIS: ICD-10-CM

## 2024-05-20 DIAGNOSIS — Z98.890 STATUS POST MITRAL VALVE REPAIR: ICD-10-CM

## 2024-05-20 LAB
CHOLEST SERPL-MCNC: 123 MG/DL (ref 0–199)
CHOLESTEROL/HDL RATIO: 2.7
ERYTHROCYTE [DISTWIDTH] IN BLOOD BY AUTOMATED COUNT: 16.4 % (ref 11.5–14.5)
HCT VFR BLD AUTO: 35.3 % (ref 41–52)
HDLC SERPL-MCNC: 45.2 MG/DL
HGB BLD-MCNC: 10.9 G/DL (ref 13.5–17.5)
LDLC SERPL CALC-MCNC: 55 MG/DL
MCH RBC QN AUTO: 27.7 PG (ref 26–34)
MCHC RBC AUTO-ENTMCNC: 30.9 G/DL (ref 32–36)
MCV RBC AUTO: 90 FL (ref 80–100)
NON HDL CHOLESTEROL: 78 MG/DL (ref 0–149)
NRBC BLD-RTO: 0 /100 WBCS (ref 0–0)
PLATELET # BLD AUTO: 279 X10*3/UL (ref 150–450)
RBC # BLD AUTO: 3.94 X10*6/UL (ref 4.5–5.9)
TRIGL SERPL-MCNC: 112 MG/DL (ref 0–149)
VLDL: 22 MG/DL (ref 0–40)
WBC # BLD AUTO: 6.1 X10*3/UL (ref 4.4–11.3)

## 2024-05-20 PROCEDURE — 85027 COMPLETE CBC AUTOMATED: CPT

## 2024-05-20 PROCEDURE — 93798 PHYS/QHP OP CAR RHAB W/ECG: CPT | Mod: KX | Performed by: INTERNAL MEDICINE

## 2024-05-20 PROCEDURE — 80061 LIPID PANEL: CPT

## 2024-05-22 ENCOUNTER — CLINICAL SUPPORT (OUTPATIENT)
Dept: CARDIAC REHAB | Facility: CLINIC | Age: 69
End: 2024-05-22
Payer: MEDICARE

## 2024-05-22 DIAGNOSIS — Z98.890 STATUS POST MITRAL VALVE REPAIR: ICD-10-CM

## 2024-05-22 PROCEDURE — 93798 PHYS/QHP OP CAR RHAB W/ECG: CPT | Mod: KX | Performed by: INTERNAL MEDICINE

## 2024-05-22 NOTE — ADDENDUM NOTE
Encounter addended by: JACOBO Agrawal-AGGANDEEP on: 5/22/2024 1:43 PM   Actions taken: Clinical Note Signed

## 2024-05-24 ENCOUNTER — CLINICAL SUPPORT (OUTPATIENT)
Dept: CARDIAC REHAB | Facility: CLINIC | Age: 69
End: 2024-05-24
Payer: MEDICARE

## 2024-05-24 DIAGNOSIS — Z98.890 STATUS POST MITRAL VALVE REPAIR: ICD-10-CM

## 2024-05-24 PROCEDURE — 93798 PHYS/QHP OP CAR RHAB W/ECG: CPT | Mod: KX | Performed by: INTERNAL MEDICINE

## 2024-05-28 ENCOUNTER — DOCUMENTATION (OUTPATIENT)
Dept: CARDIAC REHAB | Facility: CLINIC | Age: 69
End: 2024-05-28
Payer: MEDICARE

## 2024-05-28 NOTE — PROGRESS NOTES
"  Cardiac Rehabilitation Discharge Summary    Name: Jake Brunner  Medical Record Number: [id number]  YOB: 1955  Age: 69 y.o.    Today’s Date: 5/28/2024  Primary Care Physician: Neo Hanna MD  Referring Physician: Owen Choi MD  Program Location: [address]  Primary Diagnosis:   MVR     Onset/Date of Diagnosis: 1/23/2024    Patient healing no issues with incision, no chest discomfort    AACVPR Risk Stratification:  moderate    Falls Risk: Medium  Psychosocial Assessment   Pre: 1  Post: 2    Sent PH-Q 9 to MD if score > 20: No; score < 20    Pt reported/currently experiencing stress: No  Patient uses stress management skills: Yes   History of: no history of anxiety or depression  Currently seeing a mental health provider: No  Social Support: Yes, Whom:family  Quality of Life Survey: SF-36   SF-36 Pre Post   Physical Component Score  TBD   Mental Component Score  TBD     Learning Assessment:  Learning assessment/barriers: Work  Preferred learning method: Visual  Barriers: None  Comments:    Stages of Change:Action    Psychosocial Plan    Goal Status: Met    Psychosocial Goals: Demonstrating proper techniques for stress management, Maintain or lower PH-Q 9 score by discharge, and Identify strategies for managing depression    Psychosocial Interventions/Education:  Pt given stress booklet to review  He is feeling better and is in good spirits  Pt feeling better, no concerns  Nutrition Assessment:    Hyperlipidemia: Yes     Lipids:   Lab Results   Component Value Date    CHOL 123 05/20/2024    HDL 45.2 05/20/2024    LDLF 51 06/01/2022    TRIG 112 05/20/2024       Current Dietary Guidelines: Low fat, Low sodium  Barriers to dietary change: no    Diet Habit Survey: Picture Your Plate  Pre: 73  Post: 61     Diabetes Assessment    No results found for: \"HGBA1C\"    History of Diabetes: No    Weight Management  93.4kg (206lbs)  BMI  31.32  HT: 68 in  Discharge  weight: 192.3 " lbs  BMI: 29.19      Nutrition Plan    Goal Status: Met    Nutrition Goals: Lipid Goal: HDL>45, LDL <70, Total <180, Trigs <150, Improve Diet Habit Survey score by 5-10 points by discharge, Adapt a low-sodium, DASH diet prior to discharge, Adapt a Mediterranean focused diet prior to discharge, and Lose 1lb/week while enrolled in program    Nutrition Interventions/Education:   Patient advised to follow a heart healthy diet and low sodium.  2/28/24 reviewed PYP with Pt.Encouraged patient to decrease intake of sweets.reviewed added sugars and reading labels for added sugars/ROSALIND  3/6/24 Reviewed heart healthy diet tips with patient, gave handout to review/ROSALIND  5/8 he has made changes in his diet habits and is continuing to work on to work on healthy choices  5/28 pt continues to work on healthy lifestyle changes    Exercise Assessment    No  Mode: NA  Frequency: NA  Duration: NA    Exercise Prescription     Exercise Prescription based on: Pre-rehab stress test   Frequency:  3 days/week   Mode: Treadmill, NuStep, and Recumbent Cycle   Duration:45 total aerobic minutes   Intensity: RPE 12-16  Target HR:   108-118  MET Level: 4.1  Patient wears supplemental O2: N/A     Modality Workload METs Duration (minutes)   1 Pre-Exercise   2:00   2 Treadmill 2.6@3%  4.1 15 :00   3 Recumbent Bike 6@65  3.8 15 :00   4 NuStep 9@94  3.7 15:00   5 Cooldown    5 :00   6 Post-Exercise   2:00     Resistance Training: No   Home Exercise Prescription given: Yes    Exercise Plan    Goal Status: Met    Exercise Goals: Increase exercise MET level by 5-10% each week, Increase total exercise duration to 30-45 minutes, and Obtain 150 minutes/week of moderate intensity aerobic exercise    Exercise Interventions/Education:   Pt educated on the importance of exercising on his off days to meet to goal of 150 min per week . He was given education on the importance of exercise on his cardiovascular health.  He has started to get back to his regular routine  at home and has more endurance      Other Core Components/Risk Factor Assessment:    Medication adherence  Current Medications:   Medication Documentation Review Audit       Reviewed by Heaven Berg RN (Student Nurse Practitioner) on 05/08/24 at 1320      Medication Order Taking? Sig Documenting Provider Last Dose Status   aspirin 81 mg EC tablet [id number] Yes Take 1 tablet (81 mg) by mouth once daily. Historical Provider, MD Taking Active   ezetimibe (Zetia) 10 mg tablet [id number] Yes Take 1 tablet (10 mg) by mouth once daily. Neo Hanna MD Taking Active   metoprolol tartrate (Lopressor) 25 mg tablet [id number] Yes Take 0.5 tablets (12.5 mg) by mouth 2 times a day. Neo Hanna MD Taking Active   multivitamin tablet [id number] Yes Take 1 tablet by mouth once daily. Historical Provider, MD Taking Active   rosuvastatin (Crestor) 40 mg tablet [id number] Yes Take 1 tablet (40 mg) by mouth once daily. DAILY Neo Hanna MD Taking Active                                 Medication compliance: Yes   Uses pill box/organizer: Yes    Carries medication list: Yes     Blood Pressure Management  History of Hypertension: Yes   Medication Changes: Yes   Resting BP:  128/65  Heart Failure Management  Hx of Heart Failure: No    Smoking/Tobacco Assessment  Social History     Tobacco Use   Smoking Status Former    Types: Cigarettes   Smokeless Tobacco Never       Other Core Component Plan    Goal Status: Met    Other Core Component Goals: Verbalize medication usage and drug actions by discharge and Achieve resting BP of < 130/80 by discharge    Other Core Component Interventions/Education:   Verbalize medication usage and drug actions by discharge and Achieve resting BP of < 130/80 by discharge  His BP is appropriate with exercise. He is compliant with his medications.  No change or concerns    Individual Patient Goals:    Be able to do the rower 3-5 days a week  for 15 min by discharge  Establish a regular aerobic  activity for 30 min 5  days a week by discharge    Goal Status: Met    Staff Comments:  Mr Brunner has attended 36 visits of cardiac rehab. He is progressed without any difficulties and no ectopy noted. He is back to a regular routine at home and was able to ride his motorcycle and feels more confident.  He is exercising daily by walking his dog 3 miles, He was educated on the importance of continuing to work on his strength and and endurance which he has and is in good spirits. He has met his goals and outcomes of the program.    Rehab Staff Signature: Vee Berry RN

## 2024-06-21 ENCOUNTER — TELEPHONE (OUTPATIENT)
Dept: RADIATION ONCOLOGY | Facility: HOSPITAL | Age: 69
End: 2024-06-21
Payer: MEDICARE

## 2024-06-24 ASSESSMENT — ENCOUNTER SYMPTOMS
SHORTNESS OF BREATH: 0
FATIGUE: 0
BLOOD IN STOOL: 0
ANAL BLEEDING: 0
ABDOMINAL PAIN: 0
DIARRHEA: 0
BACK PAIN: 0
PSYCHIATRIC NEGATIVE: 1
CONSTIPATION: 0
DIFFICULTY URINATING: 0
RECTAL PAIN: 0
HEMATURIA: 0
FEVER: 0
COUGH: 0
UNEXPECTED WEIGHT CHANGE: 0
WEAKNESS: 0
ARTHRALGIAS: 0
DIZZINESS: 0
PALPITATIONS: 0
ALLERGIC/IMMUNOLOGIC NEGATIVE: 1
FREQUENCY: 0
DYSURIA: 0
CHEST TIGHTNESS: 0
JOINT SWELLING: 0

## 2024-06-24 NOTE — PROGRESS NOTES
Cancer synopsis:  Rad/onc: Dr. Tavares/ Sita CNP    68 year old male with node-positive prostate cancer, [id number] (PSMA PET+ left int iliac node), [place] 4+4=8, iPSA 63.47. He was treated with  definitive treatment on the ASCLEPIUS trial: SBRT with 6 months ADT/abiraterone/niraparib.   He completed treatment to a total dose of 4000 cGy in 5 fractions to his gross disease in his prostate and lymph nodes, 3750 cGy in 5 fractions to his prostate, and 2500 cGy to his pelvic lymph nodes     Treatment Dates: -2022  Elapsed Days: 9  Region(s) Treated: prostate, nodes, and pelvis  Radiation Dose Prescribed: 4000 cGy in five fractions to his gross disease (800 cGy per fraction), 3750 cGy in five fractions to his prostate (750 cGy per fraction), and 2500 cGy to his pelvic lymph nodes (500 cGy per fraction)  Radiation Technique: SBRT  Energy: 10 MV photons    PMH:  Mitral valve disease and repair, HTN, HLD, HILTON    Recent imaging:  X-ray chest: 2024 No acute cardiopulmonary pathology     History of presenting illness:    Patient ID: [id number]     Jake Brunner is a 69 y.o. male who presents for Locally advance very high risk prostate cancer GG4, IPSA 63.47, [id number] now s/p RT st-term systemic therapy.    RT Site: prostate, pelvis and local LN  RT Date: 2022  Hormone therapy: Yes, st-term ADT, aa/p and niraparib  Hot Flushes: Denies  Fatigue: Denies  Bone pain: Denies  ED: Reports sexual drive has vastly improved. Does have Cialis but has not had used. Reports some mild discomfort initially with erectile.  ED medications: Yes, cialis 10-20mg  Urinary symptoms: Denies dysuria, hematuria, frequency, urgency, urine leakage.   Urinary Medications: No  Rectal bleeding: Denies  Colonoscopy: Due now (none on file)  Other systems: Denies SOB, CP or fever. Has since last visit had redo sternotomy mitral valve repair.    PERFORMANCE STATUS:  KPS/ECO, Fully active, able to carry on all pre-disease performed  without restriction (ECOG equivalent 0)    Review of systems:  Review of Systems   Constitutional:  Negative for fatigue, fever and unexpected weight change.   Respiratory:  Negative for cough, chest tightness and shortness of breath.    Cardiovascular:  Negative for chest pain, palpitations and leg swelling.   Gastrointestinal:  Negative for abdominal pain, anal bleeding, blood in stool, constipation, diarrhea and rectal pain.   Endocrine: Negative for cold intolerance, heat intolerance and polyuria.   Genitourinary:  Positive for penile pain. Negative for decreased urine volume, difficulty urinating, dysuria, frequency, hematuria and urgency.        Refer to HPI   Musculoskeletal:  Negative for arthralgias, back pain, gait problem and joint swelling.   Skin: Negative.    Allergic/Immunologic: Negative.    Neurological:  Negative for dizziness, syncope and weakness.   Psychiatric/Behavioral: Negative.         Past Medical history  Past Medical History:   Diagnosis Date    Alcohol abuse, in remission     History of alcohol abuse    Anemia 02/07/2024    Cataract     Closed displaced comminuted fracture of shaft of right tibia 07/31/2019    Closed displaced fracture of body of right scapula 07/31/2019    Closed displaced fracture of shaft of right clavicle 07/31/2019    Coronary artery disease     Essential (primary) hypertension     Hypertension    Heart valve disease     Severe mitral valve regurgitation.    History of alcohol abuse 03/01/2024    History of transesophageal echocardiography (EULA) 12/11/2023    Echo on 12/11/23    Hyperlipidemia     Palpitations 02/07/2024    Prostate cancer (Multi) 01/09/2024    Onc: Pacheco Gomes CNP    Shortness of breath     Sleep apnea     Uses CPAP    Vision loss     Wears contacts        Surgical/family history  Family History   Problem Relation Name Age of Onset    Hyperlipidemia Mother      Coronary artery disease Father      Other (MYOCARDIAL INFARCTION) Father      Heart  attack Father        Past Surgical History:   Procedure Laterality Date    ANKLE SURGERY Left     Ankle Surgery    ARTERIAL ANEURYSM REPAIR  12/2015    Aortic Aneurysm Repair Ascending Aorta    CARDIAC CATHETERIZATION N/A 12/11/2023    Procedure: Left And Right Heart Cath, With LV;  Surgeon: Owen Choi MD;  Location: [facility];  Service: Cardiovascular;  Laterality: N/A;  Scheduled 12/11 @ 9:00am, EULA w/ anesthesia @ 7:30am    COLONOSCOPY      CT CHEST W AND WO IV CONTRAST  10/18/2023    Thoracic aortic atherosclerosis.    FEMUR FRACTURE SURGERY  01/27/2016    Femur Repair    LIPOMA RESECTION  2022    back    OTHER SURGICAL HISTORY  01/27/2016    Wrist Surgery        Social History  Tobacco Use: Medium Risk (6/11/2024)    [facility]    Patient History     Smoking Tobacco Use: Former     Smokeless Tobacco Use: Never     Passive Exposure: Not on file         Current med list:  Current Outpatient Medications   Medication Instructions    aspirin 81 mg EC tablet 1 tablet, oral, Daily    ezetimibe (ZETIA) 10 mg, oral, Daily    metoprolol tartrate (LOPRESSOR) 12.5 mg, oral, 2 times daily    multivitamin tablet 1 tablet, oral, Daily    rosuvastatin (CRESTOR) 40 mg, oral, Daily, DAILY      Last recorded vital:  /89   Pulse 75   Temp 36.2 °C (97.2 °F) (Temporal)   Resp 18   Wt 90.5 kg (199 lb 8 oz)   SpO2 96%   BMI 30.33 kg/m²     Physical Exam  Constitutional:       Appearance: Normal appearance.   Cardiovascular:      Rate and Rhythm: Normal rate.   Pulmonary:      Effort: Pulmonary effort is normal.      Breath sounds: Normal breath sounds.   Musculoskeletal:         General: Normal range of motion.      Cervical back: Normal range of motion.   Neurological:      Mental Status: He is alert and oriented to person, place, and time.   Psychiatric:         Mood and Affect: Mood normal.         Behavior: Behavior normal.         Thought Content: Thought content normal.          Judgment: Judgment normal.         Pertinent labs:  Prostate Specific AG   Date/Time Value Ref Range Status   01/09/2024 12:05 PM 0.11 <=4.00 ng/mL Final         Dx:  Problem List Items Addressed This Visit       Malignant neoplasm of prostate (Multi) - Primary     Other Visit Diagnoses       Anemia, unspecified type            Due for labs today, will call with results. Review of latent SE including rectal bleeding, hematuria, urinary strictures, ED where reviewed as well as how to contact office if s/s present. Denies latent SE. NCCN guidelines where reviewed and routine FUV of every 3m for first year and every 6m for four years for a total of five years was discussed. Patient verbalized understanding.     Discussed continued anemia and need for further work up with PCP for heme. Discussed various etiologies including chronic disease vs iron anemia and that further evaluation is again clinically indicated at this time.    PLAN:  FUV 6m  Labs Psa today and in 6m. Anemia labs  Imaging none  Cialis 10-20mg PRN  Heme referral for anemia  FUV other providers: PCP for routine evals, cards for mitral valve regurg    Please contact office with any concerns:  Pacheco Gomes CNP  [phone]

## 2024-06-25 ENCOUNTER — HOSPITAL ENCOUNTER (OUTPATIENT)
Dept: RADIATION ONCOLOGY | Facility: HOSPITAL | Age: 69
Setting detail: RADIATION/ONCOLOGY SERIES
Discharge: HOME | End: 2024-06-25
Payer: MEDICARE

## 2024-06-25 ENCOUNTER — LAB (OUTPATIENT)
Dept: LAB | Facility: HOSPITAL | Age: 69
End: 2024-06-25
Payer: MEDICARE

## 2024-06-25 ENCOUNTER — DOCUMENTATION (OUTPATIENT)
Dept: RADIATION ONCOLOGY | Facility: HOSPITAL | Age: 69
End: 2024-06-25

## 2024-06-25 VITALS
HEART RATE: 75 BPM | WEIGHT: 199.5 LBS | TEMPERATURE: 97.2 F | DIASTOLIC BLOOD PRESSURE: 89 MMHG | RESPIRATION RATE: 18 BRPM | SYSTOLIC BLOOD PRESSURE: 152 MMHG | BODY MASS INDEX: 30.33 KG/M2 | OXYGEN SATURATION: 96 %

## 2024-06-25 DIAGNOSIS — C61 MALIGNANT NEOPLASM OF PROSTATE (MULTI): ICD-10-CM

## 2024-06-25 DIAGNOSIS — D64.9 ANEMIA, UNSPECIFIED TYPE: ICD-10-CM

## 2024-06-25 DIAGNOSIS — C61 MALIGNANT NEOPLASM OF PROSTATE (MULTI): Primary | ICD-10-CM

## 2024-06-25 DIAGNOSIS — Z00.6 RESEARCH STUDY PATIENT: ICD-10-CM

## 2024-06-25 LAB
BASOPHILS # BLD AUTO: 0.07 X10*3/UL (ref 0–0.1)
BASOPHILS NFR BLD AUTO: 1.1 %
EOSINOPHIL # BLD AUTO: 0.22 X10*3/UL (ref 0–0.7)
EOSINOPHIL NFR BLD AUTO: 3.4 %
ERYTHROCYTE [DISTWIDTH] IN BLOOD BY AUTOMATED COUNT: 17.3 % (ref 11.5–14.5)
FERRITIN SERPL-MCNC: 30 NG/ML (ref 20–300)
HCT VFR BLD AUTO: 34.8 % (ref 41–52)
HGB BLD-MCNC: 11.1 G/DL (ref 13.5–17.5)
HOLD SPECIMEN: NORMAL
IMM GRANULOCYTES # BLD AUTO: 0.02 X10*3/UL (ref 0–0.7)
IMM GRANULOCYTES NFR BLD AUTO: 0.3 % (ref 0–0.9)
IRON SATN MFR SERPL: 11 % (ref 25–45)
IRON SERPL-MCNC: 45 UG/DL (ref 35–150)
LYMPHOCYTES # BLD AUTO: 1.5 X10*3/UL (ref 1.2–4.8)
LYMPHOCYTES NFR BLD AUTO: 23.3 %
MCH RBC QN AUTO: 27.8 PG (ref 26–34)
MCHC RBC AUTO-ENTMCNC: 31.9 G/DL (ref 32–36)
MCV RBC AUTO: 87 FL (ref 80–100)
MONOCYTES # BLD AUTO: 0.69 X10*3/UL (ref 0.1–1)
MONOCYTES NFR BLD AUTO: 10.7 %
NEUTROPHILS # BLD AUTO: 3.95 X10*3/UL (ref 1.2–7.7)
NEUTROPHILS NFR BLD AUTO: 61.2 %
NRBC BLD-RTO: 0 /100 WBCS (ref 0–0)
PLATELET # BLD AUTO: 249 X10*3/UL (ref 150–450)
PSA SERPL-MCNC: 0.11 NG/ML
RBC # BLD AUTO: 3.99 X10*6/UL (ref 4.5–5.9)
TIBC SERPL-MCNC: 421 UG/DL (ref 240–445)
UIBC SERPL-MCNC: 376 UG/DL (ref 110–370)
WBC # BLD AUTO: 6.5 X10*3/UL (ref 4.4–11.3)

## 2024-06-25 PROCEDURE — 99214 OFFICE O/P EST MOD 30 MIN: CPT

## 2024-06-25 PROCEDURE — 82728 ASSAY OF FERRITIN: CPT

## 2024-06-25 PROCEDURE — 83540 ASSAY OF IRON: CPT

## 2024-06-25 PROCEDURE — 85025 COMPLETE CBC W/AUTO DIFF WBC: CPT

## 2024-06-25 PROCEDURE — 84153 ASSAY OF PSA TOTAL: CPT

## 2024-06-25 ASSESSMENT — PATIENT HEALTH QUESTIONNAIRE - PHQ9
SUM OF ALL RESPONSES TO PHQ9 QUESTIONS 1 AND 2: 0
1. LITTLE INTEREST OR PLEASURE IN DOING THINGS: NOT AT ALL
2. FEELING DOWN, DEPRESSED OR HOPELESS: NOT AT ALL

## 2024-06-25 ASSESSMENT — PAIN SCALES - GENERAL: PAINLEVEL: 0-NO PAIN

## 2024-06-25 ASSESSMENT — ENCOUNTER SYMPTOMS
LOSS OF SENSATION IN FEET: 0
OCCASIONAL FEELINGS OF UNSTEADINESS: 0
DEPRESSION: 0

## 2024-06-25 NOTE — RESEARCH NOTES
STUDY: [id number]  VISIT: 24M     Clinical Research Specialist saw patient in clinic today.    Adverse Events and Concomitant Medications assessed.    QOLs completed by patient without assistance.     Next appointment and contact information provided.    All questions answered to patient satisfaction.    Karoline Gipson  06/25/2024

## 2024-08-15 ENCOUNTER — APPOINTMENT (OUTPATIENT)
Dept: CARDIOLOGY | Facility: HOSPITAL | Age: 69
End: 2024-08-15
Payer: MEDICARE

## 2024-09-05 DIAGNOSIS — E78.5 HYPERLIPIDEMIA, UNSPECIFIED HYPERLIPIDEMIA TYPE: ICD-10-CM

## 2024-09-06 RX ORDER — ROSUVASTATIN CALCIUM 40 MG/1
40 TABLET, COATED ORAL DAILY
Qty: 90 TABLET | Refills: 3 | Status: SHIPPED | OUTPATIENT
Start: 2024-09-06

## 2024-09-11 ENCOUNTER — APPOINTMENT (OUTPATIENT)
Dept: PRIMARY CARE | Facility: CLINIC | Age: 69
End: 2024-09-11
Payer: MEDICARE

## 2024-09-11 ENCOUNTER — LAB (OUTPATIENT)
Dept: LAB | Facility: LAB | Age: 69
End: 2024-09-11
Payer: MEDICARE

## 2024-09-11 VITALS
OXYGEN SATURATION: 95 % | HEART RATE: 80 BPM | HEIGHT: 68 IN | SYSTOLIC BLOOD PRESSURE: 135 MMHG | TEMPERATURE: 97.8 F | BODY MASS INDEX: 28.28 KG/M2 | DIASTOLIC BLOOD PRESSURE: 84 MMHG | WEIGHT: 186.6 LBS

## 2024-09-11 DIAGNOSIS — E78.2 MIXED HYPERLIPIDEMIA: ICD-10-CM

## 2024-09-11 DIAGNOSIS — R06.02 SHORTNESS OF BREATH: ICD-10-CM

## 2024-09-11 DIAGNOSIS — R91.8 GROUND GLASS OPACITY PRESENT ON IMAGING OF LUNG: ICD-10-CM

## 2024-09-11 DIAGNOSIS — D50.0 IRON DEFICIENCY ANEMIA DUE TO CHRONIC BLOOD LOSS: ICD-10-CM

## 2024-09-11 DIAGNOSIS — C61 MALIGNANT NEOPLASM OF PROSTATE (MULTI): ICD-10-CM

## 2024-09-11 DIAGNOSIS — Z00.00 MEDICARE ANNUAL WELLNESS VISIT, SUBSEQUENT: ICD-10-CM

## 2024-09-11 DIAGNOSIS — G47.33 OBSTRUCTIVE SLEEP APNEA SYNDROME: ICD-10-CM

## 2024-09-11 DIAGNOSIS — R05.3 CHRONIC COUGH: ICD-10-CM

## 2024-09-11 DIAGNOSIS — E66.3 OVERWEIGHT (BMI 25.0-29.9): ICD-10-CM

## 2024-09-11 DIAGNOSIS — Z00.00 HEALTHCARE MAINTENANCE: Primary | ICD-10-CM

## 2024-09-11 DIAGNOSIS — I10 HYPERTENSION, UNSPECIFIED TYPE: ICD-10-CM

## 2024-09-11 DIAGNOSIS — I34.0 NONRHEUMATIC MITRAL VALVE REGURGITATION: ICD-10-CM

## 2024-09-11 PROBLEM — Z98.890 HISTORY OF THORACIC AORTIC ANEURYSM REPAIR: Status: RESOLVED | Noted: 2023-01-22 | Resolved: 2024-09-11

## 2024-09-11 PROBLEM — Z86.79 HISTORY OF THORACIC AORTIC ANEURYSM REPAIR: Status: RESOLVED | Noted: 2023-01-22 | Resolved: 2024-09-11

## 2024-09-11 PROBLEM — E66.1 DRUG-INDUCED OBESITY: Status: RESOLVED | Noted: 2023-03-08 | Resolved: 2024-09-11

## 2024-09-11 LAB
ALBUMIN SERPL BCP-MCNC: 4.3 G/DL (ref 3.4–5)
ALP SERPL-CCNC: 51 U/L (ref 33–136)
ALT SERPL W P-5'-P-CCNC: 16 U/L (ref 10–52)
ANION GAP SERPL CALC-SCNC: 12 MMOL/L (ref 10–20)
AST SERPL W P-5'-P-CCNC: 40 U/L (ref 9–39)
BASOPHILS # BLD AUTO: 0.08 X10*3/UL (ref 0–0.1)
BASOPHILS NFR BLD AUTO: 1.1 %
BILIRUB SERPL-MCNC: 1 MG/DL (ref 0–1.2)
BUN SERPL-MCNC: 17 MG/DL (ref 6–23)
CALCIUM SERPL-MCNC: 11.1 MG/DL (ref 8.6–10.6)
CHLORIDE SERPL-SCNC: 107 MMOL/L (ref 98–107)
CO2 SERPL-SCNC: 26 MMOL/L (ref 21–32)
CREAT SERPL-MCNC: 0.83 MG/DL (ref 0.5–1.3)
EGFRCR SERPLBLD CKD-EPI 2021: >90 ML/MIN/1.73M*2
EOSINOPHIL # BLD AUTO: 0.4 X10*3/UL (ref 0–0.7)
EOSINOPHIL NFR BLD AUTO: 5.3 %
ERYTHROCYTE [DISTWIDTH] IN BLOOD BY AUTOMATED COUNT: 19 % (ref 11.5–14.5)
FERRITIN SERPL-MCNC: 46 NG/ML (ref 20–300)
GLUCOSE SERPL-MCNC: 99 MG/DL (ref 74–99)
HCT VFR BLD AUTO: 37 % (ref 41–52)
HGB BLD-MCNC: 11.6 G/DL (ref 13.5–17.5)
IMM GRANULOCYTES # BLD AUTO: 0.02 X10*3/UL (ref 0–0.7)
IMM GRANULOCYTES NFR BLD AUTO: 0.3 % (ref 0–0.9)
IRON SATN MFR SERPL: 13 % (ref 25–45)
IRON SERPL-MCNC: 54 UG/DL (ref 35–150)
LYMPHOCYTES # BLD AUTO: 1.19 X10*3/UL (ref 1.2–4.8)
LYMPHOCYTES NFR BLD AUTO: 15.9 %
MCH RBC QN AUTO: 28 PG (ref 26–34)
MCHC RBC AUTO-ENTMCNC: 31.4 G/DL (ref 32–36)
MCV RBC AUTO: 89 FL (ref 80–100)
MONOCYTES # BLD AUTO: 0.74 X10*3/UL (ref 0.1–1)
MONOCYTES NFR BLD AUTO: 9.9 %
NEUTROPHILS # BLD AUTO: 5.07 X10*3/UL (ref 1.2–7.7)
NEUTROPHILS NFR BLD AUTO: 67.5 %
NRBC BLD-RTO: 0 /100 WBCS (ref 0–0)
PLATELET # BLD AUTO: 302 X10*3/UL (ref 150–450)
POTASSIUM SERPL-SCNC: 4.9 MMOL/L (ref 3.5–5.3)
PROT SERPL-MCNC: 7.4 G/DL (ref 6.4–8.2)
RBC # BLD AUTO: 4.14 X10*6/UL (ref 4.5–5.9)
SODIUM SERPL-SCNC: 140 MMOL/L (ref 136–145)
TIBC SERPL-MCNC: 425 UG/DL (ref 240–445)
UIBC SERPL-MCNC: 371 UG/DL (ref 110–370)
WBC # BLD AUTO: 7.5 X10*3/UL (ref 4.4–11.3)

## 2024-09-11 RX ORDER — EZETIMIBE 10 MG/1
10 TABLET ORAL DAILY
Qty: 90 TABLET | Refills: 3 | Status: SHIPPED | OUTPATIENT
Start: 2024-09-11

## 2024-09-11 RX ORDER — BENZONATATE 200 MG/1
1 CAPSULE ORAL
COMMUNITY
Start: 2024-08-10 | End: 2024-09-11 | Stop reason: SDUPTHER

## 2024-09-11 RX ORDER — BENZONATATE 200 MG/1
200 CAPSULE ORAL 3 TIMES DAILY PRN
Qty: 90 CAPSULE | Refills: 3 | Status: SHIPPED | OUTPATIENT
Start: 2024-09-11

## 2024-09-11 ASSESSMENT — ACTIVITIES OF DAILY LIVING (ADL)
DRESSING: INDEPENDENT
TAKING_MEDICATION: INDEPENDENT
GROCERY_SHOPPING: INDEPENDENT
BATHING: INDEPENDENT
MANAGING_FINANCES: INDEPENDENT
DOING_HOUSEWORK: INDEPENDENT

## 2024-09-11 ASSESSMENT — PAIN SCALES - GENERAL: PAINLEVEL: 0-NO PAIN

## 2024-09-11 NOTE — ASSESSMENT & PLAN NOTE
Orders:    CBC and Auto Differential; Future    Comprehensive Metabolic Panel; Future    Iron and TIBC; Future    Ferritin; Future

## 2024-09-11 NOTE — PROGRESS NOTES
Subjective   Reason for Visit: Jake Brunner is an 69 y.o. male here for a Medicare Wellness visit.     Past Medical, Surgical, and Family History reviewed and updated in chart.    Reviewed all medications by prescribing practitioner or clinical pharmacist (such as prescriptions, OTCs, herbal therapies and supplements) and documented in the medical record.    HPI  MWV/CPE completed.     Separate issue    Re: SoB - Complains of subacute on chronic onset of SoB. Worst when lying down, can also happen with exertion but positional changes are more noticeable. He feels unable to take a full breath in. O2 sats in the office are above 95%. He has a history of light smoking and a CT scan in 10/2023 revealed GGOs in upper lobes. He never followed up with the referral to pulmonology at that time. He is not using his CPAP machine regularly as he lost 20lb and feels the machine is no longer helpful. He has a hx of CHANDNI.     CPE/Chronic issues    Re: CV - follows closely with cardiology. Doing well since his mitral valve repair. He completed cardiac rehab. He had thoracic aortic aneurysm in the past that required surgery.  BP is fairly well controlled on combo therapy. Reports no sx high or low from HTN; denies blurry vision, HA, dizziness LoC CP SoB Neff and leg swelling     Re: weight - down 20lb over the last year. BMI now less than 30.      Re: HILTON - no longer using his CPAP.      Re: prostate - see urology notes. Finished ADT treatment, in remission now. PSA undetectable. Has mild anemia, chronic.      Re: HM -  Colon screen current. PSA through urology. Due for high dose flu shot.      PMHx, FHx, Social Hx, Surg Hx personally reviewed at this appointment. No pertinent findings and/or changes from prior (if applicable).     ROS: Denies wt gain/loss f/c HA LoC CP SOB NVDC. See HPI above, and scanned sheet (if applicable). All other systems are reviewed and are without complaint.         Patient Care Team:  Neo Hanna,  "MD as PCP - General (Internal Medicine)  Brittany Behm, DO as PCP - [employer] PCP  Owen Choi MD as Consulting Physician (Cardiology)  JACOBO Alfaro-CNP as Nurse Practitioner (Cardiology)     Review of Systems    Objective   Vitals:  /84   Pulse 80   Temp 36.6 °C (97.8 °F)   Ht 1.727 m (5' 8\")   Wt 84.6 kg (186 lb 9.6 oz)   SpO2 95%   BMI 28.37 kg/m²       Physical Exam  Gen: overweight, NAD. AAO x3.  HEENT: NC/AT. Anicteric sclera, symmetric pupils. MMM no thrush.  Neck: Soft, supple. No LAD. No goiter.  CV:  Soft systolic murmur, reduced in intensity compared to preop.  nl s1s2. Fully healed scar from thoracotomy.   Pulm: CTAB no w/r/r, good air exchange  GI: obese, soft NTND BS+ no hsm  Ext: WWP no edema  Neuro: II-XII grossly intact, nonfocal systemic findings  MSK: 5/5 strength b/l UE and LE  Gait: unremarkable     Lab Results   Component Value Date    WBC 6.5 06/25/2024    HGB 11.1 (L) 06/25/2024    HCT 34.8 (L) 06/25/2024     06/25/2024    CHOL 123 05/20/2024    TRIG 112 05/20/2024    HDL 45.2 05/20/2024    ALT 23 02/07/2024    AST 24 02/07/2024     02/07/2024    K 4.9 02/07/2024     (H) 02/07/2024    CREATININE 0.79 02/07/2024    BUN 16 02/07/2024    CO2 26 02/07/2024    TSH 1.71 02/07/2024    PSA 0.11 06/25/2024    INR 1.2 (H) 01/23/2024     par    Holter or Event Cardiac Monitor  Refer to scanned image      Current Outpatient Medications   Medication Instructions    aspirin 81 mg EC tablet 1 tablet, oral, Daily    ezetimibe (ZETIA) 10 mg, oral, Daily    metoprolol tartrate (LOPRESSOR) 12.5 mg, oral, 2 times daily    multivitamin tablet 1 tablet, oral, Daily    rosuvastatin (CRESTOR) 40 mg, oral, Daily        Assessment/Plan   MWV/CPE completed.    # SoB: likely multifactorial with GGO, CHANDNI  - referral to pulmonology given GGO  - consider repeat CT Chest, done most recently 10/2023  - recommend using his CPAP machine at night given hx of HILTON  - " repeat CBC and iron studies as anemia could be contributing as well    # CV  1.  Mitral Regurge: improved since MVR  - continue current meds  - follow up cardiology     2. HTN: at/near goal at home  - continue current regimen  - routine lab work if not recent  - continue lifestyle modifications     3.  H/o Ascending aorta dissection s/p surgery  - tight BP control  - follow up cardiology     4. HLD: stable  - lipid panel, ASCVD+ score based on these values if age appropriate  - continue statin     # Overweight: improving  - counselled on wt loss via diet, exercise, and other lifestyle modifications       # Prostate Ca: resolved  - follow up urology     # HILTON on CPAP  - recommend he go back to using CPAP      # Health Maintenance  - routine blood work  - Colon Cancer Screening: MOISÉS arias, giovanna 2026  - PSA: through urology, current   - Immunizations: high dose flu today  - AAA screening:  completed

## 2024-09-11 NOTE — PATIENT INSTRUCTIONS
Please stop at any  lab ([address] if you choose to use my building) to complete your blood and/or urine work that I've ordered for you.    I will contact you with the results at my soonest convenience. I strongly urge you to use [employer] as this is the quickest and easiest way to access your results and receive my correspondences.     I have renewed your chronic medications today.      I am referring you to pulmonology for your shortness of breath. Schedule an appointment at [phone] with Dr. Wilkinson if his staff does not call you soon.    I will consider a repeat CT scan of your lungs based on Dr. Wilkinson's recommendation.    Congrats on the weight loss! Keep at it with diet and exercise.    You received your flu shot today!     Further recommendations will be made based on test results and how you fare clinically.

## 2024-09-11 NOTE — ASSESSMENT & PLAN NOTE
Orders:    Referral to Pulmonology; Future    CBC and Auto Differential; Future    Comprehensive Metabolic Panel; Future    Iron and TIBC; Future    Ferritin; Future

## 2024-09-18 ENCOUNTER — OFFICE VISIT (OUTPATIENT)
Dept: CARDIOLOGY | Facility: HOSPITAL | Age: 69
End: 2024-09-18
Payer: MEDICARE

## 2024-09-18 VITALS
HEART RATE: 74 BPM | DIASTOLIC BLOOD PRESSURE: 70 MMHG | BODY MASS INDEX: 29.3 KG/M2 | WEIGHT: 193.3 LBS | HEIGHT: 68 IN | SYSTOLIC BLOOD PRESSURE: 134 MMHG | OXYGEN SATURATION: 97 %

## 2024-09-18 DIAGNOSIS — Z98.890 H/O AORTIC ARCH REPAIR: ICD-10-CM

## 2024-09-18 DIAGNOSIS — I10 PRIMARY HYPERTENSION: ICD-10-CM

## 2024-09-18 DIAGNOSIS — I25.10 CORONARY ARTERY DISEASE INVOLVING NATIVE CORONARY ARTERY OF NATIVE HEART WITHOUT ANGINA PECTORIS: Primary | ICD-10-CM

## 2024-09-18 DIAGNOSIS — I34.9 NONRHEUMATIC MITRAL VALVE DISORDER, UNSPECIFIED: ICD-10-CM

## 2024-09-18 DIAGNOSIS — Z98.890 STATUS POST MITRAL VALVE REPAIR: ICD-10-CM

## 2024-09-18 DIAGNOSIS — E78.5 HYPERLIPIDEMIA, UNSPECIFIED HYPERLIPIDEMIA TYPE: ICD-10-CM

## 2024-09-18 DIAGNOSIS — I51.7 LVH (LEFT VENTRICULAR HYPERTROPHY): ICD-10-CM

## 2024-09-18 DIAGNOSIS — I34.0 NONRHEUMATIC MITRAL VALVE REGURGITATION: ICD-10-CM

## 2024-09-18 PROCEDURE — 99214 OFFICE O/P EST MOD 30 MIN: CPT | Performed by: INTERNAL MEDICINE

## 2024-09-18 RX ORDER — METOPROLOL TARTRATE 50 MG/1
50 TABLET ORAL 2 TIMES DAILY
Qty: 180 TABLET | Refills: 3 | Status: SHIPPED | OUTPATIENT
Start: 2024-09-18 | End: 2025-09-18

## 2024-09-18 ASSESSMENT — ENCOUNTER SYMPTOMS
DEPRESSION: 0
LOSS OF SENSATION IN FEET: 0
OCCASIONAL FEELINGS OF UNSTEADINESS: 0

## 2024-09-18 NOTE — PROGRESS NOTES
Primary Care Physician: Neo Hanna MD  Date of Visit: 09/18/2024  1:00 PM EDT  Location of visit: [facility]     Chief Complaint:   No chief complaint on file.       HPI / Summary:   Jake Brunner is a 69 y.o. male presents for followup.  He has no particular complaints.  He walks his dog at a brisk pace without chest pain or shortness of breath.  He will intermittently randomly note the sensation that he cannot take a full breath.  This has been present since his surgery.  The patient denies chest pain, shortness of breath, palpitations, lightheadedness, syncope, orthopnea, paroxysmal nocturnal dyspnea, lower extremity edema, or bleeding problems.          Past Medical History:   Diagnosis Date    Alcohol abuse, in remission     History of alcohol abuse    Anemia 02/07/2024    Cataract     Closed displaced comminuted fracture of shaft of right tibia 07/31/2019    Closed displaced fracture of body of right scapula 07/31/2019    Closed displaced fracture of shaft of right clavicle 07/31/2019    Coronary artery disease     Essential (primary) hypertension     Hypertension    Heart valve disease     Severe mitral valve regurgitation.    History of alcohol abuse 03/01/2024    History of thoracic aortic aneurysm repair 01/22/2023    History of transesophageal echocardiography (EULA) 12/11/2023    Echo on 12/11/23    Hyperlipidemia     Palpitations 02/07/2024    Prostate cancer (Multi) 01/09/2024    Onc: Pacheco Gomes CNP    Shortness of breath     Sleep apnea     Uses CPAP    Vision loss     Wears contacts        Past Surgical History:   Procedure Laterality Date    ANKLE SURGERY Left     Ankle Surgery    ARTERIAL ANEURYSM REPAIR  12/2015    Aortic Aneurysm Repair Ascending Aorta    CARDIAC CATHETERIZATION N/A 12/11/2023    Procedure: Left And Right Heart Cath, With LV;  Surgeon: Owen Choi MD;  Location: [facility] Cardiac Cath Lab;  Service: Cardiovascular;  Laterality: N/A;  Scheduled 12/11 @ 9:00am, EULA  "w/ anesthesia @ 7:30am    COLONOSCOPY      CT CHEST W AND WO IV CONTRAST  10/18/2023    Thoracic aortic atherosclerosis.    FEMUR FRACTURE SURGERY  01/27/2016    Femur Repair    LIPOMA RESECTION  2022    back    OTHER SURGICAL HISTORY  01/27/2016    Wrist Surgery          Social History:   reports that he quit smoking about 49 years ago. His smoking use included cigarettes. He has never used smokeless tobacco. He reports that he does not currently use alcohol. He reports that he does not use drugs.     Family History:  family history includes Coronary artery disease in his father; Heart attack in his father; Hyperlipidemia in his mother; MYOCARDIAL INFARCTION in his father.      Allergies:  No Known Allergies    Outpatient Medications:  Current Outpatient Medications   Medication Instructions    aspirin 81 mg EC tablet 1 tablet, oral, Daily    benzonatate (TESSALON) 200 mg, oral, 3 times daily PRN    ezetimibe (ZETIA) 10 mg, oral, Daily    metoprolol tartrate (LOPRESSOR) 12.5 mg, oral, 2 times daily    multivitamin tablet 1 tablet, oral, Daily    rosuvastatin (CRESTOR) 40 mg, oral, Daily       Physical Exam:  Vitals:    09/18/24 1255   BP: 134/70   BP Location: Right arm   Patient Position: Sitting   Pulse: 74   SpO2: 97%   Weight: 87.7 kg (193 lb 4.8 oz)   Height: 1.727 m (5' 8\")     Wt Readings from Last 5 Encounters:   09/18/24 87.7 kg (193 lb 4.8 oz)   09/11/24 84.6 kg (186 lb 9.6 oz)   06/25/24 90.5 kg (199 lb 8 oz)   05/08/24 92.5 kg (204 lb)   04/03/24 93.4 kg (206 lb)     Body mass index is 29.39 kg/m².   General: Well-developed well-nourished in no acute distress  HEENT: Normocephalic atraumatic  Neck: Supple, JVP is 6 cmH2O negative hepatojugular reflux 2+ carotid pulses without bruit  Pulmonary: Normal respiratory effort, clear to auscultation  Cardiovascular: No right ventricular heave, normal S1 and S2, 3 out of 6 mid peaking systolic ejection murmur that did not change with Valsalva no rubs or " "gallops  Abdomen: Soft nontender nondistended  Extremities: Warm without edema 2+ radial pulses bilaterally 2+ dorsalis pedis pulses bilaterally  Neurologic: Alert and oriented x3  Psychiatric: Normal mood and affect     Last Labs:  CMP:  Recent Labs     09/11/24  1135 02/07/24  1125 01/28/24  0835    143 139   K 4.9 4.9 4.2    109* 107   CO2 26 26 25   ANIONGAP 12 13 11   BUN 17 16 14   CREATININE 0.83 0.79 0.75   EGFR >90 >90 >90   GLUCOSE 99 95 212*     Recent Labs     09/11/24  1135 02/07/24  1125 01/28/24  0835 01/23/24  1423 01/09/24  1205   ALBUMIN 4.3 4.0 3.3*   < > 4.7   ALKPHOS 51 59  --   --  53   ALT 16 23  --   --  18   AST 40* 24  --   --  18   BILITOT 1.0 0.5  --   --  0.5    < > = values in this interval not displayed.     CBC:  Recent Labs     09/11/24  1135 06/25/24  1220 05/20/24  1136   WBC 7.5 6.5 6.1   HGB 11.6* 11.1* 10.9*   HCT 37.0* 34.8* 35.3*    249 279   MCV 89 87 90     COAG:   Recent Labs     01/23/24  1423 01/12/24  1534   INR 1.2* 1.0     HEME/ENDO:  Recent Labs     09/11/24  1135 06/25/24  1220 02/07/24  1125 02/16/21  1040   FERRITIN 46 30  --   --    IRONSAT 13* 11*  --   --    TSH  --   --  1.71 1.92      CARDIAC: No results for input(s): \"LDH\", \"CKMB\", \"TROPHS\", \"BNP\" in the last 76764 hours.    No lab exists for component: \"CK\", \"CKMBP\"  Recent Labs     05/20/24  1136 10/05/23  1107 06/01/22  0946 08/24/21  1307 02/16/21  1040   CHOL 123 123 123 145 145   LDLF  --   --  51 72 80   LDLCALC 55 57*  --   --   --    HDL 45.2 43.5 43.9 47.3 44.0   TRIG 112 114 142 131 104       Last Cardiology Tests:  ECG:  I reviewed electrocardiogram from February 8, 2024 that showed normal sinus rhythm left atrial abnormality T wave abnormality consider lateral ischemia.    Holter Monitor 2/14/24 to 2/28/24- 2 episodes NSVT longest episode 5 beats. 14 episodes of SVT longest episode 18 seconds. Average HR 82.     Echo:  Echo Results:  Transthoracic Echo (TTE) Complete " 03/05/2024    Narrative  [facility] [place], [address] [place] [address]  Tel [phone] and Fax [phone]    TRANSTHORACIC ECHOCARDIOGRAM REPORT      Patient Name:      WILLOW GO     Reading Physician:    [id number] Marina Arboleda MD  Study Date:        3/5/2024             Ordering Provider:    [id number] JOSE HANDY  ZOE  MRN/PID:           [id number]             Fellow:  Accession#:        [id number]         Nurse:  Date of Birth/Age: 1955 / 69 years Sonographer:          Kirsten Castellon RD  Gender:            M                    Additional Staff:  Height:            173.00 cm            Admit Date:           3/5/2024  Weight:            90.00 kg             Admission Status:     Outpatient  BSA / BMI:         2.04 m2 / 30.07      Encounter#:           [id number]  kg/m2  Department Location:  [facility] Non  Invasive  Blood Pressure: 122 /72 mmHg    Study Type:    TRANSTHORACIC ECHO (TTE) COMPLETE  Diagnosis/ICD: Nonrheumatic mitral valve disorder, unspecified-I34.9  Indication:    mitral valve repair  CPT Code:      Echo Complete w Full Doppler-39468    Patient History:  Pertinent History: CAD, HTN and Hyperlipidemia. mitral valve repair 1/23/24.    Study Detail: The following Echo studies were performed: 2D, M-Mode, Doppler and  color flow.      PHYSICIAN INTERPRETATION:  Left Ventricle: The left ventricular systolic function is normal, with an estimated ejection fraction of 65-70%. There are no regional wall motion abnormalities. The left ventricular cavity size is normal. Abnormal (paradoxical) septal motion consistent with post-operative status. Left ventricular diastolic filling was indeterminate.  Left Atrium: The left atrium is severely dilated.  Right Ventricle: The right ventricle is normal in size. There is normal right ventricular global systolic function.  Right Atrium: The right atrium is normal in size.  Aortic Valve: The aortic valve is trileaflet. There is mild aortic valve  regurgitation. The peak instantaneous gradient of the aortic valve is 6.1 mmHg. The mean gradient of the aortic valve is 3.5 mmHg.  Mitral Valve: The mitral valve is abnormal. Status post mitral annular ring repair. There is mild mitral valve regurgitation. S/p MV repair with 30mm Simuplus flexible annuloplasty band with mean MV gradient of 6.6mmHg and anteroseptally directed MR which is difficult to assess given image quality. Possibly mild.  Tricuspid Valve: The tricuspid valve is structurally normal. There is trace tricuspid regurgitation. The Doppler estimated RVSP is moderately elevated at 52.0 mmHg.  Pulmonic Valve: The pulmonic valve is not well visualized. There is physiologic pulmonic valve regurgitation.  Pericardium: There is a trivial pericardial effusion.  Aorta: The aortic root is abnormal. There is moderate dilatation the aortic root.  Systemic Veins: The inferior vena cava appears to be of normal size.  In comparison to the previous echocardiogram(s): Compared with study from 1/27/2024,. Compared wtihthe prior exam from 1/25/2024 the prior images were more technically difficult than today's, however no obvious MR was seen on the prior study. There was hyperdynamic LV systolic function at that time. The mean MV gradient was lower previously at 2.8mmHg. The MR appears to be new on the current exam. The RVSP is moderately elevated today and there was insuficent TR on the prior study to estimate.      CONCLUSIONS:  1. Left ventricular systolic function is normal with a 65-70% estimated ejection fraction.  2. Poorly visualized anatomical structures due to suboptimal image quality.  3. Abnormal septal motion consistent with post-operative status.  4. The left atrium is severely dilated.  5. S/p MV repair with 30mm Simuplus flexible annuloplasty band with mean MV gradient of 6.6mmHg and anteroseptally directed MR which is difficult to assess given image quality. Possibly mild.  6. Moderately elevated right  ventricular systolic pressure.  7. Mild aortic valve regurgitation.  8. Compared wtihthe prior exam from 1/25/2024 the prior images were more technically difficult than today's, however no obvious MR was seen on the prior study. There was hyperdynamic LV systolic function at that time. The mean MV gradient was lower previously at 2.8mmHg. The MR appears to be new on the current exam. The RVSP is moderately elevated today and there was insuficent TR on the prior study to estimate.  9. There is moderate dilatation of the aortic root.    QUANTITATIVE DATA SUMMARY:  2D MEASUREMENTS:  Normal Ranges:  LAs:           5.00 cm    (2.7-4.0cm)  IVSd:          1.16 cm    (0.6-1.1cm)  LVPWd:         1.31 cm    (0.6-1.1cm)  LVIDd:         5.05 cm    (3.9-5.9cm)  LVIDs:         3.27 cm  LV Mass Index: 120.9 g/m2  LV % FS        35.2 %    LA VOLUME:  Normal Ranges:  LA Vol A4C:        98.4 ml    (22+/-6mL/m2)  LA Vol A2C:        105.0 ml  LA Vol BP:         101.6 ml  LA Vol Index A4C:  48.2 ml/m2  LA Vol Index A2C:  51.5 ml/m2  LA Vol Index BP:   49.8 ml/m2  LA Area A4C:       27.0 cm2  LA Area A2C:       27.9 cm2  LA Major Axis A4C: 6.3 cm  LA Major Axis A2C: 6.3 cm  LA Vol A4C:        98.4 ml  LA Vol A2C:        99.1 ml    RA VOLUME BY A/L METHOD:  Normal Ranges:  RA Area A4C: 16.4 cm2    M-MODE MEASUREMENTS:  Normal Ranges:  Ao Root: 4.00 cm (2.0-3.7cm)  LAs:     5.47 cm (2.7-4.0cm)    AORTA MEASUREMENTS:  Normal Ranges:  Ao Sinus, d: 4.45 cm (2.1-3.5cm)  Ao STJ, d:   3.90 cm (1.7-3.4cm)  Asc Ao, d:   3.50 cm (2.1-3.4cm)    LV SYSTOLIC FUNCTION BY 2D PLANIMETRY (MOD):  Normal Ranges:  EF-A4C View: 69.6 % (>=55%)  EF-A2C View: 74.6 %  EF-Biplane:  71.4 %    LV DIASTOLIC FUNCTION:  Normal Ranges:  MV Peak E:        1.37 m/s    (0.7-1.2 m/s)  MV Peak A:        1.12 m/s    (0.42-0.7 m/s)  E/A Ratio:        1.22        (1.0-2.2)  MV lateral e'     0.10 m/s  MV medial e'      0.06 m/s  MV A Dur:         124.57 msec  PulmV Sys Juan Jose:     48.13 cm/s  PulmV Oneil Juan Jose:   32.71 cm/s  PulmV S/D Juan Jose:    1.47  PulmV A Revs Juan Jose: 37.64 cm/s  PulmV A Revs Dur: 96.89 msec    MITRAL VALVE:  Normal Ranges:  MV Vmax:    2.29 m/s  (<=1.3m/s)  MV peak P.9 mmHg (<5mmHg)  MV mean P.6 mmHg  (<2mmHg)  MV VTI:     44.77 cm  (10-13cm)  MV DT:      146 msec  (150-240msec)    MITRAL INSUFFICIENCY:  Normal Ranges:  MR VTI:  143.30 cm  MR Vmax: 524.94 cm/s    AORTIC VALVE:  Normal Ranges:  AoV Vmax:                1.23 m/s (<=1.7m/s)  AoV Peak P.1 mmHg (<20mmHg)  AoV Mean PG:             3.5 mmHg (1.7-11.5mmHg)  LVOT Max Juan Jose:            1.28 m/s (<=1.1m/s)  AoV VTI:                 22.32 cm (18-25cm)  LVOT VTI:                21.03 cm  LVOT Diameter:           2.23 cm  (1.8-2.4cm)  AoV Area, VTI:           3.67 cm2 (2.5-5.5cm2)  AoV Area,Vmax:           4.03 cm2 (2.5-4.5cm2)  AoV Dimensionless Index: 0.94      RIGHT VENTRICLE:  RV Basal 3.10 cm  RV Mid   1.90 cm  RV Major 7.5 cm  TAPSE:   17.0 mm  RV s'    0.11 m/s    TRICUSPID VALVE/RVSP:  Normal Ranges:  Peak TR Velocity: 3.50 m/s  Est. RA Pressure: 3 mmHg  RV Syst Pressure: 52.0 mmHg (< 30mmHg)  IVC Diam:         1.80 cm    PULMONIC VALVE:  Normal Ranges:  PV Accel Time: 95 msec   (>120ms)  PV Max Juan Jose:    1.8 m/s   (0.6-0.9m/s)  PV Max P.3 mmHg    Pulmonary Veins:  PulmV A Revs Dur: 96.89 msec  PulmV A Revs Juan Jose: 37.64 cm/s  PulmV Oneil Juan Jose:   32.71 cm/s  PulmV S/D Juan Jose:    1.47  PulmV Sys Juan Jose:    48.13 cm/s      [id number] Marina Arboleda MD  Electronically signed on 3/7/2024 at 4:22:33 PM        ** Final (Updated) **      Transthoracic Echo (TTE) Complete With Contrast 2024    [facility], [address] [place] [address]  Tel [phone] and Fax [phone]    TRANSTHORACIC ECHOCARDIOGRAM REPORT      Patient Name:      WILLOW GO     Reading Physician:    [id number] Huey Sanchez MD  Study Date:        2024            Ordering Provider:    [id number] DECKER  ROBERTA  MRN/PID:           [id number]             Fellow:  Accession#:        [id number]         Nurse:  Date of Birth/Age: 1955 / 68 years Sonographer:          Karina Meadows RDCS  Gender:            M                    Additional Staff:  Height:            172.72 cm            Admit Date:           12/21/2023  Weight:            100.24 kg            Admission Status:     Inpatient -  Routine  BSA:               2.13 m2              Encounter#:           [id number]  Department Location:  [facility] Non  Invasive  Blood Pressure: 103 /57 mmHg    Study Type:    TRANSTHORACIC ECHO (TTE) COMPLETE  Diagnosis/ICD: Other specified postprocedural states-Z98.890  Indication:    s/p MVR  CPT Code:      Echo Complete w Full Doppler-22126    Patient History:  Pertinent History: Hx of ao arch repair 12/25/2015, HLD, prostate cancer, MVR  30mm Simuplus 1/23/2024.    Study Detail: The following Echo studies were performed: 2D, M-Mode, Doppler and  color flow. Technically challenging study due to body habitus.  Definity used as a contrast agent for endocardial border  definition. Total contrast used for this procedure was 2 mL via IV  push. Unable to obtain subcostal view.      PHYSICIAN INTERPRETATION:  Left Ventricle: The left ventricular systolic function is hyperdynamic, with an estimated ejection fraction of 75-80%. There are no regional wall motion abnormalities. The left ventricular cavity size is normal. Left ventricular diastolic filling was indeterminate.  Left Atrium: The left atrium is mildly dilated.  Right Ventricle: The right ventricle is normal in size. Unable to determine right ventricular systolic function.  Right Atrium: The right atrium is normal in size.  Aortic Valve: The aortic valve is trileaflet. There is mild aortic valve regurgitation. The peak instantaneous gradient of the aortic valve is 18.8 mmHg. The mean gradient of the aortic valve is 12.2 mmHg.  Mitral Valve: The mitral valve was not  well visualized. There is no evidence of mitral valve regurgitation.  Tricuspid Valve: The tricuspid valve is structurally normal. There is trace tricuspid regurgitation.  Pulmonic Valve: The pulmonic valve is not well visualized. The pulmonic valve regurgitation was not well visualized.  Pericardium: There is no pericardial effusion noted.  Aorta: The aortic root is abnormal. There is mild dilatation of the ascending aorta. There is mild dilatation of the aortic root. Graft repair is noted.  In comparison to the previous echocardiogram(s): Compared with the prior study dated 1/23/2024 (intraoperative EULA), mitral regurgitation has decreased. Right venticular function could not be confidently assessed in the current study.      CONCLUSIONS:  1. Poorly visualized anatomical structures due to suboptimal image quality.  2. Left ventricular systolic function is hyperdynamic with a 75-80% estimated ejection fraction.  3. Mild aortic valve regurgitation.  4. Dilation of the ascending aorta with evidence of graft repair. The graft was not satisfactorily visualized.  5. Mitral valve leaflets are not well visualized. However there is no evidence of mitral regurgitation, and diastolic gradients are normal following reported leaflet repair.  6. Compared with the prior study dated 1/23/2024 (intraoperative EULA), mitral regurgitation has decreased. Right venticular function could not be confidently assessed in the current study.    QUANTITATIVE DATA SUMMARY:  2D MEASUREMENTS:  Normal Ranges:  Ao Root d:     4.50 cm    (2.0-3.7cm)  IVSd:          1.42 cm    (0.6-1.1cm)  LVPWd:         1.41 cm    (0.6-1.1cm)  LVIDd:         4.21 cm    (3.9-5.9cm)  LVIDs:         3.00 cm  LV Mass Index: 107.8 g/m2  LV % FS        28.8 %    LA VOLUME:  Normal Ranges:  LA Vol A2C:        83.0 ml  LA Vol Index A2C:  38.9 ml/m2  LA Area A2C:       25.0 cm2  LA Major Axis A2C: 6.4 cm  LA Vol A2C:        78.1 ml    AORTA MEASUREMENTS:  Normal  Ranges:  Asc Ao, d: 4.40 cm (2.1-3.4cm)    LV SYSTOLIC FUNCTION BY 2D PLANIMETRY (MOD):  Normal Ranges:  EF-A4C View: 77.0 % (>=55%)  EF-A2C View: 73.6 %  EF-Biplane:  76.3 %    LV DIASTOLIC FUNCTION:  Normal Ranges:  MV Peak E:    1.31 m/s (0.7-1.2 m/s)  MV Peak A:    1.21 m/s (0.42-0.7 m/s)  E/A Ratio:    1.08     (1.0-2.2)  MV e'         0.06 m/s (>8.0)  MV lateral e' 0.06 m/s  MV medial e'  0.05 m/s  E/e' Ratio:   23.82    (<8.0)  MV DT:        293 msec (150-240 msec)    MITRAL VALVE:  Normal Ranges:  MV Vmax:    1.34 m/s (<=1.3m/s)  MV peak P.2 mmHg (<5mmHg)  MV mean P.8 mmHg (<2mmHg)  MV VTI:     42.36 cm (10-13cm)  MV DT:      293 msec (150-240msec)    AORTIC VALVE:  Normal Ranges:  AoV Vmax:      2.17 m/s  (<=1.7m/s)  AoV Peak P.8 mmHg (<20mmHg)  AoV Mean P.2 mmHg (1.7-11.5mmHg)  AoV VTI:       44.70 cm  (18-25cm)  LVOT Diameter: 2.37 cm   (1.8-2.4cm)      RIGHT VENTRICLE:  TAPSE: 17.0 mm  RV s'  0.09 m/s    PULMONIC VALVE:  Normal Ranges:  PV Accel Time: 67 msec  (>120ms)  PV Max Juan Jose:    1.0 m/s  (0.6-0.9m/s)  PV Max P.3 mmHg    AORTA:  Asc Ao Diam 4.19 cm      [id number] Huey Sanchez MD  Electronically signed on 2024 at 8:45:47 PM        ** Final **      Transesophageal Echo (EULA) Complete With Doppler And Color 2023    [facility], [address] [address]  Tel [phone] and Fax [phone]    TRANSESOPHAGEAL ECHOCARDIOGRAM REPORT      Patient Name:      WILLOW Martinez Physician:    [id number] Owen Choi MD  Study Date:        2023           Ordering Provider:    [id number] JOSE ENRIQUEZ  MRN/PID:           [id number]             Fellow:  Accession#:        [id number]         Nurse:                Flaco Yeh RN  Date of Birth/Age: 1955 / 68 years Sonographer:          Kirsten Castellon RD  Gender:            M                    Additional Staff:     Federica BRAVO  Height:            172.72 cm             Admit Date:           12/11/2023  Weight:            94.80 kg             Admission Status:     Outpatient  BSA:               2.08 m2              Encounter#:           [id number]  Department Location:  [facility] Cath  Lab  Blood Pressure: 179 /85 mmHg    Study Type:    TRANSESOPHAGEAL ECHO (EULA)  Diagnosis/ICD: Nonrheumatic mitral (valve) insufficiency-I34.0  Indication:    Mitral valve insufficiency, unspecified etiology  CPT Code:      EULA Complete-07957; Doppler Limited-96339; Color Doppler-69165    Patient History:  Drug Allergies:    None  Smoker:            Former.  Diabetes:          No  Pertinent History: HTN, Hyperlipidemia, Dyspnea and A-Fib. 68 y.o. male presents  for EULA for mitral valve insufficiency.    PMH of mitral regurgitation and type A aortic dissection.    Study Detail: The following Echo studies were performed: 3D. Patient's heart  rhythm is sinus bradycardia. The patient was sedated.      PHYSICIAN INTERPRETATION:  EULA Details: The EULA probe used was 5175. Technically adequate omniplane transesophageal echocardiogram performed.  EULA Medication: The pharynx was anesthetized with Cetacaine spray and viscous lidocaine. The patient was sedated by Anesthesia; please refer to anesthesia flow sheet for medications used.  EULA Procedure: The probe was passed without difficulty. The patient tolerated the procedure well without any apparent complications.  Left Ventricle: The left ventricular systolic function is normal, with an estimated ejection fraction of 65%. There are no regional wall motion abnormalities. The left ventricular cavity size is normal. Abnormal (paradoxical) septal motion consistent with post-operative status. Left ventricular diastolic filling was not assessed.  Left Atrium: The left atrium is moderately dilated. A bubble study using agitated saline was performed. Bubble study is negative. There is no thrombus visualized in the left atrial appendage.  Right Ventricle: The  right ventricle is normal in size. There is normal right ventricular global systolic function.  Right Atrium: The right atrium is moderately dilated.  Aortic Valve: The aortic valve is trileaflet. There is mild to moderate aortic valve regurgitation.  Mitral Valve: The mitral valve is abnormal. There is severe mitral valve regurgitation which is anteriorly directed. Flail segment of the posterior mitral valve leaflet.  Tricuspid Valve: The tricuspid valve is structurally normal. There is mild tricuspid regurgitation. The Doppler estimated RVSP is mildly elevated at 42.9 mmHg.  Pulmonic Valve: The pulmonic valve is structurally normal. There is no indication of pulmonic valve regurgitation.  Pericardium: There is no pericardial effusion noted.  Aorta: The aortic root is abnormal. There is mild dilatation of the aortic root.      CONCLUSIONS:  1. Left ventricular systolic function is normal with a 65% estimated ejection fraction.  2. Abnormal septal motion consistent with post-operative status.  3. The left atrium is moderately dilated.  4. The right atrium is moderately dilated.  5. Flail segment of the posterior mitral valve leaflet.  6. Severe mitral valve regurgitation.  7. Mildly elevated RVSP.  8. Mild to moderate aortic valve regurgitation.    QUANTITATIVE DATA SUMMARY:  MITRAL INSUFFICIENCY:  Normal Ranges:  PISA Radius:  1.0 cm  MR VTI:       222.00 cm  MR Vmax:      621.00 cm/s  MR Alias Juan Jose: 88.2 cm/s  MR Volume:    178.80 ml  MR Flow Rt:   500.14 ml/s  MR EROA:      0.81 cm2    TRICUSPID VALVE/RVSP:  Normal Ranges:  Peak TR Velocity: 3.16 m/s  RV Syst Pressure: 42.9 mmHg (< 30mmHg)      [id number] Owen Choi MD  Electronically signed on 12/11/2023 at 7:04:27 PM        ** Final **       Cath:  12/18/23 left and right heart cath  Coronary Lesion Summary:  Vessel          Stenosis     Vessel Segment  LAD           40% stenosis      proximal  LAD           40% stenosis        mid  LAD          50-60% stenosis mid  to distal  2nd Diagonal  30% stenosis      proximal  Circumflex    50% stenosis        mid  OM 2          40% stenosis        mid     CONCLUSIONS:   1. Moderate 2 vessel CAD in a right dominant system.   2. Elevated left and right heart filling pressures.   3. Mild pulmonary hypertension with a PVR of 1.2 Wood units.   4. No evidence of intracardiac shunt.   5. Preserved cardiac index with a normal .    Stress Test:  Stress Results:  Stress test only, exercise 02/26/2024    [facility], Stress Lab, [address] [place] [address]  Tel [phone] and Fax [phone]    Exercise Stress Test and Pre Cardiac Rehab    Patient Name:      WILLOW GO     Ordering Provider:     [id number] CALEB ANGEL  Study Date:        2/26/2024            Reading Physician:     [id number] Choi MD  MRN/PID:           [id number]             Supervising Physician: Vicenta Cohi MD  Accession#:        [id number]         Fellow:  Date of Birth/Age: 1955 / 69 years Fellow:  Gender:            M                    Nurse:                 Kitty Grossman  RN  Admit Date:        2/26/2024            Technician:  Admission Status:  Outpatient           Sonographer:           genoveva  Height:            172.72 cm            Technologist:          Meredith Mark  CVT  Weight:            89.81 kg             Additional Staff:  BSA:               2.04 m2              Encounter#:            [id number]  BMI:               30.11 kg/m2          Patient Location:      [facility] Non  Invasive    Study Type:    STRESS TEST ONLY  Diagnosis/ICD: Other nonrheumatic mitral valve disorders-I34.8  Indication:    Pre-cardiac rehab and s/p Mitral valve repair  CPT Code:    Falls Risk: Moderate: Patient has a moderate risk for sustaining a fall; a falls prevention plan has been implemented.    Study Details: Correct procedure and correct patient verified verbally and with  ID Band checked.      Patient  History: Coronary artery disease, hypertension, hyperlipidemia, lipid therapy, anticoagulation therapy and family history of coronary artery disease. 69 y.o. male presents to be evaluated for Cardiac rehab s/p redo MVR. While in OR, developed DAINA, left atriotomy was reopened and post removal of annuloplasty band, the mitral valve appeared to be competent w/o any regurg. 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  Allergies:       None.  Smoker:          Former.  Valve Disorders: 00MV: Mitrial Valve Repair.      Medications: The patient's prescribed medication is Low dose Asa, Ezetimibe, Metocarbamol, Metoprolol Tartrate, Rosuvastatin, Tadalafil.. The patient took medications as prescribed.    Patient Performance: The patient exercised to stage VI on a Modified Balke protocol for 10 minutes and 30 seconds, achieving 6.7 METS. The peak heart rate achieved was 157 bpm, which was 105 % of the age predicted target heart rate of 150 bpm. The resting blood pressure was 138/66 mmHg with a heart rate of 76 bpm. The standing blood pressure was 148/74 mmHg with a heart rate of 76 bpm. The patient's functional capacity was average. The patient developed shortness of breath and Incisional tightness/soreness during the stress exam. The symptoms resolved with rest. The blood pressure response was normal. The test was terminated due to: dyspnea. Patient has met the discharge criteria and is discharged to home.    Baseline ECG: Resting ECG showed normal sinus rhythm with PAC's, PVC's.    Stress ECG: Stress ECG showed sinus tachycardia, with PAC's.    Stress Stage Data:  +---+------+-------+------------------+----+-----------------------------------+  HR Sys BPDias BPRPE               METSComments                             +---+------+-------+------------------+----+-----------------------------------+  [id number]                                                                 +---+------+-------+------------------+----+-----------------------------------+  76 148   74                                                                +---+------+-------+------------------+----+-----------------------------------+                                        Denies baseline chest discomfort or                                        SOB. SpO2 97%                        +---+------+-------+------------------+----+-----------------------------------+  91 152   58     8=Very, very light    No symptoms.                         +---+------+-------+------------------+----+-----------------------------------+  [id number]   60     12=Fairly light       Slight SOB, incisional discomfort                                          but no further chest discomfort.     +---+------+-------+------------------+----+-----------------------------------+  [id number]   58     12=Fairly light       SOB increasing, no further                                                 symptoms.                            +---+------+-------+------------------+----+-----------------------------------+  [id number]   62     14=Somewhat hard      SOB continues to increase, no                                              further symptoms.                    +---+------+-------+------------------+----+-----------------------------------+  [id number]   58     16=Hard               SOB, no further symptoms.            +---+------+-------+------------------+----+-----------------------------------+  155             17=Very hard      6.7 SOB increasing, denies leg fatigue.                                        Incisional discomfort continues,                                           SpO2 98%                             +---+------+-------+------------------+----+-----------------------------------+      Recovery ECG:  Recovery ECG showed normal sinus rhythm, with PAC's, PVC's.    +------------+---+------+-------+----------------------------------+              HR Sys BPDias BPComments                            +------------+---+------+-------+----------------------------------+  Recovery I  [id number]   54     SOB, no further symptoms. SpO2 98%  +------------+---+------+-------+----------------------------------+  Recovery II [id number]   60     SOB continues.                      +------------+---+------+-------+----------------------------------+  Recovery III99 156   56     SOB resolving.                      +------------+---+------+-------+----------------------------------+  Recovery IV 93 144   62     SOB resolved, SpO2 98%              +------------+---+------+-------+----------------------------------+      Recommendations:  The patient is an acceptable candidate for cardiac rehabilitation.    Summary:  1. No clinical or electrocardiographic evidence for ischemia at a submaximal workload.  2. Submaximal level of stress achieved.    [id number] Owen Choi MD  Electronically signed on 2/26/2024 at 5:57:01 PM              ** Final **         Cardiac Imaging:  MR cardiac morphology and function w and wo IV contrast 8/24/2023   CARDIAC MRI at 3T  Severe eccentrically directed anterior directed MR. RF 44% using the   indirect method.   Normal BIV function and size. Quantitative LVEF 81% and RVEF 49%   Concentric LVH with septal thickening (max 1.5 cm). No DAINA or LVOTO.   No evidence of myocardial inflammation/edema on T2-weighted imaging  Evidence of myocardial fibrosis with elevated ECV 31%   Basal septal, basal-mid anterolateral wall mid-myocardial LGE in a   non-ischemic pattern     LEFT VENTRICLE: 1. Normal LV size (EDVi 35 ml/m2) and hyperdynamic   systolic function (LVEF 81 %).   2. No regional wall motion abnormalities.   3. concentric LVH with septal thickening (max 1.5 cm) with normal   indexed LV  mass.   4. Elevated ECV 31%.   5. Normal native T2 values (<50 ms) with normal myocardial signal on   STIR imaging.   6. Following administration of gadolinium, in the early phase, there   is no evidence of LV thrombus or MVO.   7. In the late phase, there is basal septal, basal-mid anterolateral   wall mid-myocardial enhancement in a non-ischemic pattern    CT angio chest October 2023  IMPRESSION:  1. Status post repair of the ascending thoracic aorta. No evidence of  complications including anastomotic pseudo aneurysm or leak.  2. Native aortic root is dilated measuring 35 mm maximum diameter.  3. Ectasia of the distal ascending thoracic aorta immediately distal  to the distal anastomotic site prior prior to the origin of the right  brachiocephalic trunk measuring 39 mm. This appears largely unchanged  compared to a prior CT performed 2021.  4. Thoracic aortic atherosclerosis.  5. Reading Cardiologist: Dr. Ken Branham, Date: 10/18/2023;  10:36 am    CT Coronary Angiogram 3/10/22  IMPRESSION:  1. There is 3 vessel coronary artery atherosclerotic disease  (LAD  70%, RCA <25%, Circ <25%).  2. Focal calcific plaque in the mid LAD renders segment of the  underlying lumen difficult. This study has been sent to [facility]  for further evaluation.  3. S/p ascending and transverse aorta replacement. The root is native  and the graft starts approximate 2.7cm from the annulus. Unclear if  there is a pseudoleak within the graft, as no delayed imaging was  performed.  4. Left atrial enlargement.  5. Thoracic aortic atherosclerosis.      Assessment/Plan   Diagnoses and all orders for this visit:  Coronary artery disease involving native coronary artery of native heart without angina pectoris  Status post mitral valve repair  -     MR cardiac morphology and function w and wo IV contrast; Future  Hyperlipidemia, unspecified hyperlipidemia type  Nonrheumatic mitral valve disorder, unspecified  Nonrheumatic mitral valve  regurgitation  -     MR cardiac morphology and function w and wo IV contrast; Future  Primary hypertension  -     metoprolol tartrate (Lopressor) 50 mg tablet; Take 1 tablet by mouth 2 times a day.  H/O aortic arch repair  LVH (left ventricular hypertrophy)  -     MR cardiac morphology and function w and wo IV contrast; Future    In summary Mr. Brunner is a pleasant 69 year-old white male with a past medical history significant for nonobstructive coronary artery disease with preserved LV function, type A aortic dissection status post replacement of his ascending aorta, hypertension, hyperlipidemia, postoperative atrial fibrillation, mitral regurgitation s/p MV repair,  hepatic steatosis, alcohol abuse, and remote traumatic subdural hematoma.  He is relatively asymptomatic from a cardiac perspective.  He is euvolemic on exam.  His blood pressure is mildly elevated today.  Given his hyperdynamic LV function I did increase his metoprolol to 50 mg twice a day.  His most recent lipid profile was at goal.  I did order a cardiac MRI to evaluate his left ventricular hypertrophy, hyperdynamic LV function, rule out hypertrophic cardiomyopathy and evaluate for any residual significant mitral regurgitation.  He should continue his other cardiovascular medications.  We will see him back in follow-up in 6 months.      Orders:  No orders of the defined types were placed in this encounter.     Followup Appts:  Future Appointments   Date Time Provider Department Center   12/11/2024 11:00 AM JACOBO Agrawal-CNP [id number] Academic           ____________________________________________________________  Owen Choi MD  [facility]  [facility]

## 2024-09-18 NOTE — PATIENT INSTRUCTIONS
Increase metoprolol to 50 mg twice a day.  Check cardiac MRI.  Monitor your blood pressure at home.  Follow-up in 6 months.

## 2024-09-25 ENCOUNTER — OFFICE VISIT (OUTPATIENT)
Dept: PULMONOLOGY | Facility: CLINIC | Age: 69
End: 2024-09-25
Payer: MEDICARE

## 2024-09-25 ENCOUNTER — LAB (OUTPATIENT)
Dept: LAB | Facility: LAB | Age: 69
End: 2024-09-25
Payer: MEDICARE

## 2024-09-25 ENCOUNTER — DOCUMENTATION (OUTPATIENT)
Dept: PULMONOLOGY | Facility: HOSPITAL | Age: 69
End: 2024-09-25

## 2024-09-25 VITALS
TEMPERATURE: 97.4 F | OXYGEN SATURATION: 98 % | RESPIRATION RATE: 16 BRPM | HEART RATE: 72 BPM | WEIGHT: 196.8 LBS | DIASTOLIC BLOOD PRESSURE: 82 MMHG | BODY MASS INDEX: 29.92 KG/M2 | SYSTOLIC BLOOD PRESSURE: 134 MMHG

## 2024-09-25 DIAGNOSIS — R05.9 COUGH, UNSPECIFIED TYPE: ICD-10-CM

## 2024-09-25 DIAGNOSIS — R91.8 GROUND GLASS OPACITY PRESENT ON IMAGING OF LUNG: ICD-10-CM

## 2024-09-25 DIAGNOSIS — J45.991 COUGH VARIANT ASTHMA (HHS-HCC): ICD-10-CM

## 2024-09-25 DIAGNOSIS — R06.02 SHORTNESS OF BREATH: Primary | ICD-10-CM

## 2024-09-25 DIAGNOSIS — R06.02 SHORTNESS OF BREATH: ICD-10-CM

## 2024-09-25 DIAGNOSIS — R05.9 COUGH, UNSPECIFIED TYPE: Primary | ICD-10-CM

## 2024-09-25 LAB
ANION GAP SERPL CALC-SCNC: 12 MMOL/L (ref 10–20)
BASOPHILS # BLD AUTO: 0.08 X10*3/UL (ref 0–0.1)
BASOPHILS NFR BLD AUTO: 1.2 %
BUN SERPL-MCNC: 13 MG/DL (ref 6–23)
CALCIUM SERPL-MCNC: 10.2 MG/DL (ref 8.6–10.3)
CHLORIDE SERPL-SCNC: 110 MMOL/L (ref 98–107)
CO2 SERPL-SCNC: 24 MMOL/L (ref 21–32)
CREAT SERPL-MCNC: 0.78 MG/DL (ref 0.5–1.3)
D DIMER PPP FEU-MCNC: 1276 NG/ML FEU
EGFRCR SERPLBLD CKD-EPI 2021: >90 ML/MIN/1.73M*2
EOSINOPHIL # BLD AUTO: 0.29 X10*3/UL (ref 0–0.7)
EOSINOPHIL NFR BLD AUTO: 4.3 %
ERYTHROCYTE [DISTWIDTH] IN BLOOD BY AUTOMATED COUNT: 17.8 % (ref 11.5–14.5)
GLUCOSE SERPL-MCNC: 110 MG/DL (ref 74–99)
HCT VFR BLD AUTO: 37.4 % (ref 41–52)
HGB BLD-MCNC: 11.8 G/DL (ref 13.5–17.5)
IMM GRANULOCYTES # BLD AUTO: 0.02 X10*3/UL (ref 0–0.7)
IMM GRANULOCYTES NFR BLD AUTO: 0.3 % (ref 0–0.9)
LYMPHOCYTES # BLD AUTO: 1.34 X10*3/UL (ref 1.2–4.8)
LYMPHOCYTES NFR BLD AUTO: 19.7 %
MCH RBC QN AUTO: 28.2 PG (ref 26–34)
MCHC RBC AUTO-ENTMCNC: 31.6 G/DL (ref 32–36)
MCV RBC AUTO: 90 FL (ref 80–100)
MONOCYTES # BLD AUTO: 0.63 X10*3/UL (ref 0.1–1)
MONOCYTES NFR BLD AUTO: 9.3 %
NEUTROPHILS # BLD AUTO: 4.43 X10*3/UL (ref 1.2–7.7)
NEUTROPHILS NFR BLD AUTO: 65.2 %
NRBC BLD-RTO: 0 /100 WBCS (ref 0–0)
PLATELET # BLD AUTO: 321 X10*3/UL (ref 150–450)
POTASSIUM SERPL-SCNC: 4.7 MMOL/L (ref 3.5–5.3)
RBC # BLD AUTO: 4.18 X10*6/UL (ref 4.5–5.9)
SODIUM SERPL-SCNC: 141 MMOL/L (ref 136–145)
WBC # BLD AUTO: 6.8 X10*3/UL (ref 4.4–11.3)

## 2024-09-25 PROCEDURE — 86003 ALLG SPEC IGE CRUDE XTRC EA: CPT

## 2024-09-25 PROCEDURE — 85379 FIBRIN DEGRADATION QUANT: CPT

## 2024-09-25 PROCEDURE — 85025 COMPLETE CBC W/AUTO DIFF WBC: CPT

## 2024-09-25 PROCEDURE — 99215 OFFICE O/P EST HI 40 MIN: CPT | Performed by: INTERNAL MEDICINE

## 2024-09-25 PROCEDURE — 82785 ASSAY OF IGE: CPT

## 2024-09-25 PROCEDURE — 80048 BASIC METABOLIC PNL TOTAL CA: CPT

## 2024-09-25 ASSESSMENT — ENCOUNTER SYMPTOMS
BRUISES/BLEEDS EASILY: 0
HEMATURIA: 0
NAUSEA: 0
SINUS PAIN: 0
SHORTNESS OF BREATH: 1
ARTHRALGIAS: 0
UNEXPECTED WEIGHT CHANGE: 0
DIFFICULTY URINATING: 0
COUGH: 1
AGITATION: 0
FEVER: 0
SLEEP DISTURBANCE: 0
NUMBNESS: 0
CHOKING: 0
JOINT SWELLING: 0
PALPITATIONS: 0
WEAKNESS: 0
TREMORS: 0
CONSTIPATION: 0
WHEEZING: 0
ADENOPATHY: 0
SINUS PRESSURE: 0
NERVOUS/ANXIOUS: 0
LIGHT-HEADEDNESS: 0
APNEA: 0
DIZZINESS: 0
ABDOMINAL PAIN: 0
EYE REDNESS: 0
FACIAL SWELLING: 0
STRIDOR: 0
EYE DISCHARGE: 0
RHINORRHEA: 0
FATIGUE: 0
ABDOMINAL DISTENTION: 0
HEADACHES: 0
DYSURIA: 0
SPEECH DIFFICULTY: 0
FREQUENCY: 0
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
## 2024-09-25 NOTE — PATIENT INSTRUCTIONS
Will obtain complete pulmonary function tests and a 6-minute walk by calling     For the coughing we will obtain an HRCT scan of the chest by calling     Also will order allergy labs namely a CBC with differential and  a respiratory allergy panel    Also with increased RVSP will order a D Dimer

## 2024-09-26 ENCOUNTER — DOCUMENTATION (OUTPATIENT)
Dept: PULMONOLOGY | Facility: HOSPITAL | Age: 69
End: 2024-09-26
Payer: MEDICARE

## 2024-09-26 ENCOUNTER — HOSPITAL ENCOUNTER (OUTPATIENT)
Dept: RADIOLOGY | Facility: HOSPITAL | Age: 69
Discharge: HOME | End: 2024-09-26
Payer: MEDICARE

## 2024-09-26 DIAGNOSIS — J90 PLEURAL EFFUSION: Primary | ICD-10-CM

## 2024-09-26 DIAGNOSIS — R06.02 SHORTNESS OF BREATH: ICD-10-CM

## 2024-09-26 LAB

## 2024-09-26 PROCEDURE — 2550000001 HC RX 255 CONTRASTS: Performed by: STUDENT IN AN ORGANIZED HEALTH CARE EDUCATION/TRAINING PROGRAM

## 2024-09-26 PROCEDURE — 71275 CT ANGIOGRAPHY CHEST: CPT

## 2024-09-26 NOTE — PROGRESS NOTES
Patient's CT angiogram reveals no PE.  There is moderate to large pleural fluid accumulation on the right with lesser amounts on the left.  I suspect that this relates to heart failure but will sample the fluid on the right for analysis.  Will discuss situation with cardiology.

## 2024-09-30 ENCOUNTER — DOCUMENTATION (OUTPATIENT)
Dept: PULMONOLOGY | Facility: HOSPITAL | Age: 69
End: 2024-09-30
Payer: MEDICARE

## 2024-09-30 ENCOUNTER — HOSPITAL ENCOUNTER (OUTPATIENT)
Dept: RADIOLOGY | Facility: HOSPITAL | Age: 69
Discharge: HOME | End: 2024-09-30
Payer: MEDICARE

## 2024-09-30 VITALS
HEART RATE: 64 BPM | OXYGEN SATURATION: 98 % | RESPIRATION RATE: 16 BRPM | DIASTOLIC BLOOD PRESSURE: 64 MMHG | SYSTOLIC BLOOD PRESSURE: 130 MMHG

## 2024-09-30 DIAGNOSIS — J90 PLEURAL EFFUSION: ICD-10-CM

## 2024-09-30 LAB
AMYLASE FLD-CCNC: 36 U/L
BASOPHILS NFR FLD MANUAL: 0 %
BLASTS NFR FLD MANUAL: 0 %
CHOLEST FLD-MCNC: <25 MG/DL
CHOLEST FLD-MCNC: <25 MG/DL
CLARITY FLD: ABNORMAL
COLOR FLD: ABNORMAL
EOSINOPHIL NFR FLD MANUAL: 0 %
GLUCOSE FLD-MCNC: 116 MG/DL
HOLD SPECIMEN: NORMAL
IMMATURE GRANULOCYTES IN FLUID: 0 %
LDH FLD L TO P-CCNC: 158 U/L
LYMPHOCYTES NFR FLD MANUAL: 58 %
MONOS+MACROS NFR FLD MANUAL: 33 %
NEUTROPHILS NFR FLD MANUAL: 9 %
OTHER CELLS NFR FLD MANUAL: 0 %
PLASMA CELLS NFR FLD MANUAL: 0 %
POCT INTERNATIONAL NORMALIZATION RATIO: 1.1
POCT PROTHROMBIN TIME: NORMAL
PROT FLD-MCNC: 2.8 G/DL
RBC # FLD AUTO: 4000 /UL
TOTAL CELLS COUNTED FLD: 100
TRIGL FLD-MCNC: 13 MG/DL
WBC # FLD AUTO: 655 /UL

## 2024-09-30 PROCEDURE — 84157 ASSAY OF PROTEIN OTHER: CPT | Mod: AHULAB | Performed by: INTERNAL MEDICINE

## 2024-09-30 PROCEDURE — 89051 BODY FLUID CELL COUNT: CPT | Performed by: INTERNAL MEDICINE

## 2024-09-30 PROCEDURE — 87205 SMEAR GRAM STAIN: CPT | Mod: AHULAB | Performed by: INTERNAL MEDICINE

## 2024-09-30 PROCEDURE — C1729 CATH, DRAINAGE: HCPCS | Performed by: NURSE PRACTITIONER

## 2024-09-30 PROCEDURE — 82945 GLUCOSE OTHER FLUID: CPT | Mod: AHULAB | Performed by: INTERNAL MEDICINE

## 2024-09-30 PROCEDURE — 32555 ASPIRATE PLEURA W/ IMAGING: CPT

## 2024-09-30 PROCEDURE — 83615 LACTATE (LD) (LDH) ENZYME: CPT | Mod: AHULAB | Performed by: INTERNAL MEDICINE

## 2024-09-30 PROCEDURE — 2720000007 HC OR 272 NO HCPCS: Performed by: NURSE PRACTITIONER

## 2024-09-30 PROCEDURE — 82465 ASSAY BLD/SERUM CHOLESTEROL: CPT | Mod: AHULAB | Performed by: INTERNAL MEDICINE

## 2024-09-30 PROCEDURE — 2500000005 HC RX 250 GENERAL PHARMACY W/O HCPCS: Performed by: NURSE PRACTITIONER

## 2024-09-30 PROCEDURE — 88112 CYTOPATH CELL ENHANCE TECH: CPT | Mod: TC | Performed by: INTERNAL MEDICINE

## 2024-09-30 PROCEDURE — 82150 ASSAY OF AMYLASE: CPT | Mod: AHULAB | Performed by: INTERNAL MEDICINE

## 2024-09-30 PROCEDURE — 84478 ASSAY OF TRIGLYCERIDES: CPT | Mod: AHULAB | Performed by: INTERNAL MEDICINE

## 2024-09-30 PROCEDURE — 87116 MYCOBACTERIA CULTURE: CPT | Mod: AHULAB | Performed by: INTERNAL MEDICINE

## 2024-09-30 RX ORDER — LIDOCAINE HYDROCHLORIDE 10 MG/ML
INJECTION, SOLUTION EPIDURAL; INFILTRATION; INTRACAUDAL; PERINEURAL
Status: COMPLETED | OUTPATIENT
Start: 2024-09-30 | End: 2024-09-30

## 2024-09-30 ASSESSMENT — PAIN SCALES - GENERAL
PAINLEVEL_OUTOF10: 0 - NO PAIN
PAINLEVEL_OUTOF10: 0 - NO PAIN

## 2024-09-30 NOTE — POST-PROCEDURE NOTE
Interventional Radiology Brief Postprocedure Note    Attending: INGA Davis    Assistant: none    Diagnosis: Pleural Effusion    Description of procedure: Ultrasound guided thoracentesis was preformed on the right.  1,400mL of clear alverto fluid was drained.     Anesthesia:  Local    Complications: None    Estimated Blood Loss: none    Medications (Filter: Administrations occurring from 1004 to 1024 on 09/30/24) As of 09/30/24 1024      lidocaine PF (Xylocaine) 10 mg/mL (1 %) injection (mL) Total volume:  10 mL      Date/Time Rate/Dose/Volume Action       09/30/24  1015 10 mL Given                   Specimens sent per orders.     See detailed result report with images in PACS.    The patient tolerated the procedure well without incident or complication and is in stable condition.

## 2024-09-30 NOTE — PRE-PROCEDURE NOTE
Interventional Radiology Preprocedure Note    Indication for procedure: The encounter diagnosis was Pleural effusion.    Relevant review of systems:  +SOB on exertion    Relevant Labs:   Lab Results   Component Value Date    CREATININE 0.78 09/25/2024    EGFR >90 09/25/2024    INR 1.1 09/30/2024    PROTIME 13.9 (H) 01/23/2024       Planned Sedation/Anesthesia: local    Airway assessment: normal    Directed physical examination:    A&Ox3  Breathing Unlabored at rest, on RA.   RRR    CT chest with bilateral effusion, R>L. For US guided diagnostic thoracentesis today.     Benefits, risks and alternatives of procedure and planned sedation have been discussed with the patient and/or their representative. All questions answered and they agree to proceed.

## 2024-09-30 NOTE — PROGRESS NOTES
1400 cc of fluid removed off with thoracentesis.  Cultures are pending.  The total protein was 2.8 with a serum protein ratio of 0.38 consistent with a transudate.  LDH was 158.  Serum LDH is pending as are cultures etc.   While his D dimer was up, CTA revealed no PE

## 2024-10-01 ENCOUNTER — TELEPHONE (OUTPATIENT)
Dept: PULMONOLOGY | Facility: CLINIC | Age: 69
End: 2024-10-01
Payer: MEDICARE

## 2024-10-02 LAB
ACID FAST STN SPEC: NORMAL
MYCOBACTERIUM SPEC CULT: NORMAL

## 2024-10-03 LAB
BACTERIA FLD CULT: NORMAL
GRAM STN SPEC: NORMAL
GRAM STN SPEC: NORMAL

## 2024-10-04 LAB
LABORATORY COMMENT REPORT: NORMAL
LABORATORY COMMENT REPORT: NORMAL
PATH REPORT.FINAL DX SPEC: NORMAL
PATH REPORT.GROSS SPEC: NORMAL
PATH REPORT.RELEVANT HX SPEC: NORMAL
PATH REPORT.TOTAL CANCER: NORMAL

## 2024-10-09 ENCOUNTER — HOSPITAL ENCOUNTER (OUTPATIENT)
Dept: RADIOLOGY | Facility: HOSPITAL | Age: 69
Discharge: HOME | End: 2024-10-09
Payer: MEDICARE

## 2024-10-09 ENCOUNTER — OFFICE VISIT (OUTPATIENT)
Dept: CARDIOLOGY | Facility: HOSPITAL | Age: 69
End: 2024-10-09
Payer: MEDICARE

## 2024-10-09 ENCOUNTER — LAB (OUTPATIENT)
Dept: LAB | Facility: LAB | Age: 69
End: 2024-10-09
Payer: MEDICARE

## 2024-10-09 VITALS — HEART RATE: 73 BPM | SYSTOLIC BLOOD PRESSURE: 136 MMHG | DIASTOLIC BLOOD PRESSURE: 69 MMHG

## 2024-10-09 DIAGNOSIS — J90 PLEURAL EFFUSION: ICD-10-CM

## 2024-10-09 DIAGNOSIS — J90 PLEURAL EFFUSION: Primary | ICD-10-CM

## 2024-10-09 DIAGNOSIS — R06.02 SHORTNESS OF BREATH: ICD-10-CM

## 2024-10-09 DIAGNOSIS — I05.9 MITRAL VALVE DISORDER: ICD-10-CM

## 2024-10-09 DIAGNOSIS — Z98.890 STATUS POST MITRAL VALVE REPAIR: ICD-10-CM

## 2024-10-09 DIAGNOSIS — I25.10 CORONARY ARTERY DISEASE INVOLVING NATIVE CORONARY ARTERY OF NATIVE HEART WITHOUT ANGINA PECTORIS: Primary | ICD-10-CM

## 2024-10-09 DIAGNOSIS — I10 HYPERTENSION, UNSPECIFIED TYPE: ICD-10-CM

## 2024-10-09 LAB
ACID FAST STN SPEC: NORMAL
ANION GAP SERPL CALC-SCNC: 8 MMOL/L (ref 10–20)
ATRIAL RATE: 57 BPM
BNP SERPL-MCNC: 451 PG/ML (ref 0–99)
BUN SERPL-MCNC: 21 MG/DL (ref 6–23)
CALCIUM SERPL-MCNC: 10.2 MG/DL (ref 8.6–10.3)
CHLORIDE SERPL-SCNC: 108 MMOL/L (ref 98–107)
CO2 SERPL-SCNC: 30 MMOL/L (ref 21–32)
CREAT SERPL-MCNC: 0.9 MG/DL (ref 0.5–1.3)
EGFRCR SERPLBLD CKD-EPI 2021: >90 ML/MIN/1.73M*2
GLUCOSE SERPL-MCNC: 112 MG/DL (ref 74–99)
MYCOBACTERIUM SPEC CULT: NORMAL
P AXIS: 20 DEGREES
P OFFSET: 200 MS
P ONSET: 137 MS
POTASSIUM SERPL-SCNC: 5.1 MMOL/L (ref 3.5–5.3)
PR INTERVAL: 158 MS
Q ONSET: 216 MS
QRS COUNT: 10 BEATS
QRS DURATION: 90 MS
QT INTERVAL: 434 MS
QTC CALCULATION(BAZETT): 422 MS
QTC FREDERICIA: 426 MS
R AXIS: 42 DEGREES
SODIUM SERPL-SCNC: 141 MMOL/L (ref 136–145)
T AXIS: 32 DEGREES
T OFFSET: 433 MS
VENTRICULAR RATE: 57 BPM

## 2024-10-09 PROCEDURE — 80048 BASIC METABOLIC PNL TOTAL CA: CPT

## 2024-10-09 PROCEDURE — 83880 ASSAY OF NATRIURETIC PEPTIDE: CPT

## 2024-10-09 PROCEDURE — 93005 ELECTROCARDIOGRAM TRACING: CPT | Performed by: NURSE PRACTITIONER

## 2024-10-09 PROCEDURE — 71046 X-RAY EXAM CHEST 2 VIEWS: CPT

## 2024-10-09 PROCEDURE — 99214 OFFICE O/P EST MOD 30 MIN: CPT | Performed by: NURSE PRACTITIONER

## 2024-10-09 RX ORDER — FUROSEMIDE 40 MG/1
40 TABLET ORAL DAILY
Qty: 30 TABLET | Refills: 3 | Status: SHIPPED | OUTPATIENT
Start: 2024-10-09 | End: 2024-11-08

## 2024-10-09 NOTE — PATIENT INSTRUCTIONS
Checkchest xray and blood work BNP  Blood work again in one week- BMP  Start Furosemide 40 mg once a day   If you have dizziness/lightheadedness or significant weight loss please call me  Continue current meds  Follow up 6-8 weeks with an echo

## 2024-10-09 NOTE — PROGRESS NOTES
Chief Complaint:   Follow-up     History Of Present Illness:    Jake Brunner is a 69 y.o. male presenting for follow up. Seen in collaboration with Dr. Choi. He had CTA of chest on 9/26 for shortness of breath and elevated d-dimer that was negative for pulmonary embolism. It did show a moderate to large right pleural effusion and moderate sized left pleural effusion. He underwent thoracentesis on 9/30 at which time 1.4 liters was removed from right lung. He did note today while walking in from the parking lot into our office shortness of breath described as not being able to take a deep breath. Up until today he had not noticed shortness of breath since he underwent thoracentesis. He has been able to walk his dog 3/4 of a mile daily including up hill without chest pain or dyspnea. He has intentionally been losing weight. Denies chest pain, orthopnea, pnd, lightheadedness, dizziness, syncope, lower extremity edema, or bleeding issues.       Last Recorded Vitals:  Vitals:    10/09/24 0840   BP: 136/69   Pulse: 73       Past Medical History:  He has a past medical history of Alcohol abuse, in remission, Anemia (02/07/2024), Cataract, Closed displaced comminuted fracture of shaft of right tibia (07/31/2019), Closed displaced fracture of body of right scapula (07/31/2019), Closed displaced fracture of shaft of right clavicle (07/31/2019), Coronary artery disease, Essential (primary) hypertension, Heart valve disease, History of alcohol abuse (03/01/2024), History of thoracic aortic aneurysm repair (01/22/2023), History of transesophageal echocardiography (EULA) (12/11/2023), Hyperlipidemia, Palpitations (02/07/2024), Prostate cancer (Multi) (01/09/2024), Shortness of breath, Sleep apnea, and Vision loss.    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      Social History:  He reports that he quit smoking about 49 years ago. His smoking use included cigarettes. He has never used smokeless tobacco. He reports that he does not currently use alcohol. He reports that he does not use drugs.    Family History:  Family History   Problem Relation Name Age of Onset    Hyperlipidemia Mother      Coronary artery disease Father      Other (MYOCARDIAL INFARCTION) Father      Heart attack Father          Allergies:  Patient has no known allergies.    Outpatient Medications:  Current Outpatient Medications   Medication Instructions    aspirin 81 mg EC tablet 1 tablet, oral, Daily    benzonatate (TESSALON) 200 mg, oral, 3 times daily PRN    ezetimibe (ZETIA) 10 mg, oral, Daily    metoprolol tartrate (LOPRESSOR) 50 mg, oral, 2 times daily    multivitamin tablet 1 tablet, oral, Daily    rosuvastatin (CRESTOR) 40 mg, oral, Daily       Physical Exam:  GENERAL: alert, cooperative, pleasant, in no acute distress  SKIN: warm and dry  NECK: JVD 8CM, negative HJR  CARDIAC: Regular rate and rhythm with 3/6 holosystolic murmur heard through precordium  CHEST: Normal respiratory efforts, lungs clear to auscultation except right lung base diminished  ABDOMEN: soft, nontender, nondistended  EXTREMITIES: no edema, +2 palpable RP bilaterally       Last Labs:  CBC -  Lab Results   Component Value Date    WBC 6.8 09/25/2024    HGB 11.8 (L) 09/25/2024    HCT 37.4 (L) 09/25/2024    MCV 90 09/25/2024     09/25/2024       CMP -  Lab Results   Component Value Date    CALCIUM 10.2 09/25/2024    PHOS 2.1 (L) 01/28/2024    PROT 7.4 09/11/2024    ALBUMIN 4.3 09/11/2024    AST 40 (H) 09/11/2024    ALT 16 09/11/2024    ALKPHOS 51 09/11/2024    BILITOT 1.0 09/11/2024       LIPID PANEL -   Lab Results   Component Value Date    CHOL 123 05/20/2024    TRIG 112 05/20/2024    HDL 45.2  05/20/2024    CHHDL 2.7 05/20/2024    LDLF 51 06/01/2022    VLDL 22 05/20/2024    NHDL 78 05/20/2024   LDL 55 5/20/24    RENAL FUNCTION PANEL -   Lab Results   Component Value Date    GLUCOSE 110 (H) 09/25/2024     09/25/2024    K 4.7 09/25/2024     (H) 09/25/2024    CO2 24 09/25/2024    ANIONGAP 12 09/25/2024    BUN 13 09/25/2024    CREATININE 0.78 09/25/2024    GFRMALE >90 07/11/2023    CALCIUM 10.2 09/25/2024    PHOS 2.1 (L) 01/28/2024    ALBUMIN 4.3 09/11/2024          Last Cardiology Tests:  ECG:  Obtained and reviewed EKG- Sinus bradycardia HR 57, possible prior anterior infarct    Echo:  3/5/24  CONCLUSIONS:   1. Left ventricular systolic function is normal with a 65-70% estimated ejection fraction.   2. Poorly visualized anatomical structures due to suboptimal image quality.   3. Abnormal septal motion consistent with post-operative status.   4. The left atrium is severely dilated.   5. S/p MV repair with 30mm Simuplus flexible annuloplasty band with mean MV gradient of 6.6mmHg and anteroseptally directed MR which is difficult to assess given image quality. Possibly mild.   6. Moderately elevated right ventricular systolic pressure.   7. Mild aortic valve regurgitation.   8. Compared wtihthe prior exam from 1/25/2024 the prior images were more technically difficult than today's, however no obvious MR was seen on the prior study. There was hyperdynamic LV systolic function at that time. The mean MV gradient was lower previously at 2.8mmHg. The MR appears to be new on the current exam. The RVSP is moderately elevated today and there was insuficent TR on the prior study to estimate.   9. There is moderate dilatation of the aortic root.    Transthoracic Echo (TTE) Complete 01/25/2024  CONCLUSIONS:   1. Poorly visualized anatomical structures due to suboptimal image quality.   2. Left ventricular systolic function is hyperdynamic with a 75-80% estimated ejection fraction.   3. Mild aortic valve  regurgitation.   4. Dilation of the ascending aorta with evidence of graft repair. The graft was not satisfactorily visualized.   5. Mitral valve leaflets are not well visualized. However there is no evidence of mitral regurgitation, and diastolic gradients are normal following reported leaflet repair.   6. Compared with the prior study dated 1/23/2024 (intraoperative EULA), mitral regurgitation has decreased. Right venticular function could not be confidently assessed in the current study.    Ejection Fractions:  EF   Date/Time Value Ref Range Status   03/05/2024 03:48 PM 71 %        12/18/23 left and right heart cath  Coronary Lesion Summary:  Vessel          Stenosis     Vessel Segment  LAD           40% stenosis      proximal  LAD           40% stenosis        mid  LAD          50-60% stenosis mid to distal  2nd Diagonal  30% stenosis      proximal  Circumflex    50% stenosis        mid  OM 2          40% stenosis        mid    CONCLUSIONS:   1. Moderate 2 vessel CAD in a right dominant system.   2. Elevated left and right heart filling pressures.   3. Mild pulmonary hypertension with a PVR of 1.2 Wood units.   4. No evidence of intracardiac shunt.   5. Preserved cardiac index with a normal .    9/26/24 CTA of chest to rule out PE  IMPRESSION:  No CT evidence of pulmonary embolism in the current exam.      Mild aneurysmal dilatation of the proximal ascending aorta. Previous  surgery of the ascending aorta. No thoracic aortic dissection.      Moderate to large right pleural effusion and small to moderate-sized  left pleural effusion with associated bilateral lower lobe  atelectasis.      Mild stable hazy infiltrative densities in the bilateral upper lobes  as described. Suspect scarring from previous infectious or  inflammatory process.      Mild-to-moderate mid to distal thoracic spine DJD. Old fracture  deformities of the right clavicle and right 8th rib as described.    Assessment/Plan   Problem List  Items Addressed This Visit          Cardiac and Vasculature    Coronary artery disease involving native coronary artery of native heart without angina pectoris - Primary     In summary Mr. Brunner is a pleasant 69 year-old white male with a past medical history significant for type A aortic dissection status post replacement of his ascending aorta, hypertension, hyperlipidemia, postoperative atrial fibrillation, mitral regurgitation s/p mitral valve repair, coronary atherosclerosis on coronary angiography, hepatic steatosis, alcohol abuse, and remote traumatic subdural hematoma. He had CTA of chest on 9/26 for shortness of breath and elevated d-dimer that was negative for pulmonary embolism. It did show a moderate to large right pleural effusion and moderate sized left pleural effusion. He underwent thoracentesis on 9/30 at which time 1.4 liters was removed from right lung. He did note today while walking in from the parking lot into our office shortness of breath described as not being able to take a deep breath. Up until today he had not noticed shortness of breath since he underwent thoracentesis. I ordered a chest xray to be done today for surveillance of pleural effusion. He does have very mildly elevated filling pressures on clinical exam. I started Furosemide 40 mg once a day. I have ordered blood work to be done today to check BNP and again in one week for surveillance of renal function. He will call me if he loses significant weight and/or has dizziness/lightheadedness. He will continue all other cardiovascular medications. He will follow up in 6-8 weeks with an echocardiogram.                 Diana Alcantar, APRN-CNP

## 2024-10-16 ENCOUNTER — TELEPHONE (OUTPATIENT)
Dept: RADIATION ONCOLOGY | Facility: HOSPITAL | Age: 69
End: 2024-10-16
Payer: MEDICARE

## 2024-10-16 LAB
ACID FAST STN SPEC: NORMAL
MYCOBACTERIUM SPEC CULT: NORMAL

## 2024-10-23 DIAGNOSIS — N52.35 ERECTILE DYSFUNCTION FOLLOWING RADIATION THERAPY: Primary | ICD-10-CM

## 2024-10-23 LAB
ACID FAST STN SPEC: NORMAL
MYCOBACTERIUM SPEC CULT: NORMAL

## 2024-10-23 RX ORDER — TADALAFIL 20 MG/1
20 TABLET ORAL AS NEEDED
Qty: 12 TABLET | Refills: 3 | Status: SHIPPED | OUTPATIENT
Start: 2024-10-23 | End: 2024-11-22

## 2024-10-25 ENCOUNTER — LAB (OUTPATIENT)
Dept: LAB | Facility: LAB | Age: 69
End: 2024-10-25
Payer: MEDICARE

## 2024-10-25 ENCOUNTER — HOSPITAL ENCOUNTER (OUTPATIENT)
Dept: RESPIRATORY THERAPY | Facility: HOSPITAL | Age: 69
Discharge: HOME | End: 2024-10-25
Payer: MEDICARE

## 2024-10-25 ENCOUNTER — HOSPITAL ENCOUNTER (OUTPATIENT)
Dept: RADIOLOGY | Facility: HOSPITAL | Age: 69
Discharge: HOME | End: 2024-10-25
Payer: MEDICARE

## 2024-10-25 ENCOUNTER — APPOINTMENT (OUTPATIENT)
Dept: RADIOLOGY | Facility: HOSPITAL | Age: 69
End: 2024-10-25
Payer: MEDICARE

## 2024-10-25 ENCOUNTER — DOCUMENTATION (OUTPATIENT)
Dept: PULMONOLOGY | Facility: HOSPITAL | Age: 69
End: 2024-10-25

## 2024-10-25 DIAGNOSIS — R91.8 GROUND GLASS OPACITY PRESENT ON IMAGING OF LUNG: ICD-10-CM

## 2024-10-25 DIAGNOSIS — R06.02 SHORTNESS OF BREATH: ICD-10-CM

## 2024-10-25 DIAGNOSIS — R05.9 COUGH, UNSPECIFIED TYPE: ICD-10-CM

## 2024-10-25 DIAGNOSIS — J90 PLEURAL EFFUSION: ICD-10-CM

## 2024-10-25 LAB
ANION GAP SERPL CALC-SCNC: 11 MMOL/L (ref 10–20)
BUN SERPL-MCNC: 20 MG/DL (ref 6–23)
CALCIUM SERPL-MCNC: 10.5 MG/DL (ref 8.6–10.3)
CHLORIDE SERPL-SCNC: 105 MMOL/L (ref 98–107)
CO2 SERPL-SCNC: 29 MMOL/L (ref 21–32)
CREAT SERPL-MCNC: 0.93 MG/DL (ref 0.5–1.3)
EGFRCR SERPLBLD CKD-EPI 2021: 88 ML/MIN/1.73M*2
GLUCOSE SERPL-MCNC: 99 MG/DL (ref 74–99)
POTASSIUM SERPL-SCNC: 5.2 MMOL/L (ref 3.5–5.3)
SODIUM SERPL-SCNC: 140 MMOL/L (ref 136–145)

## 2024-10-25 PROCEDURE — 94726 PLETHYSMOGRAPHY LUNG VOLUMES: CPT

## 2024-10-25 PROCEDURE — 71250 CT THORAX DX C-: CPT

## 2024-10-25 PROCEDURE — 80048 BASIC METABOLIC PNL TOTAL CA: CPT

## 2024-10-27 NOTE — PROGRESS NOTES
PFTs revealed no significant obstruction or restriction with no significant desaturation.  The CT scan reveals the effusion but slightly better.  He will continue with his diuretics and follow-up with me over the next several months

## 2024-10-30 LAB
ACID FAST STN SPEC: NORMAL
MYCOBACTERIUM SPEC CULT: NORMAL

## 2024-11-06 LAB
ACID FAST STN SPEC: NORMAL
MYCOBACTERIUM SPEC CULT: NORMAL

## 2024-11-13 LAB
ACID FAST STN SPEC: NORMAL
MYCOBACTERIUM SPEC CULT: NORMAL

## 2024-11-20 LAB
ACID FAST STN SPEC: NORMAL
MYCOBACTERIUM SPEC CULT: NORMAL

## 2024-11-27 ENCOUNTER — OFFICE VISIT (OUTPATIENT)
Dept: CARDIOLOGY | Facility: HOSPITAL | Age: 69
End: 2024-11-27
Payer: MEDICARE

## 2024-11-27 ENCOUNTER — HOSPITAL ENCOUNTER (OUTPATIENT)
Dept: CARDIOLOGY | Facility: HOSPITAL | Age: 69
Discharge: HOME | End: 2024-11-27
Payer: MEDICARE

## 2024-11-27 VITALS
HEIGHT: 68 IN | BODY MASS INDEX: 28.95 KG/M2 | WEIGHT: 191 LBS | RESPIRATION RATE: 16 BRPM | HEART RATE: 58 BPM | OXYGEN SATURATION: 98 % | DIASTOLIC BLOOD PRESSURE: 74 MMHG | SYSTOLIC BLOOD PRESSURE: 129 MMHG

## 2024-11-27 DIAGNOSIS — I05.9 MITRAL VALVE DISORDER: ICD-10-CM

## 2024-11-27 DIAGNOSIS — I10 PRIMARY HYPERTENSION: ICD-10-CM

## 2024-11-27 DIAGNOSIS — J90 PLEURAL EFFUSION: ICD-10-CM

## 2024-11-27 DIAGNOSIS — E78.2 MIXED HYPERLIPIDEMIA: ICD-10-CM

## 2024-11-27 DIAGNOSIS — I50.32 CHRONIC DIASTOLIC HEART FAILURE: ICD-10-CM

## 2024-11-27 DIAGNOSIS — I25.10 CORONARY ARTERY DISEASE INVOLVING NATIVE CORONARY ARTERY OF NATIVE HEART WITHOUT ANGINA PECTORIS: Primary | ICD-10-CM

## 2024-11-27 DIAGNOSIS — Z98.890 STATUS POST MITRAL VALVE REPAIR: ICD-10-CM

## 2024-11-27 DIAGNOSIS — R06.02 SHORTNESS OF BREATH: ICD-10-CM

## 2024-11-27 LAB
AORTIC VALVE MEAN GRADIENT: 2 MMHG
AORTIC VALVE PEAK VELOCITY: 1.22 M/S
AV PEAK GRADIENT: 6 MMHG
AVA (PEAK VEL): 3.15 CM2
AVA (VTI): 3.19 CM2
EJECTION FRACTION APICAL 4 CHAMBER: 67.9
EJECTION FRACTION: 73 %
LEFT ATRIUM VOLUME AREA LENGTH INDEX BSA: 36.7 ML/M2
LEFT VENTRICLE INTERNAL DIMENSION DIASTOLE: 4.45 CM (ref 3.5–6)
LEFT VENTRICULAR OUTFLOW TRACT DIAMETER: 2.1 CM
MITRAL VALVE E/A RATIO: 4.03
RIGHT VENTRICLE FREE WALL PEAK S': 9.89 CM/S
TRICUSPID ANNULAR PLANE SYSTOLIC EXCURSION: 1.4 CM

## 2024-11-27 PROCEDURE — C8929 TTE W OR WO FOL WCON,DOPPLER: HCPCS

## 2024-11-27 PROCEDURE — 99214 OFFICE O/P EST MOD 30 MIN: CPT | Performed by: NURSE PRACTITIONER

## 2024-11-27 PROCEDURE — 2500000004 HC RX 250 GENERAL PHARMACY W/ HCPCS (ALT 636 FOR OP/ED): Performed by: INTERNAL MEDICINE

## 2024-11-27 NOTE — PATIENT INSTRUCTIONS
Continue current meds  Check blood work BMP   Cardiac MRI as scheduled  Follow up in March with Dr. Choi  Please call if symptoms get worse or you gain significant weight  Consider SGLT2 inihbitor Jardiance or Farxiga as this likely will help with your shortness of breath. Please let me know if you would like to proceed.

## 2024-11-27 NOTE — PROGRESS NOTES
"Chief Complaint:   Follow-up     History Of Present Illness:    Jake Brunner is a 69 y.o. male presenting for follow up. Seen in collaboration with Dr. Choi. His dyspnea on exertion has improved. He has dyspnea at rest and with exertional activity. He will have dyspnea lying down initially better on the right side. He has been sleeping in bed and not waking up due to orthopnea. He reports dyspnea does not change walking his dog up and down hills compared to rest. He has been urinating frequently taking Furosemide.  He has intentionally been losing weight. Denies chest pain, orthopnea, pnd, lightheadedness, dizziness, syncope, lower extremity edema, or bleeding issues.     He had CTA of chest on 9/26 for shortness of breath and elevated d-dimer that was negative for pulmonary embolism. It did show a moderate to large right pleural effusion and moderate sized left pleural effusion. He underwent thoracentesis on 9/30 at which time 1.4 liters was removed from right lung.   Last Recorded Vitals:  Vitals:    11/27/24 1003   BP: 129/74   BP Location: Right arm   Patient Position: Sitting   Pulse: 58   Resp: 16   SpO2: 98%   Weight: 86.6 kg (191 lb)   Height: 1.727 m (5' 8\")       Past Medical History:  He has a past medical history of Alcohol abuse, in remission, Anemia (02/07/2024), Cataract, Closed displaced comminuted fracture of shaft of right tibia (07/31/2019), Closed displaced fracture of body of right scapula (07/31/2019), Closed displaced fracture of shaft of right clavicle (07/31/2019), Coronary artery disease, Essential (primary) hypertension, Heart valve disease, History of alcohol abuse (03/01/2024), History of thoracic aortic aneurysm repair (01/22/2023), History of transesophageal echocardiography (EULA) (12/11/2023), Hyperlipidemia, Palpitations (02/07/2024), Prostate cancer (Multi) (01/09/2024), Shortness of breath, Sleep apnea, and Vision loss.    Past Surgical History:  He has a past surgical history " that includes Ankle surgery (Left); Femur fracture surgery (01/27/2016); Other surgical history (01/27/2016); Cardiac catheterization (N/A, 12/11/2023); Arterial aneurysm repair (12/2015); CT chest w and wo IV contrast (10/18/2023); Colonoscopy; and Lipoma resection (2022).      Social History:  He reports that he quit smoking about 49 years ago. His smoking use included cigarettes. He has never used smokeless tobacco. He reports that he does not currently use alcohol. He reports that he does not use drugs.    Family History:  Family History   Problem Relation Name Age of Onset    Hyperlipidemia Mother      Coronary artery disease Father      Other (MYOCARDIAL INFARCTION) Father      Heart attack Father          Allergies:  Patient has no known allergies.    Outpatient Medications:  Current Outpatient Medications   Medication Instructions    aspirin 81 mg EC tablet 1 tablet, Daily    benzonatate (TESSALON) 200 mg, oral, 3 times daily PRN    ezetimibe (ZETIA) 10 mg, oral, Daily    furosemide (LASIX) 40 mg, oral, Daily    metoprolol tartrate (LOPRESSOR) 50 mg, oral, 2 times daily    multivitamin tablet 1 tablet, Daily    rosuvastatin (CRESTOR) 40 mg, oral, Daily    tadalafil (CIALIS) 20 mg, oral, As needed       Physical Exam:  GENERAL: alert, cooperative, pleasant, in no acute distress  SKIN: warm and dry  NECK: Normal JVD  CARDIAC: Regular rate and rhythm with 3/6 holosystolic murmur heard through precordium  CHEST: Normal respiratory efforts, lungs clear to auscultation except lung base diminished  ABDOMEN: soft, nontender, nondistended  EXTREMITIES: no edema, +2 palpable RP bilaterally       Last Labs:  CBC -  Lab Results   Component Value Date    WBC 6.8 09/25/2024    HGB 11.8 (L) 09/25/2024    HCT 37.4 (L) 09/25/2024    MCV 90 09/25/2024     09/25/2024       CMP -  Lab Results   Component Value Date    CALCIUM 10.5 (H) 10/25/2024    PHOS 2.1 (L) 01/28/2024    PROT 7.4 09/11/2024    ALBUMIN 4.3 09/11/2024     AST 40 (H) 09/11/2024    ALT 16 09/11/2024    ALKPHOS 51 09/11/2024    BILITOT 1.0 09/11/2024       LIPID PANEL -   Lab Results   Component Value Date    CHOL 123 05/20/2024    TRIG 112 05/20/2024    HDL 45.2 05/20/2024    CHHDL 2.7 05/20/2024    LDLF 51 06/01/2022    VLDL 22 05/20/2024    NHDL 78 05/20/2024   LDL 55 5/20/24    RENAL FUNCTION PANEL -   Lab Results   Component Value Date    GLUCOSE 99 10/25/2024     10/25/2024    K 5.2 10/25/2024     10/25/2024    CO2 29 10/25/2024    ANIONGAP 11 10/25/2024    BUN 20 10/25/2024    CREATININE 0.93 10/25/2024    GFRMALE >90 07/11/2023    CALCIUM 10.5 (H) 10/25/2024    PHOS 2.1 (L) 01/28/2024    ALBUMIN 4.3 09/11/2024          Last Cardiology Tests:  ECG:  Obtained and reviewed EKG- Sinus bradycardia HR 57, possible prior anterior infarct    Echo:  3/5/24  CONCLUSIONS:   1. Left ventricular systolic function is normal with a 65-70% estimated ejection fraction.   2. Poorly visualized anatomical structures due to suboptimal image quality.   3. Abnormal septal motion consistent with post-operative status.   4. The left atrium is severely dilated.   5. S/p MV repair with 30mm Simuplus flexible annuloplasty band with mean MV gradient of 6.6mmHg and anteroseptally directed MR which is difficult to assess given image quality. Possibly mild.   6. Moderately elevated right ventricular systolic pressure.   7. Mild aortic valve regurgitation.   8. Compared wtihthe prior exam from 1/25/2024 the prior images were more technically difficult than today's, however no obvious MR was seen on the prior study. There was hyperdynamic LV systolic function at that time. The mean MV gradient was lower previously at 2.8mmHg. The MR appears to be new on the current exam. The RVSP is moderately elevated today and there was insuficent TR on the prior study to estimate.   9. There is moderate dilatation of the aortic root.    Transthoracic Echo (TTE) Complete  01/25/2024  CONCLUSIONS:   1. Poorly visualized anatomical structures due to suboptimal image quality.   2. Left ventricular systolic function is hyperdynamic with a 75-80% estimated ejection fraction.   3. Mild aortic valve regurgitation.   4. Dilation of the ascending aorta with evidence of graft repair. The graft was not satisfactorily visualized.   5. Mitral valve leaflets are not well visualized. However there is no evidence of mitral regurgitation, and diastolic gradients are normal following reported leaflet repair.   6. Compared with the prior study dated 1/23/2024 (intraoperative EULA), mitral regurgitation has decreased. Right venticular function could not be confidently assessed in the current study.    Ejection Fractions:  EF   Date/Time Value Ref Range Status   03/05/2024 03:48 PM 71 %        12/18/23 left and right heart cath  Coronary Lesion Summary:  Vessel          Stenosis     Vessel Segment  LAD           40% stenosis      proximal  LAD           40% stenosis        mid  LAD          50-60% stenosis mid to distal  2nd Diagonal  30% stenosis      proximal  Circumflex    50% stenosis        mid  OM 2          40% stenosis        mid    CONCLUSIONS:   1. Moderate 2 vessel CAD in a right dominant system.   2. Elevated left and right heart filling pressures.   3. Mild pulmonary hypertension with a PVR of 1.2 Wood units.   4. No evidence of intracardiac shunt.   5. Preserved cardiac index with a normal .    9/26/24 CTA of chest to rule out PE  IMPRESSION:  No CT evidence of pulmonary embolism in the current exam.      Mild aneurysmal dilatation of the proximal ascending aorta. Previous  surgery of the ascending aorta. No thoracic aortic dissection.      Moderate to large right pleural effusion and small to moderate-sized  left pleural effusion with associated bilateral lower lobe  atelectasis.      Mild stable hazy infiltrative densities in the bilateral upper lobes  as described. Suspect scarring  from previous infectious or  inflammatory process.      Mild-to-moderate mid to distal thoracic spine DJD. Old fracture  deformities of the right clavicle and right 8th rib as described.    IMPRESSION:  1.  More prominent opacification of the right base may be due to a  combination of consolidation, volume loss and pleural effusion.  2. Left basilar atelectasis versus small infiltrate. Small left  effusion not excluded.  3. Mild interstitial prominence may be chronic or represent a  component of interstitial edema.        Assessment/Plan   Problem List Items Addressed This Visit          Cardiac and Vasculature    Coronary artery disease involving native coronary artery of native heart without angina pectoris - Primary    Hyperlipidemia    Hypertension    Status post mitral valve repair     Other Visit Diagnoses       Chronic diastolic heart failure        Relevant Orders    Basic metabolic panel          In summary Mr. Brunner is a pleasant 69 year-old white male with a past medical history significant for type A aortic dissection status post replacement of his ascending aorta, hypertension, hyperlipidemia, postoperative atrial fibrillation, mitral regurgitation s/p mitral valve repair, coronary atherosclerosis on coronary angiography, hepatic steatosis, alcohol abuse, and remote traumatic subdural hematoma. His dyspnea on exertion has improved, although still persists. He appears euvolemic by clinical exam. He will have cardiac MRI done in December at which time we will also evaluate pleural effusions.  Right was greater than left on recent xray. He had an echocardiogram done today showed hyperdynamic LV function with mild MR. He is scheduled for cardiac MRI in December to rule out hypertrophic cardiomyopathy. He will have blood work done as above. I did recommend starting an SGLT2 inhibitor as I suspect it would help with dyspnea. He will consider. I have ordered blood work as above.  He will continue all  other cardiovascular medications. He will follow up in March with Dr. Choi.             Diana Alcantar, JACOBO-CNP   within normal limits

## 2024-12-10 ENCOUNTER — TELEPHONE (OUTPATIENT)
Dept: RADIATION ONCOLOGY | Facility: HOSPITAL | Age: 69
End: 2024-12-10
Payer: MEDICARE

## 2024-12-10 ASSESSMENT — ENCOUNTER SYMPTOMS
ANAL BLEEDING: 0
CHEST TIGHTNESS: 0
ALLERGIC/IMMUNOLOGIC NEGATIVE: 1
DIFFICULTY URINATING: 0
BLOOD IN STOOL: 0
COUGH: 0
WEAKNESS: 0
RECTAL PAIN: 0
FREQUENCY: 0
SHORTNESS OF BREATH: 0
DYSURIA: 0
CONSTIPATION: 0
FEVER: 0
DIZZINESS: 0
ARTHRALGIAS: 0
ABDOMINAL PAIN: 0
FATIGUE: 0
HEMATURIA: 0
JOINT SWELLING: 0
DIARRHEA: 0
PALPITATIONS: 0
PSYCHIATRIC NEGATIVE: 1
UNEXPECTED WEIGHT CHANGE: 0
BACK PAIN: 0

## 2024-12-10 NOTE — PROGRESS NOTES
Cancer synopsis:  Rad/onc: Dr. Tavares/ Sita DAVIS    69 year old male with node-positive prostate cancer, [id number] (PSMA PET+ left int iliac node), Lai 4+4=8, iPSA 63.47. He was treated with  definitive treatment on the ASCLEPIUS trial: SBRT with 6 months ADT/abiraterone/niraparib.   He completed treatment to a total dose of 4000 cGy in 5 fractions to his gross disease in his prostate and lymph nodes, 3750 cGy in 5 fractions to his prostate, and 2500 cGy to his pelvic lymph nodes     Treatment Dates: 04/11-04/20/2022  Elapsed Days: 9  Region(s) Treated: prostate, nodes, and pelvis  Radiation Dose Prescribed: 4000 cGy in five fractions to his gross disease (800 cGy per fraction), 3750 cGy in five fractions to his prostate (750 cGy per fraction), and 2500 cGy to his pelvic lymph nodes (500 cGy per fraction)  Radiation Technique: SBRT  Energy: 10 MV photons    PMH:  Mitral valve disease and repair, HTN, HLD, HILTON    Recent imaging:  X-ray chest: 03/2024 No acute cardiopulmonary pathology     History of presenting illness:    Patient ID: [id number]     Jake Brunner is a 69 y.o. male who presents for Locally advance very high risk prostate cancer GG4, IPSA 63.47, [id number] now s/p RT st-term systemic therapy.    RT Site: prostate, pelvis and local LN  RT Date: 04/20/2022  Hormone therapy: Yes, st-term ADT, aa/p and niraparib  Hot Flushes: Denies  Fatigue: Denies  Bone pain: Denies  ED: Reports sexual drive has vastly improved. Does have Cialis but has not had used.   ED medications: Yes, cialis 10-20mg  Urinary symptoms: Denies dysuria, hematuria, frequency, urgency, urine leakage. States hard to gauge d/t lasix uage  Urinary Medications: No  Rectal bleeding: Denies  Colonoscopy: Due now (none on file)  Other systems: Denies CP or fever. Does report some occasional SOB however relates this to his current pleural effusion he has recently had pleurocentesis and started on lasix. Stated when missing his lasix dosage  notices symptoms present then resolve once taken.     PERFORMANCE STATUS:  KPS/ECO, Fully active, able to carry on all pre-disease performed without restriction (ECOG equivalent 0)    Review of systems:  Review of Systems   Constitutional:  Negative for fatigue, fever and unexpected weight change.   Respiratory:  Negative for cough, chest tightness and shortness of breath.    Cardiovascular:  Negative for chest pain, palpitations and leg swelling.   Gastrointestinal:  Negative for abdominal pain, anal bleeding, blood in stool, constipation, diarrhea and rectal pain.   Endocrine: Negative for cold intolerance, heat intolerance and polyuria.   Genitourinary:  Positive for penile pain. Negative for decreased urine volume, difficulty urinating, dysuria, frequency, hematuria and urgency.        Refer to HPI   Musculoskeletal:  Negative for arthralgias, back pain, gait problem and joint swelling.   Skin: Negative.    Allergic/Immunologic: Negative.    Neurological:  Negative for dizziness, syncope and weakness.   Psychiatric/Behavioral: Negative.       Past Medical history  Past Medical History:   Diagnosis Date    Alcohol abuse, in remission     History of alcohol abuse    Anemia 2024    Cataract     Closed displaced comminuted fracture of shaft of right tibia 2019    Closed displaced fracture of body of right scapula 2019    Closed displaced fracture of shaft of right clavicle 2019    Coronary artery disease     Essential (primary) hypertension     Hypertension    Heart valve disease     Severe mitral valve regurgitation.    History of alcohol abuse 2024    History of thoracic aortic aneurysm repair 2023    History of transesophageal echocardiography (EULA) 2023    Echo on 23    Hyperlipidemia     Palpitations 2024    Prostate cancer (Multi) 2024    Onc: Pacheco Gomes CNP    Shortness of breath     Sleep apnea     Uses CPAP    Vision loss     Wears  contacts      Surgical/family history  Family History   Problem Relation Name Age of Onset    Hyperlipidemia Mother      Coronary artery disease Father      Other (MYOCARDIAL INFARCTION) Father      Heart attack Father        Past Surgical History:   Procedure Laterality Date    ANKLE SURGERY Left     Ankle Surgery    ARTERIAL ANEURYSM REPAIR  12/2015    Aortic Aneurysm Repair Ascending Aorta    CARDIAC CATHETERIZATION N/A 12/11/2023    Procedure: Left And Right Heart Cath, With LV;  Surgeon: Owen Choi MD;  Location: [facility] Cardiac Cath Lab;  Service: Cardiovascular;  Laterality: N/A;  Scheduled 12/11 @ 9:00am, EULA w/ anesthesia @ 7:30am    COLONOSCOPY      CT CHEST W AND WO IV CONTRAST  10/18/2023    Thoracic aortic atherosclerosis.    FEMUR FRACTURE SURGERY  01/27/2016    Femur Repair    LIPOMA RESECTION  2022    back    OTHER SURGICAL HISTORY  01/27/2016    Wrist Surgery      Social History  Tobacco Use: Medium Risk (11/27/2024)    Patient History     Smoking Tobacco Use: Former     Smokeless Tobacco Use: Never     Passive Exposure: Not on file       Current med list:  Current Outpatient Medications   Medication Instructions    aspirin 81 mg EC tablet 1 tablet, Daily    benzonatate (TESSALON) 200 mg, oral, 3 times daily PRN    ezetimibe (ZETIA) 10 mg, oral, Daily    furosemide (LASIX) 40 mg, oral, Daily    metoprolol tartrate (LOPRESSOR) 50 mg, oral, 2 times daily    multivitamin tablet 1 tablet, Daily    rosuvastatin (CRESTOR) 40 mg, oral, Daily    tadalafil (CIALIS) 20 mg, oral, As needed      Last recorded vital:  There were no vitals taken for this visit.    Physical Exam  Constitutional:       Appearance: Normal appearance.   Cardiovascular:      Rate and Rhythm: Normal rate.   Pulmonary:      Effort: Pulmonary effort is normal.      Breath sounds: Normal breath sounds.   Musculoskeletal:         General: Normal range of motion.      Cervical back: Normal range of motion.   Neurological:      Mental  Status: He is alert and oriented to person, place, and time.   Psychiatric:         Mood and Affect: Mood normal.         Behavior: Behavior normal.         Thought Content: Thought content normal.         Judgment: Judgment normal.       Pertinent labs:  Prostate Specific AG   Date/Time Value Ref Range Status   06/25/2024 12:20 PM 0.11 <=4.00 ng/mL Final     Dx:  Problem List Items Addressed This Visit    None  Due for labs today, will call with results. Review of latent SE including rectal bleeding, hematuria, urinary strictures, ED where reviewed as well as how to contact office if s/s present. Denies latent SE. NCCN guidelines where reviewed and routine FUV of every 3m for first year and every 6m for four years for a total of five years was discussed. Patient verbalized understanding.     PLAN:  FUV 6m (trial team to arrange)  Labs Psa today and in 6m. Anemia labs  Imaging none  Cialis 10-20mg PRN  Heme referral for anemia  FUV other providers: PCP for routine evals, cards for mitral valve regurg    Please contact office with any concerns:  Pacheco Gomes CNP  [phone]

## 2024-12-11 ENCOUNTER — HOSPITAL ENCOUNTER (OUTPATIENT)
Dept: RADIATION ONCOLOGY | Facility: HOSPITAL | Age: 69
Setting detail: RADIATION/ONCOLOGY SERIES
Discharge: HOME | End: 2024-12-11
Payer: MEDICARE

## 2024-12-11 ENCOUNTER — LAB (OUTPATIENT)
Dept: LAB | Facility: HOSPITAL | Age: 69
End: 2024-12-11
Payer: MEDICARE

## 2024-12-11 ENCOUNTER — HOSPITAL ENCOUNTER (OUTPATIENT)
Dept: RADIOLOGY | Facility: HOSPITAL | Age: 69
Discharge: HOME | End: 2024-12-11
Payer: MEDICARE

## 2024-12-11 ENCOUNTER — DOCUMENTATION (OUTPATIENT)
Dept: RADIATION ONCOLOGY | Facility: HOSPITAL | Age: 69
End: 2024-12-11
Payer: MEDICARE

## 2024-12-11 VITALS — WEIGHT: 189.6 LBS | BODY MASS INDEX: 28.73 KG/M2 | HEIGHT: 68 IN

## 2024-12-11 VITALS
SYSTOLIC BLOOD PRESSURE: 124 MMHG | DIASTOLIC BLOOD PRESSURE: 78 MMHG | OXYGEN SATURATION: 97 % | TEMPERATURE: 96.8 F | RESPIRATION RATE: 18 BRPM | BODY MASS INDEX: 30.16 KG/M2 | WEIGHT: 196.7 LBS | HEART RATE: 60 BPM

## 2024-12-11 DIAGNOSIS — I34.0 NONRHEUMATIC MITRAL VALVE REGURGITATION: ICD-10-CM

## 2024-12-11 DIAGNOSIS — I51.7 LVH (LEFT VENTRICULAR HYPERTROPHY): ICD-10-CM

## 2024-12-11 DIAGNOSIS — C61 MALIGNANT NEOPLASM OF PROSTATE (MULTI): ICD-10-CM

## 2024-12-11 DIAGNOSIS — J90 PLEURAL EFFUSION: ICD-10-CM

## 2024-12-11 DIAGNOSIS — C61 MALIGNANT NEOPLASM OF PROSTATE (MULTI): Primary | ICD-10-CM

## 2024-12-11 DIAGNOSIS — C61 PROSTATE CA (MULTI): ICD-10-CM

## 2024-12-11 DIAGNOSIS — I50.32 CHRONIC DIASTOLIC HEART FAILURE: ICD-10-CM

## 2024-12-11 DIAGNOSIS — Z98.890 STATUS POST MITRAL VALVE REPAIR: ICD-10-CM

## 2024-12-11 LAB
ALBUMIN SERPL BCP-MCNC: 4.4 G/DL (ref 3.4–5)
ALP SERPL-CCNC: 46 U/L (ref 33–136)
ALT SERPL W P-5'-P-CCNC: 15 U/L (ref 10–52)
ANION GAP SERPL CALC-SCNC: 10 MMOL/L (ref 10–20)
AST SERPL W P-5'-P-CCNC: 26 U/L (ref 9–39)
BASOPHILS # BLD AUTO: 0.08 X10*3/UL (ref 0–0.1)
BASOPHILS NFR BLD AUTO: 1 %
BILIRUB SERPL-MCNC: 0.7 MG/DL (ref 0–1.2)
BUN SERPL-MCNC: 20 MG/DL (ref 6–23)
CALCIUM SERPL-MCNC: 10.9 MG/DL (ref 8.6–10.3)
CHLORIDE SERPL-SCNC: 106 MMOL/L (ref 98–107)
CO2 SERPL-SCNC: 30 MMOL/L (ref 21–32)
CREAT SERPL-MCNC: 0.87 MG/DL (ref 0.5–1.3)
EGFRCR SERPLBLD CKD-EPI 2021: >90 ML/MIN/1.73M*2
EOSINOPHIL # BLD AUTO: 0.26 X10*3/UL (ref 0–0.7)
EOSINOPHIL NFR BLD AUTO: 3.2 %
ERYTHROCYTE [DISTWIDTH] IN BLOOD BY AUTOMATED COUNT: 16.5 % (ref 11.5–14.5)
GLUCOSE SERPL-MCNC: 98 MG/DL (ref 74–99)
HCT VFR BLD AUTO: 37.8 % (ref 41–52)
HGB BLD-MCNC: 12.4 G/DL (ref 13.5–17.5)
IMM GRANULOCYTES # BLD AUTO: 0.03 X10*3/UL (ref 0–0.7)
IMM GRANULOCYTES NFR BLD AUTO: 0.4 % (ref 0–0.9)
LDH SERPL L TO P-CCNC: 373 U/L (ref 84–246)
LYMPHOCYTES # BLD AUTO: 1.6 X10*3/UL (ref 1.2–4.8)
LYMPHOCYTES NFR BLD AUTO: 19.9 %
MCH RBC QN AUTO: 29.3 PG (ref 26–34)
MCHC RBC AUTO-ENTMCNC: 32.8 G/DL (ref 32–36)
MCV RBC AUTO: 89 FL (ref 80–100)
MONOCYTES # BLD AUTO: 0.67 X10*3/UL (ref 0.1–1)
MONOCYTES NFR BLD AUTO: 8.3 %
NEUTROPHILS # BLD AUTO: 5.42 X10*3/UL (ref 1.2–7.7)
NEUTROPHILS NFR BLD AUTO: 67.2 %
NRBC BLD-RTO: 0 /100 WBCS (ref 0–0)
PLATELET # BLD AUTO: 226 X10*3/UL (ref 150–450)
POTASSIUM SERPL-SCNC: 4.8 MMOL/L (ref 3.5–5.3)
PROT SERPL-MCNC: 7.3 G/DL (ref 6.4–8.2)
PSA SERPL-MCNC: <0.1 NG/ML
RBC # BLD AUTO: 4.23 X10*6/UL (ref 4.5–5.9)
SODIUM SERPL-SCNC: 141 MMOL/L (ref 136–145)
TESTOST SERPL-MCNC: 353 NG/DL (ref 240–1000)
WBC # BLD AUTO: 8.1 X10*3/UL (ref 4.4–11.3)

## 2024-12-11 PROCEDURE — 84403 ASSAY OF TOTAL TESTOSTERONE: CPT

## 2024-12-11 PROCEDURE — 99214 OFFICE O/P EST MOD 30 MIN: CPT

## 2024-12-11 PROCEDURE — 36415 COLL VENOUS BLD VENIPUNCTURE: CPT

## 2024-12-11 PROCEDURE — A9575 INJ GADOTERATE MEGLUMI 0.1ML: HCPCS | Performed by: INTERNAL MEDICINE

## 2024-12-11 PROCEDURE — 83615 LACTATE (LD) (LDH) ENZYME: CPT

## 2024-12-11 PROCEDURE — 84153 ASSAY OF PSA TOTAL: CPT | Mod: MUE

## 2024-12-11 PROCEDURE — 85025 COMPLETE CBC W/AUTO DIFF WBC: CPT

## 2024-12-11 PROCEDURE — 75561 CARDIAC MRI FOR MORPH W/DYE: CPT

## 2024-12-11 PROCEDURE — 84153 ASSAY OF PSA TOTAL: CPT

## 2024-12-11 PROCEDURE — 2550000001 HC RX 255 CONTRASTS: Performed by: INTERNAL MEDICINE

## 2024-12-11 PROCEDURE — 80053 COMPREHEN METABOLIC PANEL: CPT

## 2024-12-11 RX ORDER — GADOTERATE MEGLUMINE 376.9 MG/ML
34 INJECTION INTRAVENOUS
Status: COMPLETED | OUTPATIENT
Start: 2024-12-11 | End: 2024-12-11

## 2024-12-11 ASSESSMENT — COLUMBIA-SUICIDE SEVERITY RATING SCALE - C-SSRS
6. HAVE YOU EVER DONE ANYTHING, STARTED TO DO ANYTHING, OR PREPARED TO DO ANYTHING TO END YOUR LIFE?: NO
1. IN THE PAST MONTH, HAVE YOU WISHED YOU WERE DEAD OR WISHED YOU COULD GO TO SLEEP AND NOT WAKE UP?: NO
2. HAVE YOU ACTUALLY HAD ANY THOUGHTS OF KILLING YOURSELF?: NO

## 2024-12-11 ASSESSMENT — PAIN SCALES - GENERAL: PAINLEVEL_OUTOF10: 0-NO PAIN

## 2024-12-11 ASSESSMENT — ENCOUNTER SYMPTOMS
DEPRESSION: 0
LOSS OF SENSATION IN FEET: 0
OCCASIONAL FEELINGS OF UNSTEADINESS: 0

## 2024-12-11 NOTE — RESEARCH NOTES
STUDY: [id number]  VISIT: 30M    Clinical Research Specialist saw patient in clinic today.    Adverse Events and Concomitant Medications assessed.    Next appointment and contact information provided.    All questions answered to patient satisfaction.    Karoline Gipson  12/11/2024

## 2024-12-12 DIAGNOSIS — I05.9 MITRAL VALVE DISORDER: Primary | ICD-10-CM

## 2024-12-12 NOTE — RESULT ENCOUNTER NOTE
I reviewed the MRI results with the patient.  He does not appear to have evidence of hypertrophic cardiomyopathy.  He does have severe eccentric mitral regurgitation.  He has preserved LV function.  I referred him back to CT surgery for evaluation of his severe mitral regurgitation.  He will follow-up as scheduled.

## 2024-12-14 LAB
PSA FREE MFR SERPL: NORMAL %
PSA FREE SERPL-MCNC: <0.1 NG/ML
PSA SERPL IA-MCNC: <0.1 NG/ML (ref 0–4)

## 2024-12-17 ENCOUNTER — APPOINTMENT (OUTPATIENT)
Dept: RADIATION ONCOLOGY | Facility: HOSPITAL | Age: 69
End: 2024-12-17
Payer: MEDICARE

## 2025-01-03 LAB
ATRIAL RATE: 57 BPM
P AXIS: 20 DEGREES
P OFFSET: 200 MS
P ONSET: 137 MS
PR INTERVAL: 158 MS
Q ONSET: 216 MS
QRS COUNT: 10 BEATS
QRS DURATION: 90 MS
QT INTERVAL: 434 MS
QTC CALCULATION(BAZETT): 422 MS
QTC FREDERICIA: 426 MS
R AXIS: 42 DEGREES
T AXIS: 32 DEGREES
T OFFSET: 433 MS
VENTRICULAR RATE: 57 BPM

## 2025-01-07 PROBLEM — I71.019: Status: RESOLVED | Noted: 2025-01-07 | Resolved: 2025-01-07
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
## 2025-01-10 ENCOUNTER — OFFICE VISIT (OUTPATIENT)
Dept: CARDIAC SURGERY | Facility: HOSPITAL | Age: 70
End: 2025-01-10
Payer: MEDICARE

## 2025-01-10 VITALS
HEART RATE: 67 BPM | HEIGHT: 68 IN | OXYGEN SATURATION: 95 % | BODY MASS INDEX: 29.61 KG/M2 | DIASTOLIC BLOOD PRESSURE: 69 MMHG | WEIGHT: 195.4 LBS | SYSTOLIC BLOOD PRESSURE: 137 MMHG

## 2025-01-10 DIAGNOSIS — I05.9 MITRAL VALVE DISORDER: ICD-10-CM

## 2025-01-10 PROCEDURE — 99214 OFFICE O/P EST MOD 30 MIN: CPT | Performed by: THORACIC SURGERY (CARDIOTHORACIC VASCULAR SURGERY)

## 2025-01-10 ASSESSMENT — PAIN SCALES - GENERAL: PAINLEVEL_OUTOF10: 0-NO PAIN

## 2025-01-13 ENCOUNTER — HOSPITAL ENCOUNTER (OUTPATIENT)
Facility: HOSPITAL | Age: 70
Setting detail: SURGERY ADMIT
End: 2025-01-13
Attending: THORACIC SURGERY (CARDIOTHORACIC VASCULAR SURGERY) | Admitting: THORACIC SURGERY (CARDIOTHORACIC VASCULAR SURGERY)
Payer: MEDICARE

## 2025-01-13 DIAGNOSIS — I34.0 MITRAL VALVE INSUFFICIENCY, UNSPECIFIED ETIOLOGY: Primary | ICD-10-CM

## 2025-01-30 ENCOUNTER — PREP FOR PROCEDURE (OUTPATIENT)
Dept: CARDIOLOGY | Facility: HOSPITAL | Age: 70
End: 2025-01-30
Payer: MEDICARE

## 2025-01-30 DIAGNOSIS — I25.10 CORONARY ARTERY DISEASE INVOLVING NATIVE CORONARY ARTERY OF NATIVE HEART WITHOUT ANGINA PECTORIS: Primary | ICD-10-CM

## 2025-01-30 DIAGNOSIS — I05.9 MITRAL VALVE DISORDER: ICD-10-CM

## 2025-01-30 DIAGNOSIS — J90 PLEURAL EFFUSION: ICD-10-CM

## 2025-01-30 DIAGNOSIS — R06.02 SHORTNESS OF BREATH: ICD-10-CM

## 2025-01-30 DIAGNOSIS — I34.0 NONRHEUMATIC MITRAL VALVE REGURGITATION: ICD-10-CM

## 2025-01-31 RX ORDER — FUROSEMIDE 40 MG/1
40 TABLET ORAL DAILY
Qty: 90 TABLET | Refills: 3 | Status: SHIPPED | OUTPATIENT
Start: 2025-01-31 | End: 2026-01-31

## 2025-02-13 ENCOUNTER — LAB (OUTPATIENT)
Dept: LAB | Facility: HOSPITAL | Age: 70
End: 2025-02-13
Payer: MEDICARE

## 2025-02-13 LAB
ANION GAP SERPL CALC-SCNC: 7 MMOL/L (ref 10–20)
BUN SERPL-MCNC: 21 MG/DL (ref 6–23)
CALCIUM SERPL-MCNC: 10.5 MG/DL (ref 8.6–10.3)
CHLORIDE SERPL-SCNC: 110 MMOL/L (ref 98–107)
CO2 SERPL-SCNC: 29 MMOL/L (ref 21–32)
CREAT SERPL-MCNC: 0.82 MG/DL (ref 0.5–1.3)
EGFRCR SERPLBLD CKD-EPI 2021: >90 ML/MIN/1.73M*2
ERYTHROCYTE [DISTWIDTH] IN BLOOD BY AUTOMATED COUNT: 16.2 % (ref 11.5–14.5)
GLUCOSE SERPL-MCNC: 150 MG/DL (ref 74–99)
HCT VFR BLD AUTO: 36.8 % (ref 41–52)
HGB BLD-MCNC: 12.3 G/DL (ref 13.5–17.5)
MCH RBC QN AUTO: 30.4 PG (ref 26–34)
MCHC RBC AUTO-ENTMCNC: 33.4 G/DL (ref 32–36)
MCV RBC AUTO: 91 FL (ref 80–100)
NRBC BLD-RTO: 0 /100 WBCS (ref 0–0)
PLATELET # BLD AUTO: 206 X10*3/UL (ref 150–450)
POTASSIUM SERPL-SCNC: 4.9 MMOL/L (ref 3.5–5.3)
RBC # BLD AUTO: 4.04 X10*6/UL (ref 4.5–5.9)
SODIUM SERPL-SCNC: 141 MMOL/L (ref 136–145)
WBC # BLD AUTO: 10.5 X10*3/UL (ref 4.4–11.3)

## 2025-02-13 PROCEDURE — 80048 BASIC METABOLIC PNL TOTAL CA: CPT

## 2025-02-13 PROCEDURE — 85027 COMPLETE CBC AUTOMATED: CPT

## 2025-02-17 ENCOUNTER — HOSPITAL ENCOUNTER (OUTPATIENT)
Facility: HOSPITAL | Age: 70
Setting detail: OUTPATIENT SURGERY
Discharge: HOME | End: 2025-02-17
Attending: INTERNAL MEDICINE | Admitting: INTERNAL MEDICINE
Payer: MEDICARE

## 2025-02-17 VITALS
HEART RATE: 49 BPM | RESPIRATION RATE: 18 BRPM | BODY MASS INDEX: 28.79 KG/M2 | HEIGHT: 68 IN | WEIGHT: 190 LBS | SYSTOLIC BLOOD PRESSURE: 113 MMHG | DIASTOLIC BLOOD PRESSURE: 62 MMHG | TEMPERATURE: 96.8 F | OXYGEN SATURATION: 97 %

## 2025-02-17 DIAGNOSIS — I34.0 NONRHEUMATIC MITRAL VALVE REGURGITATION: ICD-10-CM

## 2025-02-17 DIAGNOSIS — I25.10 CORONARY ARTERY DISEASE INVOLVING NATIVE CORONARY ARTERY OF NATIVE HEART WITHOUT ANGINA PECTORIS: Primary | ICD-10-CM

## 2025-02-17 DIAGNOSIS — I05.9 MITRAL VALVE DISORDER: ICD-10-CM

## 2025-02-17 PROCEDURE — 2550000001 HC RX 255 CONTRASTS: Performed by: INTERNAL MEDICINE

## 2025-02-17 PROCEDURE — 2500000004 HC RX 250 GENERAL PHARMACY W/ HCPCS (ALT 636 FOR OP/ED): Performed by: NURSE PRACTITIONER

## 2025-02-17 PROCEDURE — C1769 GUIDE WIRE: HCPCS | Performed by: INTERNAL MEDICINE

## 2025-02-17 PROCEDURE — C1894 INTRO/SHEATH, NON-LASER: HCPCS | Performed by: INTERNAL MEDICINE

## 2025-02-17 PROCEDURE — 99153 MOD SED SAME PHYS/QHP EA: CPT | Performed by: INTERNAL MEDICINE

## 2025-02-17 PROCEDURE — 2500000004 HC RX 250 GENERAL PHARMACY W/ HCPCS (ALT 636 FOR OP/ED): Performed by: INTERNAL MEDICINE

## 2025-02-17 PROCEDURE — C1887 CATHETER, GUIDING: HCPCS | Performed by: INTERNAL MEDICINE

## 2025-02-17 PROCEDURE — 99152 MOD SED SAME PHYS/QHP 5/>YRS: CPT | Performed by: INTERNAL MEDICINE

## 2025-02-17 PROCEDURE — 93460 R&L HRT ART/VENTRICLE ANGIO: CPT | Performed by: INTERNAL MEDICINE

## 2025-02-17 PROCEDURE — 2500000005 HC RX 250 GENERAL PHARMACY W/O HCPCS: Performed by: INTERNAL MEDICINE

## 2025-02-17 PROCEDURE — 2720000007 HC OR 272 NO HCPCS: Performed by: INTERNAL MEDICINE

## 2025-02-17 PROCEDURE — 99223 1ST HOSP IP/OBS HIGH 75: CPT | Performed by: NURSE PRACTITIONER

## 2025-02-17 PROCEDURE — 7100000010 HC PHASE TWO TIME - EACH INCREMENTAL 1 MINUTE: Performed by: INTERNAL MEDICINE

## 2025-02-17 PROCEDURE — C1760 CLOSURE DEV, VASC: HCPCS | Performed by: INTERNAL MEDICINE

## 2025-02-17 PROCEDURE — 7100000009 HC PHASE TWO TIME - INITIAL BASE CHARGE: Performed by: INTERNAL MEDICINE

## 2025-02-17 RX ORDER — VERAPAMIL HYDROCHLORIDE 2.5 MG/ML
INJECTION, SOLUTION INTRAVENOUS AS NEEDED
Status: DISCONTINUED | OUTPATIENT
Start: 2025-02-17 | End: 2025-02-17 | Stop reason: HOSPADM

## 2025-02-17 RX ORDER — ACETAMINOPHEN 650 MG/1
650 SUPPOSITORY RECTAL EVERY 6 HOURS PRN
Status: DISCONTINUED | OUTPATIENT
Start: 2025-02-17 | End: 2025-02-17 | Stop reason: HOSPADM

## 2025-02-17 RX ORDER — MIDAZOLAM HYDROCHLORIDE 1 MG/ML
INJECTION, SOLUTION INTRAMUSCULAR; INTRAVENOUS AS NEEDED
Status: DISCONTINUED | OUTPATIENT
Start: 2025-02-17 | End: 2025-02-17 | Stop reason: HOSPADM

## 2025-02-17 RX ORDER — FENTANYL CITRATE 50 UG/ML
INJECTION, SOLUTION INTRAMUSCULAR; INTRAVENOUS AS NEEDED
Status: DISCONTINUED | OUTPATIENT
Start: 2025-02-17 | End: 2025-02-17 | Stop reason: HOSPADM

## 2025-02-17 RX ORDER — ACETAMINOPHEN 160 MG/5ML
650 SOLUTION ORAL EVERY 6 HOURS PRN
Status: DISCONTINUED | OUTPATIENT
Start: 2025-02-17 | End: 2025-02-17 | Stop reason: HOSPADM

## 2025-02-17 RX ORDER — HEPARIN SODIUM 1000 [USP'U]/ML
INJECTION, SOLUTION INTRAVENOUS; SUBCUTANEOUS AS NEEDED
Status: DISCONTINUED | OUTPATIENT
Start: 2025-02-17 | End: 2025-02-17 | Stop reason: HOSPADM

## 2025-02-17 RX ORDER — ACETAMINOPHEN 325 MG/1
650 TABLET ORAL EVERY 6 HOURS PRN
Status: DISCONTINUED | OUTPATIENT
Start: 2025-02-17 | End: 2025-02-17 | Stop reason: HOSPADM

## 2025-02-17 RX ORDER — SODIUM CHLORIDE 9 MG/ML
100 INJECTION, SOLUTION INTRAVENOUS CONTINUOUS
Status: DISCONTINUED | OUTPATIENT
Start: 2025-02-17 | End: 2025-02-17 | Stop reason: HOSPADM

## 2025-02-17 RX ORDER — LIDOCAINE HYDROCHLORIDE 20 MG/ML
INJECTION, SOLUTION INFILTRATION; PERINEURAL AS NEEDED
Status: DISCONTINUED | OUTPATIENT
Start: 2025-02-17 | End: 2025-02-17 | Stop reason: HOSPADM

## 2025-02-17 RX ORDER — NAPROXEN SODIUM 220 MG/1
81 TABLET, FILM COATED ORAL ONCE
Status: DISCONTINUED | OUTPATIENT
Start: 2025-02-17 | End: 2025-02-17 | Stop reason: HOSPADM

## 2025-02-17 ASSESSMENT — PAIN SCALES - GENERAL
PAINLEVEL_OUTOF10: 0 - NO PAIN

## 2025-02-17 ASSESSMENT — ENCOUNTER SYMPTOMS
HEMATOLOGIC/LYMPHATIC NEGATIVE: 1
EYES NEGATIVE: 1
MUSCULOSKELETAL NEGATIVE: 1
ALLERGIC/IMMUNOLOGIC NEGATIVE: 1
CONSTITUTIONAL NEGATIVE: 1
ENDOCRINE NEGATIVE: 1
GASTROINTESTINAL NEGATIVE: 1
SHORTNESS OF BREATH: 1
PSYCHIATRIC NEGATIVE: 1
NEUROLOGICAL NEGATIVE: 1

## 2025-02-17 ASSESSMENT — PAIN - FUNCTIONAL ASSESSMENT
PAIN_FUNCTIONAL_ASSESSMENT: 0-10

## 2025-02-17 NOTE — POST-PROCEDURE NOTE
Physician Transition of Care Summary  Invasive Cardiovascular Lab    Procedure Date: 2/17/2025  Attending:    * Owen Choi - Primary  Resident/Fellow/Other Assistant: Surgeons and Role:  * No surgeons found with a matching role *    Indications:   Pre-op Diagnosis      * Coronary artery disease involving native coronary artery of native heart without angina pectoris [I25.10]     * Mitral valve disorder [I05.9]     * Nonrheumatic mitral valve regurgitation [I34.0]    Post-procedure diagnosis:   Post-op Diagnosis     * Coronary artery disease involving native coronary artery of native heart without angina pectoris [I25.10]     * Mitral valve disorder [I05.9]     * Nonrheumatic mitral valve regurgitation [I34.0]    Procedure(s):   Left & Right Heart Cath w Angiography & LV  05057 - IA R & L HRT CATH WINJX HRT ART& L VENTR IMG        Procedure Findings:   Moderate CAD in a right dominant system.  Elevated left and right heart filling pressures.  Moderate pulmonary hypertension.   Preserved cardiac index.    Description of the Procedure:   R antecubital vein  R radial artery    Complications:   none    Stents/Implants:   Implants       No implant documentation for this case.            Anticoagulation/Antiplatelet Plan:   asa    Estimated Blood Loss:   5 mL    Anesthesia: Moderate Sedation Anesthesia Staff: No anesthesia staff entered.    Any Specimen(s) Removed:   No specimens collected during this procedure.    Disposition:   home      Electronically signed by: Owen Choi MD, 2/17/2025 11:18 AM

## 2025-02-17 NOTE — H&P
History Of Present Illness  Jake Brunner is a 70 y.o. male presenting with severe mitral regurgitation, here for [facility]. PMH includes type A aortic dissection status post replacement of his ascending aorta, hypertension, hyperlipidemia, postoperative atrial fibrillation, mitral regurgitation s/p mitral valve repair, coronary atherosclerosis on coronary angiography, hepatic steatosis, alcohol abuse, and remote traumatic subdural hematoma.    Past Medical History:  Past Medical History:   Diagnosis Date    Alcohol abuse, in remission     History of alcohol abuse    Anemia 02/07/2024    Cataract     Closed displaced comminuted fracture of shaft of right tibia 07/31/2019    Closed displaced fracture of body of right scapula 07/31/2019    Closed displaced fracture of shaft of right clavicle 07/31/2019    Coronary artery disease     Dissection of thoracic aorta, unspecified (Multi) 01/07/2025    Documented on 02/21/2023      Essential (primary) hypertension     Hypertension    Heart valve disease     Severe mitral valve regurgitation.    History of alcohol abuse 03/01/2024    History of thoracic aortic aneurysm repair 01/22/2023    History of transesophageal echocardiography (EULA) 12/11/2023    Echo on 12/11/23    Hyperlipidemia     Palpitations 02/07/2024    Prostate cancer (Multi) 01/09/2024    Onc: Pacheco Gomes CNP    Shortness of breath     Sleep apnea     Uses CPAP    Vision loss     Wears contacts        Past Surgical History:  Past Surgical History:   Procedure Laterality Date    ANKLE SURGERY Left     Ankle Surgery    ARTERIAL ANEURYSM REPAIR  12/2015    Aortic Aneurysm Repair Ascending Aorta    CARDIAC CATHETERIZATION N/A 12/11/2023    Procedure: Left And Right Heart Cath, With LV;  Surgeon: Owen Choi MD;  Location: [facility] Cardiac Cath Lab;  Service: Cardiovascular;  Laterality: N/A;  Scheduled 12/11 @ 9:00am, EULA w/ anesthesia @ 7:30am    COLONOSCOPY      CT CHEST W AND WO IV CONTRAST  10/18/2023     Thoracic aortic atherosclerosis.    FEMUR FRACTURE SURGERY  2016    Femur Repair    LIPOMA RESECTION      back    MITRAL VALVE REPAIR      OTHER SURGICAL HISTORY  2016    Wrist Surgery          Social History:  Social History     Tobacco Use    Smoking status: Former     Current packs/day: 0.00     Types: Cigarettes     Quit date: 1975     Years since quittin.1    Smokeless tobacco: Never   Vaping Use    Vaping status: Never Used   Substance Use Topics    Alcohol use: Not Currently    Drug use: Never       Family History:  Family History   Problem Relation Name Age of Onset    Hyperlipidemia Mother      Coronary artery disease Father      Other (MYOCARDIAL INFARCTION) Father      Heart attack Father          Allergies:  No Known Allergies     Home Medications:  Current Outpatient Medications   Medication Instructions    aspirin 81 mg EC tablet 1 tablet, Daily    benzonatate (TESSALON) 200 mg, oral, 3 times daily PRN    ezetimibe (ZETIA) 10 mg, oral, Daily    furosemide (LASIX) 40 mg, oral, Daily    metoprolol tartrate (LOPRESSOR) 50 mg, oral, 2 times daily    multivitamin tablet 1 tablet, Daily    rosuvastatin (CRESTOR) 40 mg, oral, Daily    tadalafil (CIALIS) 20 mg, oral, As needed       Inpatient Medications:  Scheduled medications   Medication Dose Route Frequency    aspirin  81 mg oral Once     PRN medications   Medication     Continuous Medications   Medication Dose Last Rate    sodium chloride 0.9%  100 mL/hr           Review of Systems   Constitutional: Negative.    HENT: Negative.     Eyes: Negative.    Respiratory:  Positive for shortness of breath (on exertion).    Cardiovascular:  Positive for leg swelling (slightly worse since held lasix for procedure).   Gastrointestinal: Negative.    Endocrine: Negative.    Genitourinary: Negative.    Musculoskeletal: Negative.    Skin: Negative.    Allergic/Immunologic: Negative.    Neurological: Negative.    Hematological: Negative.   "  Psychiatric/Behavioral: Negative.            Physical Exam  Constitutional:       General: He is awake. He is not in acute distress.     Appearance: He is not ill-appearing.   Cardiovascular:      Rate and Rhythm: Normal rate and regular rhythm.      Pulses: Normal pulses.           Radial pulses are 2+ on the right side and 2+ on the left side.        Dorsalis pedis pulses are 2+ on the right side and 2+ on the left side.      Heart sounds: Murmur heard.   Pulmonary:      Effort: Pulmonary effort is normal.      Breath sounds: Normal breath sounds and air entry.   Abdominal:      General: Bowel sounds are normal.      Palpations: Abdomen is soft.      Tenderness: There is no abdominal tenderness.   Musculoskeletal:      Right lower le+ Edema present.      Left lower le+ Edema present.   Skin:     General: Skin is warm and dry.   Neurological:      General: No focal deficit present.      Mental Status: He is alert and oriented to person, place, and time.      GCS: GCS eye subscore is 4. GCS verbal subscore is 5. GCS motor subscore is 6.   Psychiatric:         Mood and Affect: Mood normal.         Behavior: Behavior is cooperative.        Sedation Plan    ASA 3     Mallampati class: II.           NPO since last night around     Last Recorded Vitals  Blood pressure 137/66, pulse 65, temperature 36 °C (96.8 °F), temperature source Temporal, resp. rate 16, height 1.727 m (5' 8\"), weight 86.2 kg (190 lb), SpO2 97%.         Vitals from the Past 24 Hours  Heart Rate:  [65]   Temp:  [36 °C (96.8 °F)]   Resp:  [16]   BP: (137)/(66)   Height:  [172.7 cm (5' 8\")]   Weight:  [86.2 kg (190 lb)]   SpO2:  [97 %]          Relevant Results    Labs      CBC:   Recent Labs     25  1132 24  1237 24  0841 24  1135 24  1220 24  1136   WBC 10.5 8.1 6.8 7.5 6.5 6.1   HGB 12.3* 12.4* 11.8* 11.6* 11.1* 10.9*   HCT 36.8* 37.8* 37.4* 37.0* 34.8* 35.3*    226 321 302 249 279   MCV 91 89 " "90 89 87 90     BMP/CMP:   Recent Labs     02/13/25  1132 12/11/24  1237 10/25/24  1048 10/09/24  0941 09/25/24  0841 09/11/24  1135 02/07/24  1125 01/23/24  1423 01/09/24  1205 10/05/23  1107 07/11/23  1156 03/23/23  1036    141 140 141 141 140 143   < > 135*   < > 141 142   K 4.9 4.8 5.2 5.1 4.7 4.9 4.9   < > 4.4   < > 4.9 4.8   * 106 105 108* 110* 107 109*   < > 100   < > 107 107   BUN 21 20 20 21 13 17 16   < > 16   < > 15 17   CREATININE 0.82 0.87 0.93 0.90 0.78 0.83 0.79   < > 0.93   < > 0.82 0.95   CO2 29 30 29 30 24 26 26   < > 30   < > 29 28   CALCIUM 10.5* 10.9* 10.5* 10.2 10.2 11.1* 10.6   < > 10.9*   < > 11.0* 10.4*   PROT  --  7.3  --   --   --  7.4 6.5  --  7.5  --  6.9 7.3   BILITOT  --  0.7  --   --   --  1.0 0.5  --  0.5  --  0.6 0.5   ALKPHOS  --  46  --   --   --  51 59  --  53  --  51 64   ALT  --  15  --   --   --  16 23  --  18  --  12 14   AST  --  26  --   --   --  40* 24  --  18  --  14 13   GLUCOSE 150* 98 99 112* 110* 99 95   < > 106*   < > 90 108*    < > = values in this interval not displayed.      Magnesium:   Recent Labs     01/28/24  0835 01/27/24  0649 01/26/24  0807 01/25/24  0810 01/24/24  1206 01/24/24  0013   MG 1.98 1.92 1.95 2.09 2.33 2.43*     Lipid Panel:   Recent Labs     05/20/24  1136 10/05/23  1107 06/01/22  0946 08/24/21  1307 02/16/21  1040 10/01/19  1036 11/14/17  0953   CHOL 123 123 123 145 145 129 157   HDL 45.2 43.5 43.9 47.3 44.0 47.9 40.9   CHHDL 2.7 2.8 2.8 3.1 3.3 2.7 3.8   VLDL 22 23 28 26 21 16 31   TRIG 112 114 142 131 104 81 157*   NHDL 78 80  --   --   --   --   --      Cardiac       No lab exists for component: \"CK\", \"CKMBP\"   Hemoglobin A1C: No results for input(s): \"HGBA1C\" in the last 06729 hours.  TSH/ Free T4:   Recent Labs     02/07/24  1125 02/16/21  1040   TSH 1.71 1.92     Iron:   Recent Labs     10/09/24  0941 09/11/24  1135 06/25/24  1220   FERRITIN  --  46 30   TIBC  --  425 421   IRONSAT  --  13* 11*   *  --   --      Coag:  " "   ABO: No results found for: \"ABO\"    Past Cardiology Tests (Last 3 Years):    EKG:  Recent Labs     10/09/24  0830 02/08/24  1300 11/07/23  1413   ATRRATE 57 81 54   VENTRATE 57 81 54   PRINT 158 156 146   QRSDUR 90 86 88   QTCFRED 426 395 414   QTCCALCB 422 415 407     Encounter Date: 10/09/24   ECG 12 lead (Clinic Performed)   Result Value    Ventricular Rate 57    Atrial Rate 57    MA Interval 158    QRS Duration 90    QT Interval 434    QTC Calculation(Bazett) 422    P Axis 20    R Axis 42    T Axis 32    QRS Count 10    Q Onset 216    P Onset 137    P Offset 200    T Offset 433    QTC Fredericia 426    Narrative    Sinus bradycardia  Cannot rule out Anterior infarct , age undetermined  Abnormal ECG  Confirmed by Owen Choi ([id number]) on 1/3/2025 1:30:41 PM     Echo:  Echocardiogram:   Transthoracic Echo (TTE) Complete With Contrast 11/27/2024    PHYSICIAN INTERPRETATION:  Left Ventricle: Left ventricular ejection fraction is hyperdynamic, by visual estimate at 70-75%. There are no regional left ventricular wall motion abnormalities. The left ventricular cavity size is normal. There is mildly increased septal and moderately increased posterior left ventricular wall thickness. There is left ventricular concentric remodeling. Spectral Doppler shows a Grade II (pseudonormal pattern) of left ventricular diastolic filling with an elevated left atrial pressure.  Left Atrium: The left atrium is mild to moderately dilated.  Right Ventricle: The right ventricle is normal in size. There is normal right ventricular global systolic function.  Right Atrium: The right atrium is normal in size.  Aortic Valve: The aortic valve was not assessed. The aortic valve dimensionless index is 0.92. There is mild aortic valve regurgitation. The peak instantaneous gradient of the aortic valve is 6 mmHg. The mean gradient of the aortic valve is 2 mmHg.  Mitral Valve: The mitral valve is mildly thickened with a 30 mm Simuplus ring. There " is evidence of mild mitral valve stenosis. The doppler estimated mean and peak diastolic pressure gradients are 5 mmHg and 20 mmHg respectively. The peak instantaneous gradient of the mitral valve is 20 mmHg. There is mild to moderate mitral valve regurgitation which is anteriorly directed.  Tricuspid Valve: The tricuspid valve is structurally normal. There is trace tricuspid regurgitation.  Pulmonic Valve: The pulmonic valve is not well visualized. The pulmonic valve regurgitation was not well visualized.  Pericardium: There is no pericardial effusion noted.  Aorta: The aortic root was not well visualized. There is mild dilatation of the ascending aorta.  In comparison to the previous echocardiogram(s): There are no prior studies on this patient for comparison purposes.      CONCLUSIONS:  1. Left ventricular ejection fraction is hyperdynamic, by visual estimate at 70-75%.  2. Spectral Doppler shows a Grade II (pseudonormal pattern) of left ventricular diastolic filling with an elevated left atrial pressure.  3. There is moderately increased posterior left ventricular wall thickness.  4. There is normal right ventricular global systolic function.  5. The left atrium is mild to moderately dilated.  6. Mild to moderate mitral valve regurgitation.  7. Mild aortic valve regurgitation.      Ejection Fractions:  LV EF   Date/Time Value Ref Range Status   11/27/2024 09:55 AM 73 %    03/05/2024 03:48 PM 71 %    01/25/2024 03:54 PM 76 %      Cath:  Coronary Angiography:   Left And Right Heart Cath, With LV 12/11/2023    Study:            Right Heart Cath  Additional Study: Coronary Arteriogram      Indications:  WILLOW GO is a 69 year old male who presents with hypertension, obesity, dyslipidemia and an asymptomatic chest pain assessment. Valvular disease.    Procedure Description:  After infiltration with 2% Lidocaine, the right radial artery was cannulated with a modified Seldinger technique. Subsequently a 5 Nepalese  sheath was placed in the right radial artery. After infiltration of local anesthetic, the right brachial vein was identified with two-dimensional ultrasound. Under direct ultrasound visualization, the right brachial vein was cannulated with a percutaneous technique. A 5 Liberian sheath was placed in the vein. Selective coronary catheterization was performed using a 4 Fr and 5 Fr catheter(s) exchanged over a guide wire to cannulate the coronary arteries. A JL 3.5 tip catheter was used for left coronary injections. A JR 4 tip catheter was used for right coronary injections.  Multiple injections of contrast were made into the left and right coronary arteries with angiograms recorded in multiple projections. A balloon tipped catheter was advanced through the right heart to record pressures. Cardiac output was calculated via the Nima method. After completion of the procedure, the arterial sheath was pulled and a TR Band Radial Compression Device was utilized to obtain patent hemostasis. Post-procedure, the venous sheath was pulled and pressure was applied to the site.    Coronary Angiography:  The coronary circulation is right dominant.    Left Main Coronary Artery:  The left main coronary artery is a normal caliber vessel. The left main arises normally from the left coronary sinus of Valsalva and bifurcates into the LAD and circumflex coronary arteries. The left main coronary artery showed no significant disease or stenosis greater than 30%.    Left Anterior Descending Coronary Artery Distribution:  The left anterior descending coronary artery is a normal caliber vessel. The LAD arises normally from the left main coronary artery. The proximal left anterior descending coronary artery showed 40% stenosis. An additional lesion, located at the mid left anterior descending coronary artery, revealed 40% stenosis. A third lesion, located at the mid to distal LAD, demonstrated 50-60% stenosis. The 1st diagonal branch is a very  small caliber vessel. The 1st diagonal branch showed no significant disease or stenosis greater than 30%. The 2nd diagonal branch is a normal caliber vessel. The proximal 2nd diagonal branch showed 30% stenosis.    Circumflex Coronary Artery Distribution:  The circumflex coronary artery is a normal caliber vessel. The circumflex arises normally from the left main coronary artery and terminates in the AV groove. The mid circumflex coronary artery showed 50% stenosis. The 1st obtuse marginal branch is a normal caliber vessel. The 1st obtuse marginal branch showed no significant disease or stenosis greater than 30%. The 2nd obtuse marginal branch is a small caliber vessel. The mid 2nd obtuse marginal branch demonstrated 40% stenosis.    Right Heart Catheterization:  A balloon tipped catheter was advanced through the right heart to record pressures. Cardiac output was calculated via the Nima method. Elevated left sided filling pressures with normal cardiac output. Cardiac output is normal. Preserved cardiac output at rest. Normal systemic vascular resistance. Pulmonary venous hypertension.    Right Coronary Artery Distribution:    The right coronary artery is a normal caliber vessel. The RCA arises normally from the right sinus of Valsalva. The RCA showed no significant disease or stenosis greater than 30%.    Coronary Lesion Summary:  Vessel          Stenosis     Vessel Segment  LAD           40% stenosis      proximal  LAD           40% stenosis        mid  LAD          50-60% stenosis mid to distal  2nd Diagonal  30% stenosis      proximal  Circumflex    50% stenosis        mid  OM 2          40% stenosis        mid      Complications:  No in-lab complications observed.    Cardiac Cath Post Procedure Notes:  Post Procedure Diagnosis: MR.  Blood Loss:               Estimated blood loss during the procedure was 5 mls.  Specimens Removed:        Number of specimen(s) removed: none.      Recommendations:  Follow up with  CT surgery.    ____________________________________________________________________________________  CONCLUSIONS:  1. Moderate 2 vessel CAD in a right dominant system.  2. Elevated left and right heart filling pressures.  3. Mild pulmonary hypertension with a PVR of 1.2 Wood units.  4. No evidence of intracardiac shunt.  5. Preserved cardiac index with a normal .      Right Heart Cath: No results found for this or any previous visit from the past 1800 days.    Stress Test:  Nuclear:No results found for this or any previous visit from the past 1800 days.    Metabolic Stress: No results found for this or any previous visit from the past 1800 days.    Cardiac Imaging:  Cardiac Scoring:   CT ANGIO CORONARY ARTERIES W [facility]STILLO OF CARDIAC STRUCTURE MORPHOLOGY 03/10/2022    FINDINGS:  POTENTIAL STUDY LIMITATIONS:  None.    CORONARY ARTERIES:    CORONARY ANATOMY:  There is normal origin of the coronary arteries.    LEFT MAIN CORONARY ARTERY:  The left main is normal sized vessel that  bifurcates into the LAD  and circumflex.  Distal left main demonstrates focal, calcified, atherosclerotic  disease resulting in less than 25% luminal stenosis.    LEFT ANTERIOR DESCENDING ARTERY:  The LAD is a normal size vessel that  wraps around the apex.  It gives rise to  1 acute diagonal branches.  The ostial LAD has a focal calcification with less than 30% stenosis.  Proximal LAD demonstrates, focal, calcified, atherosclerotic plaque  with blooming artifact. As small diagonal is seen originating at the  level of the focal calcification with evaluation of its ostium  rendered difficult by the blooming artifact. The mid and distal LAD  appears to fill well with good contrast opacification. The 2nd  diagonal branch is intermediate size and appears to be widely patent  except for a focal area of calcification in the mid segment of the  artery. The distal 2nd diagonal branch appears to opacified normal  LEFT CIRCUMFLEX ARTERY:  The LCX  is a normal size vessel, which is  non-dominant.  It gives rise to  1 obtuse marginal branches.  The ostial circumflex demonstrates, focal, calcified, atherosclerotic  plaque with up to 25% luminal stenosis    RIGHT CORONARY ARTERY:  The RCA is a normal size vessel, which is  dominant .  It gives rise to a  conus branch,  darlene branch, and  1 acute  marginal branches.  In its distal segment it bifurcates into the PDA  and PV branch.  The proximal to mid RCA demonstrates focal, calcified,  atherosclerotic plaque resulting in up to 25% luminal stenosis. The  distal RCA is noted a focal calcification without mild eccentric  remodeling stenosis.    CARDIAC CHAMBERS:  The cardiac chambers demonstrate normal atrioventricular and  ventriculoarterial concordance, and systemic and pulmonary venous  return.    LEFT ATRIUM:  Moderately dilated (33.3 - cm2)    RIGHT ATRIUM:  Normal size (19.0 - cm2)    INTERATRIAL SEPTUM:  Intact.    LEFT VENTRICLE:  Normal size (4.3 - cm)    RIGHT VENTRICLE:  Normal size (3.8 - cm)    AORTIC VALVE:  The aortic valve is  trileaflet in morphology.  No calcifications.    MITRAL VALVE:  No thickening/calcification.    THORACIC AORTA:  There is an ascending and transverse aorta replacement. The native  aorta root is aneurysmal measuring 4 x 4 x 3.9 cm. The ascending  thoracic aortic graft starts approximately 2.7 cm from the annulus.  There is no evidence of anastomotic pseudoaneurysm or leak although  delayed images were not obtained..  There is no acute aortic pathology, such as dissection, intramural  hematoma, or contained rupture.  The aortic arch is not included on this examination.    PERICARDIUM:  There is no pericardial effusion of thickening.    CHEST:  Prior median sternotomy with sternal wires.  No significant lymphadenopathy or mass is seen in limited images of  the mediastinum. 2.4 mm non-calcified pulmonary nodule in the left  middle lobe (Image 88 of 222).  Limited imaging through  the lungs reveals no gross abnormalities.  No pleural effusion or pneumothorax.    UPPER ABDOMEN:  Limited imaging through the upper abdomen reveals no abnormalities of  the visualized organs. There remains cysts in the liver, larges 1.5 x  1.1 cm. These are noted on the prior study performed on 08/27/2021    Impression  1. There is 3 vessel coronary artery atherosclerotic disease  (LAD  70%, RCA <25%, Circ <25%).  2. Focal calcific plaque in the mid LAD renders segment of the  underlying lumen difficult. This study has been sent to [facility]  for further evaluation.  3. S/p ascending and transverse aorta replacement. The root is native  and the graft starts approximate 2.7cm from the annulus. Unclear if  there is a pseudoleak within the graft, as no delayed imaging was  performed.  4. Left atrial enlargement.  5. Thoracic aortic atherosclerosis.    Cardiac MRI:   MR cardiac morphology and function w and wo IV contrast 12/11/2024    FINDINGS:  CARDIAC CHAMBERS:  Normal atrioventricular and ventriculoarterial concordance    LEFT ATRIUM:  Severely dilated (JOHN - 69.30 ml/m2).    RIGHT ATRIUM:  Mildly dilated (RA area max 4ch - 20.28 cm2)    INTERATRIAL SEPTUM:  Intact.    LEFT VENTRICLE:  1. Normal LV size (EDVi 103 ml/m2) and normal systolic function.  Quantitative LVEF 64 %.  2. No regional wall motion abnormalities.  3. Increased LV wall thickness and increased LV indexed mass .  4. Normal native T2 values (<55ms) and normal T2 STIR imaging.  5. Normal ECV 27 %.  6. No evidence of LV thrombus.  7. Delayed-enhancement imaging reveals mild basal-mid septum  enhancement, nonischemic scar.    Quantitative left ventricular functional values are as follows:  EDV = 208.91 cc; EDVi = 103.06 cc/m2  ESV = 75.83 cc; ESVi = 37.41 cc/m2  Absolute Cardiac Output = 7.79 l/min.; COi = 3.84 l/min/m2  LV mass = 141.35 gm; LVMi = 69.73 gm/m2  Stroke volume = 133.08 cc; SVi = 65.65 cc/m2  LVEF = 64 %    Myocardial T2* - 53  ms.      LV septal wall thickness (anterobasal): 7.5 cm  LV postero-inferior wall thickness: 7.4 cm  LVEDD: 5.5 cm  LVESD: 3.5 cm        RIGHT VENTRICLE:  1. Normal RV size (EDVi 76 ml/m2) and normal systolic function.  Quantitative RVEF 50 %.  2. No regional wall motion abnormalities.  3. No abnormal delayed enhancement in the myocardium.    Quantitative right ventricular functional values are as follows:  RVEDV = 153.98 ml; RVEDVi = 75.96 ml/m2  RVESV = 77.00 ml; RVESVi = 37.99 ml/m2  RVSV = 76.98 ml; RVSVi = 37.98 ml/m2  RVEF = 50.00 %  RVCO = 4.51 l/min; RVCI = 2.22 l/min/m2    INTERVENTRICULAR SEPTUM:  Intact.    AORTIC VALVE:  The aortic valve is trileaflet.  There is quantitatively mild aortic regurgitation.  Flow quantification through the ascending aorta:  Forward volume = 72.59 cc/beat  Reverse volume = -10.18 cc/beat  Net forward volume = 62.41 cc/beat  Aortic regurgitant fraction = 14 %    MITRAL VALVE:  Mild thickened mitral valve.  There is severe mitral regurgitation. An eccentric mitral  regurgitation jet is noted hugging the posterior aspect of the  anterior mitral leaflet. Integrating LV volumetric and aortic flow  quantification data reveals: Quantitative mitral regurgitant volume =  70.67 cc/beat Quantitative mitral regurgitant fraction = 53 %    TRICUSPID VALVE:  There is qualitatively trivial tricuspid regurgitation.    PULMONARY VALVE:  Not assessed.    PERICARDIUM:  The pericardium is normal.  There is no pericardial effusion.    THORACIC AORTA:  The thoracic aortic root is prosthetic. The aortic root is aneurysmal  in size and measures 4.1 cm. There is no evidence for acute aortic  pathology. The arch vessel branching pattern is  normal.   All the  arch branch vessels appear widely patent in their proximal portions.    AORTIC ROOT DIMENSIONS:  Annulus: 3 cm  Aneurysm of the aortic root(sinus of valsalva): 4.1 cm  Sinotubular junction: 3.6 cm    PULMONARY ARTERIES:  The central pulmonary  arteries appear normal (MPA-2.9 cm, RPA-2.4 cm,  LPA-2.3 cm).    SYSTEMIC AND PULMONARY VEINS:  Normal systemic venous and pulmonary venous return.  The SVC is of normal caliber. IVC appears normal  Normal pulmonary venous anatomy.    CHEST:  Sternal wires are noted.  Limited imaging through the lungs reveals no gross abnormalities.  Large right-sided pleural effusion. Small left-sided pleural effusion.    UPPER ABDOMEN:  Limited imaging through the upper abdomen reveals no abnormalities of  the visualized organs.    Impression  1. No evidence of HCM. Mild concentric left ventricular hypertrophy  with increased LV indexed mass. No evidence of DAINA or LVOTO Normal LV  size and systolic function. Quantitative LVEF 64 %.  2. Severe mitral regurgitation with anteriorly directed velocity jet  (RF 53%) that is eccentric and hugging the posterior aspect of the  anterior mitral leaflet. Mildly thickened mitral valve.  3. Severe left atrial dilatation. Mild right atrial dilatation.  4. 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 Status post  ascending thoracic aortic replacement. Tri-leaflet aortic valve with  mild aortic regurgitation.  5. No evidence of myocardial inflammation/edema on T2-weighted  imaging (T2 mapping, STIR)  6. Mild basal-mid midwall septum enhancement. Nonischemic scar  7. Normal RV size and systolic function. Quantitative RVEF50%.  8. Bilateral pleural effusions, right-greater than-left       Assessment/Plan  Assessment/Plan   Assessment & Plan  Coronary artery disease involving native coronary artery of native heart without angina pectoris    Mitral regurgitation    Mitral valve disorder      severe mitral regurgitation  -C with Dr. Choi on 2/17/25  -asa prior to procedure       NP discussed with Dr. Choi regarding plan of care/ discharge plan      I spent 30 minutes in the professional and overall care of this patient.      Silas Rodriguez, APRN-CNP, DNP

## 2025-02-17 NOTE — NURSING NOTE
Home going instructions specific to procedure given. Easily arousable; responding appropriately. Vs +/- 20% of pre procedure status. Significant complications are absent. Ambulates without dizziness/age appropriate activity, pulse ox above or equal to 92% on room air/ordered oxygen treatment. Care Plan Complete. Discharge to home accompanied by (Wife). Discharged via wheelchair.

## 2025-02-27 ENCOUNTER — APPOINTMENT (OUTPATIENT)
Dept: PREADMISSION TESTING | Facility: HOSPITAL | Age: 70
End: 2025-02-27
Payer: MEDICARE

## 2025-02-28 ENCOUNTER — APPOINTMENT (OUTPATIENT)
Dept: PREADMISSION TESTING | Facility: HOSPITAL | Age: 70
End: 2025-02-28
Payer: MEDICARE

## 2025-02-28 ENCOUNTER — HOSPITAL ENCOUNTER (EMERGENCY)
Facility: HOSPITAL | Age: 70
Discharge: HOME | End: 2025-02-28
Attending: EMERGENCY MEDICINE
Payer: MEDICARE

## 2025-02-28 ENCOUNTER — APPOINTMENT (OUTPATIENT)
Dept: RADIOLOGY | Facility: HOSPITAL | Age: 70
End: 2025-02-28
Payer: MEDICARE

## 2025-02-28 VITALS
BODY MASS INDEX: 28.79 KG/M2 | SYSTOLIC BLOOD PRESSURE: 144 MMHG | DIASTOLIC BLOOD PRESSURE: 53 MMHG | OXYGEN SATURATION: 97 % | TEMPERATURE: 97.6 F | WEIGHT: 190 LBS | RESPIRATION RATE: 17 BRPM | HEIGHT: 68 IN | HEART RATE: 67 BPM

## 2025-02-28 DIAGNOSIS — S52.101A CLOSED FRACTURE OF PROXIMAL END OF RIGHT RADIUS AND ULNA, INITIAL ENCOUNTER: ICD-10-CM

## 2025-02-28 DIAGNOSIS — S52.001A CLOSED FRACTURE OF PROXIMAL END OF RIGHT RADIUS AND ULNA, INITIAL ENCOUNTER: ICD-10-CM

## 2025-02-28 DIAGNOSIS — S52.531A CLOSED COLLES' FRACTURE OF RIGHT RADIUS, INITIAL ENCOUNTER: Primary | ICD-10-CM

## 2025-02-28 PROCEDURE — 25605 CLTX DST RDL FX/EPHYS SEP W/: CPT | Mod: RT

## 2025-02-28 PROCEDURE — 73090 X-RAY EXAM OF FOREARM: CPT | Mod: RT

## 2025-02-28 PROCEDURE — 73090 X-RAY EXAM OF FOREARM: CPT | Mod: RIGHT SIDE | Performed by: RADIOLOGY

## 2025-02-28 PROCEDURE — 73130 X-RAY EXAM OF HAND: CPT | Mod: RIGHT SIDE | Performed by: STUDENT IN AN ORGANIZED HEALTH CARE EDUCATION/TRAINING PROGRAM

## 2025-02-28 PROCEDURE — 2500000004 HC RX 250 GENERAL PHARMACY W/ HCPCS (ALT 636 FOR OP/ED): Performed by: PHYSICIAN ASSISTANT

## 2025-02-28 PROCEDURE — 73110 X-RAY EXAM OF WRIST: CPT | Mod: RT

## 2025-02-28 PROCEDURE — 73110 X-RAY EXAM OF WRIST: CPT | Mod: RIGHT SIDE | Performed by: STUDENT IN AN ORGANIZED HEALTH CARE EDUCATION/TRAINING PROGRAM

## 2025-02-28 PROCEDURE — 73130 X-RAY EXAM OF HAND: CPT | Mod: RT

## 2025-02-28 PROCEDURE — 99284 EMERGENCY DEPT VISIT MOD MDM: CPT | Performed by: EMERGENCY MEDICINE

## 2025-02-28 PROCEDURE — 2500000001 HC RX 250 WO HCPCS SELF ADMINISTERED DRUGS (ALT 637 FOR MEDICARE OP): Performed by: PHYSICIAN ASSISTANT

## 2025-02-28 PROCEDURE — 29125 APPL SHORT ARM SPLINT STATIC: CPT | Performed by: PHYSICIAN ASSISTANT

## 2025-02-28 RX ORDER — OXYCODONE AND ACETAMINOPHEN 5; 325 MG/1; MG/1
1 TABLET ORAL ONCE
Status: COMPLETED | OUTPATIENT
Start: 2025-02-28 | End: 2025-02-28

## 2025-02-28 RX ORDER — OXYCODONE HYDROCHLORIDE 5 MG/1
5 TABLET ORAL EVERY 6 HOURS PRN
Qty: 15 TABLET | Refills: 0 | Status: SHIPPED | OUTPATIENT
Start: 2025-02-28 | End: 2025-03-14 | Stop reason: HOSPADM

## 2025-02-28 RX ORDER — BUPIVACAINE HYDROCHLORIDE 5 MG/ML
10 INJECTION, SOLUTION PERINEURAL ONCE
Status: COMPLETED | OUTPATIENT
Start: 2025-02-28 | End: 2025-02-28

## 2025-02-28 RX ORDER — LIDOCAINE HYDROCHLORIDE 10 MG/ML
10 INJECTION, SOLUTION INFILTRATION; PERINEURAL ONCE
Status: COMPLETED | OUTPATIENT
Start: 2025-02-28 | End: 2025-02-28

## 2025-02-28 RX ADMIN — OXYCODONE HYDROCHLORIDE AND ACETAMINOPHEN 1 TABLET: 5; 325 TABLET ORAL at 09:20

## 2025-02-28 RX ADMIN — BUPIVACAINE HYDROCHLORIDE 50 MG: 5 INJECTION, SOLUTION PERINEURAL at 12:35

## 2025-02-28 RX ADMIN — LIDOCAINE HYDROCHLORIDE 10 ML: 10 INJECTION, SOLUTION INFILTRATION; PERINEURAL at 12:35

## 2025-02-28 ASSESSMENT — PAIN DESCRIPTION - LOCATION
LOCATION: WRIST
LOCATION: WRIST

## 2025-02-28 ASSESSMENT — PAIN SCALES - GENERAL
PAINLEVEL_OUTOF10: 0 - NO PAIN
PAINLEVEL_OUTOF10: 6
PAINLEVEL_OUTOF10: 7

## 2025-02-28 ASSESSMENT — PAIN - FUNCTIONAL ASSESSMENT: PAIN_FUNCTIONAL_ASSESSMENT: 0-10

## 2025-02-28 ASSESSMENT — PAIN DESCRIPTION - ORIENTATION
ORIENTATION: RIGHT
ORIENTATION: RIGHT

## 2025-02-28 NOTE — CPM/PAT H&P
CPM/PAT Evaluation       Name: Jake Brunner (Jake Brunner)  /Age: 1955/70 y.o.     { PAT Visit Type:62075}      Chief Complaint: ***    HPI    Past Medical History:   Diagnosis Date    Alcohol abuse, in remission     History of alcohol abuse    Anemia 2024    Cataract     Closed displaced comminuted fracture of shaft of right tibia 2019    Closed displaced fracture of body of right scapula 2019    Closed displaced fracture of shaft of right clavicle 2019    Coronary artery disease     Dissection of thoracic aorta, unspecified (Multi) 2025    type A aortic dissection status post replacement of his ascending aorta    Essential (primary) hypertension     Hypertension    Heart valve disease     Severe mitral valve regurgitation.    Hepatic steatosis     History of thoracic aortic aneurysm repair 2023    Hyperlipidemia     Palpitations 2024    Pleural effusion 2024    Postoperative atrial fibrillation (Multi)     Prostate cancer (Multi) 2024    Node-positive prostate cancer, [id number] (PSMA PET+ left int iliac node), [place] 4+4=8, iPSA 63.47.    Shortness of breath     Sleep apnea     Uses CPAP    Traumatic subdural hematoma (Multi)     Vision loss     Wears contacts       Past Surgical History:   Procedure Laterality Date    ANKLE SURGERY Left     Ankle Surgery    ARTERIAL ANEURYSM REPAIR  2015    Aortic Aneurysm Repair Ascending Aorta    CARDIAC CATHETERIZATION N/A 2023    Procedure: Left And Right Heart Cath, With LV;  Surgeon: Owen Choi MD;  Location: [facility] Cardiac [facility];  Service: Cardiovascular;  Laterality: N/A;  Scheduled  @ 9:00am, EULA w/ anesthesia @ 7:30am    CARDIAC CATHETERIZATION N/A 2025    Procedure: Left & Right Heart Cath w Angiography & LV;  Surgeon: Owen Choi MD;  Location: [facility] Cardiac [facility];  Service: Cardiovascular;  Laterality: N/A;    CATARACT EXTRACTION Right 2024    CATARACT  EXTRACTION Left 04/25/2024    COLONOSCOPY      CT CHEST W AND WO IV CONTRAST  10/18/2023    Thoracic aortic atherosclerosis.    FEMUR FRACTURE SURGERY  01/27/2016    Femur Repair    LIPOMA RESECTION  10/28/2022    back    MITRAL VALVE REPAIR      OTHER SURGICAL HISTORY  01/27/2016    Wrist Surgery       Patient  reports that he is not currently sexually active.    Family History   Problem Relation Name Age of Onset    Hyperlipidemia Mother      Coronary artery disease Father      Other (MYOCARDIAL INFARCTION) Father      Heart attack Father         No Known Allergies    Prior to Admission medications    Medication Sig Start Date End Date Taking? Authorizing Provider   aspirin 81 mg EC tablet Take 1 tablet (81 mg) by mouth once daily.    Historical Provider, MD   benzonatate (Tessalon) 200 mg capsule Take 1 capsule (200 mg) by mouth 3 times a day as needed for cough. 9/11/24   Neo Hanna MD   ezetimibe (Zetia) 10 mg tablet Take 1 tablet (10 mg) by mouth once daily. 9/11/24   Neo Hanna MD   furosemide (Lasix) 40 mg tablet Take 1 tablet (40 mg) by mouth once daily. 1/31/25 1/31/26  Diana Alcantar APRN-CNP   metoprolol tartrate (Lopressor) 50 mg tablet Take 1 tablet by mouth 2 times a day. 9/18/24 9/18/25  Owen Choi MD   multivitamin tablet Take 1 tablet by mouth once daily.    Historical Provider, MD   rosuvastatin (Crestor) 40 mg tablet TAKE ONE TABLET BY MOUTH ONCE DAILY 9/6/24   Neo Hanna MD   tadalafil (Cialis) 20 mg tablet Take 1 tablet (20 mg) by mouth if needed for erectile dysfunction (Do not exceed 20mg.). 10/23/24 11/27/24  JACOBO Agrawal-CNP        PAT ROS     PAT Physical Exam     Airway    Testing/Diagnostic:   Left & Right heart cath 2/17/25  CONCLUSIONS:   1. Moderate 2 vessel CAD in a right dominant system.   2. Elevated left and right heart filling pressures.   3. Moderate pulmonary hypertension.   4. Preserved cardiac index.   5. No evidence of intracardiac shunt.    6. No evidence of aortic stenosis.    Echocardiogram 11/27/24  CONCLUSIONS:   1. Left ventricular ejection fraction is hyperdynamic, by visual estimate at 70-75%.   2. Spectral Doppler shows a Grade II (pseudonormal pattern) of left ventricular diastolic filling with an elevated left atrial pressure.   3. There is moderately increased posterior left ventricular wall thickness.   4. There is normal right ventricular global systolic function.   5. The left atrium is mild to moderately dilated.   6. Mild to moderate mitral valve regurgitation.   7. Mild aortic valve regurgitation.     ECG 10/9/24      Pulm stress test (6 min walk)      XR Chest 10/9/24  IMPRESSION:  1.  More prominent opacification of the right base may be due to a  combination of consolidation, volume loss and pleural effusion.  2. Left basilar atelectasis versus small infiltrate. Small left  effusion not excluded.  3. Mild interstitial prominence may be chronic or represent a  component of interstitial edema.      CT Chest 10/25/24  IMPRESSION:  1.  No evidence of interstitial lung disease. Specifically no  evidence of subpleural reticulation, bronchiectasis or  bronchiolectasis. No air trapping in the expiratory phase images.  2. Areas of architectural distortion and scarring in bilateral upper  lobes, stable since 2022.  3. Moderate right and small left pleural effusion, slightly decreased  in size compared to recent prior from 09/26/2024.  4. Postsurgical changes of median sternotomy and ascending aorta  replacement. Severe atherosclerotic calcification of the thoracic  aorta. Mild calcification of the coronary artery.  5.  Additional findings as described above.        MR Cardiac morph and funct 12/11/24  IMPRESSION:  1. No evidence of HCM. Mild concentric left ventricular hypertrophy  with increased LV indexed mass. No evidence of DAINA or LVOTO Normal LV  size and systolic function. Quantitative LVEF 64 %.  2. Severe mitral regurgitation with  anteriorly directed velocity jet  (RF 53%) that is eccentric and hugging the posterior aspect of the  anterior mitral leaflet. Mildly thickened mitral valve.  3. Severe left atrial dilatation. Mild right atrial dilatation.  4. 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 Status post  ascending thoracic aortic replacement. Tri-leaflet aortic valve with  mild aortic regurgitation.  5. No evidence of myocardial inflammation/edema on T2-weighted  imaging (T2 mapping, STIR)  6. Mild basal-mid midwall septum enhancement. Nonischemic scar  7. Normal RV size and systolic function. Quantitative RVEF50%.  8. Bilateral pleural effusions, right-greater than-left      Stress test 2/26/24  Summary:   1. No clinical or electrocardiographic evidence for ischemia at a submaximal workload.   2. Submaximal level of stress achieved.    Holter 2/8/24        Patient Specialist/PCP:   Data only, no medication, providers, or history verified. Information updated based solely on chart review.      PMHX - severe mitral regurgitation, here for [facility]. PMH includes type A aortic dissection status post replacement of his ascending aorta, hypertension, hyperlipidemia, postoperative atrial fibrillation, mitral regurgitation s/p mitral valve repair, coronary atherosclerosis on coronary angiography, hepatic steatosis, alcohol abuse, and remote traumatic subdural hematoma.     CPM/PAT  1/12/24 - JACOBO Trejo-CNP     PCP  9/11/24 - Neo Hanna MD      Cardiovascular  11/27/24 - Diana Alcantar APRN-CNP w/ Owen Choi MD   Type A aortic dissection s/p replacement of ascending aorta, hypertension, hyperlipidemia, postop atrial fibrillation, mitral regurgitation s/p mitral valve repair, coronary artery disease, recurrent mitral regurgitation     Cardiac Surgery  1/10/25 - Amarjit Arboleda MD   Discussion of surgical treatment options for recurrent mitral regurgitation. Recommend redo median sternotomy, and replacement of mitral valve  with tissue valve.     Radiation Oncology  12/11/24 - kadie Buckner APRN-CNP   Node-positive prostate cancer, [id number] (PSMA PET+ left int iliac node), [place] 4+4=8, iPSA 63.47. Treated with definitive treatment on the ASCLEPIUS trial: SBRT with 6 months ADT/abiraterone/niraparib.   Completed treatment to a total dose of 4000 cGy in 5 fractions to his gross disease in his prostate and lymph nodes, 3750 cGy in 5 fractions to his prostate, and 2500 cGy to his pelvic lymph nodes    Heme referral for anemia     Hem/Onc  7/6/22 - Colby Partida MD   FU s/p node-positive high risk prostate adenocarcinoma treatment. Continue PSA surveillance.    Pulmonary  Consult 9/25/24 - Jake Wilkinson MD   Shortness of breath, elevated D-dimer, moderate to large pleural effusion. 1400 cc of fluid removed off with thoracentesis.     Visit Vitals  Smoking Status Former       DASI Risk Score      Flowsheet Row Clinical Support from 2/19/2024 in [facility] Pre-Admission Testing from 1/12/2024 in [facility]   Can you take care of yourself (eat, dress, bathe, or use toilet)?  2.75 filed at 02/19/2024 1434 2.75 filed at 01/12/2024 1518   Can you walk indoors, such as around your house? 1.75 filed at 02/19/2024 1434 1.75 filed at 01/12/2024 1518   Can you walk a block or two on level ground?  2.75 filed at 02/19/2024 1434 2.75 filed at 01/12/2024 1518   Can you climb a flight of stairs or walk up a hill? 5.5 filed at 02/19/2024 1434 5.5 filed at 01/12/2024 1518   Can you run a short distance? 0 filed at 02/19/2024 1434 8 filed at 01/12/2024 1518   Can you do light work around the house like dusting or washing dishes? 2.7 filed at 02/19/2024 1434 2.7 filed at 01/12/2024 1518   Can you do moderate work around the house like vacuuming, sweeping floors or carrying groceries? 3.5 filed at 02/19/2024 1434 3.5 filed at 01/12/2024 1518   Can you do heavy work around the house like scrubbing floors or  lifting and moving heavy furniture?  0 filed at 02/19/2024 1434 8 filed at 01/12/2024 1518   Can you do yard work like raking leaves, weeding or pushing a mower? 0 filed at 02/19/2024 1434 4.5 filed at 01/12/2024 1518   Can you have sexual relations? 5.25 filed at 02/19/2024 1434 5.25 filed at 01/12/2024 1518   Can you participate in moderate recreational activities like golf, bowling, dancing, doubles tennis or throwing a baseball or football? 0 filed at 02/19/2024 1434 6 filed at 01/12/2024 1518   Can you participate in strenous sports like swimming, singles tennis, football, basketball, or skiing? 0 filed at 02/19/2024 1434 7.5 filed at 01/12/2024 1518   DASI SCORE 24.2 filed at 02/19/2024 1434 58.2 filed at 01/12/2024 1518   METS Score (Will be calculated only when all the questions are answered) 5.7 filed at 02/19/2024 1434 9.9 filed at 01/12/2024 1518          Caprini DVT Assessment      Flowsheet Row [facility] Amarjit [facility]bol[facility] Pre-Admission Testing from 1/12/2024 in [facility]   DVT Score (IF A SCORE IS NOT CALCULATING, MUST SELECT A BMI TO COMPLETE) 8 filed at 01/24/2024 0729 13 filed at 01/12/2024 1539   BMI (BMI MUST BE CHOSEN) 31-40 (Obesity) filed at 01/24/2024 0729 31-40 (Obesity) filed at 01/12/2024 1539   RETIRED: Current Status Central venous access filed at 01/24/2024 0729 Major surgery planned, lasting over 3 hours filed at 01/12/2024 1539   RETIRED: History Prior major surgery filed at 01/24/2024 0729 Prior major surgery, Previous malignancy filed at 01/12/2024 1539   RETIRED: Age 60-75 years filed at 01/24/2024 0729 60-75 years filed at 01/12/2024 1539          Modified Frailty Index      Flowsheet Row Pre-Admission Testing from 1/12/2024 in [facility]   Non-independent functional status (problems with dressing, bathing, personal grooming, or cooking) 0 filed at 01/12/2024 1530    History of diabetes mellitus  0 filed at 01/12/2024 1539   History of COPD 0 filed at 01/12/2024 1539   History of CHF No filed at 01/12/2024 1539   History of Percutaneous Coronary Intervention, Cardiac Surgery, or Angina No filed at 01/12/2024 1539   Hypertension requiring the use of medication  0.0909 filed at 01/12/2024 1539   Peripheral vascular disease 0 filed at 01/12/2024 1539   Impaired sensorium (cognitive impairement or loss, clouding, or delirium) 0 filed at 01/12/2024 1539   TIA or CVA withouy residual deficit 0 filed at 01/12/2024 1539   Cerebrovascular accident with deficit 0 filed at 01/12/2024 1539          [id number] Stroke Risk Points  Current as of just now        3 0 to 9 Points:      Last Change:           The [id number] risk score (Geremias YEAGER, et al. 2009. © 2010 American [employer]) quantifies the risk of stroke for a patient with atrial fibrillation. For patients without atrial fibrillation or under the age of 18 this score appears as N/A. Higher score values generally indicate higher risk of stroke.          Points Metrics   1 Has Congestive Heart Failure:  Yes     Patients with congestive heart failure get 1 point.    Current as of just now   1 Has Hypertension:  Yes     Patients with hypertension get 1 point.    Current as of just now   1 Age:  70     Patients 65 to 74 years old get 1 point, or patients 75 years and older get 2 points.    Current as of just now   0 Has Diabetes:  No     Patients with diabetes get 1 point.    Current as of just now   0 Had Stroke:  No  Had TIA:  No  Had Thromboembolism:  No     Patients who have had a stroke, TIA, or thromboembolism get 2 points.    Current as of just now   0 Has Vascular Disease:  No     Patients with vascular disease get 1 point.    Current as of just now   0 Clinically Relevant Sex:  Male     Patients with a clinically relevant sex of Female get 1 point.    Current as of just now             Revised Cardiac Risk Index       Flowsheet Row Pre-Admission Testing from 1/12/2024 in [facility]   High-Risk Surgery (Intraperitoneal, Intrathoracic,Suprainguinal vascular) 1 filed at 01/12/2024 1539   History of ischemic heart disease (History of MI, History of positive exercuse test, Current chest paint considered due to myocardial ischemia, Use of nitrate therapy, ECG with pathological Q Waves) 1 filed at 01/12/2024 1539   History of congestive heart failure (pulmonary edemia, bilateral rales or S3 gallop, Paroxysmal nocturnal dyspnea, CXR showing pulmonary vascular redistribution) 0 filed at 01/12/2024 1539   History of cerebrovascular disease (Prior TIA or stroke) 0 filed at 01/12/2024 1539   Pre-operative insulin treatment 0 filed at 01/12/2024 1539   Pre-operative creatinine>2 mg/dl 0 filed at 01/12/2024 1539   Revised Cardiac Risk Calculator 2 filed at 01/12/2024 1539          Apfel Simplified Score      Flowsheet Row Pre-Admission Testing from 1/12/2024 in [facility]   Smoking status 1 filed at 01/12/2024 1539   History of motion sickness or PONV  0 filed at 01/12/2024 1539   Use of postoperative opioids 1 filed at 01/12/2024 1539   Apfel Simplified Score Calculator 2 filed at 01/12/2024 1539          Risk Analysis Index Results This Encounter    No data found in the last 10 encounters.       Stop Bang Score      Flowsheet Row Pre-Admission Testing from 1/12/2024 in [facility]   Do you snore loudly? 1 filed at 01/12/2024 1517   Do you often feel tired or fatigued after your sleep? 0 filed at 01/12/2024 1517   Has anyone ever observed you stop breathing in your sleep? 0 filed at 01/12/2024 1517   Do you have or are you being treated for high blood pressure? 1 filed at 01/12/2024 1517   Recent BMI (Calculated) 32.9 filed at 01/12/2024 1517   Is BMI greater than 35 kg/m2? 0=No filed at 01/12/2024 1517   Age older than 50 years old? 1=Yes filed at 01/12/2024 1517   Is your neck  circumference greater than 17 inches (Male) or 16 inches (Female)? 0 filed at 01/12/2024 1517   Gender - Male 1=Yes filed at 01/12/2024 1517   STOP-BANG Total Score 4 filed at 01/12/2024 1517          Prodigy: High Risk  Total Score: 20              Prodigy Age Score      Prodigy Gender Score          ARISCAT Score for Postoperative Pulmonary Complications    No data to display       Monserrat Perioperative Risk for Myocardial Infarction or Cardiac Arrest (COOPER)    No data to display         Assessment and Plan:     {[facility] EMBEDDED ASSESSMENT AND PLAN:09289}

## 2025-02-28 NOTE — ED TRIAGE NOTES
PT is A/Ox4 coming in for a fall/wrist injury that occurred this morning. PT stated that he was walking his dog ad slipped on the ice . The right wrist is swollen and bruised. Pt is having a hard time moving the wrist and hand.

## 2025-03-01 NOTE — ED PROVIDER NOTES
HPI   Chief Complaint   Patient presents with    Wrist Injury       Is a pleasant 70-year-old male who had a mechanical fall landing on his right hand and wrist.  His pain and deformity of the right wrist he also has pain up to the elbow region.  Denies other complaints nothing makes them better or worse.  Denies other injuries.              Patient History   Past Medical History:   Diagnosis Date    Alcohol abuse, in remission     History of alcohol abuse    Anemia 02/07/2024    Cataract     Closed displaced comminuted fracture of shaft of right tibia 07/31/2019    Closed displaced fracture of body of right scapula 07/31/2019    Closed displaced fracture of shaft of right clavicle 07/31/2019    Coronary artery disease     Dissection of thoracic aorta, unspecified (Multi) 01/07/2025    type A aortic dissection status post replacement of his ascending aorta    Essential (primary) hypertension     Hypertension    Heart valve disease     Severe mitral valve regurgitation.    Hepatic steatosis     History of thoracic aortic aneurysm repair 01/22/2023    Hyperlipidemia     Palpitations 02/07/2024    Pleural effusion 09/25/2024    Postoperative atrial fibrillation (Multi)     Prostate cancer (Multi) 01/09/2024    Node-positive prostate cancer, [id number] (PSMA PET+ left int iliac node), [place] 4+4=8, iPSA 63.47.    Shortness of breath     Sleep apnea     Uses CPAP    Traumatic subdural hematoma (Multi)     Vision loss     Wears contacts     Past Surgical History:   Procedure Laterality Date    ANKLE SURGERY Left     Ankle Surgery    ARTERIAL ANEURYSM REPAIR  12/2015    Aortic Aneurysm Repair Ascending Aorta    CARDIAC CATHETERIZATION N/A 12/11/2023    Procedure: Left And Right Heart Cath, With LV;  Surgeon: Owen Choi MD;  Location: [facility] Cardiac Cath Lab;  Service: Cardiovascular;  Laterality: N/A;  Scheduled 12/11 @ 9:00am, EULA w/ anesthesia @ 7:30am    CARDIAC CATHETERIZATION N/A 02/17/2025    Procedure: Left & Right  Heart Cath w Angiography & LV;  Surgeon: Owen Choi MD;  Location: [facility] Cardiac Cath Lab;  Service: Cardiovascular;  Laterality: N/A;    CATARACT EXTRACTION Right 2024    CATARACT EXTRACTION Left 2024    COLONOSCOPY      CT CHEST W AND WO IV CONTRAST  10/18/2023    Thoracic aortic atherosclerosis.    FEMUR FRACTURE SURGERY  2016    Femur Repair    LIPOMA RESECTION  10/28/2022    back    MITRAL VALVE REPAIR      OTHER SURGICAL HISTORY  2016    Wrist Surgery     Family History   Problem Relation Name Age of Onset    Hyperlipidemia Mother      Coronary artery disease Father      Other (MYOCARDIAL INFARCTION) Father      Heart attack Father       Social History     Tobacco Use    Smoking status: Former     Current packs/day: 0.00     Types: Cigarettes     Quit date: 1975     Years since quittin.1    Smokeless tobacco: Never   Vaping Use    Vaping status: Never Used   Substance Use Topics    Alcohol use: Not Currently    Drug use: Never       Physical Exam   ED Triage Vitals [25 0816]   Temperature Heart Rate Respirations BP   36.4 °C (97.6 °F) 67 17 144/53      Pulse Ox Temp src Heart Rate Source Patient Position   97 % -- -- --      BP Location FiO2 (%)     -- --       Physical Exam  Vitals reviewed.   Constitutional:       General: He is not in acute distress.     Appearance: Normal appearance. He is normal weight. He is not ill-appearing, toxic-appearing or diaphoretic.   HENT:      Head: Normocephalic and atraumatic.      Right Ear: External ear normal.      Left Ear: External ear normal.      Nose: Nose normal.   Eyes:      Extraocular Movements: Extraocular movements intact.      Conjunctiva/sclera: Conjunctivae normal.      Pupils: Pupils are equal, round, and reactive to light.   Cardiovascular:      Rate and Rhythm: Normal rate and regular rhythm.   Pulmonary:      Effort: Pulmonary effort is normal. No respiratory distress.      Breath sounds: No stridor.   Abdominal:       General: There is no distension.   Musculoskeletal:         General: Tenderness, deformity and signs of injury present. No swelling.      Right wrist: Deformity, tenderness and bony tenderness present. No crepitus. Normal pulse.      Cervical back: Normal range of motion.   Skin:     Capillary Refill: Capillary refill takes less than 2 seconds.      Findings: No rash.   Neurological:      General: No focal deficit present.      Mental Status: He is alert and oriented to person, place, and time. Mental status is at baseline.   Psychiatric:         Mood and Affect: Mood normal.         Behavior: Behavior normal.         Thought Content: Thought content normal.         Judgment: Judgment normal.           ED Course & MDM   Diagnoses as of 03/01/25 1151   Closed Colles' fracture of right radius, initial encounter   Closed fracture of proximal end of right radius and ulna, initial encounter                 No data recorded     [place] Coma Scale Score: 15 (02/28/25 0820 : Kady Cuadra RN)                           Medical Decision Making  Ddx: fracture, dislocation.     Reduction attempted there appears to be some improvement however radiology comments no change    Referred to hand for follow-up.    Amount and/or Complexity of Data Reviewed  Radiology: independent interpretation performed.     Details: + distal radius fracture and ulna fracture.   Discussion of management or test interpretation with external provider(s): Discussed with ortho resident, recommends attempt reduction and dc.     Risk  Prescription drug management.        Procedure  Orthopaedic Injury Treatment - Fracture    Performed by: Tom Sears PA-C  Authorized by: Mikayla Jacob MD    Consent:     Consent obtained:  Verbal and written    Consent given by:  Patient    Risks, benefits, and alternatives were discussed: yes      Risks discussed:  Nerve damage, pain and vascular damage    Alternatives discussed:  Alternative treatment,  referral, no treatment, delayed treatment and observation  Universal protocol:     Procedure explained and questions answered to patient or proxy's satisfaction: yes      Imaging studies available: yes      Immediately prior to procedure, a time out was called: yes      Patient identity confirmed:  Verbally with patient  Injury:     Injury location:  Wrist    Wrist injury location:  R wrist  Pre-procedure details:     Distal neurologic exam:  Normal    Distal perfusion: distal pulses strong      Range of motion: reduced    Anesthesia:     Anesthesia method:  Nerve block    Block needle gauge:  25 G    Block anesthetic:  Lidocaine 1% w/o epi and bupivacaine 0.5% w/o epi    Block technique:  Hematoma block    Block injection procedure:  Anatomic landmarks identified and negative aspiration for blood    Block outcome:  Anesthesia achieved  Procedure details:     Manipulation performed: yes      Skin traction used: yes      Reduction successful: appears to have better alignment.      X-ray confirmed reduction: no      Immobilization:  Brace and splint    Splint type:  Sugar tong    Supplies used:  Cotton padding, fiberglass, elastic bandage and sling    Attestation: Splint applied and adjusted personally by me    Post-procedure details:     Distal neurologic exam:  Normal    Distal perfusion: distal pulses strong      Range of motion: not applicable      Procedure completion:  Tolerated       Tom Sears PA-C  03/01/25 1247

## 2025-03-03 ENCOUNTER — OFFICE VISIT (OUTPATIENT)
Dept: ORTHOPEDIC SURGERY | Facility: HOSPITAL | Age: 70
End: 2025-03-03
Payer: MEDICARE

## 2025-03-03 VITALS — HEIGHT: 68 IN | BODY MASS INDEX: 28.79 KG/M2 | WEIGHT: 190 LBS

## 2025-03-03 DIAGNOSIS — S52.001A CLOSED FRACTURE OF PROXIMAL END OF RIGHT RADIUS AND ULNA, INITIAL ENCOUNTER: ICD-10-CM

## 2025-03-03 DIAGNOSIS — S52.101A CLOSED FRACTURE OF PROXIMAL END OF RIGHT RADIUS AND ULNA, INITIAL ENCOUNTER: ICD-10-CM

## 2025-03-03 DIAGNOSIS — S52.531A CLOSED COLLES' FRACTURE OF RIGHT RADIUS, INITIAL ENCOUNTER: ICD-10-CM

## 2025-03-03 PROCEDURE — 99214 OFFICE O/P EST MOD 30 MIN: CPT | Mod: 25 | Performed by: FAMILY MEDICINE

## 2025-03-03 PROCEDURE — 29105 APPLICATION LONG ARM SPLINT: CPT | Performed by: FAMILY MEDICINE

## 2025-03-03 ASSESSMENT — PAIN - FUNCTIONAL ASSESSMENT: PAIN_FUNCTIONAL_ASSESSMENT: 0-10

## 2025-03-03 ASSESSMENT — PAIN SCALES - GENERAL: PAINLEVEL_OUTOF10: 3

## 2025-03-03 NOTE — PROGRESS NOTES
** Please excuse any errors in grammar or translation related to this dictation. Voice recognition software was utilized to prepare this document. **    Assessment & Plan:  Dx: Dorsally angulated distal radius fracture; nondisplaced ulnar styloid fracture.    Given the unsuccessful attempt at reduction and with the continued angulation of the distal radius fracture, recommend patient be evaluated by one of our Ortho hand surgeons to discuss treatment options.  Patient has been scheduled to see Dr. Billingsley tomorrow 3/4/2025.  Patient was placed into a long-arm splint today for continued immobilization.  All questions answered and patient agrees to plan of care.    Chief complaint:  Right wrist fracture     HPI:  70-year-old male presents the Ortho injury clinic with complaint of right wrist fracture.  States that he was walking his dog on 2/28 when he slipped on ice and had a FOOSH injury.  He went to the ER and had x-rays completed.  He reports fractures notified and attempts were made to reduce the fracture which were unsuccessful.  He was placed into a long-arm splint and advised on orthopedic follow-up.  Continues to have ongoing pain and limited motion of his wrist.    Of note patient is scheduled to have open heart surgery in 2 weeks.    Patient Active Problem List   Diagnosis    Coronary artery disease involving native coronary artery of native heart without angina pectoris    Swelling    Hyperlipidemia    Hypertension    Obstructive sleep apnea syndrome    Malignant neoplasm of prostate (Multi)    Unspecified atrial fibrillation (Multi)    Mitral regurgitation    Mitral valve disorder    Status post mitral valve repair    Shortness of breath    Ground glass opacity present on imaging of lung    Iron deficiency anemia due to chronic blood loss     Past Surgical History:   Procedure Laterality Date    ANKLE SURGERY Left     Ankle Surgery    ARTERIAL ANEURYSM REPAIR  12/2015    Aortic Aneurysm Repair Ascending  Aorta    CARDIAC CATHETERIZATION N/A 12/11/2023    Procedure: Left And Right Heart Cath, With LV;  Surgeon: Owen Choi MD;  Location: [facility] Cardiac Cath Lab;  Service: Cardiovascular;  Laterality: N/A;  Scheduled 12/11 @ 9:00am, EULA w/ anesthesia @ 7:30am    CARDIAC CATHETERIZATION N/A 02/17/2025    Procedure: Left & Right Heart Cath w Angiography & LV;  Surgeon: Owen Choi MD;  Location: [facility] Cardiac Cath Lab;  Service: Cardiovascular;  Laterality: N/A;    CATARACT EXTRACTION Right 05/09/2024    CATARACT EXTRACTION Left 04/25/2024    COLONOSCOPY      CT CHEST W AND WO IV CONTRAST  10/18/2023    Thoracic aortic atherosclerosis.    FEMUR FRACTURE SURGERY  01/27/2016    Femur Repair    LIPOMA RESECTION  10/28/2022    back    MITRAL VALVE REPAIR      OTHER SURGICAL HISTORY  01/27/2016    Wrist Surgery     Current Outpatient Medications on File Prior to Visit   Medication Sig Dispense Refill    aspirin 81 mg EC tablet Take 1 tablet (81 mg) by mouth once daily.      benzonatate (Tessalon) 200 mg capsule Take 1 capsule (200 mg) by mouth 3 times a day as needed for cough. 90 capsule 3    ezetimibe (Zetia) 10 mg tablet Take 1 tablet (10 mg) by mouth once daily. 90 tablet 3    furosemide (Lasix) 40 mg tablet Take 1 tablet (40 mg) by mouth once daily. 90 tablet 3    metoprolol tartrate (Lopressor) 50 mg tablet Take 1 tablet by mouth 2 times a day. 180 tablet 3    multivitamin tablet Take 1 tablet by mouth once daily.      oxyCODONE (Roxicodone) 5 mg immediate release tablet Take 1 tablet (5 mg) by mouth every 6 hours if needed for severe pain (7 - 10) for up to 7 days. 15 tablet 0    rosuvastatin (Crestor) 40 mg tablet TAKE ONE TABLET BY MOUTH ONCE DAILY 90 tablet 3    tadalafil (Cialis) 20 mg tablet Take 1 tablet (20 mg) by mouth if needed for erectile dysfunction (Do not exceed 20mg.). 12 tablet 3     No current facility-administered medications on file prior to visit.       Exam:  General Exam:  Constitutional -  NAD, AAO x 3, conversing appropriately.  HEENT- Normocephalic and atraumatic. No facial deformities. Hearing grossly normal.  Lungs - Breathing non-labored with normal rate. No accessory muscle use.  CV - Extremities warm and well-perfused, brisk capillary refill present.   Neuro - CN II-XII grossly intact.    Right wrist examined.  Soft tissue swelling and light bruising throughout distal forearm crossing into hand.  Sensation intact light touch throughout median, ulnar and radial nerve distribution of the hand as well as dorsal and volar surfaces of the distal forearm.  Brisk capillary refill present in all digits.  Normal composite fist.  Supination, flexion extension of the wrist is limited due to pain.    Results:  Xrays of right wrist obtained 2/28/2025 personally interpreted as transverse fracture of the distal radius with dorsal angulation along with nondisplaced ulnar styloid fracture.  Lab Results   Component Value Date    CREATININE 0.82 02/13/2025    EGFR >90 02/13/2025

## 2025-03-04 ENCOUNTER — HOSPITAL ENCOUNTER (OUTPATIENT)
Dept: RADIOLOGY | Facility: HOSPITAL | Age: 70
Discharge: HOME | End: 2025-03-04
Payer: MEDICARE

## 2025-03-04 ENCOUNTER — OFFICE VISIT (OUTPATIENT)
Dept: ORTHOPEDIC SURGERY | Facility: HOSPITAL | Age: 70
End: 2025-03-04
Payer: MEDICARE

## 2025-03-04 DIAGNOSIS — S52.531A CLOSED COLLES' FRACTURE OF RIGHT RADIUS, INITIAL ENCOUNTER: ICD-10-CM

## 2025-03-04 DIAGNOSIS — S52.531A CLOSED COLLES' FRACTURE OF RIGHT RADIUS, INITIAL ENCOUNTER: Primary | ICD-10-CM

## 2025-03-04 PROCEDURE — 99214 OFFICE O/P EST MOD 30 MIN: CPT | Performed by: STUDENT IN AN ORGANIZED HEALTH CARE EDUCATION/TRAINING PROGRAM

## 2025-03-04 PROCEDURE — 73110 X-RAY EXAM OF WRIST: CPT | Mod: RT

## 2025-03-04 RX ORDER — TRAMADOL HYDROCHLORIDE 50 MG/1
50 TABLET ORAL EVERY 6 HOURS PRN
Qty: 20 TABLET | Refills: 0 | Status: SHIPPED | OUTPATIENT
Start: 2025-03-04 | End: 2025-03-09

## 2025-03-04 NOTE — PROGRESS NOTES
History of Present Illness   Patient presents today for evaluation of side: right upper extremity pain.    The patient sustained an acute injury on  2/28/2025 .  The patient had a mechanical fall and sustained a right distal radius fracture.  He went to the emergency department and had an attempt of closed reduction and splinting.  He subsequently went to injury clinic yesterday and had new splint put on.  He was found to have a right distal radius fracture and referred to me for further management.  The patient denies any loss of consciousness or additional significant injuries.  The patient denies any current numbness or tingling.  The pain is sharp, acute in nature, better with rest worse with motion.    He states he is an active 70-year-old who enjoys riding his motorcycle.  He is right-hand dominant.  He continues to work as a .  He also does have a significant cardiac history with an aortic aneurysm.  He is having an open heart surgery on 3/17/2025 for a valve replacement.  He is not currently on any blood thinners.     Past Medical History:   Diagnosis Date    Alcohol abuse, in remission     History of alcohol abuse    Anemia 02/07/2024    Cataract     Closed displaced comminuted fracture of shaft of right tibia 07/31/2019    Closed displaced fracture of body of right scapula 07/31/2019    Closed displaced fracture of shaft of right clavicle 07/31/2019    Coronary artery disease     Dissection of thoracic aorta, unspecified (Multi) 01/07/2025    type A aortic dissection status post replacement of his ascending aorta    Essential (primary) hypertension     Hypertension    Heart valve disease     Severe mitral valve regurgitation.    Hepatic steatosis     History of thoracic aortic aneurysm repair 01/22/2023    Hyperlipidemia     Palpitations 02/07/2024    Pleural effusion 09/25/2024    Postoperative atrial fibrillation (Multi)     Prostate cancer (Multi) 01/09/2024    Node-positive prostate  cancer, [id number] (PSMA PET+ left int iliac node), [place] 4+4=8, iPSA 63.47.    Shortness of breath     Sleep apnea     Uses CPAP    Traumatic subdural hematoma (Multi)     Vision loss     Wears contacts       Medication Documentation Review Audit       Reviewed by Sophia Sanches ATC () on 25 at 1333      Medication Order Taking? Sig Documenting Provider Last Dose Status   aspirin 81 mg EC tablet [id number] No Take 1 tablet (81 mg) by mouth once daily. Historical Provider, MD 2025 Morning Active   benzonatate (Tessalon) 200 mg capsule [id number] No Take 1 capsule (200 mg) by mouth 3 times a day as needed for cough. Neo Hanna MD Past Month Active   ezetimibe (Zetia) 10 mg tablet [id number] No Take 1 tablet (10 mg) by mouth once daily. Neo Hanna MD 2025 Bedtime Active   furosemide (Lasix) 40 mg tablet [id number] No Take 1 tablet (40 mg) by mouth once daily. Diana Alcantar APRN-CNP 2025 Morning Active   metoprolol tartrate (Lopressor) 50 mg tablet [id number] No Take 1 tablet by mouth 2 times a day. Owen Choi MD 2025 Morning Active   multivitamin tablet [id number] No Take 1 tablet by mouth once daily. Historical Provider, MD 2025 Morning Active   oxyCODONE (Roxicodone) 5 mg immediate release tablet [id number]  Take 1 tablet (5 mg) by mouth every 6 hours if needed for severe pain (7 - 10) for up to 7 days. Tom Sears PA-C  Active   rosuvastatin (Crestor) 40 mg tablet [id number] No TAKE ONE TABLET BY MOUTH ONCE DAILY Neo Hanna MD 2025 Bedtime Active   tadalafil (Cialis) 20 mg tablet [id number]  Take 1 tablet (20 mg) by mouth if needed for erectile dysfunction (Do not exceed 20mg.). Pacheco Buckner, APRN-CNP   24 2359                    No Known Allergies    Social History     Socioeconomic History    Marital status:      Spouse name: Not on file    Number of children: Not on file    Years of education: Not on file     Highest education level: Not on file   Occupational History    Not on file   Tobacco Use    Smoking status: Former     Current packs/day: 0.00     Types: Cigarettes     Quit date: 1975     Years since quittin.2    Smokeless tobacco: Never   Vaping Use    Vaping status: Never Used   Substance and Sexual Activity    Alcohol use: Not Currently    Drug use: Never    Sexual activity: Not Currently   Other Topics Concern    Not on file   Social History Narrative    Not on file     Social Drivers of Health     Financial Resource Strain: Low Risk  (2024)    Overall Financial Resource Strain (CARDIA)     Difficulty of Paying Living Expenses: Not hard at all   Food Insecurity: Patient Declined (2024)    Hunger Vital Sign     Worried About Running Out of Food in the Last Year: Patient declined     Ran Out of Food in the Last Year: Patient declined   Transportation Needs: No Transportation Needs (2024)    OASIS : Transportation     Lack of Transportation (Medical): No     Lack of Transportation (Non-Medical): No     Patient Unable or Declines to Respond: No   Physical Activity: Unknown (2024)    Exercise Vital Sign     Days of Exercise per Week: Patient declined     Minutes of Exercise per Session: Not on file   Stress: Patient Declined (2024)    [employer] [place] [employer] - Occupational Stress Questionnaire     Feeling of Stress : Patient declined   Social Connections: Feeling Socially Integrated (2024)    OASIS : Social Isolation     Frequency of experiencing loneliness or isolation: Never   Intimate Partner Violence: Not At Risk (2024)    Humiliation, Afraid, Rape, and Kick questionnaire     Fear of Current or Ex-Partner: No     Emotionally Abused: No     Physically Abused: No     Sexually Abused: No   Housing Stability: Low Risk  (2024)    Housing Stability Vital Sign     Unable to Pay for Housing in the Last Year: No     Number of Places Lived in the  Last Year: 1     Unstable Housing in the Last Year: No       Past Surgical History:   Procedure Laterality Date    ANKLE SURGERY Left     Ankle Surgery    ARTERIAL ANEURYSM REPAIR  12/2015    Aortic Aneurysm Repair Ascending Aorta    CARDIAC CATHETERIZATION N/A 12/11/2023    Procedure: Left And Right Heart Cath, With LV;  Surgeon: Owen Choi MD;  Location: [facility] Cardiac Cath Lab;  Service: Cardiovascular;  Laterality: N/A;  Scheduled 12/11 @ 9:00am, EULA w/ anesthesia @ 7:30am    CARDIAC CATHETERIZATION N/A 02/17/2025    Procedure: Left & Right Heart Cath w Angiography & LV;  Surgeon: Owen Choi MD;  Location: [facility] Cardiac Cath Lab;  Service: Cardiovascular;  Laterality: N/A;    CATARACT EXTRACTION Right 05/09/2024    CATARACT EXTRACTION Left 04/25/2024    COLONOSCOPY      CT CHEST W AND WO IV CONTRAST  10/18/2023    Thoracic aortic atherosclerosis.    FEMUR FRACTURE SURGERY  01/27/2016    Femur Repair    LIPOMA RESECTION  10/28/2022    back    MITRAL VALVE REPAIR      OTHER SURGICAL HISTORY  01/27/2016    Wrist Surgery          Review of Systems   GENERAL: Negative  GI: Negative  MUSCULOSKELETAL: See HPI  SKIN: Negative  NEURO:  Negative     Physical Exam:  side: right upper extremity:  Well-padded sugar-tong splint in place.  Visible skin healthy to gross inspection, no breakdown  Swelling / ecchymosis noted to dorsum of the hand  Intact flexion and extension of 1st IP joint and finger abduction  Sensation intact to light touch medial / ulnar and radial nerve distribution   Good cap refill     Imaging  XR of the right wrist was taken reviewed in office today dated 3/4/2025 and compared to x-rays dated 2/28/2025  Patient has a extra-articular distal radius fracture with dorsal angulation.  X-rays on today's visit have maintained alignment.     Assessment   Patient with an acute side: right distal radius fracture      Plan:  I had a lengthy discussion with the patient about distal radius fracture management  including the indications for operative and nonoperative treatment. We reviewed the x-rays as well as the criteria of articular surface involvement, volar and dorsal tilt, radial height, and radial inclination. We discussed that we can attempt to improve the alignment of his fracture radiographically through surgical intervention. Given the appearance of this fracture, I do not think there is a clear indication to require surgery at this point.  He is active and there is no clear answer for operative versus nonoperative treatment.  Operative treatment would provide him with earlier rehabilitation.  I explained to the patient that the dorsal angulation would ultimately result in a malunion which will limit the degree of wrist flexion possible after fracture healing. I explained that malunions are not necessarily symptomatic. Moreover, I explained that I will need to follow the patient closely for the first several weeks after injury to evaluate for any subsidence or shifting of the fracture fragments.  If this should occur, there is a possibility that I may change my recommendation and consider recommending surgical intervention at that time. In the interim, we will keep him in his current splint.  He will follow-up in 1 week for transition to a cast with x-rays in cast. have encouraged them to work aggressively on active and passive range of motion of the fingers.          Follow-up 1 week with removal of splint and transition to a short arm cast.

## 2025-03-06 ENCOUNTER — APPOINTMENT (OUTPATIENT)
Dept: PREADMISSION TESTING | Facility: HOSPITAL | Age: 70
End: 2025-03-06
Payer: MEDICARE

## 2025-03-11 ENCOUNTER — OFFICE VISIT (OUTPATIENT)
Dept: ORTHOPEDIC SURGERY | Facility: HOSPITAL | Age: 70
End: 2025-03-11
Payer: MEDICARE

## 2025-03-11 ENCOUNTER — HOSPITAL ENCOUNTER (OUTPATIENT)
Dept: RADIOLOGY | Facility: HOSPITAL | Age: 70
Discharge: HOME | End: 2025-03-11
Payer: MEDICARE

## 2025-03-11 DIAGNOSIS — S52.531A CLOSED COLLES' FRACTURE OF RIGHT RADIUS, INITIAL ENCOUNTER: ICD-10-CM

## 2025-03-11 DIAGNOSIS — S52.531A CLOSED COLLES' FRACTURE OF RIGHT RADIUS, INITIAL ENCOUNTER: Primary | ICD-10-CM

## 2025-03-11 PROCEDURE — 1036F TOBACCO NON-USER: CPT | Performed by: STUDENT IN AN ORGANIZED HEALTH CARE EDUCATION/TRAINING PROGRAM

## 2025-03-11 PROCEDURE — 73110 X-RAY EXAM OF WRIST: CPT | Mod: RT

## 2025-03-11 PROCEDURE — 99213 OFFICE O/P EST LOW 20 MIN: CPT | Performed by: STUDENT IN AN ORGANIZED HEALTH CARE EDUCATION/TRAINING PROGRAM

## 2025-03-11 PROCEDURE — 1123F ACP DISCUSS/DSCN MKR DOCD: CPT | Performed by: STUDENT IN AN ORGANIZED HEALTH CARE EDUCATION/TRAINING PROGRAM

## 2025-03-11 PROCEDURE — 1159F MED LIST DOCD IN RCRD: CPT | Performed by: STUDENT IN AN ORGANIZED HEALTH CARE EDUCATION/TRAINING PROGRAM

## 2025-03-11 PROCEDURE — 1125F AMNT PAIN NOTED PAIN PRSNT: CPT | Performed by: STUDENT IN AN ORGANIZED HEALTH CARE EDUCATION/TRAINING PROGRAM

## 2025-03-11 ASSESSMENT — PAIN - FUNCTIONAL ASSESSMENT: PAIN_FUNCTIONAL_ASSESSMENT: 0-10

## 2025-03-11 ASSESSMENT — PAIN SCALES - GENERAL: PAINLEVEL_OUTOF10: 2

## 2025-03-11 NOTE — PROGRESS NOTES
History of Present Illness   Patient presents today for evaluation of side: right upper extremity pain.    The patient sustained an acute injury on  2/28/2025 .  The patient had a mechanical fall and sustained a right distal radius fracture.  He went to the emergency department and had an attempt of closed reduction and splinting.  He subsequently went to injury clinic yesterday and had new splint put on.  He was found to have a right distal radius fracture and referred to me for further management.  The patient denies any loss of consciousness or additional significant injuries.  The patient denies any current numbness or tingling.  The pain is sharp, acute in nature, better with rest worse with motion.    He states he is an active 70-year-old who enjoys riding his motorcycle.  He is right-hand dominant.  He continues to work as a .  He also does have a significant cardiac history with an aortic aneurysm.  He is having an open heart surgery on 3/17/2025 for a valve replacement.  He is not currently on any blood thinners.    3/11/2025 follow-up:    Patient presents for follow-up today.  He has remained in his splint since his last visit.  He does note continued pain to his wrist.  Denies numbness tingling.  He was seen with his wife present.     Past Medical History:   Diagnosis Date    Alcohol abuse, in remission     History of alcohol abuse    Anemia 02/07/2024    Cataract     Closed displaced comminuted fracture of shaft of right tibia 07/31/2019    Closed displaced fracture of body of right scapula 07/31/2019    Closed displaced fracture of shaft of right clavicle 07/31/2019    Coronary artery disease     Dissection of thoracic aorta, unspecified (Multi) 01/07/2025    type A aortic dissection status post replacement of his ascending aorta    Essential (primary) hypertension     Hypertension    Heart valve disease     Severe mitral valve regurgitation.    Hepatic steatosis     History of thoracic  aortic aneurysm repair 2023    Hyperlipidemia     Palpitations 2024    Pleural effusion 2024    Postoperative atrial fibrillation (Multi)     Prostate cancer (Multi) 2024    Node-positive prostate cancer, [id number] (PSMA PET+ left int iliac node), Lai 4+4=8, iPSA 63.47.    Shortness of breath     Sleep apnea     Uses CPAP    Traumatic subdural hematoma (Multi)     Vision loss     Wears contacts       Medication Documentation Review Audit       Reviewed by Rocio Harden LPN (Licensed Nurse) on 25 at 0907      Medication Order Taking? Sig Documenting Provider Last Dose Status   aspirin 81 mg EC tablet [id number] No Take 1 tablet (81 mg) by mouth once daily. Historical Provider, MD 2025 Morning Active   benzonatate (Tessalon) 200 mg capsule [id number] No Take 1 capsule (200 mg) by mouth 3 times a day as needed for cough. Neo Hanna MD Past Month Active   ezetimibe (Zetia) 10 mg tablet [id number] No Take 1 tablet (10 mg) by mouth once daily. Neo Hanna MD 2025 Bedtime Active   furosemide (Lasix) 40 mg tablet [id number] No Take 1 tablet (40 mg) by mouth once daily. Diana Alcantar, APRN-CNP 2025 Morning Active   metoprolol tartrate (Lopressor) 50 mg tablet [id number] No Take 1 tablet by mouth 2 times a day. Owen Choi MD 2025 Morning Active   multivitamin tablet [id number] No Take 1 tablet by mouth once daily. Historical Provider, MD 2025 Morning Active   oxyCODONE (Roxicodone) 5 mg immediate release tablet [id number]  Take 1 tablet (5 mg) by mouth every 6 hours if needed for severe pain (7 - 10) for up to 7 days. Tom Sears PA-C   25 235   rosuvastatin (Crestor) 40 mg tablet [id number] No TAKE ONE TABLET BY MOUTH ONCE DAILY Neo Hanna MD 2025 Bedtime Active   tadalafil (Cialis) 20 mg tablet [id number]  Take 1 tablet (20 mg) by mouth if needed for erectile dysfunction (Do not exceed 20mg.). Pacheco Buckner, APRN-CNP    24 235   traMADol (Ultram) 50 mg tablet [id number]  Take 1 tablet (50 mg) by mouth every 6 hours if needed for severe pain (7 - 10) for up to 5 days. Milo FossuclerDO   25 235                    No Known Allergies    Social History     Socioeconomic History    Marital status:      Spouse name: Not on file    Number of children: Not on file    Years of education: Not on file    Highest education level: Not on file   Occupational History    Not on file   Tobacco Use    Smoking status: Former     Current packs/day: 0.00     Types: Cigarettes     Quit date: 1975     Years since quittin.2    Smokeless tobacco: Never   Vaping Use    Vaping status: Never Used   Substance and Sexual Activity    Alcohol use: Not Currently    Drug use: Never    Sexual activity: Not Currently   Other Topics Concern    Not on file   Social History Narrative    Not on file     Social Drivers of Health     Financial Resource Strain: Low Risk  (2024)    Overall Financial Resource Strain (CARDIA)     Difficulty of Paying Living Expenses: Not hard at all   Food Insecurity: Patient Declined (2024)    Hunger Vital Sign     Worried About Running Out of Food in the Last Year: Patient declined     Ran Out of Food in the Last Year: Patient declined   Transportation Needs: No Transportation Needs (2024)    OASIS : Transportation     Lack of Transportation (Medical): No     Lack of Transportation (Non-Medical): No     Patient Unable or Declines to Respond: No   Physical Activity: Unknown (2024)    Exercise Vital Sign     Days of Exercise per Week: Patient declined     Minutes of Exercise per Session: Not on file   Stress: Patient Declined (2024)    [employer] - Occupational Stress Questionnaire     Feeling of Stress : Patient declined   Social Connections: Feeling Socially Integrated (2024)    OASIS : Social Isolation     Frequency of  experiencing loneliness or isolation: Never   Intimate Partner Violence: Not At Risk (1/23/2024)    Humiliation, Afraid, Rape, and Kick questionnaire     Fear of Current or Ex-Partner: No     Emotionally Abused: No     Physically Abused: No     Sexually Abused: No   Housing Stability: Low Risk  (1/23/2024)    Housing Stability Vital Sign     Unable to Pay for Housing in the Last Year: No     Number of Places Lived in the Last Year: 1     Unstable Housing in the Last Year: No       Past Surgical History:   Procedure Laterality Date    ANKLE SURGERY Left     Ankle Surgery    ARTERIAL ANEURYSM REPAIR  12/2015    Aortic Aneurysm Repair Ascending Aorta    CARDIAC CATHETERIZATION N/A 12/11/2023    Procedure: Left And Right Heart Cath, With LV;  Surgeon: Owen Choi MD;  Location: [facility] Cardiac Cath Lab;  Service: Cardiovascular;  Laterality: N/A;  Scheduled 12/11 @ 9:00am, EULA w/ anesthesia @ 7:30am    CARDIAC CATHETERIZATION N/A 02/17/2025    Procedure: Left & Right Heart Cath w Angiography & LV;  Surgeon: Owen Choi MD;  Location: [facility] Cardiac Cath Lab;  Service: Cardiovascular;  Laterality: N/A;    CATARACT EXTRACTION Right 05/09/2024    CATARACT EXTRACTION Left 04/25/2024    COLONOSCOPY      CT CHEST W AND WO IV CONTRAST  10/18/2023    Thoracic aortic atherosclerosis.    FEMUR FRACTURE SURGERY  01/27/2016    Femur Repair    LIPOMA RESECTION  10/28/2022    back    MITRAL VALVE REPAIR      OTHER SURGICAL HISTORY  01/27/2016    Wrist Surgery          Review of Systems   GENERAL: Negative  GI: Negative  MUSCULOSKELETAL: See HPI  SKIN: Negative  NEURO:  Negative     Physical Exam:  side: right upper extremity:  Splint was removed.  Placed in a short arm cast  Visible skin healthy to gross inspection, no breakdown  Swelling / ecchymosis noted to dorsum of the hand  Intact flexion and extension of 1st IP joint and finger abduction  Sensation intact to light touch medial / ulnar and radial nerve distribution   Good cap  refill     Imaging  XR of the right wrist was taken reviewed in office today dated 3/11/2025 and compared to x-rays dated 3/4/2025 and 2/28/2025  Patient has a extra-articular distal radius fracture with dorsal angulation.  Increased dorsal angulation from compared to previous x-rays.    Assessment   Patient with an acute side: right distal radius fracture      Plan:  At last visit we had trialed nonoperative treatment of his fracture.  He did have dorsal angulation at last visit however he has had interval displacement.  He continues to have pain.  We did place him in a short arm cast after today and took repeat x-rays which demonstrated increased alignment.  He also states the cough was quite uncomfortable.  He is a very active 70-year-old but does have a significant cardiac history.  Based on this and his level of activity and increased displacement we have decided to pursue open reduction internal fixation of his left distal radius fracture.  We will do this at [facility] secondary to his cardiac history.       Follow-up postop        For Surgical Planning:  Diagnosis: Right distal radius fracture  Procedure: ORIF right distal radius  CPT: 64330  Anesthesia: MAC and regional block  Duration: 90 minutes  Special Equipment Needed: Synthes volar locking plate distal radius, mini C arm  Medical Notes / PM / DM / PAT needed?:  Yes  Location: [facility] 3/14 2025  Initial Post Operative Visit: 2 weeks

## 2025-03-11 NOTE — H&P (VIEW-ONLY)
History of Present Illness   Patient presents today for evaluation of side: right upper extremity pain.    The patient sustained an acute injury on  2/28/2025 .  The patient had a mechanical fall and sustained a right distal radius fracture.  He went to the emergency department and had an attempt of closed reduction and splinting.  He subsequently went to injury clinic yesterday and had new splint put on.  He was found to have a right distal radius fracture and referred to me for further management.  The patient denies any loss of consciousness or additional significant injuries.  The patient denies any current numbness or tingling.  The pain is sharp, acute in nature, better with rest worse with motion.    He states he is an active 70-year-old who enjoys riding his motorcycle.  He is right-hand dominant.  He continues to work as a .  He also does have a significant cardiac history with an aortic aneurysm.  He is having an open heart surgery on 3/17/2025 for a valve replacement.  He is not currently on any blood thinners.    3/11/2025 follow-up:    Patient presents for follow-up today.  He has remained in his splint since his last visit.  He does note continued pain to his wrist.  Denies numbness tingling.  He was seen with his wife present.     Past Medical History:   Diagnosis Date    Alcohol abuse, in remission     History of alcohol abuse    Anemia 02/07/2024    Cataract     Closed displaced comminuted fracture of shaft of right tibia 07/31/2019    Closed displaced fracture of body of right scapula 07/31/2019    Closed displaced fracture of shaft of right clavicle 07/31/2019    Coronary artery disease     Dissection of thoracic aorta, unspecified (Multi) 01/07/2025    type A aortic dissection status post replacement of his ascending aorta    Essential (primary) hypertension     Hypertension    Heart valve disease     Severe mitral valve regurgitation.    Hepatic steatosis     History of thoracic  aortic aneurysm repair 2023    Hyperlipidemia     Palpitations 2024    Pleural effusion 2024    Postoperative atrial fibrillation (Multi)     Prostate cancer (Multi) 2024    Node-positive prostate cancer, [id number] (PSMA PET+ left int iliac node), Lai 4+4=8, iPSA 63.47.    Shortness of breath     Sleep apnea     Uses CPAP    Traumatic subdural hematoma (Multi)     Vision loss     Wears contacts       Medication Documentation Review Audit       Reviewed by Rocio Harden LPN (Licensed Nurse) on 25 at 0907      Medication Order Taking? Sig Documenting Provider Last Dose Status   aspirin 81 mg EC tablet [id number] No Take 1 tablet (81 mg) by mouth once daily. Historical Provider, MD 2025 Morning Active   benzonatate (Tessalon) 200 mg capsule [id number] No Take 1 capsule (200 mg) by mouth 3 times a day as needed for cough. Neo Hanna MD Past Month Active   ezetimibe (Zetia) 10 mg tablet [id number] No Take 1 tablet (10 mg) by mouth once daily. Neo Hanna MD 2025 Bedtime Active   furosemide (Lasix) 40 mg tablet [id number] No Take 1 tablet (40 mg) by mouth once daily. Diana Alcantar, APRN-CNP 2025 Morning Active   metoprolol tartrate (Lopressor) 50 mg tablet [id number] No Take 1 tablet by mouth 2 times a day. Owen Choi MD 2025 Morning Active   multivitamin tablet [id number] No Take 1 tablet by mouth once daily. Historical Provider, MD 2025 Morning Active   oxyCODONE (Roxicodone) 5 mg immediate release tablet [id number]  Take 1 tablet (5 mg) by mouth every 6 hours if needed for severe pain (7 - 10) for up to 7 days. Tom Sears PA-C   25 235   rosuvastatin (Crestor) 40 mg tablet [id number] No TAKE ONE TABLET BY MOUTH ONCE DAILY Neo Hanna MD 2025 Bedtime Active   tadalafil (Cialis) 20 mg tablet [id number]  Take 1 tablet (20 mg) by mouth if needed for erectile dysfunction (Do not exceed 20mg.). Pacheco Buckner, APRN-CNP    24 235   traMADol (Ultram) 50 mg tablet [id number]  Take 1 tablet (50 mg) by mouth every 6 hours if needed for severe pain (7 - 10) for up to 5 days. Milo FossuclerDO   25 235                    No Known Allergies    Social History     Socioeconomic History    Marital status:      Spouse name: Not on file    Number of children: Not on file    Years of education: Not on file    Highest education level: Not on file   Occupational History    Not on file   Tobacco Use    Smoking status: Former     Current packs/day: 0.00     Types: Cigarettes     Quit date: 1975     Years since quittin.2    Smokeless tobacco: Never   Vaping Use    Vaping status: Never Used   Substance and Sexual Activity    Alcohol use: Not Currently    Drug use: Never    Sexual activity: Not Currently   Other Topics Concern    Not on file   Social History Narrative    Not on file     Social Drivers of Health     Financial Resource Strain: Low Risk  (2024)    Overall Financial Resource Strain (CARDIA)     Difficulty of Paying Living Expenses: Not hard at all   Food Insecurity: Patient Declined (2024)    Hunger Vital Sign     Worried About Running Out of Food in the Last Year: Patient declined     Ran Out of Food in the Last Year: Patient declined   Transportation Needs: No Transportation Needs (2024)    OASIS : Transportation     Lack of Transportation (Medical): No     Lack of Transportation (Non-Medical): No     Patient Unable or Declines to Respond: No   Physical Activity: Unknown (2024)    Exercise Vital Sign     Days of Exercise per Week: Patient declined     Minutes of Exercise per Session: Not on file   Stress: Patient Declined (2024)    [employer] - Occupational Stress Questionnaire     Feeling of Stress : Patient declined   Social Connections: Feeling Socially Integrated (2024)    OASIS : Social Isolation     Frequency of  experiencing loneliness or isolation: Never   Intimate Partner Violence: Not At Risk (1/23/2024)    Humiliation, Afraid, Rape, and Kick questionnaire     Fear of Current or Ex-Partner: No     Emotionally Abused: No     Physically Abused: No     Sexually Abused: No   Housing Stability: Low Risk  (1/23/2024)    Housing Stability Vital Sign     Unable to Pay for Housing in the Last Year: No     Number of Places Lived in the Last Year: 1     Unstable Housing in the Last Year: No       Past Surgical History:   Procedure Laterality Date    ANKLE SURGERY Left     Ankle Surgery    ARTERIAL ANEURYSM REPAIR  12/2015    Aortic Aneurysm Repair Ascending Aorta    CARDIAC CATHETERIZATION N/A 12/11/2023    Procedure: Left And Right Heart Cath, With LV;  Surgeon: Owen Choi MD;  Location: [facility] Cardiac Cath Lab;  Service: Cardiovascular;  Laterality: N/A;  Scheduled 12/11 @ 9:00am, EULA w/ anesthesia @ 7:30am    CARDIAC CATHETERIZATION N/A 02/17/2025    Procedure: Left & Right Heart Cath w Angiography & LV;  Surgeon: Owen Choi MD;  Location: [facility] Cardiac Cath Lab;  Service: Cardiovascular;  Laterality: N/A;    CATARACT EXTRACTION Right 05/09/2024    CATARACT EXTRACTION Left 04/25/2024    COLONOSCOPY      CT CHEST W AND WO IV CONTRAST  10/18/2023    Thoracic aortic atherosclerosis.    FEMUR FRACTURE SURGERY  01/27/2016    Femur Repair    LIPOMA RESECTION  10/28/2022    back    MITRAL VALVE REPAIR      OTHER SURGICAL HISTORY  01/27/2016    Wrist Surgery          Review of Systems   GENERAL: Negative  GI: Negative  MUSCULOSKELETAL: See HPI  SKIN: Negative  NEURO:  Negative     Physical Exam:  side: right upper extremity:  Splint was removed.  Placed in a short arm cast  Visible skin healthy to gross inspection, no breakdown  Swelling / ecchymosis noted to dorsum of the hand  Intact flexion and extension of 1st IP joint and finger abduction  Sensation intact to light touch medial / ulnar and radial nerve distribution   Good cap  refill     Imaging  XR of the right wrist was taken reviewed in office today dated 3/11/2025 and compared to x-rays dated 3/4/2025 and 2/28/2025  Patient has a extra-articular distal radius fracture with dorsal angulation.  Increased dorsal angulation from compared to previous x-rays.    Assessment   Patient with an acute side: right distal radius fracture      Plan:  At last visit we had trialed nonoperative treatment of his fracture.  He did have dorsal angulation at last visit however he has had interval displacement.  He continues to have pain.  We did place him in a short arm cast after today and took repeat x-rays which demonstrated increased alignment.  He also states the cough was quite uncomfortable.  He is a very active 70-year-old but does have a significant cardiac history.  Based on this and his level of activity and increased displacement we have decided to pursue open reduction internal fixation of his left distal radius fracture.  We will do this at [facility] secondary to his cardiac history.       Follow-up postop        For Surgical Planning:  Diagnosis: Right distal radius fracture  Procedure: ORIF right distal radius  CPT: 46492  Anesthesia: MAC and regional block  Duration: 90 minutes  Special Equipment Needed: Synthes volar locking plate distal radius, mini C arm  Medical Notes / PM / DM / PAT needed?:  Yes  Location: [facility] 3/14 2025  Initial Post Operative Visit: 2 weeks

## 2025-03-12 ENCOUNTER — TELEPHONE (OUTPATIENT)
Dept: PREADMISSION TESTING | Facility: HOSPITAL | Age: 70
End: 2025-03-12
Payer: MEDICARE

## 2025-03-12 NOTE — PROGRESS NOTES
Patient at acceptable risk for right Distal Radius Fracture Open Reduction Internal Fixation. No further cardiac testing required. He should remain on Aspirin without interruption. Please reach out to our office at [phone] for further questions or concerns.

## 2025-03-13 ENCOUNTER — APPOINTMENT (OUTPATIENT)
Dept: LAB | Facility: HOSPITAL | Age: 70
End: 2025-03-13
Payer: MEDICARE

## 2025-03-13 ENCOUNTER — PRE-ADMISSION TESTING (OUTPATIENT)
Dept: PREADMISSION TESTING | Facility: HOSPITAL | Age: 70
End: 2025-03-13
Payer: MEDICARE

## 2025-03-13 VITALS
DIASTOLIC BLOOD PRESSURE: 57 MMHG | BODY MASS INDEX: 29.97 KG/M2 | HEIGHT: 68 IN | OXYGEN SATURATION: 99 % | HEART RATE: 63 BPM | SYSTOLIC BLOOD PRESSURE: 152 MMHG | TEMPERATURE: 98.3 F | WEIGHT: 197.75 LBS | RESPIRATION RATE: 18 BRPM

## 2025-03-13 DIAGNOSIS — I10 HYPERTENSION, UNSPECIFIED TYPE: Primary | ICD-10-CM

## 2025-03-13 DIAGNOSIS — I10 ESSENTIAL (PRIMARY) HYPERTENSION: Primary | ICD-10-CM

## 2025-03-13 LAB
ALBUMIN SERPL BCP-MCNC: 4.4 G/DL (ref 3.4–5)
ALP SERPL-CCNC: 51 U/L (ref 33–136)
ALT SERPL W P-5'-P-CCNC: 18 U/L (ref 10–52)
ANION GAP SERPL CALC-SCNC: 7 MMOL/L (ref 10–20)
AST SERPL W P-5'-P-CCNC: 31 U/L (ref 9–39)
BILIRUB SERPL-MCNC: 0.7 MG/DL (ref 0–1.2)
BUN SERPL-MCNC: 18 MG/DL (ref 6–23)
CALCIUM SERPL-MCNC: 11.1 MG/DL (ref 8.6–10.3)
CHLORIDE SERPL-SCNC: 109 MMOL/L (ref 98–107)
CO2 SERPL-SCNC: 30 MMOL/L (ref 21–32)
CREAT SERPL-MCNC: 0.9 MG/DL (ref 0.5–1.3)
EGFRCR SERPLBLD CKD-EPI 2021: >90 ML/MIN/1.73M*2
GLUCOSE SERPL-MCNC: 110 MG/DL (ref 74–99)
POTASSIUM SERPL-SCNC: 4.7 MMOL/L (ref 3.5–5.3)
PROT SERPL-MCNC: 7 G/DL (ref 6.4–8.2)
SODIUM SERPL-SCNC: 141 MMOL/L (ref 136–145)

## 2025-03-13 PROCEDURE — 99204 OFFICE O/P NEW MOD 45 MIN: CPT | Performed by: NURSE PRACTITIONER

## 2025-03-13 PROCEDURE — 36415 COLL VENOUS BLD VENIPUNCTURE: CPT

## 2025-03-13 PROCEDURE — 80053 COMPREHEN METABOLIC PANEL: CPT

## 2025-03-13 ASSESSMENT — ENCOUNTER SYMPTOMS
ARTHRALGIAS: 1
GASTROINTESTINAL NEGATIVE: 1
RESPIRATORY NEGATIVE: 1
DYSPNEA WITH EXERTION: 1
NECK NEGATIVE: 1
CONSTITUTIONAL NEGATIVE: 1

## 2025-03-13 NOTE — PREPROCEDURE INSTRUCTIONS
Medication List            Accurate as of March 13, 2025  9:20 AM. Always use your most recent med list.                aspirin 81 mg EC tablet  Medication Adjustments for Surgery: Take on the morning of surgery     benzonatate 200 mg capsule  Commonly known as: Tessalon  Take 1 capsule (200 mg) by mouth 3 times a day as needed for cough.  Notes to patient: Not taking     ezetimibe 10 mg tablet  Commonly known as: Zetia  Take 1 tablet (10 mg) by mouth once daily.     furosemide 40 mg tablet  Commonly known as: Lasix  Take 1 tablet (40 mg) by mouth once daily.  Medication Adjustments for Surgery: Take on the morning of surgery     metoprolol tartrate 50 mg tablet  Commonly known as: Lopressor  Take 1 tablet by mouth 2 times a day.  Medication Adjustments for Surgery: Take on the morning of surgery     multivitamin tablet  Additional Medication Adjustments for Surgery: Take last dose 7 days before surgery     oxyCODONE 5 mg immediate release tablet  Commonly known as: Roxicodone  Take 1 tablet (5 mg) by mouth every 6 hours if needed for severe pain (7 - 10) for up to 7 days.  Medication Adjustments for Surgery: Take/Use as prescribed     rosuvastatin 40 mg tablet  Commonly known as: Crestor  TAKE ONE TABLET BY MOUTH ONCE DAILY  Medication Adjustments for Surgery: Take on the morning of surgery     tadalafil 20 mg tablet  Commonly known as: Cialis  Take 1 tablet (20 mg) by mouth if needed for erectile dysfunction (Do not exceed 20mg.).  Medication Adjustments for Surgery: Take last dose 3 days before surgery     traMADol 50 mg tablet  Commonly known as: Ultram  Take 1 tablet (50 mg) by mouth every 6 hours if needed for severe pain (7 - 10) for up to 5 days.  Medication Adjustments for Surgery: Take/Use as prescribed              CONTACT SURGEON'S OFFICE IF YOU DEVELOP:  * Fever = 100.4 F   * New respiratory symptoms (e.g. cough, shortness of breath, respiratory distress, sore throat)  * Recent loss of taste or  smell  *Flu like symptoms such as headache, fatigue or gastrointestinal symptoms  * You develop any open sores, shingles, burning or painful urination   AND/OR:  * You no longer wish to have the surgery.  * Any other personal circumstances change that may lead to the need to cancel or defer this surgery.  *You were admitted to any hospital within one week of your planned procedure.    SMOKING:  *Quitting smoking can make a huge difference to your health and recovery from surgery.    *If you need help with quitting, call 9-815-QUIT-NOW.    THE DAY BEFORE SURGERY:  *Do not eat any food after midnight the night before surgery.   You may have up to 13.5 ounces of clear liquid until TWO hours before your instructed arrival time to the hospital  DIABETICS:  Please check fasting blood sugar  upon waking up.  If fasting sugar is <80 mg/dl, please drink 100ml/3oz of apple juice no later than 2 hours prior to surgery.      SURGICAL TIME  *You will be contacted between 2 p.m. and 6 p.m. the business day before your surgery with your arrival time.  *If you haven't received a call by 6pm, call [phone].  *Scheduled surgery times may change and you will be notified if this occurs-check your personal voicemail for any updates.    ON THE MORNING OF SURGERY:  *Wear comfortable, loose fitting clothing.   *Do not use moisturizers, creams, lotions or perfume.  *All jewelry and valuables should be left at home.  *Prosthetic devices such as contact lenses, hearing aids, dentures, eyelash extensions, hairpins and body piercing must be removed before surgery.    BRING WITH YOU:  *Photo ID and insurance card  *Current list of medicines and allergies  *Pacemaker/Defibrillator/Heart stent cards  *CPAP machine and mask  *Slings/splints/crutches  *Copy of your complete Advanced Directive/DHPOA-if applicable  *Neurostimulator implant remote    PARKING AND ARRIVAL:  *Check in at the Main Entrance desk and let them know you are here for  surgery.  *You will be directed to the 2nd floor surgical waiting area.    AFTER OUTPATIENT SURGERY:  *A responsible adult MUST accompany you at the time of discharge and stay with you for 24 hours after your surgery.  *You may NOT drive yourself home after surgery.  *You may use a taxi or ride sharing service ([employer], [employer]) to return home ONLY if you are accompanied by a friend or family member.  *Instructions for resuming your medications will be provided by your surgeon.

## 2025-03-13 NOTE — CPM/PAT H&P
CPM/PAT Evaluation       Name: Jake Brunner (Jake Brunner)  /Age: 1955/70 y.o.     SURGEON :DR HERNÁN GUADARRAMA  Surgery, Date, and Length:  Right Distal Radius Fracture Open Reduction Internal Fixation 3/14/25  HPI:  This a 70  y.o.male who presents for presurgical evaluation for for above mentioned procedure . Pt states he fell while walking, placed his right arm out to brace fall. He had immediate pain and presented to ER.   . After discussion of the risks and benefits with Dr. Guadarrama the patient elects to proceed with the planned procedure.       Past Medical History:   Diagnosis Date    Alcohol abuse, in remission     Cardiology follow-up encounter     Diana Alcantar, APRN-CNP LOV 10/9/24    Closed Colles' fracture of right radius, initial encounter     Plan: Right Distal Radius Fracture Open Reduction Internal Fixation 3/14/25    Coronary artery disease involving native coronary artery of native heart without angina pectoris     Essential (primary) hypertension     Hepatic steatosis     History of PFTs     Pulmonology Jake Wilkinson MD 10/27/24 “PFTs revealed no significant obstruction or restriction with no significant desaturation.  The CT scan reveals the effusion but slightly better.  He will continue with his diuretics and follow-up with me over the next several months”    Hyperlipidemia     Malignant neoplasm of prostate (Multi)     Mitral valve disorder     s/p Heart cath 25    Nonrheumatic mitral valve regurgitation     HILTON on CPAP     Postoperative atrial fibrillation (Multi)     Shortness of breath     Tibia/fibula fracture     s/p INSERTION NAIL / BELLA INTRAMEDULLARY TIBIA Right 19    Traumatic subdural hematoma (Multi)        Past Surgical History:   Procedure Laterality Date    ARTERIAL ANEURYSM REPAIR  2015    Aortic Aneurysm Repair Ascending Aorta    CARDIAC CATHETERIZATION N/A 2023    Procedure: Left And Right Heart Cath, With LV;  Surgeon: Owen Choi MD;   Location: [facility];  Service: Cardiovascular;  Laterality: N/A;  Scheduled 12/11 @ 9:00am, EULA w/ anesthesia @ 7:30am    CARDIAC CATHETERIZATION N/A 02/17/2025    Procedure: Left & Right Heart Cath w Angiography & LV;  Surgeon: Owen Choi MD;  Location: [facility];  Service: Cardiovascular;  Laterality: N/A;    CATARACT EXTRACTION Right 05/09/2024    CATARACT EXTRACTION Left 04/25/2024    COLONOSCOPY      CT CHEST W AND WO IV CONTRAST  10/18/2023    Thoracic aortic atherosclerosis.    LIPOMA RESECTION  10/28/2022    back    MITRAL VALVE REPAIR      ORIF ANKLE FRACTURE Left 12/02/2009    ORIF TIBIA FRACTURE Right 06/26/2019    INSERTION NAIL / BELLA INTRAMEDULLARY TIBIA    STERNOTOMY  01/23/2024    Redo Median Sternotomy, Repair Mitral Valve    THORACIC AORTIC ANEURYSM REPAIR  2015    WRIST SURGERY       Anesthesia History  Pt denies any past history of anesthetic complications such as PONV, awareness, prolonged sedation, dental damage, aspiration, cardiac arrest, difficult intubation, difficult I.V. access or unexpected hospital admissions.  NO malignant hyperthermia and or pseudo cholinesterase deficiency.    The patient is not  a Catholic and will accept blood and blood products if medically indicated.   No history of blood transfusions .Type and screen not sent.         Family History   Problem Relation Name Age of Onset    Hyperlipidemia Mother      Coronary artery disease Father      Other (MYOCARDIAL INFARCTION) Father      Heart attack Father         No Known Allergies    Prior to Admission medications    Medication Sig Start Date End Date Taking? Authorizing Provider   aspirin 81 mg EC tablet Take 1 tablet (81 mg) by mouth once daily.   Yes Historical Provider, MD   ezetimibe (Zetia) 10 mg tablet Take 1 tablet (10 mg) by mouth once daily. 9/11/24  Yes Neo Hanna MD   furosemide (Lasix) 40 mg tablet Take 1 tablet (40 mg) by mouth once daily. 1/31/25 1/31/26 Yes Diana  HEATHER Alcantar APRN-CNP   metoprolol tartrate (Lopressor) 50 mg tablet Take 1 tablet by mouth 2 times a day. 9/18/24 9/18/25 Yes Owen Choi MD   multivitamin tablet Take 1 tablet by mouth once daily.   Yes Historical Provider, MD   oxyCODONE (Roxicodone) 5 mg immediate release tablet Take 1 tablet (5 mg) by mouth every 6 hours if needed for severe pain (7 - 10) for up to 7 days. 2/28/25 3/13/25 Yes Tom Sears PA-C   rosuvastatin (Crestor) 40 mg tablet TAKE ONE TABLET BY MOUTH ONCE DAILY 9/6/24  Yes Neo Hanna MD   traMADol (Ultram) 50 mg tablet Take 1 tablet (50 mg) by mouth every 6 hours if needed for severe pain (7 - 10) for up to 5 days. 3/4/25 3/13/25 Yes Will B Beucler, DO   benzonatate (Tessalon) 200 mg capsule Take 1 capsule (200 mg) by mouth 3 times a day as needed for cough.  Patient not taking: Reported on 3/13/2025 9/11/24   Neo Hanna MD   tadalafil (Cialis) 20 mg tablet Take 1 tablet (20 mg) by mouth if needed for erectile dysfunction (Do not exceed 20mg.). 10/23/24 11/27/24  Pacheco Buckner APRN-CNP        PAT ROS:   Constitutional:   neg    Neuro/Psych:   Eyes:   Ears:   Nose:   neg    Mouth:   neg    Throat:   neg    Neck:   neg    Cardio:    ROD  Respiratory:   neg    Endocrine:   GI:   neg    :   neg    Musculoskeletal:    arthralgias  Hematologic:   neg    Skin:  neg        Physical Exam  Vitals reviewed.   Constitutional:       Appearance: Normal appearance.   HENT:      Head: Normocephalic.      Mouth/Throat:      Mouth: Mucous membranes are moist.   Eyes:      Extraocular Movements: Extraocular movements intact.      Pupils: Pupils are equal, round, and reactive to light.   Cardiovascular:      Rate and Rhythm: Normal rate and regular rhythm.      Pulses: Normal pulses.      Heart sounds: Murmur (3/6 holosystolic murmur heard through precordium) heard.   Pulmonary:      Effort: Pulmonary effort is normal.      Breath sounds: Normal breath sounds.   Musculoskeletal:          "General: Signs of injury (RT WRIST CASTED) present.      Cervical back: Normal range of motion.   Skin:     General: Skin is warm and dry.   Neurological:      Mental Status: He is alert and oriented to person, place, and time.   Psychiatric:         Behavior: Behavior normal.          PAT AIRWAY:   Airway:     Mallampati::  II  normal        Testing/Diagnostic:   EKG in [facility]     CARDIAC CATH 2/20/25  CONCLUSIONS:   1. Moderate 2 vessel CAD in a right dominant system.   2. Elevated left and right heart filling pressures.   3. Moderate pulmonary hypertension.   4. Preserved cardiac index.   5. No evidence of intracardiac shunt.   6. No evidence of aortic stenosis.    ECHO 11/27/2024     CONCLUSIONS:   1. Left ventricular ejection fraction is hyperdynamic, by visual estimate at 70-75%.   2. Spectral Doppler shows a Grade II (pseudonormal pattern) of left ventricular diastolic filling with an elevated left atrial pressure.   3. There is moderately increased posterior left ventricular wall thickness.   4. There is normal right ventricular global systolic function.   5. The left atrium is mild to moderately dilated.   6. Mild to moderate mitral valve regurgitation.   7. Mild aortic valve regurgitation.     Lab Results   Component Value Date    WBC 10.5 02/13/2025    HGB 12.3 (L) 02/13/2025    HCT 36.8 (L) 02/13/2025    MCV 91 02/13/2025     02/13/2025     Lab Results   Component Value Date    GLUCOSE 110 (H) 03/13/2025    CALCIUM 11.1 (H) 03/13/2025     03/13/2025    K 4.7 03/13/2025    CO2 30 03/13/2025     (H) 03/13/2025    BUN 18 03/13/2025    CREATININE 0.90 03/13/2025       Patient Specialist/PCP:     Visit Vitals  /57   Pulse 63   Temp 36.8 °C (98.3 °F) (Temporal)   Resp 18   Ht 1.725 m (5' 7.91\")   Wt 89.7 kg (197 lb 12 oz)   SpO2 99%   BMI 30.15 kg/m²   Smoking Status Former   BSA 2.07 m²       ASSESSMENT/PLAN    Patient is a 70 year-old  scheduled for Right Distal Radius Fracture Open " "Reduction Internal Fixation  with Dr. GUADARRAMA  on  3/14/25 .  CARDIOVASCULAR:  RCRI score / Risk: The patients score is 1 based on history . Per ACC/AHA guidelines this places him  at  6.0% risk for MACE undergoing a intermediate  risk procedure . The patient has the following risk factors:CAD  Functional Capacity: The patients exercise tolerance is  4  METS. This is based on the patient's abililty to ascend a flight of stairs  and walk 2 block w/o chest pain or other anginal equivalents.. Patient denies  active cardiac symptoms, he does state ROD .    MITRAL REGURGITATION :  DR ZOE ORANTES 1/10/25  \"Patient returns to clinic to discuss surgical treatment options for recurrent mitral regurgitation. Patient underwent aortic dissection repair in the past and about 1 year ago I performed a redo median sternotomy and mitral valve repair patient was well for about 6 to 7 months however then developed recurrent shortness of breath and pleural effusions. Echocardiography demonstrated mild to moderate mitral regurgitation at that time an MRI has demonstrated severe mitral regurgitation secondary to an eccentric regurgitant jet\".   3/12/25 keely choe :  Patient at acceptable risk for right Distal Radius Fracture Open Reduction Internal Fixation. No further cardiac testing required. He should remain on Aspirin without interruption. Please reach out to our office at [phone] for further questions or concerns.     PULMONARY:  The patient has the following factors that place them at increased risk of perioperative pulmonary complications;age greater than 65/BMI greater than 27/greater than 2.5 hour procedure.  Postoperatively the patient would benefit from early pulmonary toilet/incentive spirometry q 1-2 hours while awake/pulse oximetry/cautious use of respiratory depressant medications such as opioids/elevate the HOB/oral hygiene.    PLEURAL EFFUSION :  PFT results noted as above. No significant obstruction or " restriction .    DVT:  CAPRINI SCORE=7  The patient has the following factors that increase his  Risk for thrombus formation ; Virchow's triad ,age>70, bmi>25, casted extremity  , Surgical procedure >2 hrs  procedure .    Recommendations: DVT prophylaxis  per Dr. Mendez protocol . SCD's, SYED's, and early ambulation are recommended. Heparin or LMWH is recommended for the very high risk .      Risk assessment complete.  Patient is scheduled for  intermediate  surgical risk procedure.  Patient is considered an increased, not prohibitive  risk to proceed with the planned procedure.      Preoperative medication instructions were provided and reviewed with the patient.  Any additional testing or evaluation was explained to the patient.  Nothing by mouth instructions were discussed and patient's questions were answered prior to conclusion to this encounter.  Patient verbalized understanding of preoperative instructions given in preadmission testing; discharge instructions available in EMR.

## 2025-03-14 ENCOUNTER — ANESTHESIA EVENT (OUTPATIENT)
Dept: OPERATING ROOM | Facility: HOSPITAL | Age: 70
End: 2025-03-14
Payer: MEDICARE

## 2025-03-14 ENCOUNTER — ANESTHESIA (OUTPATIENT)
Dept: OPERATING ROOM | Facility: HOSPITAL | Age: 70
End: 2025-03-14
Payer: MEDICARE

## 2025-03-14 ENCOUNTER — APPOINTMENT (OUTPATIENT)
Dept: RADIOLOGY | Facility: HOSPITAL | Age: 70
End: 2025-03-14
Payer: MEDICARE

## 2025-03-14 ENCOUNTER — HOSPITAL ENCOUNTER (INPATIENT)
Facility: HOSPITAL | Age: 70
LOS: 1 days | Discharge: HOME | End: 2025-03-14
Attending: STUDENT IN AN ORGANIZED HEALTH CARE EDUCATION/TRAINING PROGRAM | Admitting: THORACIC SURGERY (CARDIOTHORACIC VASCULAR SURGERY)
Payer: MEDICARE

## 2025-03-14 VITALS
WEIGHT: 199.08 LBS | BODY MASS INDEX: 29.49 KG/M2 | RESPIRATION RATE: 14 BRPM | HEART RATE: 73 BPM | SYSTOLIC BLOOD PRESSURE: 152 MMHG | DIASTOLIC BLOOD PRESSURE: 56 MMHG | OXYGEN SATURATION: 98 % | HEIGHT: 69 IN | TEMPERATURE: 96.8 F

## 2025-03-14 DIAGNOSIS — S52.531A CLOSED COLLES' FRACTURE OF RIGHT RADIUS, INITIAL ENCOUNTER: ICD-10-CM

## 2025-03-14 DIAGNOSIS — I34.0 MITRAL VALVE INSUFFICIENCY, UNSPECIFIED ETIOLOGY: Primary | ICD-10-CM

## 2025-03-14 DIAGNOSIS — S52.531A FRACTURE, COLLES, RIGHT, CLOSED: ICD-10-CM

## 2025-03-14 PROCEDURE — 2500000005 HC RX 250 GENERAL PHARMACY W/O HCPCS: Performed by: STUDENT IN AN ORGANIZED HEALTH CARE EDUCATION/TRAINING PROGRAM

## 2025-03-14 PROCEDURE — 76000 FLUOROSCOPY <1 HR PHYS/QHP: CPT | Mod: RT

## 2025-03-14 PROCEDURE — 3700000001 HC GENERAL ANESTHESIA TIME - INITIAL BASE CHARGE: Performed by: STUDENT IN AN ORGANIZED HEALTH CARE EDUCATION/TRAINING PROGRAM

## 2025-03-14 PROCEDURE — 7100000001 HC RECOVERY ROOM TIME - INITIAL BASE CHARGE: Performed by: STUDENT IN AN ORGANIZED HEALTH CARE EDUCATION/TRAINING PROGRAM

## 2025-03-14 PROCEDURE — 7100000009 HC PHASE TWO TIME - INITIAL BASE CHARGE: Performed by: STUDENT IN AN ORGANIZED HEALTH CARE EDUCATION/TRAINING PROGRAM

## 2025-03-14 PROCEDURE — 3600000004 HC OR TIME - INITIAL BASE CHARGE - PROCEDURE LEVEL FOUR: Performed by: STUDENT IN AN ORGANIZED HEALTH CARE EDUCATION/TRAINING PROGRAM

## 2025-03-14 PROCEDURE — 2500000001 HC RX 250 WO HCPCS SELF ADMINISTERED DRUGS (ALT 637 FOR MEDICARE OP): Performed by: ANESTHESIOLOGY

## 2025-03-14 PROCEDURE — 7100000002 HC RECOVERY ROOM TIME - EACH INCREMENTAL 1 MINUTE: Performed by: STUDENT IN AN ORGANIZED HEALTH CARE EDUCATION/TRAINING PROGRAM

## 2025-03-14 PROCEDURE — 2020000001 HC ICU ROOM DAILY

## 2025-03-14 PROCEDURE — 2500000004 HC RX 250 GENERAL PHARMACY W/ HCPCS (ALT 636 FOR OP/ED): Performed by: ANESTHESIOLOGY

## 2025-03-14 PROCEDURE — 3600000009 HC OR TIME - EACH INCREMENTAL 1 MINUTE - PROCEDURE LEVEL FOUR: Performed by: STUDENT IN AN ORGANIZED HEALTH CARE EDUCATION/TRAINING PROGRAM

## 2025-03-14 PROCEDURE — 2780000003 HC OR 278 NO HCPCS: Performed by: STUDENT IN AN ORGANIZED HEALTH CARE EDUCATION/TRAINING PROGRAM

## 2025-03-14 PROCEDURE — C1713 ANCHOR/SCREW BN/BN,TIS/BN: HCPCS | Performed by: STUDENT IN AN ORGANIZED HEALTH CARE EDUCATION/TRAINING PROGRAM

## 2025-03-14 PROCEDURE — 2720000007 HC OR 272 NO HCPCS: Performed by: STUDENT IN AN ORGANIZED HEALTH CARE EDUCATION/TRAINING PROGRAM

## 2025-03-14 PROCEDURE — 2500000004 HC RX 250 GENERAL PHARMACY W/ HCPCS (ALT 636 FOR OP/ED): Performed by: NURSE ANESTHETIST, CERTIFIED REGISTERED

## 2025-03-14 PROCEDURE — 25608 OPTX DST RD XART FX/EPI SEP2: CPT | Performed by: STUDENT IN AN ORGANIZED HEALTH CARE EDUCATION/TRAINING PROGRAM

## 2025-03-14 PROCEDURE — 3700000002 HC GENERAL ANESTHESIA TIME - EACH INCREMENTAL 1 MINUTE: Performed by: STUDENT IN AN ORGANIZED HEALTH CARE EDUCATION/TRAINING PROGRAM

## 2025-03-14 PROCEDURE — 7100000010 HC PHASE TWO TIME - EACH INCREMENTAL 1 MINUTE: Performed by: STUDENT IN AN ORGANIZED HEALTH CARE EDUCATION/TRAINING PROGRAM

## 2025-03-14 PROCEDURE — 2500000004 HC RX 250 GENERAL PHARMACY W/ HCPCS (ALT 636 FOR OP/ED): Mod: JZ | Performed by: ANESTHESIOLOGY

## 2025-03-14 PROCEDURE — 2500000004 HC RX 250 GENERAL PHARMACY W/ HCPCS (ALT 636 FOR OP/ED): Performed by: STUDENT IN AN ORGANIZED HEALTH CARE EDUCATION/TRAINING PROGRAM

## 2025-03-14 DEVICE — SCREW, VA 2.4 X 16 LOCK STARDRIVE: Type: IMPLANTABLE DEVICE | Site: WRIST | Status: FUNCTIONAL

## 2025-03-14 DEVICE — SCREW, VA 2.4 X 18 LOCK STARDRIVE: Type: IMPLANTABLE DEVICE | Site: WRIST | Status: FUNCTIONAL

## 2025-03-14 DEVICE — SCREW, CORTEX SELF, 2.7MM X 16MM: Type: IMPLANTABLE DEVICE | Site: WRIST | Status: FUNCTIONAL

## 2025-03-14 DEVICE — SCREW, VA 2.4 X 20 LOCK STARDRIVE: Type: IMPLANTABLE DEVICE | Site: WRIST | Status: FUNCTIONAL

## 2025-03-14 DEVICE — SCREW, CORTEX SELF TAP W/T8 RECESS 2.7MM X 14MM, SS: Type: IMPLANTABLE DEVICE | Site: WRIST | Status: FUNCTIONAL

## 2025-03-14 DEVICE — PLATE, VOLAR RADIUS DISTAL 2.4 6H/ 3H SHAFT VA-LCP 2 CLMN RIGHT: Type: IMPLANTABLE DEVICE | Site: WRIST | Status: FUNCTIONAL

## 2025-03-14 RX ORDER — OXYCODONE HYDROCHLORIDE 5 MG/1
5 TABLET ORAL ONCE
Status: COMPLETED | OUTPATIENT
Start: 2025-03-14 | End: 2025-03-14

## 2025-03-14 RX ORDER — CEFAZOLIN 1 G/1
INJECTION, POWDER, FOR SOLUTION INTRAVENOUS AS NEEDED
Status: DISCONTINUED | OUTPATIENT
Start: 2025-03-14 | End: 2025-03-14

## 2025-03-14 RX ORDER — PROPOFOL 10 MG/ML
INJECTION, EMULSION INTRAVENOUS AS NEEDED
Status: DISCONTINUED | OUTPATIENT
Start: 2025-03-14 | End: 2025-03-14

## 2025-03-14 RX ORDER — SODIUM CHLORIDE 0.9 G/100ML
INJECTION, SOLUTION IRRIGATION AS NEEDED
Status: DISCONTINUED | OUTPATIENT
Start: 2025-03-14 | End: 2025-03-14 | Stop reason: HOSPADM

## 2025-03-14 RX ORDER — SODIUM CHLORIDE, SODIUM LACTATE, POTASSIUM CHLORIDE, CALCIUM CHLORIDE 600; 310; 30; 20 MG/100ML; MG/100ML; MG/100ML; MG/100ML
100 INJECTION, SOLUTION INTRAVENOUS CONTINUOUS
Status: DISCONTINUED | OUTPATIENT
Start: 2025-03-14 | End: 2025-03-14 | Stop reason: HOSPADM

## 2025-03-14 RX ORDER — MEPERIDINE HYDROCHLORIDE 25 MG/ML
12.5 INJECTION INTRAMUSCULAR; INTRAVENOUS; SUBCUTANEOUS EVERY 10 MIN PRN
Status: DISCONTINUED | OUTPATIENT
Start: 2025-03-14 | End: 2025-03-14 | Stop reason: HOSPADM

## 2025-03-14 RX ORDER — LIDOCAINE HYDROCHLORIDE 20 MG/ML
INJECTION, SOLUTION EPIDURAL; INFILTRATION; INTRACAUDAL; PERINEURAL AS NEEDED
Status: DISCONTINUED | OUTPATIENT
Start: 2025-03-14 | End: 2025-03-14

## 2025-03-14 RX ORDER — HYDROCODONE BITARTRATE AND ACETAMINOPHEN 5; 325 MG/1; MG/1
1 TABLET ORAL EVERY 6 HOURS PRN
Qty: 20 TABLET | Refills: 0 | Status: SHIPPED | OUTPATIENT
Start: 2025-03-14 | End: 2025-03-19

## 2025-03-14 RX ORDER — ONDANSETRON HYDROCHLORIDE 2 MG/ML
INJECTION, SOLUTION INTRAVENOUS AS NEEDED
Status: DISCONTINUED | OUTPATIENT
Start: 2025-03-14 | End: 2025-03-14

## 2025-03-14 RX ORDER — FENTANYL CITRATE 50 UG/ML
100 INJECTION, SOLUTION INTRAMUSCULAR; INTRAVENOUS ONCE AS NEEDED
Status: COMPLETED | OUTPATIENT
Start: 2025-03-14 | End: 2025-03-14

## 2025-03-14 RX ORDER — ROPIVACAINE HYDROCHLORIDE 5 MG/ML
INJECTION, SOLUTION EPIDURAL; INFILTRATION; PERINEURAL AS NEEDED
Status: DISCONTINUED | OUTPATIENT
Start: 2025-03-14 | End: 2025-03-14

## 2025-03-14 RX ORDER — IPRATROPIUM BROMIDE 0.5 MG/2.5ML
500 SOLUTION RESPIRATORY (INHALATION) ONCE AS NEEDED
Status: DISCONTINUED | OUTPATIENT
Start: 2025-03-14 | End: 2025-03-14 | Stop reason: HOSPADM

## 2025-03-14 RX ORDER — LABETALOL HYDROCHLORIDE 5 MG/ML
5 INJECTION, SOLUTION INTRAVENOUS
Status: DISCONTINUED | OUTPATIENT
Start: 2025-03-14 | End: 2025-03-14 | Stop reason: HOSPADM

## 2025-03-14 RX ORDER — MIDAZOLAM HYDROCHLORIDE 1 MG/ML
INJECTION, SOLUTION INTRAMUSCULAR; INTRAVENOUS AS NEEDED
Status: DISCONTINUED | OUTPATIENT
Start: 2025-03-14 | End: 2025-03-14

## 2025-03-14 RX ORDER — ALBUTEROL SULFATE 0.83 MG/ML
2.5 SOLUTION RESPIRATORY (INHALATION) ONCE AS NEEDED
Status: DISCONTINUED | OUTPATIENT
Start: 2025-03-14 | End: 2025-03-14 | Stop reason: HOSPADM

## 2025-03-14 RX ORDER — MIDAZOLAM HYDROCHLORIDE 1 MG/ML
2 INJECTION INTRAMUSCULAR; INTRAVENOUS ONCE AS NEEDED
Status: DISCONTINUED | OUTPATIENT
Start: 2025-03-14 | End: 2025-03-14 | Stop reason: HOSPADM

## 2025-03-14 RX ORDER — HYDRALAZINE HYDROCHLORIDE 20 MG/ML
5 INJECTION INTRAMUSCULAR; INTRAVENOUS EVERY 30 MIN PRN
Status: DISCONTINUED | OUTPATIENT
Start: 2025-03-14 | End: 2025-03-14 | Stop reason: HOSPADM

## 2025-03-14 RX ADMIN — HYDROMORPHONE HYDROCHLORIDE 0.5 MG: 1 INJECTION, SOLUTION INTRAMUSCULAR; INTRAVENOUS; SUBCUTANEOUS at 13:28

## 2025-03-14 RX ADMIN — OXYCODONE HYDROCHLORIDE 5 MG: 5 TABLET ORAL at 13:50

## 2025-03-14 RX ADMIN — FENTANYL CITRATE 25 MCG: 50 INJECTION, SOLUTION INTRAMUSCULAR; INTRAVENOUS at 12:16

## 2025-03-14 RX ADMIN — PROPOFOL 150 MG: 10 INJECTION, EMULSION INTRAVENOUS at 11:12

## 2025-03-14 RX ADMIN — DEXAMETHASONE SODIUM PHOSPHATE 8 MG: 4 INJECTION, SOLUTION INTRAMUSCULAR; INTRAVENOUS at 11:18

## 2025-03-14 RX ADMIN — FENTANYL CITRATE 25 MCG: 50 INJECTION, SOLUTION INTRAMUSCULAR; INTRAVENOUS at 12:24

## 2025-03-14 RX ADMIN — CEFAZOLIN 2 G: 1 INJECTION, POWDER, FOR SOLUTION INTRAMUSCULAR; INTRAVENOUS at 11:18

## 2025-03-14 RX ADMIN — FENTANYL CITRATE 50 MCG: 50 INJECTION, SOLUTION INTRAMUSCULAR; INTRAVENOUS at 11:26

## 2025-03-14 RX ADMIN — ROPIVACAINE HYDROCHLORIDE 30 ML: 5 INJECTION, SOLUTION EPIDURAL; INFILTRATION; PERINEURAL at 10:50

## 2025-03-14 RX ADMIN — LIDOCAINE HYDROCHLORIDE 80 MG: 20 INJECTION, SOLUTION EPIDURAL; INFILTRATION; INTRACAUDAL; PERINEURAL at 11:12

## 2025-03-14 RX ADMIN — HYDROMORPHONE HYDROCHLORIDE 0.2 MG: 1 INJECTION, SOLUTION INTRAMUSCULAR; INTRAVENOUS; SUBCUTANEOUS at 13:12

## 2025-03-14 RX ADMIN — ONDANSETRON 4 MG: 2 INJECTION, SOLUTION INTRAMUSCULAR; INTRAVENOUS at 12:31

## 2025-03-14 RX ADMIN — SODIUM CHLORIDE, POTASSIUM CHLORIDE, SODIUM LACTATE AND CALCIUM CHLORIDE: 600; 310; 30; 20 INJECTION, SOLUTION INTRAVENOUS at 11:02

## 2025-03-14 RX ADMIN — MIDAZOLAM HYDROCHLORIDE 2 MG: 1 INJECTION, SOLUTION INTRAMUSCULAR; INTRAVENOUS at 10:50

## 2025-03-14 RX ADMIN — FENTANYL CITRATE 100 MCG: 50 INJECTION, SOLUTION INTRAMUSCULAR; INTRAVENOUS at 10:50

## 2025-03-14 SDOH — HEALTH STABILITY: MENTAL HEALTH: CURRENT SMOKER: 0

## 2025-03-14 ASSESSMENT — PAIN - FUNCTIONAL ASSESSMENT
PAIN_FUNCTIONAL_ASSESSMENT: 0-10

## 2025-03-14 ASSESSMENT — PAIN SCALES - GENERAL
PAINLEVEL_OUTOF10: 4
PAINLEVEL_OUTOF10: 6
PAINLEVEL_OUTOF10: 4
PAINLEVEL_OUTOF10: 7
PAINLEVEL_OUTOF10: 2
PAIN_LEVEL: 4
PAINLEVEL_OUTOF10: 4
PAINLEVEL_OUTOF10: 0 - NO PAIN
PAINLEVEL_OUTOF10: 0 - NO PAIN
PAINLEVEL_OUTOF10: 4
PAINLEVEL_OUTOF10: 4
PAINLEVEL_OUTOF10: 3
PAINLEVEL_OUTOF10: 3

## 2025-03-14 NOTE — ANESTHESIA PROCEDURE NOTES
Airway  Date/Time: 3/14/2025 11:16 AM  Urgency: elective    Airway not difficult    Staffing  Performed: CRNA   Authorized by: Anu Klein MD MPH    Performed by: JACOBO Mcgee-DAVID  Patient location during procedure: OR    Indications and Patient Condition  Indications for airway management: anesthesia  Spontaneous Ventilation: absent  Sedation level: deep  Preoxygenated: yes  Patient position: sniffing  Mask difficulty assessment: 1 - vent by mask  Planned trial extubation    Final Airway Details  Final airway type: supraglottic airway (i gel)      Successful airway: Size 4     Number of attempts at approach: 1

## 2025-03-14 NOTE — DISCHARGE INSTRUCTIONS
Post-Operative Instructions   Dr. Milo Fossucler   [phone]     Dressing: You have a bandage or splint covering your operative site:    Do not remove the dressing until your next scheduled appointment with your surgeon or therapist. Keep your dressing clean and dry. The dressing will be removed at that appointment     Showering: You may shower following surgery but please adhere to above instructions regarding the dressing. If showering with bandage / splint in place please ensure that it is kept dry and covered while bathing. There are commercially available cast bags that can be used to protect your dressing while showering. If using garbage bags please make sure that there are no holes in the bag and that the bag has been sealed above the dressing. If the bandage gets wet you must contact your surgeon's office to make arrangements to be seen to have bandage changed.    Ice / Elevation: The application of ice to your surgical site after surgery will help with pain control and swelling. This can be very effective for 48-72 hours after surgery. Please be careful to avoid getting bandage wet from a leaky ice bag. Please be advised that the ice may need to be applied for longer periods of time for the cooling effect to penetrate the post-operative dressing. Elevation of the operative site above the level of the heart as much as possible for the first 48-72 hours after surgery. Use your sling if you have been provided one while standing or walking. If your fingers are not included in the dressing you are encouraged to attempt finger range of motion as this will help with your hand swelling and ultimate recovery.     Pain Medication: If you received a regional anesthetic on the day of your surgery your arm and hand may be numb for up to 24 hours after surgery. It is important to wear your sling while the block is still effective in order to protect your arm. It is advisable to take pain medications prior to going to  sleep in case the regional anesthesia medication wears off while you are sleeping. Take your pain medications as directed. Narcotic pain medications can cause lethargy, nausea and constipation. Please contact your surgeon's office and discontinue the medication if these symptoms  become problematic. Eating a regular diet, drinking fluids and walking can  help with constipation from these medications.   Avoid alcohol consumption     Additional pain control options:    You are encouraged to take over the counter medications like Advil / Motrin / Ibuprofen / Aleve in addition to your prescribed pain medications after surgery     Smoking / Tobacco: Tobacco use is known to interfere with wound and fracture healing and increase post-operative pain. It can also increase your risk of poor outcomes following surgery. Please do not use tobacco or nicotine products following your surgery. This includes smokeless tobacco, or nicotine replacement products (patches, gum, etc.)     Driving: It is not advisable to operate a vehicle while using narcotic pain medications. Additionally, please be advised that you are likely to have challenges operating a car or motorcycle while you are still in your postoperative dressing and this could increase your risk of being involved in an accident and being cited for driving while physically impaired.     Warning Signs: Observe your arm / hand and incision site (if visible) for increased redness, inflammation, drainage, odor or pain that is unrelieved by rest, elevation or medication. Please contact your surgeon's office immediately if you develop any of these issues or if you develop a fever greater than 101°.     Follow Up Appointments:     You do not yet have a post-operative appointment scheduled. Please contact Dr. Mendez's office at [phone] to schedule this appointment.

## 2025-03-14 NOTE — ANESTHESIA POSTPROCEDURE EVALUATION
Patient: Jake Brunner    Procedure Summary       Date: 03/14/25 Room / Location: [facility] 18 / Virtual [facility] OR    Anesthesia Start: 1102 Anesthesia Stop: 1301    Procedure: Right Distal Radius Fracture Open Reduction Internal Fixation (Right: Wrist) Diagnosis:       Closed Colles' fracture of right radius, initial encounter      (Closed Colles' fracture of right radius, initial encounter [S52.531A])    Surgeons: Milo Mendez DO Responsible Provider: Anu Klein MD MPH    Anesthesia Type: general ASA Status: 3            Anesthesia Type: general    Vitals Value Taken Time   /58 03/14/25 1331   Temp 36 °C (96.8 °F) 03/14/25 1257   Pulse 62 03/14/25 1342   Resp 14 03/14/25 1330   SpO2 94 % 03/14/25 1342   Vitals shown include unfiled device data.    Anesthesia Post Evaluation    Patient location during evaluation: PACU  Patient participation: complete - patient participated  Level of consciousness: awake and alert  Pain score: 4  Pain management: adequate  Multimodal analgesia pain management approach  Airway patency: patent  Two or more strategies used to mitigate risk of obstructive sleep apnea  Cardiovascular status: acceptable  Respiratory status: acceptable  Hydration status: acceptable  Postoperative Nausea and Vomiting: none  Comments: Fingers tingling but min pain.    No notable events documented.

## 2025-03-14 NOTE — BRIEF OP NOTE
Date: 3/14/2025  OR Location: [facility] OR    Name: Jake Brunner : 1955, Age: 70 y.o., MRN: [id number], Sex: male    Diagnosis  Pre-op Diagnosis      * Closed Colles' fracture of right radius, initial encounter [S52.213V] Post-op Diagnosis     * Closed Colles' fracture of right radius, initial encounter [L12.097M]     Procedures  Right Distal Radius Fracture Open Reduction Internal Fixation  26450 - KS OPTX DSTL RADL I-ARTIC FX/EPIPHYSL SEP 2 FRAG      Surgeons      * Will B Beucler - Primary    Resident/Fellow/Other Assistant:  Surgeons and Role:     * Rossy Diaz MD - Resident - Observing    Staff:   Scrub Person: Alla Elizabeth Circulator: Mikayla Elizabeth Scrub: Dennise  Circulator: Asher    Anesthesia Staff: Anesthesiologist: Anu Klein MD MPH  CRNA: ISAÍAS AyonCRNA; TRENT Mcgee  Frontline Breaker: TRENT Belcher    Procedure Summary  Anesthesia: General  ASA: III  Estimated Blood Loss: 50mL  Intra-op Medications:   Administrations occurring from 1105 to 1335 on 25:   Medication Name Total Dose   sodium chloride 0.9 % irrigation solution 1,000 mL   HYDROmorphone (Dilaudid) injection 0.2 mg 0.2 mg   HYDROmorphone (Dilaudid) injection 0.5 mg 0.5 mg   fentaNYL PF (Sublimaze) injection 100 mcg 100 mcg   ceFAZolin (Ancef) vial 1 g 2 g   dexAMETHasone (Decadron) injection 4 mg/mL 8 mg   LR bolus Cannot be calculated   lidocaine PF (Xylocaine-MPF) local injection 2 % 80 mg   ondansetron (Zofran) 2 mg/mL injection 4 mg   propofol (Diprivan) injection 10 mg/mL 150 mg              Anesthesia Record               Intraprocedure I/O Totals          Intake    LR bolus 500.00 mL    Total Intake 500 mL       Output    Est. Blood Loss 50 mL    Total Output 50 mL       Net    Net Volume 450 mL          Specimen: No specimens collected               Findings: R distal radius fx    Complications:  None; patient tolerated the procedure well.     Disposition: PACU -  hemodynamically stable.  Condition: stable  Specimens Collected: No specimens collected  Attending Attestation:     Milo Mendez  Phone Number: [phone]

## 2025-03-14 NOTE — ANESTHESIA PREPROCEDURE EVALUATION
Patient: Jake Brunner    Procedure Information       Date/Time: 03/14/25 1030    Procedure: Right Distal Radius Fracture Open Reduction Internal Fixation (Right: Wrist)    Location: [facility] / [facility]    Surgeons: Will B Beucler, DO            Relevant Problems   Anesthesia (within normal limits)      Cardiac  Echo 11/27/24:  CONCLUSIONS:   1. Left ventricular ejection fraction is hyperdynamic, by visual estimate at 70-75%.   2. Spectral Doppler shows a Grade II (pseudonormal pattern) of left ventricular diastolic filling with an elevated left atrial pressure.   3. There is moderately increased posterior left ventricular wall thickness.   4. There is normal right ventricular global systolic function.   5. The left atrium is mild to moderately dilated.   6. Mild to moderate mitral valve regurgitation.   7. Mild aortic valve regurgitation.     (+) Coronary artery disease involving native coronary artery of native heart without angina pectoris (Heart cath 2/17/25:  CONCLUSIONS:   1. Moderate 2 vessel CAD in a right dominant system.   2. Elevated left and right heart filling pressures.   3. Moderate pulmonary hypertension.   4. Preserved cardiac index.   5. No evidence of intracardiac shunt.   6. No evidence of aortic stenosis.  )   (+) Hyperlipidemia   (+) Hypertension   (+) Mitral regurgitation   (+) Mitral valve disorder   (+) Mitral valve insufficiency, unspecified etiology   (+) Unspecified atrial fibrillation (Multi)      Pulmonary (within normal limits)      Neuro (within normal limits)      GI (within normal limits)      /Renal   (+) Malignant neoplasm of prostate (Multi)      Liver (within normal limits)      Endocrine (within normal limits)      Hematology   (+) Iron deficiency anemia due to chronic blood loss      Musculoskeletal (within normal limits)      HEENT (within normal limits)      ID (within normal limits)      Skin (within normal limits)       Clinical information reviewed:    Tobacco  Allergies  Meds   Med Hx  Surg Hx   Fam Hx  Soc Hx        NPO Detail:  No data recorded     Physical Exam    Airway  Mallampati: I  TM distance: >3 FB  Neck ROM: full     Cardiovascular - normal exam     Dental - normal exam     Pulmonary - normal exam     Abdominal - normal exam         Anesthesia Plan    History of general anesthesia?: yes  History of complications of general anesthesia?: no    ASA 3     general     The patient is not a current smoker.    intravenous induction   Anesthetic plan and risks discussed with patient.  Use of blood products discussed with patient who consented to blood products.

## 2025-03-14 NOTE — INTERVAL H&P NOTE
H&P reviewed. The patient was examined and there are no changes to the H&P.\    Seen and evaluated in the preoperative holding area.  We discussed the planned operation of open reduction internal fixation of right distal radius fracture.  All questions were answered.  He was seen with his significant other present.    The indications for surgical versus nonsurgical management of the condition have been advised, including the inherent risks, benefits, prognosis of the condition.  The risks of surgery include, but are not limited to, pain, bleeding, blood clots, infection, tendon damage, nerve damage, vessel damage, and need for further surgery.  The risks also include wound healing problems, decreased long-term functionality of the wrist and hand, tendon irritation, tendon rupture, and possible need for therapy for an optimum outcome.  There is a risk of complex regional pain syndrome, incomplete recovery, incomplete relief or worsening of symptoms, and recurrence. We also discussed that medical complications are possible during and after surgery related to either anesthesia or the surgical procedure itself. Specific discussion of the risks of the proposed anesthetic plan will be discussed by the patient's anesthesia provider. All risks were reviewed in layman´s terms. The general plan for the postoperative rehabilitation course, including possible need for therapy, was reviewed at great length. The patient was given ample time to ask appropriate questions and appeared to be satisfied with the answers provided. All questions were answered and no guarantees have been given regarding the patient's condition and treatment recommendations.    Okay to proceed with surgery

## 2025-03-14 NOTE — ANESTHESIA PROCEDURE NOTES
Peripheral Block    Patient location during procedure: pre-op  Start time: 3/14/2025 10:50 AM  End time: 3/14/2025 10:51 AM  Reason for block: at surgeon's request and post-op pain management  Staffing  Performed: attending   Authorized by: Anu Klein MD MPH    Performed by: Flaco Harley MD  Preanesthetic Checklist  Completed: patient identified, IV checked, site marked, risks and benefits discussed, surgical consent, monitors and equipment checked, pre-op evaluation and timeout performed   Timeout performed at:   Peripheral Block  Patient position: laying flat  Prep: ChloraPrep and site prepped and draped  Patient monitoring: continuous pulse ox, heart rate and cardiac monitor  Block type: supraclavicular  Laterality: right  Injection technique: single-shot  Guidance: ultrasound guided  Local infiltration: lidocaine  Infiltration strength: 1 %  Dose: 3 mL  Needle  Needle type: short-bevel   Needle gauge: 22 G  Needle length: 8 cm  Needle localization: ultrasound guidance  Test dose: negative  Assessment  Injection assessment: negative aspiration for heme, local visualized surrounding nerve on ultrasound, no paresthesia on injection and incremental injection  Heart rate change: no  Slow fractionated injection: yes

## 2025-03-17 ASSESSMENT — PAIN SCALES - GENERAL: PAINLEVEL_OUTOF10: 0 - NO PAIN

## 2025-03-19 ENCOUNTER — APPOINTMENT (OUTPATIENT)
Dept: CARDIOLOGY | Facility: HOSPITAL | Age: 70
End: 2025-03-19
Payer: MEDICARE

## 2025-03-28 ENCOUNTER — HOSPITAL ENCOUNTER (OUTPATIENT)
Dept: RADIOLOGY | Facility: CLINIC | Age: 70
Discharge: HOME | End: 2025-03-28
Payer: MEDICARE

## 2025-03-28 ENCOUNTER — APPOINTMENT (OUTPATIENT)
Dept: ORTHOPEDIC SURGERY | Facility: CLINIC | Age: 70
End: 2025-03-28
Payer: MEDICARE

## 2025-03-28 DIAGNOSIS — S52.531A CLOSED COLLES' FRACTURE OF RIGHT RADIUS, INITIAL ENCOUNTER: Primary | ICD-10-CM

## 2025-03-28 DIAGNOSIS — S52.531A CLOSED COLLES' FRACTURE OF RIGHT RADIUS, INITIAL ENCOUNTER: ICD-10-CM

## 2025-03-28 PROCEDURE — 73110 X-RAY EXAM OF WRIST: CPT | Mod: RT

## 2025-03-28 ASSESSMENT — PAIN - FUNCTIONAL ASSESSMENT: PAIN_FUNCTIONAL_ASSESSMENT: NO/DENIES PAIN

## 2025-03-28 NOTE — PROGRESS NOTES
[facility]  Hand and Upper Extremity Service  Post Operative visit      Presents for follow up of  wrist. S/p ORIF right distal radius on 3/14/2025. Doing well. Denies numbness or tingling.  Denies fevers or chills.  Operative splint removed today.    Physical Examination:  The patient appears to be their stated age, is in no apparent distress, and is oriented x3. The patients mood and affect are appropriate. The patients gait is normal. The examination of the limb in question was performed in comparison to the contralateral limb.    Incision well healed. Sutures intact  No erythema, warmth, or drainage present  Able to make a full composite fist without issue.  Able to fully extend digits.  .  Decreased wrist flexion and extension compared to contralateral side.  AIN, PIN, ulnar intact  SILT A/R/U/M  Hand wwp    Radiographs  Demonstrate a wrist status post open reduction internal fixation.  No evidence of hardware loosening or failure. Alignment maintained    Assessment:  2 weeks post op    Plan:   Transition to a removable brace today.  Come out of this to work on range of motion 3 times a day.  Maintain nonweightbearing status.  Follow-up in 1 month for progression of weightbearing, XR and clinical exam.

## 2025-04-02 NOTE — OP NOTE
Right Distal Radius Fracture Open Reduction Internal Fixation (R) Operative Note     Date: 3/14/2025  OR Location: [facility] A OR    Name: Jake Brunner : 1955, Age: 70 y.o., MRN: [id number], Sex: male    Diagnosis  Pre-op Diagnosis      * Closed Colles' fracture of right radius, initial encounter [S52.368V] Post-op Diagnosis     * Closed Colles' fracture of right radius, initial encounter [S52533Y]     Procedures  Right Distal Radius Fracture Open Reduction Internal Fixation  78196 - ID OPTX DSTL RADL I-ARTIC FX/EPIPHYSL SEP 2 FRAG      Surgeons      * Will B Beucler - Primary    Resident/Fellow/Other Assistant:  Surgeons and Role:     * Rossy Diaz MD - Resident - Observing    Staff:   Scrub Person: Alla Elizabeth Circulator: Mikayla Elizabeth Scrub: Dennise  Circulator: Asher    Anesthesia Staff: Anesthesiologist: Anu Klein MD MPH  CRNA: ISAÍAS AyonCRNA; TRENT Mcgee  Frontline Breaker: TRENT Belcher    Procedure Summary  Anesthesia: General  ASA: III  Estimated Blood Loss: 10mL  Intra-op Medications:   Administrations occurring from 1105 to 1335 on 25:   Medication Name Total Dose   sodium chloride 0.9 % irrigation solution 1,000 mL   fentaNYL PF (Sublimaze) injection 100 mcg 100 mcg   ceFAZolin (Ancef) vial 1 g 2 g   dexAMETHasone (Decadron) injection 4 mg/mL 8 mg   HYDROmorphone (Dilaudid) injection 0.2 mg 0.2 mg   HYDROmorphone (Dilaudid) injection 0.5 mg 0.5 mg   LR bolus Cannot be calculated   lidocaine PF (Xylocaine-MPF) local injection 2 % 80 mg   ondansetron (Zofran) 2 mg/mL injection 4 mg   propofol (Diprivan) injection 10 mg/mL 150 mg              Anesthesia Record               Intraprocedure I/O Totals          Intake    LR bolus 500.00 mL    Total Intake 500 mL       Output    Est. Blood Loss 50 mL    Total Output 50 mL       Net    Net Volume 450 mL          Specimen: No specimens collected              Drains and/or Catheters: * None in  log *    Tourniquet Times:     Total Tourniquet Time Documented:  Arm - Upper (Right) - 58 minutes  Total: Arm - Upper (Right) - 58 minutes      Implants:  Implants       Type Name Action Serial No.      Screw PLATE, VOLAR RADIUS DISTAL 2.4 6H/ 3H SHAFT VA-LCP 2 CLMN RIGHT - SNA - [id number] Implanted NA     Screw SCREW, VA 2.4 X 20 LOCK STARDRIVE - SNA - [id number] Implanted NA     Screw SCREW, VA 2.4 X 16 LOCK STARDRIVE - SN/A - [id number] Implanted N/A     Screw SCREW, CORTEX SELF, 2.7MM X 16MM - SN/A - [id number] Implanted N/A     Screw SCREW, CORTEX SELF TAP W/T8 RECESS 2.7MM X 14MM, SS - SN/A - [id number] Implanted N/A     Screw SCREW, CORTEX SELF TAPPING T8 SDR 2.4MM X 26MM - SN/A - [id number] Wasted N/A     Screw SCREW, VA 2.4 X 18 LOCK STARDRIVE - SN/A - [id number] Implanted N/A              Findings: Dorsal angulated distal radius fracture    Indications: Jake Brunner is an 70 y.o. male who is having surgery for Closed Colles' fracture of right radius, initial encounter [S52.814U].     The patient was seen in the preoperative area. The risks, benefits, complications, treatment options, non-operative alternatives, expected recovery and outcomes were discussed with the patient. The possibilities of reaction to medication, pulmonary aspiration, injury to surrounding structures, bleeding, recurrent infection, the need for additional procedures, failure to diagnose a condition, and creating a complication requiring transfusion or operation were discussed with the patient. The patient concurred with the proposed plan, giving informed consent.  The site of surgery was properly noted/marked if necessary per policy. The patient has been actively warmed in preoperative area. Preoperative antibiotics have been ordered and given within 1 hours of incision. Venous thrombosis prophylaxis are not indicated.    Procedure Details: Indications for procedure:     I met and evaluated the patient in the preoperative  holding area today prior to the patient receiving any sedation or other medications which may alter their mental status. I confirmed their identity via the facility's protocol. I reviewed the patient's history, physical exam, and recommendation for surgery. The indications for surgical versus nonsurgical management of the condition were discussed, including the inherent risks, benefits, prognosis of the condition.  The risks of surgery include, but are not limited to, pain, bleeding, infection, tendon damage, nerve damage, vessel damage, and need for further surgery.  The risks also include wound healing problems, decreased long-term functionality of the wrist and hand, tendon irritation, tendon rupture, and possible need for therapy for an optimum outcome.  There is a risk of complex regional pain syndrome, incomplete recovery, incomplete relief or worsening of symptoms, and recurrence.  All we also discussed that medical complications are possible during and after surgery related to either anesthesia or the surgical procedure itself. Specific discussion of the risks of the proposed anesthetic plan will be discussed by the patient's anesthesia provider. All risks were reviewed in layman´s terms. The patient was given ample time to ask appropriate questions and appeared to be satisfied with the answers provided. All questions were answered and no guarantees have been given regarding the patient's condition and treatment recommendations. The general plan for the postoperative rehabilitation course, including possible need for therapy, was reviewed at great length. Informed consent was signed by all appropriate parties. The surgical site was marked in ink by myself.        Procedure in Detail:  The patient was transported to the operating room and placed in the supine position on the operating table. All extremities and prominences were appropriately padded. Adequate anesthesia was induced. A non-sterile tourniquet  was applied high on the right arm. The right arm was prepped and draped in standard sterile fashion. A time-out was conducted prior to beginning the operation to confirm the patient's identity, planned procedures, laterality of procedures, and that appropriate antibiotics had been administered within 30 minutes of incision. The right upper extremity was exsanguinated with an Esmarch bandage, and the tourniquet was inflated to 250mmHg.     A volar FCR approach was used for surgical exposure. A 7cm incision was made beginning at the volar wrist crease continuing proximally along the FCR tendon.  Full thickness soft tissue flaps were elevated from the forearm fascia overlying the FCR tendon. Care was taken during dissection to preserve and protect the palmar cutaneous branch of the median nerve and the radial artery. The FCR tendon sheath was opened longitudinally from the volar wrist crease to approximately 10 centimeters proximal to this. The FCR tendon was carefully retracted ulnarly. The FPL musculotendinous unit was identified, septal insertions to the radial shaft were carefully released, and the tendon was retracted ulnarly. The pronator quadratus was identified and released from the radius via an L-shaped incision running longitudinally along the radial aspect of the radius and transversely just proximal to the origin of the volar extrinsic radiocarpal ligaments.  The pronator quadratus was elevated as a single flap and used to cushion any gentle retraction against the carpal canal contents. The fracture site was identified and debrided of interposed pronator muscle and hematoma.  This was primarily extra-articular dorsally angulated distal radius fracture however there was a nondisplaced fracture between the scaphoid and lunate facet.      A brachioradialis tenotomy was performed under direct visualization, carefully protecting the 1st dorsal extensor compartment tendons, radial artery, and superficial branch  "of the radial nerve.     Fracture reduction was facilitated with gentle traction and fracture site manipulation in order to restore the volar cortex, articular segments including both the radiocarpal and distal radial ulnar joints, and acceptable volar tilt. Intraoperative C-arm fluoroscopy was utilized to confirm acceptable fracture reduction in multiple planes.  Acceptable radial height, radial inclination, volar tilt, and articular reduction were confirmed on orthogonal fluoroscopic views.     A volar locking distal radius plating system was used for definitive fracture stabilization. The plate was first centered over the distal aspect of the radial diaphysis and stabilized using an appropriately drilled, sized, and inserted bicortical non-locking screw. Next the distal portion of the plate was utilized to aid in fracture reduction and stabilization, and was provisionally fixed distally using three unicortical 0.054\" Alexandr wires inserted through the corresponding wire holes within the volar locking plate. The distal screw holes in the plate were next filled by placing appropriately drilled, sized, and inserted unicortical locking screws. With the distal portion of the plate filled, attention was once again turned proximally where two additional bicortical screws were placed in the corresponding plate holes in standard fashion as described above. Following hardware placement intraoperative C-arm fluoroscopy was utilized in multiple planes to confirm maintenance of fracture reduction and adequate hardware placement.  The intraoperative C-arm images were reviewed. The DRUJ was noted to be stable in supination, neutral, and pronation. A Kendrick shift test was performed and noted to be negative for palpable subluxation of the carpus or other evidence of instability. No crepitance or stepoffs noted with loaded radiocarpal shear testing.     The tourniquet was released and meticulous hemostasis achieved with bipolar " cautery.  The wound was closed in layers with interrupted buried 4-0 Monocryl suture in the dermis, and 4-0 nylon suture in a horizontal mattress fashion for the skin.  Sterile dressings consisting of Xeroform, 4 x 4 gauze, and cast padding were applied.  A well-padded volar resting splint was applied with the MCPs free for uninhibited composite digital flexion.       The patient was transferred to the hospital bed and transported to the post-anesthesia care unit in good condition having tolerated the procedure well. All sponge, needle, and instrument counts were correct x2. Postoperatively, the digits were pink and well perfused and the hand compartments and soft tissues were supple. No complications were apparent.    Postoperative Plan:  The patient will be nonweightbearing on their operative site. Their dressing will be removed in office  They will return to clinic in 2 weeks. X-rays are needed on return visit.  Complications:  None; patient tolerated the procedure well.    Disposition: PACU - hemodynamically stable.  Condition: stable         Additional Details: See op note    Attending Attestation: I performed the procedure.    Milo Fossuclepaul  Phone Number: [phone]

## 2025-04-22 ENCOUNTER — APPOINTMENT (OUTPATIENT)
Dept: PRIMARY CARE | Facility: CLINIC | Age: 70
End: 2025-04-22
Payer: MEDICARE

## 2025-04-22 DIAGNOSIS — J06.9 UPPER RESPIRATORY TRACT INFECTION, UNSPECIFIED TYPE: Primary | ICD-10-CM

## 2025-04-22 ASSESSMENT — ENCOUNTER SYMPTOMS
WHEEZING: 1
COUGH: 1

## 2025-04-22 NOTE — PROGRESS NOTES
Subjective   Patient ID: Jake Brunner is a 70 y.o. male who presents for No chief complaint on file..    Cough  This is a new problem. The current episode started in the past 7 days. The problem has been gradually improving. The problem occurs every few minutes. The cough is Productive of sputum. Associated symptoms include nasal congestion and wheezing. Pertinent negatives include no chest pain. The symptoms are aggravated by lying down.      Virtual visit today.    Reports subacute onset of cold/URI symptoms; namely those of nasal drip, congestion, cough, stuffiness. OTC medications are effective when taken, however symptoms return when the medication wears off. No f/c. Maintains good PO intake. Mild decrease in energy during this timeframe.     Review of Systems   Respiratory:  Positive for cough and wheezing.    Cardiovascular:  Negative for chest pain.       Objective   There were no vitals taken for this visit.    Physical Exam  Gen: Well appearing, AAO x 3.   HEENT: NC/AT. Symmetric pupils on webcam.   Pulm: no evidence of increased work of breathing on webcam  Skin: no blemishes or rashes on webcam     Assessment/Plan   # Common Cold / Viral URI: self limiting  - conservative management; rest, OTCs, symptomatic care  - no role for abx

## 2025-04-22 NOTE — PATIENT INSTRUCTIONS
You have a URI or the common cold. These are viral infections -- first line treatment is with over-the-counter medications. There is no role for antibiotics in treating this. I recommend over-the-counter treatment with guafenesin such as ROBITUSSIN or MUCINEX for congestion (and/or if not on blood pressure medications, try SUDAFED);  AZELASTINE or FLONASE for nasal symptoms, and THERAFLU or DAYQUIL for all the above.

## 2025-04-25 ENCOUNTER — APPOINTMENT (OUTPATIENT)
Dept: ORTHOPEDIC SURGERY | Facility: CLINIC | Age: 70
End: 2025-04-25
Payer: MEDICARE

## 2025-04-25 ENCOUNTER — HOSPITAL ENCOUNTER (OUTPATIENT)
Dept: RADIOLOGY | Facility: CLINIC | Age: 70
Discharge: HOME | End: 2025-04-25
Payer: MEDICARE

## 2025-04-25 DIAGNOSIS — S52.531A CLOSED COLLES' FRACTURE OF RIGHT RADIUS, INITIAL ENCOUNTER: Primary | ICD-10-CM

## 2025-04-25 DIAGNOSIS — S52.531A CLOSED COLLES' FRACTURE OF RIGHT RADIUS, INITIAL ENCOUNTER: ICD-10-CM

## 2025-04-25 PROCEDURE — 73110 X-RAY EXAM OF WRIST: CPT | Mod: RT

## 2025-04-25 NOTE — PROGRESS NOTES
[facility]  Hand and Upper Extremity Service  Post Operative visit      Presents for follow up of  wrist. S/p ORIF right distal radius on 3/14/2025. Doing well. Denies numbness or tingling.  Denies fevers or chills.  He has been wearing his Velcro movable splint.  He has returned to activity as tolerated.  He does note some mild ulnar-sided wrist pain.  He has been riding his motorcycle without difficulty.    Physical Examination:  The patient appears to be their stated age, is in no apparent distress, and is oriented x3. The patients mood and affect are appropriate. The patients gait is normal. The examination of the limb in question was performed in comparison to the contralateral limb.    Incision well healed. No erythema, warmth, or drainage present  Able to make a full composite fist without issue.  Able to fully extend digits.  Full pronation and supination.  Decreased wrist flexion extension compared to contralateral side.  Mild tenderness ovation over the ulnar styloid.  AIN, PIN, ulnar intact  SILT A/R/U/M  Hand wwp    Radiographs  Demonstrate a wrist status post open reduction internal fixation.  No evidence of hardware loosening or failure. Alignment maintained.  Bridging callus present.    Assessment:  6 weeks post op    Plan:   He he may discontinue his Velcro movable brace.  Gradually return to weightbearing as tolerated.  May gradually return to activity as tolerated.  We discussed that his ulnar-sided wrist pain should improve.  We discussed that this may be present for up to 6 months.  If this is still a problem after 6 months she should follow-up.    Please schedule for 6-week follow-up.  If he has doing well and he may cancel.

## 2025-05-13 DIAGNOSIS — I34.0 MITRAL VALVE INSUFFICIENCY, UNSPECIFIED ETIOLOGY: ICD-10-CM

## 2025-06-23 ASSESSMENT — ENCOUNTER SYMPTOMS
PALPITATIONS: 0
DIZZINESS: 0
BLOOD IN STOOL: 0
PSYCHIATRIC NEGATIVE: 1
UNEXPECTED WEIGHT CHANGE: 0
RECTAL PAIN: 0
JOINT SWELLING: 0
FATIGUE: 0
DIFFICULTY URINATING: 0
ANAL BLEEDING: 0
DIARRHEA: 0
ARTHRALGIAS: 0
HEMATURIA: 0
FEVER: 0
ALLERGIC/IMMUNOLOGIC NEGATIVE: 1
CONSTIPATION: 0
BACK PAIN: 0
CHEST TIGHTNESS: 0
ABDOMINAL PAIN: 0
SHORTNESS OF BREATH: 0
COUGH: 0
WEAKNESS: 0
FREQUENCY: 0
DYSURIA: 0

## 2025-06-23 NOTE — PROGRESS NOTES
Cancer synopsis:  Rad/onc: Dr. Tavares/ Sita DAVIS    70 year old male with node-positive prostate cancer, [id number] (PSMA PET+ left int iliac node), Lai 4+4=8, iPSA 63.47. He was treated with  definitive treatment on the ASCLEPIUS trial: SBRT with 6 months ADT/abiraterone/niraparib.   He completed treatment to a total dose of 4000 cGy in 5 fractions to his gross disease in his prostate and lymph nodes, 3750 cGy in 5 fractions to his prostate, and 2500 cGy to his pelvic lymph nodes     Treatment Dates: 04/11-04/20/2022  Elapsed Days: 9  Region(s) Treated: prostate, nodes, and pelvis  Radiation Dose Prescribed: 4000 cGy in five fractions to his gross disease (800 cGy per fraction), 3750 cGy in five fractions to his prostate (750 cGy per fraction), and 2500 cGy to his pelvic lymph nodes (500 cGy per fraction)  Radiation Technique: SBRT  Energy: 10 MV photons    PMH:  Mitral valve disease and repair, HTN, HLD, HILTON    Recent imaging:  X-ray chest: 03/2024 No acute cardiopulmonary pathology     History of presenting illness:    Patient ID: [id number]     Jake Brunner is a 70 y.o. male who presents for Locally advance very high risk prostate cancer GG4, IPSA 63.47, [id number] now s/p RT st-term systemic therapy.    RT Site: prostate, pelvis and local LN  RT Date: 04/20/2022  Hormone therapy: Yes, st-term ADT, aa/p and niraparib  Hot Flushes: Denies  Fatigue: Denies  Bone pain: Denies  ED: Reports sexual drive has vastly improved. Does have Cialis but has not had used.   ED medications: Yes, cialis 10-20mg  Urinary symptoms: Denies dysuria, hematuria, frequency, urgency, urine leakage. States hard to gauge d/t lasix uage  Urinary Medications: No  Rectal bleeding: Denies  Colonoscopy: Due now (none on file)  Other systems: Denies CP or fever. Does report some occasional SOB however relates this to his current pleural effusion he has recently had pleurocentesis and started on lasix. Planning to have mitral valve repair  gain in coming months.     PERFORMANCE STATUS:  KPS/ECO, Fully active, able to carry on all pre-disease performed without restriction (ECOG equivalent 0)    Review of systems:  Review of Systems   Constitutional:  Negative for fatigue, fever and unexpected weight change.   Respiratory:  Negative for cough, chest tightness and shortness of breath.    Cardiovascular:  Negative for chest pain, palpitations and leg swelling.   Gastrointestinal:  Negative for abdominal pain, anal bleeding, blood in stool, constipation, diarrhea and rectal pain.   Endocrine: Negative for cold intolerance, heat intolerance and polyuria.   Genitourinary:  Positive for penile pain. Negative for decreased urine volume, difficulty urinating, dysuria, frequency, hematuria and urgency.        Refer to HPI   Musculoskeletal:  Negative for arthralgias, back pain, gait problem and joint swelling.   Skin: Negative.    Allergic/Immunologic: Negative.    Neurological:  Negative for dizziness, syncope and weakness.   Psychiatric/Behavioral: Negative.       Past Medical history  Past Medical History:   Diagnosis Date    Alcohol abuse, in remission     Cardiology follow-up encounter     Diana Alcantar, APRN-CNP LOV 10/9/24    Closed Colles' fracture of right radius, initial encounter     Plan: Right Distal Radius Fracture Open Reduction Internal Fixation 3/14/25    Coronary artery disease involving native coronary artery of native heart without angina pectoris     Essential (primary) hypertension     Hepatic steatosis     History of PFTs     Pulmonology Jake Wilkinson MD 10/27/24 “PFTs revealed no significant obstruction or restriction with no significant desaturation.  The CT scan reveals the effusion but slightly better.  He will continue with his diuretics and follow-up with me over the next several months”    Hyperlipidemia     Malignant neoplasm of prostate (Multi)     Mitral valve disorder     s/p Heart cath 25    Nonrheumatic  mitral valve regurgitation     HILTON on CPAP     Postoperative atrial fibrillation (Multi)     Shortness of breath     Tibia/fibula fracture     s/p INSERTION NAIL / BELLA INTRAMEDULLARY TIBIA Right 6/26/19    Traumatic subdural hematoma (Multi)       Surgical/family history  Family History   Problem Relation Name Age of Onset    Hyperlipidemia Mother      Coronary artery disease Father      Other (MYOCARDIAL INFARCTION) Father      Heart attack Father        Past Surgical History:   Procedure Laterality Date    ARTERIAL ANEURYSM REPAIR  12/2015    Aortic Aneurysm Repair Ascending Aorta    CARDIAC CATHETERIZATION N/A 12/11/2023    Procedure: Left And Right Heart Cath, With LV;  Surgeon: Owen Choi MD;  Location: [facility] Cardiac Cath Lab;  Service: Cardiovascular;  Laterality: N/A;  Scheduled 12/11 @ 9:00am, EULA w/ anesthesia @ 7:30am    CARDIAC CATHETERIZATION N/A 02/17/2025    Procedure: Left & Right Heart Cath w Angiography & LV;  Surgeon: Owen Choi MD;  Location: [facility] Cardiac Cath Lab;  Service: Cardiovascular;  Laterality: N/A;    CATARACT EXTRACTION Right 05/09/2024    CATARACT EXTRACTION Left 04/25/2024    COLONOSCOPY      CT CHEST W AND WO IV CONTRAST  10/18/2023    Thoracic aortic atherosclerosis.    LIPOMA RESECTION  10/28/2022    back    MITRAL VALVE REPAIR      ORIF ANKLE FRACTURE Left 12/02/2009    ORIF TIBIA FRACTURE Right 06/26/2019    INSERTION NAIL / BELLA INTRAMEDULLARY TIBIA    STERNOTOMY  01/23/2024    Redo Median Sternotomy, Repair Mitral Valve    THORACIC AORTIC ANEURYSM REPAIR  2015    WRIST SURGERY        Social History  Tobacco Use: Medium Risk (5/5/2025)    Received [facility]    Patient History     Smoking Tobacco Use: Former     Smokeless Tobacco Use: Never     Passive Exposure: Not on file       Current med list:  Current Outpatient Medications   Medication Instructions    aspirin 81 mg EC tablet 1 tablet, Daily    benzonatate (TESSALON) 200 mg, oral, 3 times daily PRN     ezetimibe (ZETIA) 10 mg, oral, Daily    furosemide (LASIX) 40 mg, oral, Daily    metoprolol tartrate (LOPRESSOR) 50 mg, oral, 2 times daily    multivitamin tablet 1 tablet, Daily    rosuvastatin (CRESTOR) 40 mg, oral, Daily    tadalafil 20 mg, oral, As needed      Last recorded vital:  /84   Pulse 63   Temp 36 °C (96.8 °F) (Temporal)   Resp 18   Wt 93 kg (205 lb)   SpO2 97%   BMI 30.72 kg/m²     Physical Exam  Constitutional:       Appearance: Normal appearance.   Cardiovascular:      Rate and Rhythm: Normal rate.   Pulmonary:      Effort: Pulmonary effort is normal.      Breath sounds: Normal breath sounds.   Musculoskeletal:         General: Normal range of motion.      Cervical back: Normal range of motion.   Neurological:      Mental Status: He is alert and oriented to person, place, and time.   Psychiatric:         Mood and Affect: Mood normal.         Behavior: Behavior normal.         Thought Content: Thought content normal.         Judgment: Judgment normal.       Pertinent labs:  Prostate Specific AG   Date/Time Value Ref Range Status   12/11/2024 12:37 PM <0.10 <=4.00 ng/mL Final     PSA   Date/Time Value Ref Range Status   12/11/2024 12:37 PM <0.1 0.0 - 4.0 ng/mL Final     Comment:     INTERPRETIVE INFORMATION: Prostate Specific Antigen    The Roche PSA electrochemiluminescent immunoassay is used. Results   obtained with different test methods or kits cannot be used   interchangeably. The Roche PSA method is approved for use as an   aid in the detection of prostate cancer when used in conjunction   with a digital rectal exam in individuals with a prostate age 50   years and older. The Roche PSA is also indicated for the serial   measurement of PSA to aid in the prognosis and management of   prostate cancer patients. Elevated PSA concentrations can only   suggest the presence of prostate cancer until biopsy is performed.   PSA concentrations can also be elevated in benign prostatic    hyperplasia or inflammatory conditions of the prostate. PSA is   generally not elevated in healthy individuals or individuals with   nonprostatic carcinoma.     Dx:  Problem List Items Addressed This Visit       Malignant neoplasm of prostate (Multi) - Primary    Relevant Orders    PSA, Total and Free    Testosterone    Comprehensive Metabolic Panel    CBC and Auto Differential   Due for labs today, will call with results. Review of latent SE including rectal bleeding, hematuria, urinary strictures, ED where reviewed as well as how to contact office if s/s present. Denies latent SE. NCCN guidelines where reviewed and routine FUV of every 3m for first year and every 6m for four years for a total of five years was discussed. Patient verbalized understanding.     PLAN:  FUV 6m (trial team to arrange)  Labs Psa today and in 6m. Anemia labs  Imaging none  Cialis 10-20mg PRN  Heme referral for anemia  FUV other providers: PCP for routine evals, cards for mitral valve regurg    Please contact office with any concerns:  Pacheco Gomes CNP  [phone]

## 2025-06-24 ENCOUNTER — HOSPITAL ENCOUNTER (OUTPATIENT)
Dept: RADIATION ONCOLOGY | Facility: HOSPITAL | Age: 70
Setting detail: RADIATION/ONCOLOGY SERIES
Discharge: HOME | End: 2025-06-24
Payer: MEDICARE

## 2025-06-24 ENCOUNTER — LAB (OUTPATIENT)
Dept: LAB | Facility: HOSPITAL | Age: 70
End: 2025-06-24
Payer: MEDICARE

## 2025-06-24 ENCOUNTER — DOCUMENTATION (OUTPATIENT)
Dept: RADIATION ONCOLOGY | Facility: HOSPITAL | Age: 70
End: 2025-06-24
Payer: MEDICARE

## 2025-06-24 VITALS
BODY MASS INDEX: 30.72 KG/M2 | SYSTOLIC BLOOD PRESSURE: 145 MMHG | OXYGEN SATURATION: 97 % | DIASTOLIC BLOOD PRESSURE: 84 MMHG | HEART RATE: 63 BPM | WEIGHT: 205 LBS | RESPIRATION RATE: 18 BRPM | TEMPERATURE: 96.8 F

## 2025-06-24 DIAGNOSIS — C61 MALIGNANT NEOPLASM OF PROSTATE (MULTI): ICD-10-CM

## 2025-06-24 DIAGNOSIS — C61 MALIGNANT NEOPLASM OF PROSTATE (MULTI): Primary | ICD-10-CM

## 2025-06-24 LAB
ALBUMIN SERPL BCP-MCNC: 4.2 G/DL (ref 3.4–5)
ALP SERPL-CCNC: 47 U/L (ref 33–136)
ALT SERPL W P-5'-P-CCNC: 13 U/L (ref 10–52)
ANION GAP SERPL CALC-SCNC: 11 MMOL/L (ref 10–20)
AST SERPL W P-5'-P-CCNC: 23 U/L (ref 9–39)
BASOPHILS # BLD AUTO: 0.09 X10*3/UL (ref 0–0.1)
BASOPHILS NFR BLD AUTO: 1.2 %
BILIRUB SERPL-MCNC: 0.7 MG/DL (ref 0–1.2)
BUN SERPL-MCNC: 19 MG/DL (ref 6–23)
CALCIUM SERPL-MCNC: 10.7 MG/DL (ref 8.6–10.3)
CHLORIDE SERPL-SCNC: 107 MMOL/L (ref 98–107)
CO2 SERPL-SCNC: 28 MMOL/L (ref 21–32)
CREAT SERPL-MCNC: 0.84 MG/DL (ref 0.5–1.3)
EGFRCR SERPLBLD CKD-EPI 2021: >90 ML/MIN/1.73M*2
EOSINOPHIL # BLD AUTO: 0.26 X10*3/UL (ref 0–0.4)
EOSINOPHIL NFR BLD AUTO: 3.5 %
ERYTHROCYTE [DISTWIDTH] IN BLOOD BY AUTOMATED COUNT: 15.6 % (ref 11.5–14.5)
GLUCOSE SERPL-MCNC: 100 MG/DL (ref 74–99)
HCT VFR BLD AUTO: 38.8 % (ref 41–52)
HGB BLD-MCNC: 12.5 G/DL (ref 13.5–17.5)
IMM GRANULOCYTES # BLD AUTO: 0.02 X10*3/UL (ref 0–0.5)
IMM GRANULOCYTES NFR BLD AUTO: 0.3 % (ref 0–0.9)
LYMPHOCYTES # BLD AUTO: 1.74 X10*3/UL (ref 0.8–3)
LYMPHOCYTES NFR BLD AUTO: 23.5 %
MCH RBC QN AUTO: 29.8 PG (ref 26–34)
MCHC RBC AUTO-ENTMCNC: 32.2 G/DL (ref 32–36)
MCV RBC AUTO: 92 FL (ref 80–100)
MONOCYTES # BLD AUTO: 0.7 X10*3/UL (ref 0.05–0.8)
MONOCYTES NFR BLD AUTO: 9.5 %
NEUTROPHILS # BLD AUTO: 4.59 X10*3/UL (ref 1.6–5.5)
NEUTROPHILS NFR BLD AUTO: 62 %
NRBC BLD-RTO: 0 /100 WBCS (ref 0–0)
PLATELET # BLD AUTO: 244 X10*3/UL (ref 150–450)
POTASSIUM SERPL-SCNC: 4.8 MMOL/L (ref 3.5–5.3)
PROT SERPL-MCNC: 7 G/DL (ref 6.4–8.2)
RBC # BLD AUTO: 4.2 X10*6/UL (ref 4.5–5.9)
SODIUM SERPL-SCNC: 141 MMOL/L (ref 136–145)
TESTOST SERPL-MCNC: 385 NG/DL (ref 240–1000)
WBC # BLD AUTO: 7.4 X10*3/UL (ref 4.4–11.3)

## 2025-06-24 PROCEDURE — 84154 ASSAY OF PSA FREE: CPT

## 2025-06-24 PROCEDURE — 99214 OFFICE O/P EST MOD 30 MIN: CPT

## 2025-06-24 PROCEDURE — 36415 COLL VENOUS BLD VENIPUNCTURE: CPT

## 2025-06-24 PROCEDURE — 85025 COMPLETE CBC W/AUTO DIFF WBC: CPT

## 2025-06-24 PROCEDURE — 84403 ASSAY OF TOTAL TESTOSTERONE: CPT

## 2025-06-24 PROCEDURE — 84075 ASSAY ALKALINE PHOSPHATASE: CPT

## 2025-06-24 ASSESSMENT — COLUMBIA-SUICIDE SEVERITY RATING SCALE - C-SSRS
1. IN THE PAST MONTH, HAVE YOU WISHED YOU WERE DEAD OR WISHED YOU COULD GO TO SLEEP AND NOT WAKE UP?: NO
2. HAVE YOU ACTUALLY HAD ANY THOUGHTS OF KILLING YOURSELF?: NO
6. HAVE YOU EVER DONE ANYTHING, STARTED TO DO ANYTHING, OR PREPARED TO DO ANYTHING TO END YOUR LIFE?: NO

## 2025-06-24 ASSESSMENT — ENCOUNTER SYMPTOMS
DEPRESSION: 0
OCCASIONAL FEELINGS OF UNSTEADINESS: 0
LOSS OF SENSATION IN FEET: 0

## 2025-06-24 ASSESSMENT — PAIN SCALES - GENERAL: PAINLEVEL_OUTOF10: 0-NO PAIN

## 2025-06-24 NOTE — ADDENDUM NOTE
Encounter addended by: JACOBO Agrawal-GAGANDEEP on: 6/24/2025 10:28 AM   Actions taken: Level of Service modified, Visit diagnoses modified, Order list changed, Diagnosis association updated, Flowsheet data copied forward, Clinical Note Signed, Flowsheet accepted

## 2025-06-24 NOTE — RESEARCH NOTES
STUDY: [id number]  VISIT: 35M    Clinical Research Specialist saw patient in clinic today.    Adverse Events and Concomitant Medications assessed.    QOLs completed by patient without assistance.     Next appointment and contact information provided.    All questions answered to patient satisfaction.    Karoline Gipson  06/24/2025

## 2025-06-24 NOTE — RESEARCH NOTES
STUDY: [id number]  VISIT: 35M    Clinical Research Specialist saw patient in clinic today.    Adverse Events and Concomitant Medications assessed.    QOLs completed by patient without assistance.     Next appointment and contact information provided.    All questions answered to patient satisfaction.    Karoline Gipson  06/24/2025

## 2025-06-27 LAB
PSA FREE MFR SERPL: <50 %
PSA FREE SERPL-MCNC: <0.1 NG/ML
PSA SERPL IA-MCNC: 0.2 NG/ML (ref 0–4)

## 2025-07-01 ENCOUNTER — TELEPHONE (OUTPATIENT)
Dept: CARDIAC SURGERY | Facility: HOSPITAL | Age: 70
End: 2025-07-01
Payer: MEDICARE

## 2025-07-16 ENCOUNTER — HOSPITAL ENCOUNTER (OUTPATIENT)
Dept: CARDIOLOGY | Facility: HOSPITAL | Age: 70
Discharge: HOME | End: 2025-07-16
Payer: MEDICARE

## 2025-07-16 DIAGNOSIS — I05.9 RHEUMATIC MITRAL VALVE DISEASE, UNSPECIFIED: ICD-10-CM

## 2025-07-16 DIAGNOSIS — I34.0 MITRAL VALVE INSUFFICIENCY, UNSPECIFIED ETIOLOGY: ICD-10-CM

## 2025-07-16 LAB
AORTIC VALVE PEAK VELOCITY: 1.43 M/S
AV PEAK GRADIENT: 8 MMHG
EJECTION FRACTION APICAL 4 CHAMBER: 63.1
EJECTION FRACTION: 68 %
LEFT ATRIUM VOLUME AREA LENGTH INDEX BSA: 55 ML/M2
LEFT VENTRICLE INTERNAL DIMENSION DIASTOLE: 4.87 CM (ref 3.5–6)
MITRAL VALVE E/A RATIO: 2.4
RIGHT VENTRICLE FREE WALL PEAK S': 10 CM/S
RIGHT VENTRICLE PEAK SYSTOLIC PRESSURE: 83 MMHG
TRICUSPID ANNULAR PLANE SYSTOLIC EXCURSION: 1.8 CM

## 2025-07-16 PROCEDURE — 2500000004 HC RX 250 GENERAL PHARMACY W/ HCPCS (ALT 636 FOR OP/ED): Performed by: THORACIC SURGERY (CARDIOTHORACIC VASCULAR SURGERY)

## 2025-07-16 RX ADMIN — HUMAN ALBUMIN MICROSPHERES AND PERFLUTREN 0.5 ML: 10; .22 INJECTION, SOLUTION INTRAVENOUS at 12:01

## 2025-07-25 DIAGNOSIS — I34.0 MITRAL VALVE INSUFFICIENCY, UNSPECIFIED ETIOLOGY: Primary | ICD-10-CM

## 2025-07-25 PROBLEM — F10.11 HISTORY OF ALCOHOL ABUSE: Status: RESOLVED | Noted: 2025-07-25 | Resolved: 2025-07-25

## 2025-07-25 NOTE — PROGRESS NOTES
Re referral from Dr. Choi. Jake comes to discuss treatment recommendations for mitral regurgitation. Past history of mitral repair 1/23/2024 remote aorta surgery 2015. Jen Bobo RN     Patient comes to clinic to discuss treatment options for recurrent mitral regurgitation.  Patient is status post 2 previous heart operations.  1 for an acute aortic dissection and the second 1 for mitral regurgitation.  The patient has now developed recurrent mitral regurgitation.  Coronary angiography does not demonstrate any significant coronary artery disease.  Echocardiography demonstrates preserved ventricular function with elevated pulmonary pressures and severe mitral regurgitation.  Confirmed by MRI.    Recommend redo median sternotomy, and mitral valve replacement with tissue valve.  Risks, benefits, and alternatives discussed with patient, who wishes to proceed.

## 2025-07-29 DIAGNOSIS — I34.0 MITRAL VALVE INSUFFICIENCY, UNSPECIFIED ETIOLOGY: ICD-10-CM

## 2025-08-01 ENCOUNTER — OFFICE VISIT (OUTPATIENT)
Dept: CARDIAC SURGERY | Facility: HOSPITAL | Age: 70
End: 2025-08-01
Payer: MEDICARE

## 2025-08-01 VITALS
BODY MASS INDEX: 31.16 KG/M2 | HEART RATE: 57 BPM | HEIGHT: 68 IN | DIASTOLIC BLOOD PRESSURE: 77 MMHG | SYSTOLIC BLOOD PRESSURE: 125 MMHG | OXYGEN SATURATION: 99 % | WEIGHT: 205.6 LBS

## 2025-08-01 DIAGNOSIS — I34.0 MITRAL VALVE INSUFFICIENCY, UNSPECIFIED ETIOLOGY: ICD-10-CM

## 2025-08-01 PROCEDURE — 99212 OFFICE O/P EST SF 10 MIN: CPT

## 2025-08-01 ASSESSMENT — ENCOUNTER SYMPTOMS
DEPRESSION: 0
OCCASIONAL FEELINGS OF UNSTEADINESS: 0
LOSS OF SENSATION IN FEET: 0

## 2025-08-01 ASSESSMENT — PAIN SCALES - GENERAL: PAINLEVEL_OUTOF10: 0-NO PAIN

## 2025-08-05 ENCOUNTER — CLINICAL SUPPORT (OUTPATIENT)
Dept: PREADMISSION TESTING | Facility: HOSPITAL | Age: 70
End: 2025-08-05
Payer: MEDICARE

## 2025-08-05 ASSESSMENT — DUKE ACTIVITY SCORE INDEX (DASI)
CAN YOU DO MODERATE WORK AROUND THE HOUSE LIKE VACUUMING, SWEEPING FLOORS OR CARRYING GROCERIES: YES
CAN YOU PARTICIPATE IN MODERATE RECREATIONAL ACTIVITIES LIKE GOLF, BOWLING, DANCING, DOUBLES TENNIS OR THROWING A BASEBALL OR FOOTBALL: NO
CAN YOU DO YARD WORK LIKE RAKING LEAVES, WEEDING OR PUSHING A MOWER: YES
CAN YOU PARTICIPATE IN STRENOUS SPORTS LIKE SWIMMING, SINGLES TENNIS, FOOTBALL, BASKETBALL, OR SKIING: NO
CAN YOU HAVE SEXUAL RELATIONS: YES
CAN YOU CLIMB A FLIGHT OF STAIRS OR WALK UP A HILL: YES
CAN YOU TAKE CARE OF YOURSELF (EAT, DRESS, BATHE, OR USE TOILET): YES
CAN YOU DO LIGHT WORK AROUND THE HOUSE LIKE DUSTING OR WASHING DISHES: YES
DASI METS SCORE: 7.3
TOTAL_SCORE: 36.7
CAN YOU WALK INDOORS, SUCH AS AROUND YOUR HOUSE: YES
CAN YOU RUN A SHORT DISTANCE: NO
CAN YOU WALK A BLOCK OR TWO ON LEVEL GROUND: YES
CAN YOU DO HEAVY WORK AROUND THE HOUSE LIKE SCRUBBING FLOORS OR LIFTING AND MOVING HEAVY FURNITURE: YES

## 2025-08-14 ENCOUNTER — PRE-ADMISSION TESTING (OUTPATIENT)
Dept: PREADMISSION TESTING | Facility: HOSPITAL | Age: 70
End: 2025-08-14
Payer: MEDICARE

## 2025-08-14 ENCOUNTER — HOSPITAL ENCOUNTER (OUTPATIENT)
Dept: RADIOLOGY | Facility: HOSPITAL | Age: 70
Discharge: HOME | End: 2025-08-14
Payer: MEDICARE

## 2025-08-14 ENCOUNTER — ANESTHESIA EVENT (OUTPATIENT)
Dept: OPERATING ROOM | Facility: HOSPITAL | Age: 70
End: 2025-08-14
Payer: MEDICARE

## 2025-08-14 VITALS
OXYGEN SATURATION: 96 % | TEMPERATURE: 98 F | BODY MASS INDEX: 30.96 KG/M2 | DIASTOLIC BLOOD PRESSURE: 80 MMHG | WEIGHT: 204.3 LBS | HEIGHT: 68 IN | HEART RATE: 59 BPM | SYSTOLIC BLOOD PRESSURE: 145 MMHG

## 2025-08-14 DIAGNOSIS — R20.0 ANESTHESIA: Primary | ICD-10-CM

## 2025-08-14 DIAGNOSIS — I34.0 MITRAL VALVE INSUFFICIENCY, UNSPECIFIED ETIOLOGY: ICD-10-CM

## 2025-08-14 LAB
ABO GROUP (TYPE) IN BLOOD: NORMAL
ALBUMIN SERPL BCP-MCNC: 4.4 G/DL (ref 3.4–5)
ALP SERPL-CCNC: 43 U/L (ref 33–136)
ALT SERPL W P-5'-P-CCNC: 12 U/L (ref 10–52)
ANION GAP SERPL CALC-SCNC: 10 MMOL/L (ref 10–20)
ANTIBODY SCREEN: NORMAL
AST SERPL W P-5'-P-CCNC: 25 U/L (ref 9–39)
BASOPHILS # BLD AUTO: 0.13 X10*3/UL (ref 0–0.1)
BASOPHILS NFR BLD AUTO: 1.7 %
BILIRUB SERPL-MCNC: 0.6 MG/DL (ref 0–1.2)
BUN SERPL-MCNC: 24 MG/DL (ref 6–23)
CALCIUM SERPL-MCNC: 10.8 MG/DL (ref 8.6–10.6)
CHLORIDE SERPL-SCNC: 107 MMOL/L (ref 98–107)
CO2 SERPL-SCNC: 29 MMOL/L (ref 21–32)
CREAT SERPL-MCNC: 0.87 MG/DL (ref 0.5–1.3)
EGFRCR SERPLBLD CKD-EPI 2021: >90 ML/MIN/1.73M*2
EOSINOPHIL # BLD AUTO: 0.23 X10*3/UL (ref 0–0.4)
EOSINOPHIL NFR BLD AUTO: 3.1 %
ERYTHROCYTE [DISTWIDTH] IN BLOOD BY AUTOMATED COUNT: 15.3 % (ref 11.5–14.5)
GLUCOSE SERPL-MCNC: 98 MG/DL (ref 74–99)
HCT VFR BLD AUTO: 40.8 % (ref 41–52)
HGB BLD-MCNC: 12.9 G/DL (ref 13.5–17.5)
IMM GRANULOCYTES # BLD AUTO: 0.02 X10*3/UL (ref 0–0.5)
IMM GRANULOCYTES NFR BLD AUTO: 0.3 % (ref 0–0.9)
LYMPHOCYTES # BLD AUTO: 2.04 X10*3/UL (ref 0.8–3)
LYMPHOCYTES NFR BLD AUTO: 27.5 %
MCH RBC QN AUTO: 29.6 PG (ref 26–34)
MCHC RBC AUTO-ENTMCNC: 31.6 G/DL (ref 32–36)
MCV RBC AUTO: 94 FL (ref 80–100)
MONOCYTES # BLD AUTO: 0.76 X10*3/UL (ref 0.05–0.8)
MONOCYTES NFR BLD AUTO: 10.2 %
NEUTROPHILS # BLD AUTO: 4.25 X10*3/UL (ref 1.6–5.5)
NEUTROPHILS NFR BLD AUTO: 57.2 %
NRBC BLD-RTO: 0 /100 WBCS (ref 0–0)
PLATELET # BLD AUTO: 223 X10*3/UL (ref 150–450)
POTASSIUM SERPL-SCNC: 5.3 MMOL/L (ref 3.5–5.3)
PROT SERPL-MCNC: 7 G/DL (ref 6.4–8.2)
RBC # BLD AUTO: 4.36 X10*6/UL (ref 4.5–5.9)
RH FACTOR (ANTIGEN D): NORMAL
SODIUM SERPL-SCNC: 141 MMOL/L (ref 136–145)
WBC # BLD AUTO: 7.4 X10*3/UL (ref 4.4–11.3)

## 2025-08-14 PROCEDURE — 80053 COMPREHEN METABOLIC PANEL: CPT | Performed by: THORACIC SURGERY (CARDIOTHORACIC VASCULAR SURGERY)

## 2025-08-14 PROCEDURE — 85025 COMPLETE CBC W/AUTO DIFF WBC: CPT | Performed by: THORACIC SURGERY (CARDIOTHORACIC VASCULAR SURGERY)

## 2025-08-14 PROCEDURE — 86901 BLOOD TYPING SEROLOGIC RH(D): CPT | Performed by: THORACIC SURGERY (CARDIOTHORACIC VASCULAR SURGERY)

## 2025-08-14 PROCEDURE — 87081 CULTURE SCREEN ONLY: CPT

## 2025-08-14 PROCEDURE — 71046 X-RAY EXAM CHEST 2 VIEWS: CPT

## 2025-08-14 PROCEDURE — 99215 OFFICE O/P EST HI 40 MIN: CPT | Performed by: ANESTHESIOLOGY

## 2025-08-14 RX ORDER — CHLORHEXIDINE GLUCONATE ORAL RINSE 1.2 MG/ML
15 SOLUTION DENTAL DAILY
Qty: 30 ML | Refills: 0 | Status: ON HOLD | OUTPATIENT
Start: 2025-08-14 | End: 2025-08-16

## 2025-08-14 RX ORDER — CHLORHEXIDINE GLUCONATE 40 MG/ML
1 SOLUTION TOPICAL DAILY
Qty: 25 ML | Refills: 0 | Status: ON HOLD | OUTPATIENT
Start: 2025-08-14 | End: 2025-08-19 | Stop reason: ALTCHOICE

## 2025-08-14 ASSESSMENT — ENCOUNTER SYMPTOMS
NECK NEGATIVE: 1
NEUROLOGICAL NEGATIVE: 1
CONSTITUTIONAL NEGATIVE: 1
GASTROINTESTINAL NEGATIVE: 1
DYSPNEA AT REST: 1
SHORTNESS OF BREATH: 1

## 2025-08-15 LAB — STAPHYLOCOCCUS SPEC CULT: NORMAL

## 2025-08-19 ENCOUNTER — APPOINTMENT (OUTPATIENT)
Dept: RADIOLOGY | Facility: HOSPITAL | Age: 70
DRG: 220 | End: 2025-08-19
Payer: MEDICARE

## 2025-08-19 ENCOUNTER — HOSPITAL ENCOUNTER (INPATIENT)
Facility: HOSPITAL | Age: 70
DRG: 220 | End: 2025-08-19
Attending: THORACIC SURGERY (CARDIOTHORACIC VASCULAR SURGERY) | Admitting: THORACIC SURGERY (CARDIOTHORACIC VASCULAR SURGERY)
Payer: MEDICARE

## 2025-08-19 ENCOUNTER — HOSPITAL ENCOUNTER (INPATIENT)
Dept: OPERATING ROOM | Facility: HOSPITAL | Age: 70
Discharge: HOME | DRG: 220 | End: 2025-08-19
Payer: MEDICARE

## 2025-08-19 ENCOUNTER — ANESTHESIA (OUTPATIENT)
Dept: OPERATING ROOM | Facility: HOSPITAL | Age: 70
End: 2025-08-19
Payer: MEDICARE

## 2025-08-19 ENCOUNTER — APPOINTMENT (OUTPATIENT)
Dept: CARDIOLOGY | Facility: HOSPITAL | Age: 70
DRG: 220 | End: 2025-08-19
Payer: MEDICARE

## 2025-08-19 PROCEDURE — 2500000004 HC RX 250 GENERAL PHARMACY W/ HCPCS (ALT 636 FOR OP/ED): Performed by: THORACIC SURGERY (CARDIOTHORACIC VASCULAR SURGERY)

## 2025-08-19 PROCEDURE — 2500000004 HC RX 250 GENERAL PHARMACY W/ HCPCS (ALT 636 FOR OP/ED): Performed by: ANESTHESIOLOGIST ASSISTANT

## 2025-08-19 PROCEDURE — 2500000004 HC RX 250 GENERAL PHARMACY W/ HCPCS (ALT 636 FOR OP/ED): Performed by: ANESTHESIOLOGY

## 2025-08-19 PROCEDURE — 2500000004 HC RX 250 GENERAL PHARMACY W/ HCPCS (ALT 636 FOR OP/ED): Performed by: NURSE ANESTHETIST, CERTIFIED REGISTERED

## 2025-08-19 PROCEDURE — 2500000002 HC RX 250 W HCPCS SELF ADMINISTERED DRUGS (ALT 637 FOR MEDICARE OP, ALT 636 FOR OP/ED): Performed by: ANESTHESIOLOGIST ASSISTANT

## 2025-08-19 PROCEDURE — 2500000005 HC RX 250 GENERAL PHARMACY W/O HCPCS: Performed by: ANESTHESIOLOGIST ASSISTANT

## 2025-08-19 PROCEDURE — P9045 ALBUMIN (HUMAN), 5%, 250 ML: HCPCS | Mod: JZ,TB | Performed by: ANESTHESIOLOGIST ASSISTANT

## 2025-08-19 RX ORDER — ALBUMIN HUMAN 50 G/1000ML
SOLUTION INTRAVENOUS AS NEEDED
Status: DISCONTINUED | OUTPATIENT
Start: 2025-08-19 | End: 2025-08-19

## 2025-08-19 RX ORDER — MIDAZOLAM HYDROCHLORIDE 2 MG/2ML
INJECTION, SOLUTION INTRAMUSCULAR; INTRAVENOUS AS NEEDED
Status: DISCONTINUED | OUTPATIENT
Start: 2025-08-19 | End: 2025-08-19

## 2025-08-19 RX ORDER — DEXMEDETOMIDINE HYDROCHLORIDE 4 UG/ML
INJECTION, SOLUTION INTRAVENOUS CONTINUOUS PRN
Status: DISCONTINUED | OUTPATIENT
Start: 2025-08-19 | End: 2025-08-19

## 2025-08-19 RX ORDER — LIDOCAINE HYDROCHLORIDE 10 MG/ML
INJECTION, SOLUTION INFILTRATION; PERINEURAL AS NEEDED
Status: DISCONTINUED | OUTPATIENT
Start: 2025-08-19 | End: 2025-08-19

## 2025-08-19 RX ORDER — CALCIUM CHLORIDE INJECTION 100 MG/ML
INJECTION, SOLUTION INTRAVENOUS AS NEEDED
Status: DISCONTINUED | OUTPATIENT
Start: 2025-08-19 | End: 2025-08-19

## 2025-08-19 RX ORDER — SODIUM CHLORIDE, SODIUM GLUCONATE, SODIUM ACETATE, POTASSIUM CHLORIDE AND MAGNESIUM CHLORIDE 30; 37; 368; 526; 502 MG/100ML; MG/100ML; MG/100ML; MG/100ML; MG/100ML
INJECTION, SOLUTION INTRAVENOUS CONTINUOUS PRN
Status: DISCONTINUED | OUTPATIENT
Start: 2025-08-19 | End: 2025-08-19

## 2025-08-19 RX ORDER — ROCURONIUM BROMIDE 10 MG/ML
INJECTION, SOLUTION INTRAVENOUS AS NEEDED
Status: DISCONTINUED | OUTPATIENT
Start: 2025-08-19 | End: 2025-08-19

## 2025-08-19 RX ORDER — NITROGLYCERIN 40 MG/100ML
INJECTION INTRAVENOUS AS NEEDED
Status: DISCONTINUED | OUTPATIENT
Start: 2025-08-19 | End: 2025-08-19

## 2025-08-19 RX ORDER — EPINEPHRINE IN 0.9 % SOD CHLOR 4MG/250ML
PLASTIC BAG, INJECTION (ML) INTRAVENOUS CONTINUOUS PRN
Status: DISCONTINUED | OUTPATIENT
Start: 2025-08-19 | End: 2025-08-19

## 2025-08-19 RX ORDER — AMIODARONE HYDROCHLORIDE 150 MG/3ML
INJECTION, SOLUTION INTRAVENOUS AS NEEDED
Status: DISCONTINUED | OUTPATIENT
Start: 2025-08-19 | End: 2025-08-19

## 2025-08-19 RX ORDER — METHADONE IN SOD CHLOR,ISO-OSM 10 MG/ML
SYRINGE (ML) INTRAVENOUS AS NEEDED
Status: DISCONTINUED | OUTPATIENT
Start: 2025-08-19 | End: 2025-08-19

## 2025-08-19 RX ORDER — HEPARIN SODIUM 1000 [USP'U]/ML
INJECTION, SOLUTION INTRAVENOUS; SUBCUTANEOUS AS NEEDED
Status: DISCONTINUED | OUTPATIENT
Start: 2025-08-19 | End: 2025-08-19

## 2025-08-19 RX ORDER — PROTAMINE SULFATE 10 MG/ML
INJECTION, SOLUTION INTRAVENOUS AS NEEDED
Status: DISCONTINUED | OUTPATIENT
Start: 2025-08-19 | End: 2025-08-19

## 2025-08-19 RX ORDER — MANNITOL 20 G/100ML
INJECTION, SOLUTION INTRAVENOUS AS NEEDED
Status: DISCONTINUED | OUTPATIENT
Start: 2025-08-19 | End: 2025-08-19

## 2025-08-19 RX ORDER — FENTANYL CITRATE 50 UG/ML
INJECTION, SOLUTION INTRAMUSCULAR; INTRAVENOUS AS NEEDED
Status: DISCONTINUED | OUTPATIENT
Start: 2025-08-19 | End: 2025-08-19

## 2025-08-19 RX ORDER — PROPOFOL 10 MG/ML
INJECTION, EMULSION INTRAVENOUS AS NEEDED
Status: DISCONTINUED | OUTPATIENT
Start: 2025-08-19 | End: 2025-08-19

## 2025-08-19 RX ORDER — LIDOCAINE HYDROCHLORIDE 20 MG/ML
INJECTION, SOLUTION INFILTRATION; PERINEURAL AS NEEDED
Status: DISCONTINUED | OUTPATIENT
Start: 2025-08-19 | End: 2025-08-19

## 2025-08-19 RX ORDER — MAGNESIUM SULFATE HEPTAHYDRATE 500 MG/ML
INJECTION, SOLUTION INTRAMUSCULAR; INTRAVENOUS AS NEEDED
Status: DISCONTINUED | OUTPATIENT
Start: 2025-08-19 | End: 2025-08-19

## 2025-08-19 RX ORDER — CEFAZOLIN 1 G/1
INJECTION, POWDER, FOR SOLUTION INTRAVENOUS AS NEEDED
Status: DISCONTINUED | OUTPATIENT
Start: 2025-08-19 | End: 2025-08-19

## 2025-08-19 RX ORDER — NOREPINEPHRINE BITARTRATE 0.03 MG/ML
INJECTION, SOLUTION INTRAVENOUS CONTINUOUS PRN
Status: DISCONTINUED | OUTPATIENT
Start: 2025-08-19 | End: 2025-08-19

## 2025-08-19 RX ORDER — INDOMETHACIN 25 MG/1
CAPSULE ORAL AS NEEDED
Status: DISCONTINUED | OUTPATIENT
Start: 2025-08-19 | End: 2025-08-19

## 2025-08-19 RX ADMIN — ALBUMIN HUMAN 250 ML: 0.05 INJECTION, SOLUTION INTRAVENOUS at 12:41

## 2025-08-19 RX ADMIN — PROPOFOL 50 MG: 10 INJECTION, EMULSION INTRAVENOUS at 07:52

## 2025-08-19 RX ADMIN — NOREPINEPHRINE BITARTRATE 4 MCG: 1 INJECTION, SOLUTION, CONCENTRATE INTRAVENOUS at 09:36

## 2025-08-19 RX ADMIN — NOREPINEPHRINE BITARTRATE 16 MCG: 1 INJECTION, SOLUTION, CONCENTRATE INTRAVENOUS at 13:11

## 2025-08-19 RX ADMIN — NOREPINEPHRINE BITARTRATE 8 MCG: 1 INJECTION, SOLUTION, CONCENTRATE INTRAVENOUS at 12:31

## 2025-08-19 RX ADMIN — LIDOCAINE HYDROCHLORIDE 100 MG: 20 INJECTION, SOLUTION INFILTRATION; PERINEURAL at 07:51

## 2025-08-19 RX ADMIN — NOREPINEPHRINE BITARTRATE 16 MCG: 1 INJECTION, SOLUTION, CONCENTRATE INTRAVENOUS at 12:00

## 2025-08-19 RX ADMIN — SODIUM BICARBONATE 50 MEQ: 84 INJECTION, SOLUTION INTRAVENOUS at 10:14

## 2025-08-19 RX ADMIN — SODIUM CHLORIDE, SODIUM GLUCONATE, SODIUM ACETATE, POTASSIUM CHLORIDE AND MAGNESIUM CHLORIDE: 30; 37; 368; 526; 502 INJECTION, SOLUTION INTRAVENOUS at 08:00

## 2025-08-19 RX ADMIN — PROPOFOL 50 MG: 10 INJECTION, EMULSION INTRAVENOUS at 07:51

## 2025-08-19 RX ADMIN — AMIODARONE HYDROCHLORIDE 150 MG: 50 INJECTION, SOLUTION INTRAVENOUS at 11:14

## 2025-08-19 RX ADMIN — NOREPINEPHRINE BITARTRATE 8 MCG: 1 INJECTION, SOLUTION, CONCENTRATE INTRAVENOUS at 09:47

## 2025-08-19 RX ADMIN — NOREPINEPHRINE BITARTRATE 16 MCG: 1 INJECTION, SOLUTION, CONCENTRATE INTRAVENOUS at 12:33

## 2025-08-19 RX ADMIN — CALCIUM CHLORIDE INJECTION 0.5 G: 100 INJECTION, SOLUTION INTRAVENOUS at 11:44

## 2025-08-19 RX ADMIN — FENTANYL CITRATE 50 MCG: 50 INJECTION, SOLUTION INTRAMUSCULAR; INTRAVENOUS at 12:37

## 2025-08-19 RX ADMIN — PROTAMINE SULFATE 50 MG: 10 INJECTION, SOLUTION INTRAVENOUS at 11:53

## 2025-08-19 RX ADMIN — INSULIN HUMAN 5 UNITS: 100 INJECTION, SOLUTION PARENTERAL at 10:34

## 2025-08-19 RX ADMIN — SODIUM CHLORIDE, SODIUM GLUCONATE, SODIUM ACETATE, POTASSIUM CHLORIDE AND MAGNESIUM CHLORIDE: 30; 37; 368; 526; 502 INJECTION, SOLUTION INTRAVENOUS at 08:20

## 2025-08-19 RX ADMIN — ROCURONIUM BROMIDE 70 MG: 10 INJECTION, SOLUTION INTRAVENOUS at 07:52

## 2025-08-19 RX ADMIN — DEXMEDETOMIDINE HYDROCHLORIDE 0.2 MCG/KG/HR: 4 INJECTION, SOLUTION INTRAVENOUS at 11:23

## 2025-08-19 RX ADMIN — MIDAZOLAM HYDROCHLORIDE 1 MG: 2 INJECTION, SOLUTION INTRAMUSCULAR; INTRAVENOUS at 07:51

## 2025-08-19 RX ADMIN — MIDAZOLAM HYDROCHLORIDE 1 MG: 2 INJECTION, SOLUTION INTRAMUSCULAR; INTRAVENOUS at 07:14

## 2025-08-19 RX ADMIN — PROTAMINE SULFATE 50 MG: 10 INJECTION, SOLUTION INTRAVENOUS at 12:24

## 2025-08-19 RX ADMIN — NOREPINEPHRINE BITARTRATE 8 MCG: 1 INJECTION, SOLUTION, CONCENTRATE INTRAVENOUS at 13:19

## 2025-08-19 RX ADMIN — DESMOPRESSIN ACETATE 32 MCG: 4 INJECTION, SOLUTION INTRAVENOUS; SUBCUTANEOUS at 11:46

## 2025-08-19 RX ADMIN — EPINEPHRINE IN SODIUM CHLORIDE 0.02 MCG/KG/MIN: 16 INJECTION INTRAVENOUS at 11:10

## 2025-08-19 RX ADMIN — CALCIUM CHLORIDE INJECTION 0.5 G: 100 INJECTION, SOLUTION INTRAVENOUS at 11:43

## 2025-08-19 RX ADMIN — NOREPINEPHRINE BITARTRATE 8 MCG: 1 INJECTION, SOLUTION, CONCENTRATE INTRAVENOUS at 12:57

## 2025-08-19 RX ADMIN — MANNITOL 60 ML: 20 INJECTION, SOLUTION INTRAVENOUS at 10:38

## 2025-08-19 RX ADMIN — CEFAZOLIN 2 G: 1 INJECTION, POWDER, FOR SOLUTION INTRAMUSCULAR; INTRAVENOUS at 12:29

## 2025-08-19 RX ADMIN — PROTAMINE SULFATE 330 MG: 10 INJECTION, SOLUTION INTRAVENOUS at 11:33

## 2025-08-19 RX ADMIN — PROTAMINE SULFATE 10 MG: 10 INJECTION, SOLUTION INTRAVENOUS at 11:32

## 2025-08-19 RX ADMIN — Medication 0.02 MCG/KG/MIN: at 11:20

## 2025-08-19 RX ADMIN — NOREPINEPHRINE BITARTRATE 8 MCG: 1 INJECTION, SOLUTION, CONCENTRATE INTRAVENOUS at 09:52

## 2025-08-19 RX ADMIN — LIDOCAINE HYDROCHLORIDE 100 MG: 10 INJECTION, SOLUTION INFILTRATION; PERINEURAL at 11:11

## 2025-08-19 RX ADMIN — NOREPINEPHRINE BITARTRATE 16 MCG: 1 INJECTION, SOLUTION, CONCENTRATE INTRAVENOUS at 11:53

## 2025-08-19 RX ADMIN — FENTANYL CITRATE 50 MCG: 50 INJECTION, SOLUTION INTRAMUSCULAR; INTRAVENOUS at 12:18

## 2025-08-19 RX ADMIN — FENTANYL CITRATE 300 MCG: 50 INJECTION, SOLUTION INTRAMUSCULAR; INTRAVENOUS at 07:51

## 2025-08-19 RX ADMIN — ROCURONIUM BROMIDE 50 MG: 10 INJECTION, SOLUTION INTRAVENOUS at 09:24

## 2025-08-19 RX ADMIN — MAGNESIUM SULFATE HEPTAHYDRATE 2 G: 500 INJECTION, SOLUTION INTRAMUSCULAR; INTRAVENOUS at 11:02

## 2025-08-19 RX ADMIN — CALCIUM CHLORIDE 1 G: 100 INJECTION, SOLUTION INTRAVENOUS at 11:15

## 2025-08-19 RX ADMIN — TRANEXAMIC ACID 45 MG/KG/HR: 100 INJECTION INTRAVENOUS at 08:50

## 2025-08-19 RX ADMIN — CEFAZOLIN 2 G: 1 INJECTION, POWDER, FOR SOLUTION INTRAMUSCULAR; INTRAVENOUS at 08:29

## 2025-08-19 RX ADMIN — SODIUM BICARBONATE 50 MEQ: 84 INJECTION, SOLUTION INTRAVENOUS at 11:01

## 2025-08-19 RX ADMIN — ROCURONIUM BROMIDE 30 MG: 10 INJECTION, SOLUTION INTRAVENOUS at 08:25

## 2025-08-19 RX ADMIN — HEPARIN SODIUM 37000 UNITS: 1000 INJECTION INTRAVENOUS; SUBCUTANEOUS at 09:43

## 2025-08-19 RX ADMIN — ALBUMIN HUMAN 250 ML: 0.05 INJECTION, SOLUTION INTRAVENOUS at 12:35

## 2025-08-19 RX ADMIN — PROPOFOL 30 MG: 10 INJECTION, EMULSION INTRAVENOUS at 08:54

## 2025-08-19 RX ADMIN — VASOPRESSIN 0.04 UNITS/MIN: 20 INJECTION, SOLUTION INTRAMUSCULAR; SUBCUTANEOUS at 13:38

## 2025-08-19 RX ADMIN — SODIUM CHLORIDE, SODIUM GLUCONATE, SODIUM ACETATE, POTASSIUM CHLORIDE AND MAGNESIUM CHLORIDE: 526; 502; 368; 37; 30 INJECTION, SOLUTION INTRAVENOUS at 07:15

## 2025-08-19 RX ADMIN — NITROGLYCERIN 100 MCG: 10 INJECTION INTRAVENOUS at 08:52

## 2025-08-19 RX ADMIN — Medication 20 MG: at 09:00

## 2025-08-19 RX ADMIN — LIDOCAINE HYDROCHLORIDE 100 MG: 10 INJECTION, SOLUTION INFILTRATION; PERINEURAL at 11:02

## 2025-08-19 RX ADMIN — PROPOFOL 30 MCG/KG/MIN: 10 INJECTION, EMULSION INTRAVENOUS at 12:05

## 2025-08-19 RX ADMIN — NOREPINEPHRINE BITARTRATE 16 MCG: 1 INJECTION, SOLUTION, CONCENTRATE INTRAVENOUS at 11:41

## 2025-08-19 RX ADMIN — NOREPINEPHRINE BITARTRATE 16 MCG: 1 INJECTION, SOLUTION, CONCENTRATE INTRAVENOUS at 13:25

## 2025-08-19 RX ADMIN — ROCURONIUM BROMIDE 20 MG: 10 INJECTION, SOLUTION INTRAVENOUS at 12:05

## 2025-08-19 RX ADMIN — NOREPINEPHRINE BITARTRATE 16 MCG: 1 INJECTION, SOLUTION, CONCENTRATE INTRAVENOUS at 11:38

## 2025-08-19 RX ADMIN — FENTANYL CITRATE 300 MCG: 50 INJECTION, SOLUTION INTRAMUSCULAR; INTRAVENOUS at 08:52

## 2025-08-19 RX ADMIN — MANNITOL 60 ML: 20 INJECTION, SOLUTION INTRAVENOUS at 10:04

## 2025-08-19 RX ADMIN — ALBUMIN HUMAN 250 ML: 0.05 INJECTION, SOLUTION INTRAVENOUS at 12:21

## 2025-08-19 RX ADMIN — NOREPINEPHRINE BITARTRATE 16 MCG: 1 INJECTION, SOLUTION, CONCENTRATE INTRAVENOUS at 12:40

## 2025-08-19 RX ADMIN — NOREPINEPHRINE BITARTRATE 4 MCG: 1 INJECTION, SOLUTION, CONCENTRATE INTRAVENOUS at 09:43

## 2025-08-19 RX ADMIN — NOREPINEPHRINE BITARTRATE 16 MCG: 1 INJECTION, SOLUTION, CONCENTRATE INTRAVENOUS at 11:46

## 2025-08-19 RX ADMIN — SODIUM CHLORIDE, SODIUM GLUCONATE, SODIUM ACETATE, POTASSIUM CHLORIDE AND MAGNESIUM CHLORIDE: 30; 37; 368; 526; 502 INJECTION, SOLUTION INTRAVENOUS at 12:02

## 2025-08-19 ASSESSMENT — PAIN - FUNCTIONAL ASSESSMENT
PAIN_FUNCTIONAL_ASSESSMENT: 0-10
PAIN_FUNCTIONAL_ASSESSMENT: CPOT (CRITICAL CARE PAIN OBSERVATION TOOL)
PAIN_FUNCTIONAL_ASSESSMENT: 0-10
PAIN_FUNCTIONAL_ASSESSMENT: 0-10
PAIN_FUNCTIONAL_ASSESSMENT: CPOT (CRITICAL CARE PAIN OBSERVATION TOOL)
PAIN_FUNCTIONAL_ASSESSMENT: 0-10

## 2025-08-19 ASSESSMENT — PAIN SCALES - GENERAL
PAINLEVEL_OUTOF10: 7
PAINLEVEL_OUTOF10: 0 - NO PAIN
PAINLEVEL_OUTOF10: 0 - NO PAIN
PAINLEVEL_OUTOF10: 7
PAINLEVEL_OUTOF10: 0 - NO PAIN
PAINLEVEL_OUTOF10: 7
PAINLEVEL_OUTOF10: 7
PAINLEVEL_OUTOF10: 0 - NO PAIN
PAINLEVEL_OUTOF10: 5 - MODERATE PAIN
PAINLEVEL_OUTOF10: 3

## 2025-08-19 ASSESSMENT — PAIN DESCRIPTION - DESCRIPTORS
DESCRIPTORS: ACHING;SHARP
DESCRIPTORS: ACHING;SQUEEZING;TENDER

## 2025-08-19 ASSESSMENT — PAIN DESCRIPTION - LOCATION
LOCATION: CHEST

## 2025-08-20 ENCOUNTER — APPOINTMENT (OUTPATIENT)
Dept: RADIOLOGY | Facility: HOSPITAL | Age: 70
DRG: 220 | End: 2025-08-20
Payer: MEDICARE

## 2025-08-20 SDOH — ECONOMIC STABILITY: INCOME INSECURITY: IN THE PAST 12 MONTHS HAS THE ELECTRIC, GAS, OIL, OR WATER COMPANY THREATENED TO SHUT OFF SERVICES IN YOUR HOME?: NO

## 2025-08-20 SDOH — ECONOMIC STABILITY: FOOD INSECURITY: WITHIN THE PAST 12 MONTHS, THE FOOD YOU BOUGHT JUST DIDN'T LAST AND YOU DIDN'T HAVE MONEY TO GET MORE.: NEVER TRUE

## 2025-08-20 SDOH — SOCIAL STABILITY: SOCIAL INSECURITY
WITHIN THE LAST YEAR, HAVE YOU BEEN RAPED OR FORCED TO HAVE ANY KIND OF SEXUAL ACTIVITY BY YOUR PARTNER OR EX-PARTNER?: NO

## 2025-08-20 SDOH — HEALTH STABILITY: MENTAL HEALTH
DO YOU FEEL STRESS - TENSE, RESTLESS, NERVOUS, OR ANXIOUS, OR UNABLE TO SLEEP AT NIGHT BECAUSE YOUR MIND IS TROUBLED ALL THE TIME - THESE DAYS?: NOT AT ALL

## 2025-08-20 SDOH — HEALTH STABILITY: MENTAL HEALTH: HOW OFTEN DO YOU HAVE SIX OR MORE DRINKS ON ONE OCCASION?: NEVER

## 2025-08-20 SDOH — SOCIAL STABILITY: SOCIAL NETWORK: HOW OFTEN DO YOU GET TOGETHER WITH FRIENDS OR RELATIVES?: TWICE A WEEK

## 2025-08-20 SDOH — SOCIAL STABILITY: SOCIAL INSECURITY: WITHIN THE LAST YEAR, HAVE YOU BEEN HUMILIATED OR EMOTIONALLY ABUSED IN OTHER WAYS BY YOUR PARTNER OR EX-PARTNER?: NO

## 2025-08-20 SDOH — HEALTH STABILITY: PHYSICAL HEALTH: ON AVERAGE, HOW MANY MINUTES DO YOU ENGAGE IN EXERCISE AT THIS LEVEL?: 0 MIN

## 2025-08-20 SDOH — SOCIAL STABILITY: SOCIAL NETWORK
DO YOU BELONG TO ANY CLUBS OR ORGANIZATIONS SUCH AS CHURCH GROUPS, UNIONS, FRATERNAL OR ATHLETIC GROUPS, OR SCHOOL GROUPS?: NO

## 2025-08-20 SDOH — ECONOMIC STABILITY: FOOD INSECURITY: WITHIN THE PAST 12 MONTHS, YOU WORRIED THAT YOUR FOOD WOULD RUN OUT BEFORE YOU GOT THE MONEY TO BUY MORE.: NEVER TRUE

## 2025-08-20 SDOH — SOCIAL STABILITY: SOCIAL NETWORK: HOW OFTEN DO YOU ATTEND MEETINGS OF THE CLUBS OR ORGANIZATIONS YOU BELONG TO?: NEVER

## 2025-08-20 SDOH — HEALTH STABILITY: MENTAL HEALTH: HOW MANY DRINKS CONTAINING ALCOHOL DO YOU HAVE ON A TYPICAL DAY WHEN YOU ARE DRINKING?: PATIENT DOES NOT DRINK

## 2025-08-20 SDOH — HEALTH STABILITY: MENTAL HEALTH: HOW OFTEN DO YOU HAVE A DRINK CONTAINING ALCOHOL?: NEVER

## 2025-08-20 SDOH — SOCIAL STABILITY: SOCIAL NETWORK: IN A TYPICAL WEEK, HOW MANY TIMES DO YOU TALK ON THE PHONE WITH FAMILY, FRIENDS, OR NEIGHBORS?: THREE TIMES A WEEK

## 2025-08-20 SDOH — SOCIAL STABILITY: SOCIAL NETWORK: HOW OFTEN DO YOU ATTEND CHURCH OR RELIGIOUS SERVICES?: NEVER

## 2025-08-20 SDOH — HEALTH STABILITY: PHYSICAL HEALTH: ON AVERAGE, HOW MANY DAYS PER WEEK DO YOU ENGAGE IN MODERATE TO STRENUOUS EXERCISE (LIKE A BRISK WALK)?: 0 DAYS

## 2025-08-20 SDOH — SOCIAL STABILITY: SOCIAL INSECURITY: ARE YOU MARRIED, WIDOWED, DIVORCED, SEPARATED, NEVER MARRIED, OR LIVING WITH A PARTNER?: MARRIED

## 2025-08-20 SDOH — HEALTH STABILITY: PHYSICAL HEALTH
HOW OFTEN DO YOU NEED TO HAVE SOMEONE HELP YOU WHEN YOU READ INSTRUCTIONS, PAMPHLETS, OR OTHER WRITTEN MATERIAL FROM YOUR DOCTOR OR PHARMACY?: RARELY

## 2025-08-20 SDOH — SOCIAL STABILITY: SOCIAL INSECURITY: WITHIN THE LAST YEAR, HAVE YOU BEEN AFRAID OF YOUR PARTNER OR EX-PARTNER?: NO

## 2025-08-20 ASSESSMENT — ACTIVITIES OF DAILY LIVING (ADL)
LACK_OF_TRANSPORTATION: NO
HEARING - LEFT EAR: FUNCTIONAL
JUDGMENT_ADEQUATE_SAFELY_COMPLETE_DAILY_ACTIVITIES: YES
GROOMING: INDEPENDENT
FEEDING YOURSELF: INDEPENDENT
ADEQUATE_TO_COMPLETE_ADL: YES
HEARING - RIGHT EAR: FUNCTIONAL
TOILETING: INDEPENDENT
ADL_ASSISTANCE: INDEPENDENT
BATHING: INDEPENDENT
WALKS IN HOME: INDEPENDENT
PATIENT'S MEMORY ADEQUATE TO SAFELY COMPLETE DAILY ACTIVITIES?: YES

## 2025-08-20 ASSESSMENT — PAIN SCALES - GENERAL
PAINLEVEL_OUTOF10: 0 - NO PAIN
PAINLEVEL_OUTOF10: 5 - MODERATE PAIN
PAINLEVEL_OUTOF10: 5 - MODERATE PAIN
PAINLEVEL_OUTOF10: 6
PAINLEVEL_OUTOF10: 4
PAINLEVEL_OUTOF10: 5 - MODERATE PAIN
PAINLEVEL_OUTOF10: 5 - MODERATE PAIN
PAINLEVEL_OUTOF10: 3
PAINLEVEL_OUTOF10: 0 - NO PAIN
PAINLEVEL_OUTOF10: 3
PAINLEVEL_OUTOF10: 10 - WORST POSSIBLE PAIN
PAINLEVEL_OUTOF10: 5 - MODERATE PAIN
PAINLEVEL_OUTOF10: 0 - NO PAIN

## 2025-08-20 ASSESSMENT — PAIN DESCRIPTION - DESCRIPTORS
DESCRIPTORS: ACHING

## 2025-08-20 ASSESSMENT — PAIN - FUNCTIONAL ASSESSMENT
PAIN_FUNCTIONAL_ASSESSMENT: 0-10

## 2025-08-20 ASSESSMENT — COGNITIVE AND FUNCTIONAL STATUS - GENERAL
STANDING UP FROM CHAIR USING ARMS: A LITTLE
MOBILITY SCORE: 16
MOVING FROM LYING ON BACK TO SITTING ON SIDE OF FLAT BED WITH BEDRAILS: A LITTLE
CLIMB 3 TO 5 STEPS WITH RAILING: TOTAL
MOVING TO AND FROM BED TO CHAIR: A LITTLE
WALKING IN HOSPITAL ROOM: A LITTLE
TURNING FROM BACK TO SIDE WHILE IN FLAT BAD: A LITTLE

## 2025-08-20 ASSESSMENT — LIFESTYLE VARIABLES
SKIP TO QUESTIONS 9-10: 1
AUDIT-C TOTAL SCORE: 0

## 2025-08-20 ASSESSMENT — PAIN DESCRIPTION - LOCATION
LOCATION: CHEST
LOCATION: CHEST

## 2025-08-20 ASSESSMENT — PAIN DESCRIPTION - ORIENTATION
ORIENTATION: LEFT
ORIENTATION: LEFT

## 2025-08-21 ENCOUNTER — APPOINTMENT (OUTPATIENT)
Dept: RADIOLOGY | Facility: HOSPITAL | Age: 70
DRG: 220 | End: 2025-08-21
Payer: MEDICARE

## 2025-08-21 SDOH — SOCIAL STABILITY: SOCIAL INSECURITY: WERE YOU ABLE TO COMPLETE ALL THE BEHAVIORAL HEALTH SCREENINGS?: YES

## 2025-08-21 SDOH — SOCIAL STABILITY: SOCIAL INSECURITY: DO YOU FEEL UNSAFE GOING BACK TO THE PLACE WHERE YOU ARE LIVING?: NO

## 2025-08-21 SDOH — SOCIAL STABILITY: SOCIAL INSECURITY: DO YOU FEEL ANYONE HAS EXPLOITED OR TAKEN ADVANTAGE OF YOU FINANCIALLY OR OF YOUR PERSONAL PROPERTY?: NO

## 2025-08-21 SDOH — SOCIAL STABILITY: SOCIAL INSECURITY: HAVE YOU HAD THOUGHTS OF HARMING ANYONE ELSE?: NO

## 2025-08-21 SDOH — SOCIAL STABILITY: SOCIAL INSECURITY: DOES ANYONE TRY TO KEEP YOU FROM HAVING/CONTACTING OTHER FRIENDS OR DOING THINGS OUTSIDE YOUR HOME?: NO

## 2025-08-21 SDOH — SOCIAL STABILITY: SOCIAL INSECURITY: HAS ANYONE EVER THREATENED TO HURT YOUR FAMILY OR YOUR PETS?: NO

## 2025-08-21 SDOH — SOCIAL STABILITY: SOCIAL INSECURITY: ARE THERE ANY APPARENT SIGNS OF INJURIES/BEHAVIORS THAT COULD BE RELATED TO ABUSE/NEGLECT?: NO

## 2025-08-21 SDOH — SOCIAL STABILITY: SOCIAL INSECURITY: ABUSE: ADULT

## 2025-08-21 SDOH — SOCIAL STABILITY: SOCIAL INSECURITY: ARE YOU OR HAVE YOU BEEN THREATENED OR ABUSED PHYSICALLY, EMOTIONALLY, OR SEXUALLY BY ANYONE?: NO

## 2025-08-21 ASSESSMENT — PAIN - FUNCTIONAL ASSESSMENT
PAIN_FUNCTIONAL_ASSESSMENT: 0-10

## 2025-08-21 ASSESSMENT — COGNITIVE AND FUNCTIONAL STATUS - GENERAL
CLIMB 3 TO 5 STEPS WITH RAILING: A LITTLE
TOILETING: A LITTLE
TURNING FROM BACK TO SIDE WHILE IN FLAT BAD: A LITTLE
DRESSING REGULAR UPPER BODY CLOTHING: A LITTLE
TOILETING: A LITTLE
DRESSING REGULAR LOWER BODY CLOTHING: A LITTLE
MOBILITY SCORE: 18
MOBILITY SCORE: 18
WALKING IN HOSPITAL ROOM: A LITTLE
DRESSING REGULAR UPPER BODY CLOTHING: A LITTLE
WALKING IN HOSPITAL ROOM: A LITTLE
MOVING TO AND FROM BED TO CHAIR: A LITTLE
TURNING FROM BACK TO SIDE WHILE IN FLAT BAD: A LITTLE
STANDING UP FROM CHAIR USING ARMS: A LITTLE
HELP NEEDED FOR BATHING: A LITTLE
DAILY ACTIVITIY SCORE: 21
STANDING UP FROM CHAIR USING ARMS: A LITTLE
MOVING FROM LYING ON BACK TO SITTING ON SIDE OF FLAT BED WITH BEDRAILS: A LITTLE
DRESSING REGULAR LOWER BODY CLOTHING: A LITTLE
DAILY ACTIVITIY SCORE: 20
TOILETING: A LITTLE
DRESSING REGULAR LOWER BODY CLOTHING: A LITTLE
HELP NEEDED FOR BATHING: A LITTLE
CLIMB 3 TO 5 STEPS WITH RAILING: A LITTLE
MOVING FROM LYING ON BACK TO SITTING ON SIDE OF FLAT BED WITH BEDRAILS: A LITTLE
PATIENT BASELINE BEDBOUND: NO
HELP NEEDED FOR BATHING: A LITTLE
DAILY ACTIVITIY SCORE: 20
MOVING TO AND FROM BED TO CHAIR: A LITTLE

## 2025-08-21 ASSESSMENT — PATIENT HEALTH QUESTIONNAIRE - PHQ9
SUM OF ALL RESPONSES TO PHQ9 QUESTIONS 1 & 2: 0
1. LITTLE INTEREST OR PLEASURE IN DOING THINGS: NOT AT ALL
2. FEELING DOWN, DEPRESSED OR HOPELESS: NOT AT ALL

## 2025-08-21 ASSESSMENT — LIFESTYLE VARIABLES
SKIP TO QUESTIONS 9-10: 1
HOW OFTEN DO YOU HAVE A DRINK CONTAINING ALCOHOL: NEVER
HOW MANY STANDARD DRINKS CONTAINING ALCOHOL DO YOU HAVE ON A TYPICAL DAY: PATIENT DOES NOT DRINK
AUDIT-C TOTAL SCORE: 0
AUDIT-C TOTAL SCORE: 0
HOW OFTEN DO YOU HAVE 6 OR MORE DRINKS ON ONE OCCASION: NEVER

## 2025-08-21 ASSESSMENT — PAIN SCALES - GENERAL
PAINLEVEL_OUTOF10: 0 - NO PAIN
PAINLEVEL_OUTOF10: 2
PAINLEVEL_OUTOF10: 4
PAINLEVEL_OUTOF10: 0 - NO PAIN
PAINLEVEL_OUTOF10: 4
PAINLEVEL_OUTOF10: 0 - NO PAIN
PAINLEVEL_OUTOF10: 0 - NO PAIN

## 2025-08-21 ASSESSMENT — ACTIVITIES OF DAILY LIVING (ADL)
ADL_ASSISTANCE: INDEPENDENT
WALKS IN HOME: INDEPENDENT
PATIENT'S MEMORY ADEQUATE TO SAFELY COMPLETE DAILY ACTIVITIES?: YES
GROOMING: INDEPENDENT
HEARING - LEFT EAR: FUNCTIONAL
ADEQUATE_TO_COMPLETE_ADL: YES
FEEDING YOURSELF: INDEPENDENT
BATHING_ASSISTANCE: MINIMAL
TOILETING: INDEPENDENT
BATHING: INDEPENDENT
HEARING - RIGHT EAR: FUNCTIONAL
DRESSING YOURSELF: INDEPENDENT
JUDGMENT_ADEQUATE_SAFELY_COMPLETE_DAILY_ACTIVITIES: YES

## 2025-08-22 ENCOUNTER — APPOINTMENT (OUTPATIENT)
Dept: RADIOLOGY | Facility: HOSPITAL | Age: 70
DRG: 220 | End: 2025-08-22
Payer: MEDICARE

## 2025-08-22 ASSESSMENT — COGNITIVE AND FUNCTIONAL STATUS - GENERAL
WALKING IN HOSPITAL ROOM: A LITTLE
HELP NEEDED FOR BATHING: A LITTLE
DAILY ACTIVITIY SCORE: 21
DRESSING REGULAR UPPER BODY CLOTHING: A LITTLE
DRESSING REGULAR LOWER BODY CLOTHING: A LITTLE
CLIMB 3 TO 5 STEPS WITH RAILING: A LITTLE
MOBILITY SCORE: 22

## 2025-08-22 ASSESSMENT — PAIN - FUNCTIONAL ASSESSMENT: PAIN_FUNCTIONAL_ASSESSMENT: 0-10

## 2025-08-22 ASSESSMENT — PAIN SCALES - GENERAL: PAINLEVEL_OUTOF10: 0 - NO PAIN

## 2025-08-23 ENCOUNTER — APPOINTMENT (OUTPATIENT)
Dept: CARDIOLOGY | Facility: HOSPITAL | Age: 70
DRG: 220 | End: 2025-08-23
Payer: MEDICARE

## 2025-08-23 ENCOUNTER — APPOINTMENT (OUTPATIENT)
Dept: RADIOLOGY | Facility: HOSPITAL | Age: 70
DRG: 220 | End: 2025-08-23
Payer: MEDICARE

## 2025-08-23 ASSESSMENT — PAIN SCALES - GENERAL
PAINLEVEL_OUTOF10: 0 - NO PAIN

## 2025-08-23 ASSESSMENT — PAIN - FUNCTIONAL ASSESSMENT
PAIN_FUNCTIONAL_ASSESSMENT: 0-10

## 2025-08-23 ASSESSMENT — COGNITIVE AND FUNCTIONAL STATUS - GENERAL
DAILY ACTIVITIY SCORE: 24
DAILY ACTIVITIY SCORE: 21
CLIMB 3 TO 5 STEPS WITH RAILING: A LITTLE
DRESSING REGULAR LOWER BODY CLOTHING: A LITTLE
MOBILITY SCORE: 24
DRESSING REGULAR UPPER BODY CLOTHING: A LITTLE
HELP NEEDED FOR BATHING: A LITTLE

## 2025-08-24 ENCOUNTER — APPOINTMENT (OUTPATIENT)
Dept: CARDIOLOGY | Facility: HOSPITAL | Age: 70
DRG: 220 | End: 2025-08-24
Payer: MEDICARE

## 2025-08-24 ASSESSMENT — COGNITIVE AND FUNCTIONAL STATUS - GENERAL
MOBILITY SCORE: 24
MOBILITY SCORE: 24
DAILY ACTIVITIY SCORE: 24
DAILY ACTIVITIY SCORE: 24

## 2025-08-24 ASSESSMENT — PAIN SCALES - GENERAL
PAINLEVEL_OUTOF10: 0 - NO PAIN

## 2025-08-24 ASSESSMENT — PAIN - FUNCTIONAL ASSESSMENT
PAIN_FUNCTIONAL_ASSESSMENT: 0-10

## 2025-08-25 ENCOUNTER — APPOINTMENT (OUTPATIENT)
Dept: CARDIOLOGY | Facility: HOSPITAL | Age: 70
DRG: 220 | End: 2025-08-25
Payer: MEDICARE

## 2025-08-25 ENCOUNTER — DOCUMENTATION (OUTPATIENT)
Dept: HOME HEALTH SERVICES | Facility: HOME HEALTH | Age: 70
End: 2025-08-25
Payer: MEDICARE

## 2025-08-25 DIAGNOSIS — Z95.2 S/P MVR (MITRAL VALVE REPLACEMENT): Primary | ICD-10-CM

## 2025-08-25 PROBLEM — R06.02 SHORTNESS OF BREATH: Status: RESOLVED | Noted: 2024-09-11 | Resolved: 2025-08-25

## 2025-08-25 PROBLEM — Z98.890 STATUS POST MITRAL VALVE REPAIR: Status: RESOLVED | Noted: 2024-01-28 | Resolved: 2025-08-25

## 2025-08-25 PROBLEM — I34.0 MITRAL VALVE INSUFFICIENCY, UNSPECIFIED ETIOLOGY: Status: RESOLVED | Noted: 2025-03-14 | Resolved: 2025-08-25

## 2025-08-26 ENCOUNTER — HOME CARE VISIT (OUTPATIENT)
Dept: HOME HEALTH SERVICES | Facility: HOME HEALTH | Age: 70
End: 2025-08-26
Payer: MEDICARE

## 2025-08-26 ENCOUNTER — PATIENT OUTREACH (OUTPATIENT)
Dept: PRIMARY CARE | Facility: CLINIC | Age: 70
End: 2025-08-26
Payer: MEDICARE

## 2025-08-26 PROCEDURE — G0299 HHS/HOSPICE OF RN EA 15 MIN: HCPCS

## 2025-08-28 ENCOUNTER — HOME CARE VISIT (OUTPATIENT)
Dept: HOME HEALTH SERVICES | Facility: HOME HEALTH | Age: 70
End: 2025-08-28
Payer: MEDICARE

## 2025-08-28 VITALS
RESPIRATION RATE: 16 BRPM | TEMPERATURE: 98.3 F | SYSTOLIC BLOOD PRESSURE: 110 MMHG | DIASTOLIC BLOOD PRESSURE: 60 MMHG | OXYGEN SATURATION: 96 % | HEART RATE: 80 BPM

## 2025-08-28 VITALS
DIASTOLIC BLOOD PRESSURE: 60 MMHG | SYSTOLIC BLOOD PRESSURE: 122 MMHG | HEART RATE: 68 BPM | RESPIRATION RATE: 16 BRPM | TEMPERATURE: 98.4 F

## 2025-08-28 PROCEDURE — G0151 HHCP-SERV OF PT,EA 15 MIN: HCPCS

## 2025-08-28 ASSESSMENT — BALANCE ASSESSMENTS
ATTEMPTS TO ARISE: 2 - ABLE TO RISE, ONE ATTEMPT
NUDGED SCORE: 2
EYES CLOSED AT MAXIMUM POSITION NUDGED: 1 - STEADY
BALANCE SCORE: 16
TURNING 360 DEGREES STEPS: 1 - CONTINUOUS STEPS
SITTING BALANCE: 1 - STEADY, SAFE
STANDING BALANCE: 2 - NARROW STANCE WITHOUT SUPPORT
ARISING SCORE: 2
SITTING DOWN: 2 - SAFE, SMOOTH MOTION
IMMEDIATE STANDING BALANCE FIRST 5 SECONDS: 2 - STEADY WITHOUT WALKER OR OTHER SUPPORT
ARISES: 2 - ABLE WITHOUT USING ARMS
NUDGED: 2 - STEADY

## 2025-08-28 ASSESSMENT — ENCOUNTER SYMPTOMS
CHANGE IN APPETITE: UNCHANGED
HIGHEST PAIN SEVERITY IN PAST 24 HOURS: 4/10
PAIN LOCATION - EXACERBATING FACTORS: ACTIVITY
PERSON REPORTING PAIN: PATIENT
PAIN SEVERITY GOAL: 0/10
HIGHEST PAIN SEVERITY IN PAST 24 HOURS: 2/10
PAIN: 1
PAIN: 1
APPETITE LEVEL: FAIR
FATIGUES EASILY: 1
PERSON REPORTING PAIN: PATIENT
AGGRESSION WITHIN DEFINED LIMITS: 1
LOWEST PAIN SEVERITY IN PAST 24 HOURS: 1/10
PAIN SEVERITY GOAL: 1/10
LAST BOWEL MOVEMENT: 67443
ANGER WITHIN DEFINED LIMITS: 1
PAIN LOCATION - PAIN SEVERITY: 1/10
PAIN LOCATION - RELIEVING FACTORS: REST
SLEEP QUALITY: FAIR
SUBJECTIVE PAIN PROGRESSION: GRADUALLY IMPROVING
LOWEST PAIN SEVERITY IN PAST 24 HOURS: 2/10

## 2025-08-28 ASSESSMENT — GAIT ASSESSMENTS
TRUNK: 2 - NO SWAY, NO FLEXION, NO USE OF ARMS, NO WALKING AID
STEP SYMMETRY: 1 - RIGHT AND LEFT STEP LENGTH APPEAR EQUAL
GAIT SCORE: 12
TRUNK SCORE: 2
WALKING STANCE: 1 - HEELS ALMOST TOUCHING WHILE WALKING
PATH: 2 - STRAIGHT WITHOUT WALKING AID
STEP CONTINUITY: 1 - STEPS APPEAR CONTINUOUS
PATH SCORE: 2
BALANCE AND GAIT SCORE: 28
INITIATION OF GAIT IMMEDIATELY AFTER GO: 1 - NO HESITANCY

## 2025-08-28 ASSESSMENT — PAIN SCALES - PAIN ASSESSMENT IN ADVANCED DEMENTIA (PAINAD)
BREATHING: 0
NEGVOCALIZATION: 0 - NONE.
TOTALSCORE: 0
BODYLANGUAGE: 0
FACIALEXPRESSION: 0
NEGVOCALIZATION: 0
CONSOLABILITY: 0
CONSOLABILITY: 0 - NO NEED TO CONSOLE.
BODYLANGUAGE: 0 - RELAXED.
FACIALEXPRESSION: 0 - SMILING OR INEXPRESSIVE.

## 2025-08-28 ASSESSMENT — ACTIVITIES OF DAILY LIVING (ADL)
ENTERING_EXITING_HOME: NEEDS ASSISTANCE
OASIS_M1830: 05

## 2025-08-29 DIAGNOSIS — Z95.2 S/P MVR (MITRAL VALVE REPLACEMENT): ICD-10-CM

## 2025-09-03 ENCOUNTER — HOME CARE VISIT (OUTPATIENT)
Dept: HOME HEALTH SERVICES | Facility: HOME HEALTH | Age: 70
End: 2025-09-03
Payer: MEDICARE

## 2025-09-03 VITALS
SYSTOLIC BLOOD PRESSURE: 136 MMHG | TEMPERATURE: 98.7 F | RESPIRATION RATE: 16 BRPM | DIASTOLIC BLOOD PRESSURE: 62 MMHG | HEART RATE: 75 BPM

## 2025-09-03 PROCEDURE — G0151 HHCP-SERV OF PT,EA 15 MIN: HCPCS

## 2025-09-03 ASSESSMENT — ENCOUNTER SYMPTOMS
DENIES PAIN: 1
HIGHEST PAIN SEVERITY IN PAST 24 HOURS: 0/10
PAIN SEVERITY GOAL: 0/10
PERSON REPORTING PAIN: PATIENT
LOWEST PAIN SEVERITY IN PAST 24 HOURS: 0/10

## 2025-09-03 ASSESSMENT — ACTIVITIES OF DAILY LIVING (ADL)
OASIS_M1830: 00
HOME_HEALTH_OASIS: 00

## 2025-09-05 ENCOUNTER — HOSPITAL ENCOUNTER (OUTPATIENT)
Dept: CARDIOLOGY | Facility: HOSPITAL | Age: 70
Discharge: HOME | End: 2025-09-05
Payer: MEDICARE

## 2025-09-05 DIAGNOSIS — I05.9 RHEUMATIC MITRAL VALVE DISEASE, UNSPECIFIED: ICD-10-CM

## 2025-09-05 DIAGNOSIS — Z95.2 S/P MVR (MITRAL VALVE REPLACEMENT): ICD-10-CM

## 2025-09-05 PROCEDURE — 93306 TTE W/DOPPLER COMPLETE: CPT

## 2025-09-06 LAB
AORTIC VALVE MEAN GRADIENT: 12 MMHG
AORTIC VALVE PEAK VELOCITY: 2.45 M/S
AV PEAK GRADIENT: 24 MMHG
AVA (PEAK VEL): 2.35 CM2
AVA (VTI): 2.56 CM2
EJECTION FRACTION APICAL 4 CHAMBER: 69.8
EJECTION FRACTION: 70 %
LEFT ATRIUM VOLUME AREA LENGTH INDEX BSA: 38.7 ML/M2
LEFT VENTRICLE INTERNAL DIMENSION DIASTOLE: 5.09 CM (ref 3.5–6)
LEFT VENTRICULAR OUTFLOW TRACT DIAMETER: 2.04 CM
MITRAL VALVE E/A RATIO: 0.93
MITRAL VALVE E/E' RATIO: 19.07
RIGHT VENTRICLE FREE WALL PEAK S': 7.9 CM/S
RIGHT VENTRICLE PEAK SYSTOLIC PRESSURE: 17 MMHG
TRICUSPID ANNULAR PLANE SYSTOLIC EXCURSION: 1.1 CM

## 2025-09-30 ENCOUNTER — APPOINTMENT (OUTPATIENT)
Dept: CARDIOLOGY | Facility: HOSPITAL | Age: 70
End: 2025-09-30
Payer: MEDICARE

## (undated) DEVICE — ADAPTER, Y, CORONARY PERFUSION

## (undated) DEVICE — CONNECTOR, STRAIGHT, 0.375 X 0.375 IN

## (undated) DEVICE — SUTURE, PROLENE, 3-0, 36 IN, SH, DA, BLUE

## (undated) DEVICE — SUTURE, PROLENE, 4-0, TAPER POINT, SH/SH BLUE 36IN

## (undated) DEVICE — SUTURE, ETHIBOND, 2-0, 30 IN, SH, DA, MULTIPACK, GREEN/WHITE

## (undated) DEVICE — FILTER, IV, BLOOD, MICROAGGREGATE, 40 MIC, RBC TRANSFUSION

## (undated) DEVICE — TUBING PACK, OXYGENATOR, ADULT

## (undated) DEVICE — SUTURE, VICRYL, 3-0, 18 IN, PS2, UNDYED

## (undated) DEVICE — TRAY, MINOR, SINGLE BASIN, STERILE

## (undated) DEVICE — BAND, VASCULAR, RADIAL HEMOSTAT, REGULAR 24CM

## (undated) DEVICE — CATHETER, WEDGE PRESSURE, BALLOON, DOUBLE LUMEN, 5 FR, 110 CM

## (undated) DEVICE — Device

## (undated) DEVICE — SUTURE, VICRYL, 4-0, 27 IN, KS, UNDYED

## (undated) DEVICE — CATHETER, DIAGNOSTIC, 4 FR-JR 4

## (undated) DEVICE — CANNULA, CARDIAC SUMP

## (undated) DEVICE — GUIDEWIRE, STRAIGHT, HI-TORQUE BALANCE MIDDLEWEIGHT, 0.014 IN X 190 CM, HYDROCOAT

## (undated) DEVICE — DRESSING, MEPILEX, BORDER, SACRUM, 8.7 X 9.8 IN

## (undated) DEVICE — KIT, CELL SAVER, W/COLLECTION SET, 225ML WASH SET

## (undated) DEVICE — INTRODUCER, GLIDESHEATH SLENDER A-KIT, 5FR 10CM

## (undated) DEVICE — LOADS, COR-KNOT (6PK)

## (undated) DEVICE — SUTURE, SURGICAL STEEL, STERNUM 7, 18 IN, KV40, SINGL-WIRE

## (undated) DEVICE — MICROCOAGULATION TEST, ACT+ TEST CUVETTE

## (undated) DEVICE — SUTURE, PROLENE, 5-0, 36 IN, C1, DA, BLUE

## (undated) DEVICE — DRAPE, INSTRUMENT, W/POUCH, STERI DRAPE, 7 X 11 IN, DISPOSABLE, STERILE

## (undated) DEVICE — MONITORING KIT, TRANSDUCER, RETROGRADE, MPS, W/EXTENSION, LF

## (undated) DEVICE — DRESSING, ADHESIVE, ISLAND, TELFA, 4 X 14 IN

## (undated) DEVICE — SINGLES, QLU, COR-KNOT

## (undated) DEVICE — TIP, SUCTION, YANKAUER, W/O VENT, FLEXIBLE, OPEN TIP, HIGH CAPACITY

## (undated) DEVICE — CONNECTOR, Y, 0.375 X 0.375 X 0.5 IN

## (undated) DEVICE — SUTURE, SILK, 1, 30 IN, LABYRINTH, BLACK

## (undated) DEVICE — GLOVE, SURGICAL, PROTEXIS PI MICRO, 7.5, PF, LF

## (undated) DEVICE — TUBING, 0.375 X 3/32 IN X 6 FT

## (undated) DEVICE — BIT, DRILL, QUICK-COUPLING, 2 X 100 MM, STAINLESS STEEL

## (undated) DEVICE — CLEANER, ELECTROSURGICAL, TIP, 5 X 5 CM, LF

## (undated) DEVICE — MAYO TRAY, SMALL

## (undated) DEVICE — FOOT SWITCH, PENCIL, W/HOLSTER, LONGER CORD

## (undated) DEVICE — INSERT, CLAMP, SURGICAL, SOFT/TRACTION, STEALTH, 5 MM

## (undated) DEVICE — SPONGE, LAP, XRAY DECT, 18IN X 18IN, W/MASTER DMT, STERILE

## (undated) DEVICE — SUTURE, ETHIBOND, XTRA, 30 IN, 0, CT-1, GREEN

## (undated) DEVICE — COVER, CART, 45 X 27 X 48 IN, CLEAR

## (undated) DEVICE — KIT, TOURNIQUET, 7"

## (undated) DEVICE — CLIPPER, SURGICAL BLADE ASSEMBLY, GENERAL PURPOSE, SINGLE USE

## (undated) DEVICE — GUIDEWIRE, HI-TORQUE, VERSACORE, 145CM, FLOPPY

## (undated) DEVICE — DRAPE KIT, C-ARM, W/ PLATE & FOOT, DISP

## (undated) DEVICE — DRESSING, ADHESIVE, ISLAND, TELFA, 2 X 3.75 IN, LF

## (undated) DEVICE — SUTURE, VICRYL 2-0, TAPER POINT, CT-1 UNDYED 27 INCH

## (undated) DEVICE — PERFUSION SERVICES

## (undated) DEVICE — SUTURE, PROLENE, 6-0, 30 IN, C-1, CV-11, BLUE

## (undated) DEVICE — HANDPIECE, ABC, SINGLE FUNCTION, MALLEABLE, W/CORD, BEND-A-BEAM, 3 IN, LF

## (undated) DEVICE — CATHETER, DRAINAGE, NASOGASTRIC, DOUBLE LUMEN, FUNNEL END, SUMP, SALEM, 18 FR, 48 IN, PVC, STERILE

## (undated) DEVICE — CATHETER, THORACIC, STRAIGHT, SOFT, TAPER TIP, 32 FR

## (undated) DEVICE — OXYGENATOR FX 25, W/HR, ARTERIAL FILTER

## (undated) DEVICE — GUIDEWIRE, INQWIRE, 3MM J, .035, 260

## (undated) DEVICE — GLOVE, SURGICAL, PROTEXIS PI BLUE W/NEUTHERA, 7.5, PF, LF

## (undated) DEVICE — SUTURE, PROLENE, 3-0, 48 IN, SH, DA, BLUE

## (undated) DEVICE — SHUNT, SENSOR

## (undated) DEVICE — SPONGE, HEMOSTATIC, GELATIN, SURGIFOAM, 8 X 12.5 CM X 10 MM

## (undated) DEVICE — CATHETER, THORACIC, STRAIGHT, ADULT, 28 FR, PVC

## (undated) DEVICE — SUTURE, VICRYL, 2-0, 27 IN, CT-1, VIOLET

## (undated) DEVICE — CATHETER, SWAN GANZ, DBL LUMEN, MONITORING, 5 FR

## (undated) DEVICE — CANNULA, VENOUS 28 FR X 38CM 1-STG, BULLET-TIP

## (undated) DEVICE — KIT, COR-KNOT MINI COMBO

## (undated) DEVICE — CATHETER, ANGIO, IMPULSE, FL3.5, 5 FR X 100 CM

## (undated) DEVICE — SPONGE, GAUZE, XRAY DECT, 16 PLY, 4 X 4, W/MASTER DMT,STERILE

## (undated) DEVICE — GOWN, ASTOUND, L

## (undated) DEVICE — SUTURE, ETHILON, 4-0, BLK, MONO, PS-2 18

## (undated) DEVICE — APPLICATOR, CHLORAPREP, W/ORANGE TINT, 26ML

## (undated) DEVICE — MANIFOLD, 4 PORT NEPTUNE STANDARD

## (undated) DEVICE — TR BAND, RADIAL COMPRESSION, STANDARD, 24CM

## (undated) DEVICE — CANNULA, EZ GLIDE 24FR

## (undated) DEVICE — SUTURE, ETHIBOND, XTRA, 30 IN, 0, CTX, GREEN

## (undated) DEVICE — DRESSING, MEPILEX, BORDER, HEEL, 8.7 X 9.1 IN

## (undated) DEVICE — BONE WAX, 3.5G ABSORBABLE, OSTENE

## (undated) DEVICE — CASSETTE, BLOOD, PLEGIC SET

## (undated) DEVICE — COUNTER, NEEDLE, FOAM BLOCK, POP-N-COUNT, W/BLADEGUARD, W/ADHESIVE 40 COUNT, RED

## (undated) DEVICE — IMPLANTABLE DEVICE: Type: IMPLANTABLE DEVICE | Site: WRIST | Status: NON-FUNCTIONAL

## (undated) DEVICE — SYRINGE, 60 CC, IRRIGATION, BULB, CONTRO-BULB, PAPER POUCH

## (undated) DEVICE — SUTURE, VICRYL 0, TAPER POINT, CT-1 VIOLET 27 INCH

## (undated) DEVICE — DRAPE, SHEET, THREE QUARTER, FAN FOLD, 57 X 77 IN

## (undated) DEVICE — DRILL BIT, 1.8MM W/DEPTH MARKER/QC/110MM

## (undated) DEVICE — PACING WIRE, 1/2 CIRCLE, 26MM NEEDLE, WHITE

## (undated) DEVICE — TAPE, POLYESTER, BRAIDED, PRE-CUT 2 X 30, WHITE"

## (undated) DEVICE — SUTURE, PROLENE, 4-0, 36 IN, BB, BLUE

## (undated) DEVICE — SUTURE, SILK, 2-0, 30 IN, SH, CONTROL RELEASE, MULTIPACK, BLACK

## (undated) DEVICE — PLEDGET, PTFE, SOFT, LARGE, 3/8 X 3/16 X 1/16 IN

## (undated) DEVICE — CATHETER, URETHRAL, ROBNEL, 16 FR, 16 IN, LF, RED

## (undated) DEVICE — WIRE, KIRSCHNER, TROCAR POINT, 1.25 X 150 MM, STAINLESS STEEL

## (undated) DEVICE — SUTURE, VICRYL, 4-0, 18 IN, PS2, UNDYED

## (undated) DEVICE — DRAPE, SHEET, CARDIOVASCULAR, ANTIMICROBIAL, W/ANESTHESIA SCREEN, IOBAN 2, STERI DRAPE, 107 X 133 IN, DISPOSABLE, FABRIC, BLUE, STERILE

## (undated) DEVICE — DRESSING, ISLAND, TELFA, 4 X 5 IN

## (undated) DEVICE — MARKER, SKIN, DUAL TIP INK W/9 LABEL AND REMOVABLE TIME OUT SLEEVE

## (undated) DEVICE — BANDAGE, ELASTIC, MATRIX, SELF-CLOSURE, 3 IN X 5 YD, LF